# Patient Record
Sex: MALE | Race: WHITE | NOT HISPANIC OR LATINO | Employment: OTHER | URBAN - METROPOLITAN AREA
[De-identification: names, ages, dates, MRNs, and addresses within clinical notes are randomized per-mention and may not be internally consistent; named-entity substitution may affect disease eponyms.]

---

## 2021-03-28 ENCOUNTER — HOSPITAL ENCOUNTER (EMERGENCY)
Facility: HOSPITAL | Age: 71
Discharge: HOME | End: 2021-03-28
Attending: EMERGENCY MEDICINE
Payer: MEDICARE

## 2021-03-28 VITALS
WEIGHT: 170 LBS | TEMPERATURE: 97.4 F | RESPIRATION RATE: 20 BRPM | OXYGEN SATURATION: 96 % | DIASTOLIC BLOOD PRESSURE: 81 MMHG | HEART RATE: 82 BPM | SYSTOLIC BLOOD PRESSURE: 161 MMHG

## 2021-03-28 DIAGNOSIS — R31.29 OTHER MICROSCOPIC HEMATURIA: ICD-10-CM

## 2021-03-28 DIAGNOSIS — I10 HYPERTENSION, UNSPECIFIED TYPE: ICD-10-CM

## 2021-03-28 DIAGNOSIS — R34 DECREASED URINATION: Primary | ICD-10-CM

## 2021-03-28 LAB
ANION GAP SERPL CALCULATED.3IONS-SCNC: 3 MMOL/L (ref 4–13)
BACTERIA UR QL AUTO: ABNORMAL /HPF
BILIRUB UR QL STRIP: NEGATIVE
BUN SERPL-MCNC: 18 MG/DL (ref 5–25)
CALCIUM SERPL-MCNC: 9.2 MG/DL (ref 8.3–10.1)
CHLORIDE SERPL-SCNC: 106 MMOL/L (ref 100–108)
CLARITY UR: ABNORMAL
CO2 SERPL-SCNC: 32 MMOL/L (ref 21–32)
COLOR UR: YELLOW
CREAT SERPL-MCNC: 1.08 MG/DL (ref 0.6–1.3)
GFR SERPL CREATININE-BSD FRML MDRD: 70 ML/MIN/1.73SQ M
GLUCOSE SERPL-MCNC: 112 MG/DL (ref 65–140)
GLUCOSE UR STRIP-MCNC: NEGATIVE MG/DL
HGB UR QL STRIP.AUTO: ABNORMAL
KETONES UR STRIP-MCNC: NEGATIVE MG/DL
LEUKOCYTE ESTERASE UR QL STRIP: NEGATIVE
MUCOUS THREADS UR QL AUTO: ABNORMAL
NITRITE UR QL STRIP: POSITIVE
NON-SQ EPI CELLS URNS QL MICRO: ABNORMAL /HPF
PH UR STRIP.AUTO: 7 [PH]
POTASSIUM SERPL-SCNC: 4.9 MMOL/L (ref 3.5–5.3)
PROT UR STRIP-MCNC: ABNORMAL MG/DL
RBC #/AREA URNS AUTO: ABNORMAL /HPF
SODIUM SERPL-SCNC: 141 MMOL/L (ref 136–145)
SP GR UR STRIP.AUTO: >=1.03 (ref 1–1.03)
UROBILINOGEN UR QL STRIP.AUTO: 0.2 E.U./DL
WBC #/AREA URNS AUTO: ABNORMAL /HPF

## 2021-03-28 PROCEDURE — 81001 URINALYSIS AUTO W/SCOPE: CPT | Performed by: EMERGENCY MEDICINE

## 2021-03-28 PROCEDURE — 99282 EMERGENCY DEPT VISIT SF MDM: CPT | Performed by: EMERGENCY MEDICINE

## 2021-03-28 PROCEDURE — 99283 EMERGENCY DEPT VISIT LOW MDM: CPT

## 2021-03-28 PROCEDURE — 36415 COLL VENOUS BLD VENIPUNCTURE: CPT | Performed by: EMERGENCY MEDICINE

## 2021-03-28 PROCEDURE — 80048 BASIC METABOLIC PNL TOTAL CA: CPT | Performed by: EMERGENCY MEDICINE

## 2021-03-28 PROCEDURE — 87086 URINE CULTURE/COLONY COUNT: CPT | Performed by: EMERGENCY MEDICINE

## 2021-03-28 RX ORDER — TAMSULOSIN HYDROCHLORIDE 0.4 MG/1
0.4 CAPSULE ORAL
Qty: 30 CAPSULE | Refills: 6 | Status: SHIPPED | OUTPATIENT
Start: 2021-03-28 | End: 2021-04-07

## 2021-03-28 RX ORDER — TAMSULOSIN HYDROCHLORIDE 0.4 MG/1
0.4 CAPSULE ORAL ONCE
Status: COMPLETED | OUTPATIENT
Start: 2021-03-28 | End: 2021-03-28

## 2021-03-28 RX ADMIN — TAMSULOSIN HYDROCHLORIDE 0.4 MG: 0.4 CAPSULE ORAL at 08:53

## 2021-03-28 NOTE — ED PROVIDER NOTES
History  Chief Complaint   Patient presents with    Urinary Retention     pt c/o urinary retention starting last night     78 yr male- with hx of decreasing urine stream -- no no meds- c/op since last night- with  Urinary urgency withg dribbling ---   No abd/ flank pain -- no fevers/ no n/v- no suprpubic pressure- pain -- no dysuria- no low back pain- weight loss--       History provided by:  Patient   used: No        None       Past Medical History:   Diagnosis Date    Hypertension        Past Surgical History:   Procedure Laterality Date    HERNIA REPAIR         History reviewed  No pertinent family history  I have reviewed and agree with the history as documented  E-Cigarette/Vaping     E-Cigarette/Vaping Substances     Social History     Tobacco Use    Smoking status: Former Smoker    Tobacco comment: quit Wednesday   Substance Use Topics    Alcohol use: Never     Frequency: Never    Drug use: Never       Review of Systems   Constitutional: Negative  HENT: Negative  Eyes: Negative  Respiratory: Negative  Cardiovascular: Negative  Gastrointestinal: Negative  Endocrine: Negative  Genitourinary: Positive for decreased urine volume  Negative for difficulty urinating, discharge, dysuria, enuresis, flank pain, frequency, genital sores, hematuria, penile pain, penile swelling, scrotal swelling, testicular pain and urgency  Musculoskeletal: Negative  Skin: Negative  Allergic/Immunologic: Negative  Neurological: Negative  Hematological: Negative  Psychiatric/Behavioral: Negative  Physical Exam  Physical Exam  Vitals signs and nursing note reviewed  Constitutional:       General: He is not in acute distress  Appearance: Normal appearance  He is not ill-appearing, toxic-appearing or diaphoretic        Comments: avss-- htnsive- pulse ox  96 % on ra- interpretation is normal- no intervention- well appearing- in nad    HENT:      Head: Normocephalic and atraumatic  Nose: Nose normal       Mouth/Throat:      Mouth: Mucous membranes are moist    Eyes:      General: No scleral icterus  Right eye: No discharge  Left eye: No discharge  Extraocular Movements: Extraocular movements intact  Conjunctiva/sclera: Conjunctivae normal       Pupils: Pupils are equal, round, and reactive to light  Comments: Mm pink   Neck:      Musculoskeletal: Normal range of motion and neck supple  No neck rigidity or muscular tenderness  Vascular: No carotid bruit  Comments: No pmt c/t/l/s spine   Cardiovascular:      Rate and Rhythm: Normal rate and regular rhythm  Pulses: Normal pulses  Heart sounds: Normal heart sounds  No murmur  No friction rub  No gallop  Pulmonary:      Effort: Pulmonary effort is normal  No respiratory distress  Breath sounds: Normal breath sounds  No stridor  No wheezing, rhonchi or rales  Chest:      Chest wall: No tenderness  Abdominal:      General: Bowel sounds are normal  There is no distension  Palpations: Abdomen is soft  There is no mass  Tenderness: There is no abdominal tenderness  There is no right CVA tenderness, left CVA tenderness, guarding or rebound  Hernia: No hernia is present  Comments: Soft nt/nd- no hsm- no cva tenderness- no peritoneal signs-- no pulsatile abd mass/bruit/ tenderness   Genitourinary:     Comments: enlarged- nt- non boggy prostate- no masses  Musculoskeletal: Normal range of motion  General: No swelling, tenderness, deformity or signs of injury  Right lower leg: Edema present  Left lower leg: Edema present  Comments: Trace ble pretibial edema- nt- no asym/ erythema   Lymphadenopathy:      Cervical: No cervical adenopathy  Skin:     General: Skin is warm  Capillary Refill: Capillary refill takes less than 2 seconds  Coloration: Skin is not jaundiced or pale        Findings: No bruising, erythema or lesion  Neurological:      General: No focal deficit present  Mental Status: He is alert and oriented to person, place, and time  Mental status is at baseline  Cranial Nerves: No cranial nerve deficit  Sensory: No sensory deficit  Motor: No weakness  Coordination: Coordination normal       Gait: Gait normal       Comments: Non focal neuro exam    Psychiatric:         Mood and Affect: Mood normal          Behavior: Behavior normal          Vital Signs  ED Triage Vitals   Temperature Pulse Respirations Blood Pressure SpO2   03/28/21 0710 03/28/21 0710 03/28/21 0710 03/28/21 0710 03/28/21 0711   (!) 97 4 °F (36 3 °C) 82 20 161/81 96 %      Temp Source Heart Rate Source Patient Position - Orthostatic VS BP Location FiO2 (%)   03/28/21 0710 03/28/21 0710 -- -- --   Oral Monitor         Pain Score       --                  Vitals:    03/28/21 0710 03/28/21 0715   BP: 161/81 161/81   Pulse: 82          Visual Acuity      ED Medications  Medications - No data to display    Diagnostic Studies  Results Reviewed     Procedure Component Value Units Date/Time    UA (URINE) with reflex to Scope [[id number]] Collected: 03/28/21 0743    Lab Status: In process Specimen: Urine, Clean Catch Updated: 03/28/21 3001    Basic metabolic panel [[id number]] Collected: 03/28/21 0742    Lab Status:  In process Specimen: Blood from Arm, Right Updated: 03/28/21 0746                 No orders to display              Procedures  Procedures         ED Course  ED Course as of Apr 01 1250   Sun Mar 28, 2021   0750 Er md note- bladder scan 33 ml       0849 Er md note- bladder u/s by  er md- non enlarged bladder-  with prostate indenting bladder- no  free fluid- printer out of paper                                              MDM    Disposition  Final diagnoses:   None     ED Disposition     None      Follow-up Information    None         Patient's Medications    No medications on file     No discharge procedures on file     PDMP Review     None          ED Provider  Electronically Signed by           Nisha Freeman MD  04/01/21 4305

## 2021-03-28 NOTE — DISCHARGE INSTRUCTIONS
Decreased urination-- likely do to enlarged prostate / elevated blood p ressure-  160/80/ microscopic hematuria -blood in urine    - flomax- 1 capsule once a day --  can cause low blood pressure  upon standing- causing dizziness/ lightheadedness- so no fast movements- position changes     - please call the internal medicine clinic on Monday to schedule an appointment-- -- within 1 week - you will need your blood pressure rechecked and to see if the medicine is improving your urine stream - they might want to send y ou to a urologist     - please return to  the er for any fevers- temp > 100 4/ any flank pain/ persistent vomiting- these wound be signs of an kidney infection  or any decreasing urination with increasing lower abdominal/bladder area pain/ pressure/ distention- this would be urinary retention

## 2021-03-29 LAB — BACTERIA UR CULT: NORMAL

## 2023-01-01 ENCOUNTER — NURSING HOME VISIT (OUTPATIENT)
Dept: WOUND CARE | Facility: HOSPITAL | Age: 73
End: 2023-01-01

## 2023-01-01 ENCOUNTER — NURSING HOME VISIT (OUTPATIENT)
Dept: GERIATRICS | Facility: OTHER | Age: 73
End: 2023-01-01

## 2023-01-01 ENCOUNTER — TELEPHONE (OUTPATIENT)
Dept: OTHER | Facility: OTHER | Age: 73
End: 2023-01-01

## 2023-01-01 VITALS — WEIGHT: 123.2 LBS | BODY MASS INDEX: 19.89 KG/M2

## 2023-01-01 VITALS
DIASTOLIC BLOOD PRESSURE: 60 MMHG | RESPIRATION RATE: 18 BRPM | HEART RATE: 73 BPM | SYSTOLIC BLOOD PRESSURE: 100 MMHG | TEMPERATURE: 97.9 F | OXYGEN SATURATION: 96 %

## 2023-01-01 DIAGNOSIS — G93.89 BRAIN MASS: ICD-10-CM

## 2023-01-01 DIAGNOSIS — R53.81 PHYSICAL DECONDITIONING: ICD-10-CM

## 2023-01-01 DIAGNOSIS — C34.91 NON-SMALL CELL CARCINOMA OF RIGHT LUNG (HCC): ICD-10-CM

## 2023-01-01 DIAGNOSIS — I10 HYPERTENSION, UNSPECIFIED TYPE: ICD-10-CM

## 2023-01-01 DIAGNOSIS — R53.81 PHYSICAL DECONDITIONING: Primary | ICD-10-CM

## 2023-01-01 DIAGNOSIS — R26.2 AMBULATORY DYSFUNCTION: ICD-10-CM

## 2023-01-01 DIAGNOSIS — C34.91 NON-SMALL CELL CARCINOMA OF RIGHT LUNG (HCC): Primary | ICD-10-CM

## 2023-01-01 DIAGNOSIS — E43 SEVERE PROTEIN-CALORIE MALNUTRITION (HCC): ICD-10-CM

## 2023-01-01 DIAGNOSIS — T14.8XXA ABRASION: Primary | ICD-10-CM

## 2023-01-01 DIAGNOSIS — J96.01 ACUTE RESPIRATORY FAILURE WITH HYPOXIA (HCC): ICD-10-CM

## 2023-01-01 RX ORDER — LORAZEPAM 2 MG/ML
0.25 CONCENTRATE ORAL EVERY 2 HOUR PRN
COMMUNITY

## 2023-01-01 RX ORDER — ACETAMINOPHEN 650 MG/1
650 SUPPOSITORY RECTAL EVERY 4 HOURS PRN
COMMUNITY

## 2023-01-01 RX ORDER — MORPHINE SULFATE 20 MG/5ML
0.25 SOLUTION ORAL EVERY 2 HOUR PRN
COMMUNITY

## 2023-03-13 ENCOUNTER — HOSPITAL ENCOUNTER (EMERGENCY)
Facility: HOSPITAL | Age: 73
End: 2023-03-14
Attending: EMERGENCY MEDICINE

## 2023-03-13 DIAGNOSIS — C79.31 METASTASIS TO BRAIN (HCC): ICD-10-CM

## 2023-03-13 DIAGNOSIS — R27.0 ATAXIA: ICD-10-CM

## 2023-03-13 DIAGNOSIS — R91.8 MASS OF RIGHT LUNG: Primary | ICD-10-CM

## 2023-03-13 DIAGNOSIS — R47.9 SPEECH DISTURBANCE: ICD-10-CM

## 2023-03-13 LAB
BASOPHILS # BLD AUTO: 0.04 THOUSANDS/ÂΜL (ref 0–0.1)
BASOPHILS NFR BLD AUTO: 0 % (ref 0–1)
EOSINOPHIL # BLD AUTO: 0.02 THOUSAND/ÂΜL (ref 0–0.61)
EOSINOPHIL NFR BLD AUTO: 0 % (ref 0–6)
ERYTHROCYTE [DISTWIDTH] IN BLOOD BY AUTOMATED COUNT: 14.3 % (ref 11.6–15.1)
HCT VFR BLD AUTO: 35.4 % (ref 36.5–49.3)
HGB BLD-MCNC: 11.1 G/DL (ref 12–17)
IMM GRANULOCYTES # BLD AUTO: 0.04 THOUSAND/UL (ref 0–0.2)
IMM GRANULOCYTES NFR BLD AUTO: 0 % (ref 0–2)
LYMPHOCYTES # BLD AUTO: 1.05 THOUSANDS/ÂΜL (ref 0.6–4.47)
LYMPHOCYTES NFR BLD AUTO: 11 % (ref 14–44)
MCH RBC QN AUTO: 27.3 PG (ref 26.8–34.3)
MCHC RBC AUTO-ENTMCNC: 31.4 G/DL (ref 31.4–37.4)
MCV RBC AUTO: 87 FL (ref 82–98)
MONOCYTES # BLD AUTO: 0.98 THOUSAND/ÂΜL (ref 0.17–1.22)
MONOCYTES NFR BLD AUTO: 10 % (ref 4–12)
NEUTROPHILS # BLD AUTO: 7.89 THOUSANDS/ÂΜL (ref 1.85–7.62)
NEUTS SEG NFR BLD AUTO: 79 % (ref 43–75)
NRBC BLD AUTO-RTO: 0 /100 WBCS
PLATELET # BLD AUTO: 370 THOUSANDS/UL (ref 149–390)
PMV BLD AUTO: 10 FL (ref 8.9–12.7)
RBC # BLD AUTO: 4.07 MILLION/UL (ref 3.88–5.62)
WBC # BLD AUTO: 10.02 THOUSAND/UL (ref 4.31–10.16)

## 2023-03-14 ENCOUNTER — APPOINTMENT (EMERGENCY)
Dept: CT IMAGING | Facility: HOSPITAL | Age: 73
End: 2023-03-14

## 2023-03-14 ENCOUNTER — APPOINTMENT (EMERGENCY)
Dept: RADIOLOGY | Facility: HOSPITAL | Age: 73
End: 2023-03-14

## 2023-03-14 ENCOUNTER — HOSPITAL ENCOUNTER (INPATIENT)
Facility: HOSPITAL | Age: 73
LOS: 5 days | Discharge: HOME HEALTH | End: 2023-03-19
Attending: EMERGENCY MEDICINE | Admitting: EMERGENCY MEDICINE

## 2023-03-14 ENCOUNTER — APPOINTMENT (EMERGENCY)
Dept: MRI IMAGING | Facility: HOSPITAL | Age: 73
End: 2023-03-14

## 2023-03-14 VITALS
DIASTOLIC BLOOD PRESSURE: 86 MMHG | HEART RATE: 78 BPM | RESPIRATION RATE: 18 BRPM | SYSTOLIC BLOOD PRESSURE: 131 MMHG | OXYGEN SATURATION: 98 % | TEMPERATURE: 98.1 F

## 2023-03-14 DIAGNOSIS — G93.89 BRAIN MASS: Primary | ICD-10-CM

## 2023-03-14 DIAGNOSIS — R91.8 LUNG MASS: ICD-10-CM

## 2023-03-14 DIAGNOSIS — R91.8 RIGHT LOWER LOBE LUNG MASS: ICD-10-CM

## 2023-03-14 PROBLEM — I10 HYPERTENSION: Status: ACTIVE | Noted: 2023-03-14

## 2023-03-14 LAB
2HR DELTA HS TROPONIN: -1 NG/L
ALBUMIN SERPL BCP-MCNC: 3.4 G/DL (ref 3.5–5)
ALP SERPL-CCNC: 53 U/L (ref 34–104)
ALT SERPL W P-5'-P-CCNC: 8 U/L (ref 7–52)
ANION GAP SERPL CALCULATED.3IONS-SCNC: 10 MMOL/L (ref 4–13)
AST SERPL W P-5'-P-CCNC: 9 U/L (ref 13–39)
BACTERIA UR QL AUTO: ABNORMAL /HPF
BILIRUB SERPL-MCNC: 0.44 MG/DL (ref 0.2–1)
BILIRUB UR QL STRIP: NEGATIVE
BUN SERPL-MCNC: 20 MG/DL (ref 5–25)
CALCIUM ALBUM COR SERPL-MCNC: 10.4 MG/DL (ref 8.3–10.1)
CALCIUM SERPL-MCNC: 9.9 MG/DL (ref 8.4–10.2)
CARDIAC TROPONIN I PNL SERPL HS: 7 NG/L
CARDIAC TROPONIN I PNL SERPL HS: 8 NG/L
CHLORIDE SERPL-SCNC: 101 MMOL/L (ref 96–108)
CLARITY UR: CLEAR
CO2 SERPL-SCNC: 25 MMOL/L (ref 21–32)
COLOR UR: ABNORMAL
CREAT SERPL-MCNC: 0.85 MG/DL (ref 0.6–1.3)
GFR SERPL CREATININE-BSD FRML MDRD: 88 ML/MIN/1.73SQ M
GLUCOSE SERPL-MCNC: 108 MG/DL (ref 65–140)
GLUCOSE UR STRIP-MCNC: NEGATIVE MG/DL
HGB UR QL STRIP.AUTO: NEGATIVE
KETONES UR STRIP-MCNC: NEGATIVE MG/DL
LEUKOCYTE ESTERASE UR QL STRIP: ABNORMAL
MAGNESIUM SERPL-MCNC: 2.1 MG/DL (ref 1.9–2.7)
MUCOUS THREADS UR QL AUTO: ABNORMAL
NITRITE UR QL STRIP: NEGATIVE
NON-SQ EPI CELLS URNS QL MICRO: ABNORMAL /HPF
PH UR STRIP.AUTO: 6 [PH]
POTASSIUM SERPL-SCNC: 3.8 MMOL/L (ref 3.5–5.3)
PROT SERPL-MCNC: 6.6 G/DL (ref 6.4–8.4)
PROT UR STRIP-MCNC: ABNORMAL MG/DL
RBC #/AREA URNS AUTO: ABNORMAL /HPF
SODIUM SERPL-SCNC: 136 MMOL/L (ref 135–147)
SP GR UR STRIP.AUTO: 1.04 (ref 1–1.03)
TSH SERPL DL<=0.05 MIU/L-ACNC: 2.13 UIU/ML (ref 0.45–4.5)
UROBILINOGEN UR STRIP-ACNC: <2 MG/DL
WBC #/AREA URNS AUTO: ABNORMAL /HPF

## 2023-03-14 RX ORDER — LABETALOL HYDROCHLORIDE 5 MG/ML
10 INJECTION, SOLUTION INTRAVENOUS EVERY 6 HOURS PRN
Status: DISCONTINUED | OUTPATIENT
Start: 2023-03-14 | End: 2023-03-19 | Stop reason: HOSPADM

## 2023-03-14 RX ORDER — DEXAMETHASONE SODIUM PHOSPHATE 4 MG/ML
10 INJECTION, SOLUTION INTRA-ARTICULAR; INTRALESIONAL; INTRAMUSCULAR; INTRAVENOUS; SOFT TISSUE EVERY 6 HOURS SCHEDULED
Status: DISCONTINUED | OUTPATIENT
Start: 2023-03-14 | End: 2023-03-14 | Stop reason: HOSPADM

## 2023-03-14 RX ORDER — AMOXICILLIN 250 MG
1 CAPSULE ORAL
Status: DISCONTINUED | OUTPATIENT
Start: 2023-03-14 | End: 2023-03-19 | Stop reason: HOSPADM

## 2023-03-14 RX ORDER — DEXAMETHASONE SODIUM PHOSPHATE 4 MG/ML
4 INJECTION, SOLUTION INTRA-ARTICULAR; INTRALESIONAL; INTRAMUSCULAR; INTRAVENOUS; SOFT TISSUE EVERY 6 HOURS SCHEDULED
Status: DISCONTINUED | OUTPATIENT
Start: 2023-03-14 | End: 2023-03-19 | Stop reason: HOSPADM

## 2023-03-14 RX ORDER — DEXAMETHASONE SODIUM PHOSPHATE 4 MG/ML
10 INJECTION, SOLUTION INTRA-ARTICULAR; INTRALESIONAL; INTRAMUSCULAR; INTRAVENOUS; SOFT TISSUE ONCE
Status: COMPLETED | OUTPATIENT
Start: 2023-03-14 | End: 2023-03-14

## 2023-03-14 RX ORDER — LEVETIRACETAM 500 MG/1
500 TABLET ORAL EVERY 12 HOURS SCHEDULED
Status: DISCONTINUED | OUTPATIENT
Start: 2023-03-14 | End: 2023-03-19 | Stop reason: HOSPADM

## 2023-03-14 RX ORDER — POLYETHYLENE GLYCOL 3350 17 G/17G
17 POWDER, FOR SOLUTION ORAL DAILY
Status: DISCONTINUED | OUTPATIENT
Start: 2023-03-14 | End: 2023-03-19 | Stop reason: HOSPADM

## 2023-03-14 RX ORDER — ATORVASTATIN CALCIUM 40 MG/1
40 TABLET, FILM COATED ORAL EVERY EVENING
Status: DISCONTINUED | OUTPATIENT
Start: 2023-03-14 | End: 2023-03-19 | Stop reason: HOSPADM

## 2023-03-14 RX ADMIN — GADOBUTROL 7.5 ML: 604.72 INJECTION INTRAVENOUS at 12:15

## 2023-03-14 RX ADMIN — POLYETHYLENE GLYCOL 3350 17 G: 17 POWDER, FOR SOLUTION ORAL at 15:37

## 2023-03-14 RX ADMIN — LEVETIRACETAM 500 MG: 500 TABLET, FILM COATED ORAL at 22:21

## 2023-03-14 RX ADMIN — DEXAMETHASONE SODIUM PHOSPHATE 10 MG: 4 INJECTION, SOLUTION INTRAMUSCULAR; INTRAVENOUS at 08:56

## 2023-03-14 RX ADMIN — SENNOSIDES AND DOCUSATE SODIUM 1 TABLET: 8.6; 5 TABLET ORAL at 22:21

## 2023-03-14 RX ADMIN — IOHEXOL 100 ML: 350 INJECTION, SOLUTION INTRAVENOUS at 01:28

## 2023-03-14 RX ADMIN — ATORVASTATIN CALCIUM 40 MG: 40 TABLET, FILM COATED ORAL at 17:18

## 2023-03-14 RX ADMIN — DEXAMETHASONE SODIUM PHOSPHATE 4 MG: 4 INJECTION INTRA-ARTICULAR; INTRALESIONAL; INTRAMUSCULAR; INTRAVENOUS; SOFT TISSUE at 17:18

## 2023-03-14 RX ADMIN — DEXAMETHASONE SODIUM PHOSPHATE 10 MG: 4 INJECTION INTRA-ARTICULAR; INTRALESIONAL; INTRAMUSCULAR; INTRAVENOUS; SOFT TISSUE at 02:16

## 2023-03-14 RX ADMIN — LEVETIRACETAM 1500 MG: 100 INJECTION, SOLUTION INTRAVENOUS at 02:31

## 2023-03-14 NOTE — H&P
H&P Exam - [address]  Winsome Perez 81 y o  male MRN: [id number]  Unit/Bed#: ICU 05 Encounter: [id number]      -------------------------------------------------------------------------------------------------------------  Chief Complaint: Ambulatory dysfunction    History of Present Illness   HX and PE limited by: 81year-old male patient with past medical history of BPH, hypertension who presented to [address] [place] [address] ED with ambulatory dysfunction for the last 5 days  Patient reported having fall 5 days ago  Patient denied head strike however he did reported striking his chest with right-sided rib pain  Patient reported feeling of imbalance in the last few days but denied lightheadedness or dizziness  Patient family reported that patient had some slurred speech  Patient is denying any weakness, sensory loss, chest pain, headache, visual changes, diarrhea  Patient also complaining of constipation over the last few days but denied blood in her stool  Patient heart rate in the ED was slightly bradycardia in the 50s  In the ED patient exam showed mild ataxia with both upper and lower extremity ataxia  With both legs are approximately week  Labs was done in the ED including TSH was normal, magnesium was normal, UA was unremarkable, troponin was negative, CMP was unremarkable, CBC with hemoglobin of 11 1  CT head showed multiple target appearing intracranial lesions suspected described with associated stranding edema, primary differential consideration including intracranial metastatic disease versus infection/abscess depending on the clinical setting  MRI brain showed multiple intracranial enhancing lesions are identified total of 5, consistent with endocrine metastasis  Several of these demonstrate focal surrounding positioning with localized mass effect, most prominently the right cerebellar lesion    CT chest/abdomen/pelvis showed right lower lobe mass extending along the fissure to the right middle and upper lobes and right hilum, measuring 10 x 12 cm consistent with lung malignancy  Left lower lobe subpleural 9 mm lung pulmonary nodule, intermediate concerning for malignancy  Few airspace opacities at the base of the right lower lobe and the right middle lobe may represent pneumonia and/or aspiration  Patient was transferred [facility] for further workup and management  On exam patient was not able to walk by himself and he has an ataxia in his 4 limbs  Patient is current smoker and used to smoke a pack a day for 45 years however currently he smokes 2 to 3 cigarettes/day  Patient quit alcohol 15 years ago  Patient decision-maker is either the wife or daughter  Patient is level 2 CODE STATUS  History obtained from the patient   -------------------------------------------------------------------------------------------------------------  Assessment and Plan:    Neuro:   • Diagnosis: Multiple intracranial enhancing lesions of the brain suspecting brain metastasis  • Patient is presenting with imbalance and ambulatory dysfunction for the last 5 days  • Patient had recent fall 5 days ago with no head strike  • On exam patient has ataxic gait  • 03/14 CT head showed multiple target appearing intracranial lesions suspected described with associated stranding edema, primary differential consideration including intracranial metastatic disease versus infection/abscess depending on the clinical setting  • 03/14 MRI brain showed multiple intracranial enhancing lesions are identified total of 5, consistent with endocrine metastasis  Several of these demonstrate focal surrounding positioning with localized mass effect, most prominently the right cerebellar lesion  • CT chest/abdomen/pelvis showed right lower lobe mass extending along the fissure to the right middle and upper lobes and right hilum, measuring 10 x 12 cm consistent with lung malignancy    Left lower lobe subpleural 9 mm lung pulmonary nodule, intermediate concerning for malignancy  Few airspace opacities at the base of the right lower lobe and the right middle lobe may represent pneumonia and/or aspiration  o Plan:   o Consult palliative care  o Consult neurosurgery   o possible consult pulmonary   o Consult PT/OT  o Fall precautions  o Neuro check every 4 hours  o Decadron 4 mg IV every 6 hours       CV:   • Diagnosis: Hypertension  • Patient has a history of hypertension   • Patient is not currently on any medication at home  • patient blood pressure in the ED was normotensive  o Plan: atorvastatin 40 mg daily  o Labetalol 10 mg IV PRN every 6 hours      Pulm:  o Diagnosis: Right lower lobe lung mass suspecting malignancy  o CT chest/abdomen/pelvis showed right lower lobe mass extending along the fissure to the right middle and upper lobes and right hilum, measuring 10 x 12 cm consistent with lung malignancy  Left lower lobe subpleural 9 mm lung pulmonary nodule, intermediate concerning for malignancy  Few airspace opacities at the base of the right lower lobe and the right middle lobe may represent pneumonia and/or aspiration  o Plan: See the plan under brain mets      GI:   • Diagnosis: Constipation  o Plan: We will start patient on bowel regimen      :   • Diagnosis: BPH  o Plan:  We will continue tamsulosin as well medication      F/E/N:   • Plan: Patient has not currently on fluid  • Regular diet      Heme/Onc:   • Diagnosis: No acute issues      Endo:   o Diagnosis: No acute issues      ID:   o Diagnosis: No acute issues      MSK/Skin:   • Diagnosis: Ambulatory dysfunction  • Plan: Fall precautions  • PT/OT evaluation and treatment      Disposition: Admit to Critical Care   Code Status: Level 2 - DNAR: but accepts endotracheal intubation  --------------------------------------------------------------------------------------------------------------  Review of Systems   Constitutional: Negative for activity change, appetite change, chills, diaphoresis, fatigue and fever  HENT: Negative for congestion, drooling, facial swelling, hearing loss, nosebleeds, sinus pain, sneezing, tinnitus and voice change  Eyes: Negative for photophobia, pain, discharge, redness and itching  Respiratory: Negative for apnea, cough, choking, chest tightness, shortness of breath, wheezing and stridor  Cardiovascular: Negative for chest pain, palpitations and leg swelling  Gastrointestinal: Positive for constipation  Negative for abdominal distention, abdominal pain, diarrhea, rectal pain and vomiting  Endocrine: Negative for cold intolerance, heat intolerance, polydipsia, polyphagia and polyuria  Genitourinary: Negative for decreased urine volume, difficulty urinating, dysuria, enuresis, flank pain, frequency, hematuria and urgency  Musculoskeletal: Positive for gait problem  Negative for arthralgias, back pain, joint swelling, myalgias, neck pain and neck stiffness  Skin: Negative for color change, pallor and rash  Neurological: Negative for dizziness, seizures, syncope, facial asymmetry, weakness, light-headedness, numbness and headaches  Psychiatric/Behavioral: Negative for agitation, confusion, hallucinations and self-injury  A 12-point, complete review of systems was reviewed and negative except as stated above     Physical Exam  Constitutional:       General: He is not in acute distress  Appearance: Normal appearance  He is not ill-appearing, toxic-appearing or diaphoretic  HENT:      Head: Normocephalic and atraumatic  Nose: Nose normal  No congestion or rhinorrhea  Mouth/Throat:      Mouth: Mucous membranes are moist       Pharynx: No oropharyngeal exudate or posterior oropharyngeal erythema  Eyes:      General: No scleral icterus  Right eye: No discharge  Left eye: No discharge  Extraocular Movements: Extraocular movements intact        Conjunctiva/sclera: Conjunctivae normal  Pupils: Pupils are equal, round, and reactive to light  Neck:      Vascular: No carotid bruit  Cardiovascular:      Rate and Rhythm: Normal rate and regular rhythm  Pulses: Normal pulses  Heart sounds: Normal heart sounds  No murmur heard  No friction rub  No gallop  Pulmonary:      Effort: Pulmonary effort is normal  No respiratory distress  Breath sounds: Normal breath sounds  No stridor  No wheezing, rhonchi or rales  Chest:      Chest wall: No tenderness  Abdominal:      General: Abdomen is flat  Bowel sounds are normal  There is no distension  Palpations: Abdomen is soft  There is no mass  Tenderness: There is no abdominal tenderness  There is no right CVA tenderness, left CVA tenderness, guarding or rebound  Hernia: No hernia is present  Musculoskeletal:         General: No swelling, tenderness, deformity or signs of injury  Normal range of motion  Cervical back: Normal range of motion and neck supple  No rigidity or tenderness  Right lower leg: No edema  Left lower leg: No edema  Lymphadenopathy:      Cervical: No cervical adenopathy  Skin:     General: Skin is warm and dry  Coloration: Skin is not jaundiced or pale  Findings: No bruising, erythema, lesion or rash  Neurological:      General: No focal deficit present  Mental Status: He is alert and oriented to person, place, and time  Cranial Nerves: No cranial nerve deficit  Sensory: No sensory deficit  Motor: No weakness  Coordination: Coordination normal       Gait: Gait abnormal (Ataxic)        Deep Tendon Reflexes: Reflexes normal    Psychiatric:         Mood and Affect: Mood normal          Behavior: Behavior normal          Judgment: Judgment normal          --------------------------------------------------------------------------------------------------------------  Vitals:   Vitals:    03/14/23 1350 03/14/23 1400   BP: 127/72 119/70   Pulse: 92 90   Resp: 18 20   Temp: 98 5 °F (36 9 °C)    TempSrc: Oral    SpO2: 98% 98%   Weight: 61 2 kg (134 lb 14 7 oz)    Height: 5' 6" (1 676 m)      Temp  Min: 98 1 °F (36 7 °C)  Max: 98 5 °F (36 9 °C)  IBW (Ideal Body Weight): 63 8 kg  Height: 5' 6" (167 6 cm)  Body mass index is 21 78 kg/m²  N/A    Laboratory and Diagnostics:  Results from last 7 days   Lab Units 03/13/23  2328   WBC Thousand/uL 10 02   HEMOGLOBIN g/dL 11 1*   HEMATOCRIT % 35 4*   PLATELETS Thousands/uL 370   NEUTROS PCT % 79*   MONOS PCT % 10     Results from last 7 days   Lab Units 03/13/23  2328   SODIUM mmol/L 136   POTASSIUM mmol/L 3 8   CHLORIDE mmol/L 101   CO2 mmol/L 25   ANION GAP mmol/L 10   BUN mg/dL 20   CREATININE mg/dL 0 85   CALCIUM mg/dL 9 9   GLUCOSE RANDOM mg/dL 108   ALT U/L 8   AST U/L 9*   ALK PHOS U/L 53   ALBUMIN g/dL 3 4*   TOTAL BILIRUBIN mg/dL 0 44     Results from last 7 days   Lab Units 03/13/23  2328   MAGNESIUM mg/dL 2 1                           EKG: Sinus rhythm with premature supraventricular complex imaging: I have personally reviewed pertinent reports  Historical Information   Past Medical History:   Diagnosis Date   • Hypertension      Past Surgical History:   Procedure Laterality Date   • HERNIA REPAIR       Social History   Social History     Substance and Sexual Activity   Alcohol Use Never     Social History     Substance and Sexual Activity   Drug Use Never     Social History     Tobacco Use   Smoking Status Former   Smokeless Tobacco Not on file   Tobacco Comments    quit Wednesday     Exercise History: None  Family History:   No family history on file    I have reviewed this patient's family history and commented on sigificant items within the HPI      Medications:  Current Facility-Administered Medications   Medication Dose Route Frequency   • atorvastatin (LIPITOR) tablet 40 mg  40 mg Oral QPM   • dexamethasone (DECADRON) injection 4 mg  4 mg Intravenous Q6H [address]   • labetalol (NORMODYNE) injection 10 mg 10 mg Intravenous Q6H PRN     Home medications:  None     Allergies:  No Known Allergies  ------------------------------------------------------------------------------------------------------------  Advance Directive and Living Will:      Power of :    POLST:    ------------------------------------------------------------------------------------------------------------  Anticipated Length of Stay is > 2 midnights    Care Time Delivered:   45 minutes      Delilah Kline DO        Portions of the record may have been created with voice recognition software  Occasional wrong word or "sound a like" substitutions may have occurred due to the inherent limitations of voice recognition software    Read the chart carefully and recognize, using context, where substitutions have occurred

## 2023-03-14 NOTE — CONSULTS
[facility] MERVIN Perez 1950, 81 y o  male MRN: [id number]  Unit/Bed#: ICU 05 Encounter: [id number]  Primary Care Provider: No primary care provider on file  Date and time admitted to hospital: 3/14/2023  1:39 PM    Inpatient consult to Neurosurgery  Consult performed by: Blaire Sandhu PA-C  Consult ordered by: Hilary Holman DO      Imaging personally reviewed with attending 3/14/23 at 3:30pm  Patient examined at bedside 3/14/23 at 3:45pm    Brain mass  Assessment & Plan  Patient presents for evaluation of ambulatory dysfunction x5 days, found to have 12 cm right sided lung mass and 5 brain masses with cerebral edema and mild brain compression  · No prior h/o malignancy  · Daily long time smoker  Admits to coughing up blood in January  · Significant weight loss over several months (used to be 185)  · On exam, subtle right eyelid droop and slurred speech  No dysmetria noted BUE/BLE  GCS 15    Imaging:  · MRI brain w/wo, 3/14/23: Multiple intracranial enhancing lesions are identified, total of 5, consistent with intracranial metastasis  Several of these demonstrate focal surrounding vasogenic with localized mass effect, most prominently the right cerebellar lesion    Plan:  · Continue to monitor neurological exam  · Imaging reviewed with attending   Would recommend WBRT vs SRT to brain lesions depending on pathology  · IR consult for biopsy  · Palliative consult given likely stage IV cancer diagnosis  · Will need medical oncology and radiation oncology consults once tissue diagnosis is made  · Continue decadron 4q6 for cerebral edema  · Continue Keppra 500mg BID for seizure ppx  · Medical management per primary team  · DVT ppx: BETSYs, ok for pharm ppx from [facility] standpoint    Neurosurgery will continue to follow for family discussion tomorrow per patient request  No neurosurgical intervention is anticipated given location of masses and small size - more amenable to radiation  Please call with questions or concerns  History of Present Illness     HPI: Shelia Lai is a 81y o  year old male with PMH including HTN, BPH who presents with complaint of 5 days of ambulatory dysfunction  He states he hasn't been able to walk straight for several days  He bent forward to pick something off of the ground and fell forward, hitting his head  He has not been using a cane / walker  He states that prior to this he had no difficulty with ambulation or falls  He states he is a long term smoker and coughed up blood in January; he assumed he had lung cancer but the blood resolved so he did not seek medical evaluation  He also admits to nearly a 50 pound weight loss over the past few months  Imaging in the ED was significant for a large 12cm right sided lung mass and 5 brain masses, each less than 2 cm  Review of Systems   Constitutional: Positive for unexpected weight change  Negative for chills and fever  HENT: Negative for ear pain and sore throat  Eyes: Negative for pain and visual disturbance  Respiratory: Negative for cough and shortness of breath  Cardiovascular: Negative for chest pain and palpitations  Gastrointestinal: Negative for abdominal pain and vomiting  Genitourinary: Negative for dysuria and hematuria  Musculoskeletal: Positive for gait problem  Negative for arthralgias and back pain  Skin: Negative for color change and rash  Neurological: Negative for seizures and syncope  All other systems reviewed and are negative        Historical Information   Past Medical History:   Diagnosis Date   • Hypertension      Past Surgical History:   Procedure Laterality Date   • HERNIA REPAIR       Social History     Substance and Sexual Activity   Alcohol Use Never     Social History     Substance and Sexual Activity   Drug Use Never     Social History     Tobacco Use   Smoking Status Former   Smokeless Tobacco Not on file   Tobacco Comments quit Wednesday     No family history on file  Meds/Allergies   all current active meds have been reviewed, current meds:   Current Facility-Administered Medications   Medication Dose Route Frequency   • atorvastatin (LIPITOR) tablet 40 mg  40 mg Oral QPM   • dexamethasone (DECADRON) injection 4 mg  4 mg Intravenous Q6H [facility]   • labetalol (NORMODYNE) injection 10 mg  10 mg Intravenous Q6H PRN   • polyethylene glycol (MIRALAX) packet 17 g  17 g Oral Daily   • senna-docusate sodium (SENOKOT S) 8 6-50 mg per tablet 1 tablet  1 tablet Oral HS    and PTA meds:   None     No Known Allergies    Objective   I/O       03/12 0701 03/13 0700 03/13 0701 03/14 0700 03/14 0701  03/15 0700           Unmeasured Stool Occurrence   1 x          Physical Exam  Vitals and nursing note reviewed  Constitutional:       Appearance: Normal appearance  He is well-developed  He is ill-appearing  Comments: Bilateral temporal wasting   HENT:      Head: Normocephalic and atraumatic  Eyes:      Extraocular Movements: Extraocular movements intact  Pupils: Pupils are equal, round, and reactive to light  Cardiovascular:      Rate and Rhythm: Normal rate  Pulmonary:      Effort: Pulmonary effort is normal  No respiratory distress  Abdominal:      Palpations: Abdomen is soft  Musculoskeletal:         General: Normal range of motion  Cervical back: Normal range of motion  Skin:     General: Skin is warm and dry  Neurological:      Mental Status: He is alert and oriented to person, place, and time  Cranial Nerves: Cranial nerves 2-12 are intact  Motor: Motor strength is normal       Coordination: Finger-Nose-Finger Test and Heel to [place] rashid Test normal       Deep Tendon Reflexes:      Reflex Scores:       Brachioradialis reflexes are 2+ on the right side and 2+ on the left side  Patellar reflexes are 2+ on the right side and 2+ on the left side    Psychiatric:         Mood and Affect: Mood normal  Speech: Speech normal          Behavior: Behavior normal          Thought Content: Thought content normal          Judgment: Judgment normal        Neurologic Exam     Mental Status   Oriented to person, place, and time  Follows 2 step commands  Attention: normal  Concentration: normal    Speech: speech is normal (Mild slurred speech)  Level of consciousness: alert  Knowledge: good  Able to perform simple calculations  Able to name object  Able to repeat  Normal comprehension  Cranial Nerves   Cranial nerves II through XII intact  CN III, IV, VI   Pupils are equal, round, and reactive to light  Subtle right eyelid droop     Motor Exam   Muscle bulk: normal  Overall muscle tone: normal  Right arm pronator drift: absent  Left arm pronator drift: absent    Strength   Strength 5/5 throughout  Sensory Exam   Light touch normal      Gait, Coordination, and Reflexes     Coordination   Finger to nose coordination: normal  Heel to shin coordination: normal    Tremor   Resting tremor: absent    Reflexes   Right brachioradialis: 2+  Left brachioradialis: 2+  Right patellar: 2+  Left patellar: 2+  Right Fraire: absent  Left Fraire: absent  Right ankle clonus: absent  Left ankle clonus: absent        Vitals:Blood pressure 138/79, pulse 98, temperature 98 5 °F (36 9 °C), temperature source Oral, resp  rate 20, height 5' 6" (1 676 m), weight 61 2 kg (134 lb 14 7 oz), SpO2 96 %  ,Body mass index is 21 78 kg/m²       Lab Results:   Results from last 7 days   Lab Units 03/13/23  2328   WBC Thousand/uL 10 02   HEMOGLOBIN g/dL 11 1*   HEMATOCRIT % 35 4*   PLATELETS Thousands/uL 370   NEUTROS PCT % 79*   MONOS PCT % 10     Results from last 7 days   Lab Units 03/13/23  2328   POTASSIUM mmol/L 3 8   CHLORIDE mmol/L 101   CO2 mmol/L 25   BUN mg/dL 20   CREATININE mg/dL 0 85   CALCIUM mg/dL 9 9   ALK PHOS U/L 53   ALT U/L 8   AST U/L 9*     Results from last 7 days   Lab Units 03/13/23  2328   MAGNESIUM mg/dL 2 1 No results found for: TROPONINT  ABG:No results found for: PHART, LWF0TFW, PO2ART, LZU0VBJ, B0APTAPE, BEART, SOURCE    Imaging Studies: I have personally reviewed pertinent reports  and I have personally reviewed pertinent films in PACS     CTA head and neck with and without contrast    Result Date: 3/14/2023  Narrative: CTA NECK AND BRAIN WITH gadolinium CONTRAST INDICATION: CVA vs tumor, abnormal CT head COMPARISON:   3/14/2023  TECHNIQUE:  Post contrast imaging was performed after administration of iodinated contrast through the neck and brain  Post contrast axial 0 625 mm images timed to opacify the arterial system  3D rendering was performed on an independent workstation  MIP reconstructions performed  Coronal reconstructions were performed of the noncontrast portion of the brain  Radiation dose length product (DLP) for this visit:  3174 mGy-cm   This examination, like all CT scans performed in the [facility], was performed utilizing techniques to minimize radiation dose exposure, including the use of iterative reconstruction and automated exposure control  IV Contrast:  100 mL of iohexol (OMNIPAQUE)  IMAGE QUALITY:   Diagnostic FINDINGS: Previously demonstrated lesions throughout the cerebral hemispheres and in the posterior fossa demonstrate ring enhancement  Stable surrounding vasogenic edema  CERVICAL VASCULATURE AORTIC ARCH AND GREAT VESSELS:  No atherosclerotic plaque in the aortic arch  No stenosis in the subclavian arteries  RIGHT VERTEBRAL ARTERY CERVICAL SEGMENT:  Normal origin  The vessel is normal in caliber throughout the neck  LEFT VERTEBRAL ARTERY CERVICAL SEGMENT: Origin directly from the aortic arch  No stenosis or dissection  RIGHT EXTRACRANIAL CAROTID SEGMENT:  Normal caliber common carotid artery  Normal bifurcation and cervical internal carotid artery  No stenosis or dissection  LEFT EXTRACRANIAL CAROTID SEGMENT:  Normal caliber common carotid artery  Normal bifurcation and cervical internal carotid artery  No stenosis or dissection  NASCET criteria was used to determine the degree of internal carotid artery diameter stenosis  INTRACRANIAL VASCULATURE INTERNAL CAROTID ARTERIES:  Calcified plaque throughout the carotid siphons without hemodynamically significant stenosis  ANTERIOR CIRCULATION:  Symmetric A1 segments and anterior cerebral arteries with normal enhancement  Normal anterior communicating artery  MIDDLE CEREBRAL ARTERY CIRCULATION:  M1 segment and middle cerebral artery branches demonstrate normal enhancement bilaterally  DISTAL VERTEBRAL ARTERIES:  Normal distal vertebral arteries  Posterior inferior cerebellar arteries are patent  BASILAR ARTERY:  Physiologically small basilar artery  Patent superior cerebellar arteries  POSTERIOR CEREBRAL ARTERIES: Fetal type bilateral posterior communicating arteries  No stenosis  VENOUS STRUCTURES:  Normal  NON VASCULAR ANATOMY BONY STRUCTURES:  No acute osseous abnormality  SOFT TISSUES OF THE NECK:  Right level IIb and 3 lymph nodes measuring up to 1 9 cm  THORACIC INLET:  Partially imaged right upper lobe 9 cm mass  Mild panlobular emphysema  Impression: 1  Enhancing lesions in the cerebral hemispheres and posterior fossa was able surrounding vasogenic edema  Recommend MRI of the brain with gadolinium  2   Right level 2 and 3 lymph nodes measuring up to 1 9 cm, likely metastatic  3   No stenosis, dissection or occlusion of the carotid or vertebral arteries or major vessels of the [place]  Workstation performed: [id number]     XR chest 2 views    Result Date: 3/14/2023  Narrative: CHEST INDICATION:   trauma  COMPARISON:  Subsequent chest CT from today  EXAM PERFORMED/VIEWS:  XR CHEST AP & LATERAL  FINDINGS: Cardiomediastinal silhouette normal  12 cm mass in the right lower lobe  No effusion  Bones normal for age  Upper abdomen normal      Impression: 12 cm right lower lobe mass    See subsequent chest CT  Workstation performed: [id number]     XR abdomen 1 view kub    Result Date: 3/14/2023  Narrative: ABDOMEN INDICATION:   constipation  COMPARISON:  CT chest, abdomen, pelvis 3/14/2023  VIEWS:  AP supine FINDINGS: There is a nonobstructive bowel gas pattern  Moderate colonic stool burden in the ascending colon and rectum  No discernible free air on this supine study  Upright or left lateral decubitus imaging is more sensitive to detect subtle free air in the appropriate setting  No pathologic calcifications or soft tissue masses  Visualized lung bases are clear  Degenerative changes of the bilateral sacroiliac joints  Impression: Nonobstructive bowel gas pattern with a moderate colonic stool burden  Workstation performed: [id number]     CT head without contrast    Result Date: 3/14/2023  Narrative: CT BRAIN - WITHOUT CONTRAST INDICATION:   ataxia, speech disturbance  COMPARISON:  None  TECHNIQUE:  CT examination of the brain was performed  In addition to axial images, sagittal and coronal 2D reformatted images were created and submitted for interpretation  Radiation dose length product (DLP) for this visit:  1022 mGy-cm   This examination, like all CT scans performed in the [facility], was performed utilizing techniques to minimize radiation dose exposure, including the use of iterative reconstruction and automated exposure control  IMAGE QUALITY:  Diagnostic   FINDINGS: PARENCHYMA:  Target appearing intracranial lesions suspected in the right cerebellum measuring approximately 1 9 cm in size (axial image 7, series 301), and the right frontal region measuring approximately 1 7 cm in size  (axial image 31, series 301), in  the posterior left frontal region measuring approximately 1 2 cm in size (axial image 32, series 301 with associated surrounding vasogenic edema; primary differential considerations including intracranial metastatic disease versus infection/abscesses depending on the clinical setting  MRI of the brain with contrast recommended for further evaluation to detect smaller lesions  No significant mass effect or midline shift  No territorial infarction is seen  Gray-white differentiation appears grossly  maintained  Mild parenchymal atrophy  VENTRICLES AND EXTRA-AXIAL SPACES:  Ventricles and extra-axial CSF spaces are prominent commensurate with the degree of volume loss  No hydrocephalus  No acute extra-axial hemorrhage  VISUALIZED ORBITS: Orbits appear intact  PARANASAL SINUSES: Mild mucosal thickening and small amount of fluid in the left maxillary sinus  Cystic area in the right maxillary bone measures approximately 2 cm in size CALVARIUM AND EXTRACRANIAL SOFT TISSUES:  Otherwise grossly unremarkable  Impression: Multiple target appearing intracranial lesions suspected as described with associated vasogenic edema; primary differential considerations including intracranial metastatic disease versus infection/abscesses depending on the clinical setting  MRI of the  brain with contrast recommended for further evaluation and to detect smaller lesions  No significant mass effect or midline shift  Other findings as above  Workstation performed: [id number]     CT chest abdomen pelvis w contrast    Result Date: 3/14/2023  Narrative: CT CHEST, ABDOMEN AND PELVIS WITH IV CONTRAST INDICATION:   Abnormal chest radiograph  COMPARISON:  3/14/2023  TECHNIQUE: CT examination of the chest, abdomen and pelvis was performed  Axial, sagittal, and coronal 2D reformatted images were created from the source data and submitted for interpretation  Radiation dose length product (DLP) for this visit:  462 mGy-cm   This examination, like all CT scans performed in the [facility], was performed utilizing techniques to minimize radiation dose exposure, including the use of iterative reconstruction and automated exposure control   IV Contrast:  100 mL of iohexol (OMNIPAQUE) Enteric Contrast: Enteric contrast was administered  FINDINGS: CHEST LUNGS:  Hypoenhancing right lower lobe mass extending along the fissures into the right middle and upper lobes measuring 10 3 x 11 7 x 12 2 cm  Mass extends to the right hilum  Left lower lobe subpleural 9 mm pulmonary nodule, series 306, image 96  Occlusion of right lower lobe segmental bronchi within the mass  Scattered airspace opacities at the base of the right lower lobe may represent postobstructive pneumonia  Few tiny opacities at the base of the right middle lobe may indicate aspiration  Mild centrilobular and paraseptal emphysema  PLEURA:  No effusion  HEART/GREAT VESSELS: Normal heart size  No thoracic aortic aneurysm or dissection  No evidence of proximal pulmonary artery embolus  Right lower lobe segmental pulmonary arteries are encased by tumor  MEDIASTINUM AND UDAY:  Subcarinal 1 7 cm lymph node  Prevascular 1 cm lymph node  CHEST WALL AND LOWER NECK:  Unremarkable  ABDOMEN LIVER/BILIARY TREE:  Focal area of hypoattenuation in the right lobe of the liver adjacent to the falciform ligament is consistent with focal fat  Mild hepatomegaly GALLBLADDER:  No calcified gallstones  No pericholecystic inflammatory change  SPLEEN:  Unremarkable  PANCREAS:  Unremarkable  ADRENAL GLANDS:  Unremarkable  KIDNEYS/URETERS:  Right renal cysts measure up to 3 8 cm  Left renal cysts measure up to 3 3 cm  Symmetric nephrographic phase enhancement of the kidneys  No obstructive uropathy  STOMACH AND BOWEL:  Large amount stool distends the right colon  Stool distends the rectum  Diverticulosis without evidence of diverticulitis or colitis  APPENDIX:  No findings to suggest appendicitis  ABDOMINOPELVIC CAVITY:  No free intraperitoneal air, fluid collection or lymphadenopathy  VESSELS:  No abdominal aortic aneurysm  PELVIS REPRODUCTIVE ORGANS:  Enlarged prostate making a posterior impression on the bladder   URINARY BLADDER: Calculi at the left base of the bladder  ABDOMINAL WALL/INGUINAL REGIONS:  Unremarkable  OSSEOUS STRUCTURES:  No acute fracture or destructive osseous lesion  Impression: 1  Right lower lobe mass extending along the fissure to the right middle and upper lobes and to the right hilum, measuring 10 x 12 12 cm, consistent with lung malignancy  Recommend pulmonary consultation  2   Left lower lobe subpleural 9 mm pulmonary nodule, indeterminate though concerning for malignancy  3   Few airspace opacities at the base of the right lower lobe and right middle lobe may represent pneumonia and/or aspiration  4   No evidence of malignancy in the abdomen or pelvis  5   Constipation  Workstation performed: [id number]     MRI Brain BT w wo Contrast    Result Date: 3/14/2023  Narrative: MRI BRAIN WITH AND WITHOUT CONTRAST INDICATION: New brain masses  Lung carcinoma  Tumor Type: Lung carcinoma  Suspected brain metastasis  Surgical History: None Radiation History: None COMPARISON:  None  TECHNIQUE: Multiplanar, multisequence imaging of the brain and sella was performed before and after gadolinium administration  IV Contrast:  7 5 mL of Gadobutrol injection (SINGLE-DOSE)  IMAGE QUALITY:   Diagnostic  FINDINGS: BRAIN PARENCHYMA: A total of 5 enhancing lesions are identified within the brain parenchyma  1   Series 14 image 103: Left parafalcine frontal lobe  2   Series 14 image 96: Right anterior lateral frontal lobe 3  Superior cerebellum, series 14 image 53  4   Right lateral cerebellum, series 14 image 27 5  5  Left posterior cerebellum, series 14 image 39  Lesions 1 through 4 measures between 1 and 2 cm in size  Lesion 5, Measures less than 1 cm in size  Vasogenic edema is seen surrounding the larger lesions most prominently within the right cerebellum where there is localized mass effect upon the 4th ventricle and partial effacement of the right perimesencephalic cistern   Arterial spin labeling was performed and lesions #1, 2 and 3 demonstrate an increase in cerebral blood flow  OTHER FINDINGS: The remainder of the brain parenchyma is stable  VENTRICLES:  Normal for the patient's age  SELLA AND PITUITARY GLAND:  Normal  ORBITS:  Normal  PARANASAL SINUSES:  Normal  VASCULATURE:  Evaluation of the major intracranial vasculature demonstrates appropriate flow voids  CALVARIUM AND SKULL BASE:  Normal  EXTRACRANIAL SOFT TISSUES:  Normal      Impression: Multiple intracranial enhancing lesions are identified, total of 5, consistent with intracranial metastasis  Several of these demonstrate focal surrounding vasogenic with localized mass effect, most prominently the right cerebellar lesion  Workstation performed: [id number]     EKG, Pathology, and Other Studies: I have personally reviewed pertinent reports  VTE Prophylaxis: Sequential compression device (Venodyne)     Code Status: Level 2 - DNAR: but accepts endotracheal intubation  Advance Directive and Living Will:      Power of :    POLST:      Counseling / Coordination of Care  I spent 30 minutes with the patient

## 2023-03-14 NOTE — ASSESSMENT & PLAN NOTE
Patient has a history of hypertension   • Patient is not currently on any medication at home  • patient blood pressure in the ED was normotensive  ? Plan: atorvastatin 40 mg daily  ?  Labetalol 10 mg IV PRN every 6 hours

## 2023-03-14 NOTE — ED NOTES
Transfer Information:    Ambulance Squad: Nika Lynne Time: 3455   Destination: [facility] ICU5   Accepting Physician: [place] [id number]   Report Number:          [facility] Marcel, RN  03/14/23 1252

## 2023-03-14 NOTE — SPEECH THERAPY NOTE
Speech-Language Pathology Bedside Swallow Evaluation      Patient Name: Naomi Young    BVDWO'Q Date: 3/14/2023     Problem List  Active Problems:    Brain mass      Past Medical History  Past Medical History:   Diagnosis Date   • Hypertension        Past Surgical History  Past Surgical History:   Procedure Laterality Date   • HERNIA REPAIR         Summary   Pt presented with functional appearing oral and pharyngeal stage swallowing skills with materials administered today  The patient is assessed with puree and regular solids with thin liquids  Bite strength is adequate  Mastication is timely and efficient  The patient has no overt s/s aspiration with thin liquids  Patient admitted on stroke pathway  Speech is min dysarthric, but intelligible during unknown conversation  No oral motor weakness observed  Language skills appear adequate  Risk/s for Aspiration: low      Recommended Diet: regular diet and thin liquids   Recommended Form of Meds: whole with liquid   Aspiration precautions and swallowing strategies: upright posture and small bites/sips  Other Recommendations: Continue frequent oral care    Current Medical Status  History provided by:  Patient and relative   used: No    29-year-old male with history of hypertension, BPH brought for evaluation of ambulatory dysfunction for the last 4 to 5 days  He does report having a fall prior to this but denies head strike  Strike his chest and had reported some right-sided rib pain just with movement but no pain with breathing or dyspnea  He states that he has felt wobbly consistently over the last few days  Does not normally use a walker but has needed one recently  Family members report that he seems to be having some slurred speech but his last known normal was at least a many days ago  Patient states he does not recognize if he is talking any different    He denies any focal weakness, sensory loss, difficulty swallowing, chest pain, headaches, visual changes  He reports some constipation over the last few days but denies any abdominal symptoms, bloating, pressure, pain, nausea, vomiting  Appetite is normal   No blood in stool  Urinating at baseline with some frequency and hesitancy secondary to BPH  He states that his tamsulosin ran out and he did not get it refilled  He was noted to be briefly bradycardic in triage with a heart rate in the 30s via pulse ox  While I was in the room he did have a heart rate in the 50s via pulse ox but was reading in the 100s on the telemetry monitor  His palpable pulse was in the 50  His ectopic beats are not generating a pulse  Otherwise on exam he has mild ataxia with both upper and lower extremities his gait is slightly ataxic  He needed assistance and to hold onto something which is atypical   He has no focal sensory changes  Both legs are proximally weak  Current Precautions:  Fall  Aspiration    Allergies:  No known food allergies  Past medical history:  Please see H&P for details    Special Studies:  CT chest 3/14/2023:    1  Right lower lobe mass extending along the fissure to the right middle and upper lobes and to the right hilum, measuring 10 x 12 12 cm, consistent with lung malignancy  Recommend pulmonary consultation  2   Left lower lobe subpleural 9 mm pulmonary nodule, indeterminate though concerning for malignancy  3   Few airspace opacities at the base of the right lower lobe and right middle lobe may represent pneumonia and/or aspiration  4   No evidence of malignancy in the abdomen or pelvis  5   Constipation      Social/Education/Vocational Hx:  Pt lives with family    Swallow Information   Current Risks for Dysphagia & Aspiration: CVA  Current Symptoms/Concerns: none observed   Current Diet: regular diet and thin liquids   Baseline Diet: regular diet and thin liquids    Baseline Assessment   Behavior/Cognition: alert  Speech/Language Status: able to participate in conversation and able to follow commands  Patient Positioning: upright in bed  Pain Status/Interventions/Response to Interventions: No report of or nonverbal indications of pain  Swallow Mechanism Exam  Facial: symmetrical  Labial: WFL  Lingual: WFL  Velum: symmetrical  Mandible: adequate ROM  Dentition: full dentures, upper dentures and lower dentures  Vocal quality:clear/adequate   Volitional Cough: strong/productive   Respiratory Status: on RA       Consistencies Assessed and Performance   Consistencies Administered: thin liquids, puree and hard solids  Materials administered included applesauce and cookie with thin liquids     Oral Stage: WFL  Mastication was adequate with the materials administered today  Bolus formation and transfer were functional with no significant oral residue noted  No overt s/s reduced oral control  Pharyngeal Stage: WFL  Swallow Mechanics:  Swallowing initiation appeared prompt  Laryngeal rise was palpated and judged to be within functional limits  No coughing, throat clearing, change in vocal quality or respiratory status noted today       Esophageal Concerns: none reported    Summary and Recommendations (see above)    Results Reviewed with: patient     Treatment Recommended: evaluation only

## 2023-03-14 NOTE — ASSESSMENT & PLAN NOTE
Multiple intracranial enhancing lesions of the brain suspecting brain metastasis  • Patient is presenting with imbalance and ambulatory dysfunction for the last 5 days  • Patient had recent fall 5 days ago with no head strike  • On exam patient has ataxic gait  • 03/14 CT head showed multiple target appearing intracranial lesions suspected described with associated stranding edema, primary differential consideration including intracranial metastatic disease versus infection/abscess depending on the clinical setting     • 03/14 MRI brain showed multiple intracranial enhancing lesions are identified total of 5, consistent with endocrine metastasis   Several of these demonstrate focal surrounding positioning with localized mass effect, most prominently the right cerebellar lesion     • CT chest/abdomen/pelvis showed right lower lobe mass extending along the fissure to the right middle and upper lobes and right hilum, measuring 10 x 12 cm consistent with lung malignancy   Left lower lobe subpleural 9 mm lung pulmonary nodule, intermediate concerning for malignancy   Few airspace opacities at the base of the right lower lobe and the right middle lobe may represent pneumonia and/or aspiration  ? Plan:   ? Consult palliative care  ? Consult neurosurgery   ? possible consult pulmonary   ? Consult PT/OT  ? Fall precautions  ? Neuro check every 4 hours  ? Decadron 4 mg IV every 6 hours   ?  Keppra 500 mg tab every 12 hours

## 2023-03-14 NOTE — ED NOTES
Pt's legal wife, Edmond Shell daughter, Urmila Yan, would like updates when the patient is transferred    Josey's phone number: [phone]  [id number]  phone number: [phone]     Kyler Wright RN  03/14/23 4042

## 2023-03-14 NOTE — ASSESSMENT & PLAN NOTE
Right lower lobe lung mass suspecting malignancy  ? CT chest/abdomen/pelvis showed right lower lobe mass extending along the fissure to the right middle and upper lobes and right hilum, measuring 10 x 12 cm consistent with lung malignancy  Left lower lobe subpleural 9 mm lung pulmonary nodule, intermediate concerning for malignancy  Few airspace opacities at the base of the right lower lobe and the right middle lobe may represent pneumonia and/or aspiration    ? Plan: See the plan under brain mets

## 2023-03-14 NOTE — ASSESSMENT & PLAN NOTE
Multiple intracranial enhancing lesions of the brain suspecting brain metastasis  • Patient is presenting with imbalance and ambulatory dysfunction for the last 5 days  • Patient had recent fall 5 days ago with no head strike  • On exam patient has ataxic gait  • 03/14 CT head showed multiple target appearing intracranial lesions suspected described with associated stranding edema, primary differential consideration including intracranial metastatic disease versus infection/abscess depending on the clinical setting     • 03/14 MRI brain showed multiple intracranial enhancing lesions are identified total of 5, consistent with endocrine metastasis   Several of these demonstrate focal surrounding positioning with localized mass effect, most prominently the right cerebellar lesion     • CT chest/abdomen/pelvis showed right lower lobe mass extending along the fissure to the right middle and upper lobes and right hilum, measuring 10 x 12 cm consistent with lung malignancy   Left lower lobe subpleural 9 mm lung pulmonary nodule, intermediate concerning for malignancy   Few airspace opacities at the base of the right lower lobe and the right middle lobe may represent pneumonia and/or aspiration  ? Plan:   ? Palliative care consulted  ? Neurosurgery consulted, recommend IR biopsy of lung mass  ? IR consulted -> lung biopsy completed 3/17 -> f/u results  ? Consult PT/OT  ? Oncology consulted -> Plan to follow up with Dr Autumn Coburn outpatient (visit scheduled for 3/30/23)  ? Rad-onc consulted -> Plan to follow up outpatient  ? Fall precautions  ? Neuro check every 4 hours  ? Decadron 4 mg IV every 6 hours   ? Keppra 500 mg tab every 12 hours  ?  Patient is medically stable for discharge to [facility] -> multiple referrals to [facility] and [facility] sent, however facilities will not accept the patient until he is reevaluated by PT/OT and until the oncology and radiation oncology outpatient plans are documented in case the patient will require transportation -> PT, OT, oncology and rad Onc made aware

## 2023-03-14 NOTE — PROGRESS NOTES
Transfer Note - [address]  Winsome Perez 81 y o  male MRN: [id number]  Unit/Bed#: ICU 05 Encounter: [id number]    Code Status: Level 2 - DNAR: but accepts endotracheal intubation  POA:    POLST:      Reason for ICU admission: Ambulatory function with imbalance    Active problems:   Active Problems:    Brain mass    Lung mass    Hypertension  Resolved Problems:    * No resolved hospital problems  *  Brain mass  Assessment & Plan  Multiple intracranial enhancing lesions of the brain suspecting brain metastasis  • Patient is presenting with imbalance and ambulatory dysfunction for the last 5 days  • Patient had recent fall 5 days ago with no head strike  • On exam patient has ataxic gait  • 03/14 CT head showed multiple target appearing intracranial lesions suspected described with associated stranding edema, primary differential consideration including intracranial metastatic disease versus infection/abscess depending on the clinical setting  • 03/14 MRI brain showed multiple intracranial enhancing lesions are identified total of 5, consistent with endocrine metastasis  Several of these demonstrate focal surrounding positioning with localized mass effect, most prominently the right cerebellar lesion  • CT chest/abdomen/pelvis showed right lower lobe mass extending along the fissure to the right middle and upper lobes and right hilum, measuring 10 x 12 cm consistent with lung malignancy  Left lower lobe subpleural 9 mm lung pulmonary nodule, intermediate concerning for malignancy  Few airspace opacities at the base of the right lower lobe and the right middle lobe may represent pneumonia and/or aspiration  ? Plan:   ? Consult palliative care  ? Consult neurosurgery   ? possible consult pulmonary   ? Consult PT/OT  ? Fall precautions  ? Neuro check every 4 hours  ? Decadron 4 mg IV every 6 hours   ?  Keppra 500 mg tab every 12 hours    Hypertension  Assessment & Plan  Patient has a history of hypertension   • Patient is not currently on any medication at home  • patient blood pressure in the ED was normotensive  ? Plan: atorvastatin 40 mg daily  ? Labetalol 10 mg IV PRN every 6 hours    Lung mass  Assessment & Plan  Right lower lobe lung mass suspecting malignancy  ? CT chest/abdomen/pelvis showed right lower lobe mass extending along the fissure to the right middle and upper lobes and right hilum, measuring 10 x 12 cm consistent with lung malignancy  Left lower lobe subpleural 9 mm lung pulmonary nodule, intermediate concerning for malignancy  Few airspace opacities at the base of the right lower lobe and the right middle lobe may represent pneumonia and/or aspiration  ? Plan: See the plan under brain mets      Consultants:   Neurosurgery    History of Present Illness:   81year-old male patient with past medical history of BPH, hypertension who presented to [address] [place] [address] ED with ambulatory dysfunction for the last 5 days  Patient reported having fall 5 days ago  Patient denied head strike however he did reported striking his chest with right-sided rib pain  Patient reported feeling of imbalance in the last few days but denied lightheadedness or dizziness  Patient family reported that patient had some slurred speech  Patient is denying any weakness, sensory loss, chest pain, headache, visual changes, diarrhea  Patient also complaining of constipation over the last few days but denied blood in her stool  Patient heart rate in the ED was slightly bradycardia in the 50s  In the ED patient exam showed mild ataxia with both upper and lower extremity ataxia  With both legs are approximately week  Labs was done in the ED including TSH was normal, magnesium was normal, UA was unremarkable, troponin was negative, CMP was unremarkable, CBC with hemoglobin of 11 1    CT head showed multiple target appearing intracranial lesions suspected described with associated stranding edema, primary differential consideration including intracranial metastatic disease versus infection/abscess depending on the clinical setting  MRI brain showed multiple intracranial enhancing lesions are identified total of 5, consistent with endocrine metastasis  Several of these demonstrate focal surrounding positioning with localized mass effect, most prominently the right cerebellar lesion  CT chest/abdomen/pelvis showed right lower lobe mass extending along the fissure to the right middle and upper lobes and right hilum, measuring 10 x 12 cm consistent with lung malignancy  Left lower lobe subpleural 9 mm lung pulmonary nodule, intermediate concerning for malignancy  Few airspace opacities at the base of the right lower lobe and the right middle lobe may represent pneumonia and/or aspiration  Patient was transferred to [facility] critical care for further workup and management  On exam patient was not able to walk by himself and he has an ataxia in his 4 limbs  Patient is current smoker and used to smoke a pack a day for 45 years however currently he smokes 2 to 3 cigarettes/day  Patient quit alcohol 15 years ago  Patient decision-maker is either the wife or daughter  Patient is level 2 CODE STATUS  Summary of clinical course: Patient received Decadron 10 mg injection and Keppra 500 mg tablet  Recent or scheduled procedures:   None    Outstanding/pending diagnostics:   None    Cultures:   None         Nutrition Plan:   Regular diet    VTE Pharmacologic Prophylaxis: None  VTE Mechanical Prophylaxis: sequential compression device    Discharge Plan:   Patient should be ready for transfer to [place]  Home medications that are not reordered: Seen      Portions of the record may have been created with voice recognition software  Occasional wrong word or "sound a like" substitutions may have occurred due to the inherent limitations of voice recognition software    Read the chart carefully and recognize, using context, where substitutions have occurred

## 2023-03-14 NOTE — ED PROVIDER NOTES
History  Chief Complaint   Patient presents with   • Weakness - Generalized     Pt c/o generalized weakness, unable to have a bowel movement, and difficulty ambulating for the past week  Pt has also tried laxatives but unable to produce any movement  Pt has also had his HR jump from the 30's to 110's in the past week       History provided by:  Patient and relative   used: No    77-year-old male with history of hypertension, BPH brought for evaluation of ambulatory dysfunction for the last 4 to 5 days  He does report having a fall prior to this but denies head strike  Strike his chest and had reported some right-sided rib pain just with movement but no pain with breathing or dyspnea  He states that he has felt wobbly consistently over the last few days  Does not normally use a walker but has needed one recently  Family members report that he seems to be having some slurred speech but his last known normal was at least a many days ago  Patient states he does not recognize if he is talking any different  He denies any focal weakness, sensory loss, difficulty swallowing, chest pain, headaches, visual changes  He reports some constipation over the last few days but denies any abdominal symptoms, bloating, pressure, pain, nausea, vomiting  Appetite is normal   No blood in stool  Urinating at baseline with some frequency and hesitancy secondary to BPH  He states that his tamsulosin ran out and he did not get it refilled  He was noted to be briefly bradycardic in triage with a heart rate in the 30s via pulse ox  While I was in the room he did have a heart rate in the 50s via pulse ox but was reading in the 100s on the telemetry monitor  His palpable pulse was in the 50  His ectopic beats are not generating a pulse  Otherwise on exam he has mild ataxia with both upper and lower extremities his gait is slightly ataxic    He needed assistance and to hold onto something which is atypical   He has no focal sensory changes  Both legs are proximally weak  Plan labs, continue cardiac monitoring, CT head, chest x-ray, KUB  We will plan admission for further work-up and management  Prior to Admission Medications   Prescriptions Last Dose Informant Patient Reported? Taking?   tamsulosin (FLOMAX) 0 4 mg   No No   Sig: Take 1 capsule (0 4 mg total) by mouth daily with dinner for 10 days      Facility-Administered Medications: None       Past Medical History:   Diagnosis Date   • Hypertension        Past Surgical History:   Procedure Laterality Date   • HERNIA REPAIR         History reviewed  No pertinent family history  I have reviewed and agree with the history as documented  E-Cigarette/Vaping     E-Cigarette/Vaping Substances     Social History     Tobacco Use   • Smoking status: Former   • Tobacco comments:     quit Wednesday   Substance Use Topics   • Alcohol use: Never   • Drug use: Never       Review of Systems   Constitutional: Negative for activity change, appetite change, fatigue and fever  Respiratory: Negative for cough, chest tightness and shortness of breath  Cardiovascular: Negative for chest pain  Gastrointestinal: Positive for constipation  Negative for abdominal pain, nausea and vomiting  Musculoskeletal: Positive for gait problem  Negative for back pain, neck pain and neck stiffness  Skin: Negative for color change and rash  Neurological: Positive for speech difficulty and weakness  Negative for dizziness, light-headedness and headaches  All other systems reviewed and are negative  Physical Exam  Physical Exam  Vitals and nursing note reviewed  Constitutional:       Appearance: Normal appearance  HENT:      Head: Normocephalic and atraumatic  Nose: Nose normal       Mouth/Throat:      Comments: Somewhat dry  Eyes:      Extraocular Movements: Extraocular movements intact  Pupils: Pupils are equal, round, and reactive to light     Cardiovascular: Rate and Rhythm: Normal rate  Pulses: Normal pulses  Heart sounds: Normal heart sounds  Comments: Frequent ectopy on monitor  Pulmonary:      Effort: Pulmonary effort is normal       Breath sounds: Normal breath sounds  Chest:      Chest wall: No tenderness  Abdominal:      General: There is no distension  Palpations: Abdomen is soft  Tenderness: There is no abdominal tenderness  Musculoskeletal:         General: No swelling or tenderness  Normal range of motion  Cervical back: Normal range of motion and neck supple  No tenderness  Skin:     General: Skin is warm and dry  Neurological:      Mental Status: He is alert  Cranial Nerves: No cranial nerve deficit  Gait: Gait abnormal       Comments: Some mild ataxia in all 4 extremities  Proximal weakness of both lower extremities about 4/5     Psychiatric:         Mood and Affect: Mood normal          Behavior: Behavior normal          Vital Signs  ED Triage Vitals   Temperature Pulse Respirations Blood Pressure SpO2   03/13/23 2303 03/13/23 2303 03/13/23 2303 03/13/23 2303 03/13/23 2303   98 1 °F (36 7 °C) (!) 35 20 136/95 97 %      Temp Source Heart Rate Source Patient Position - Orthostatic VS BP Location FiO2 (%)   03/13/23 2303 03/13/23 2303 03/13/23 2303 03/13/23 2303 --   Oral Monitor Sitting Right arm       Pain Score       03/14/23 0430       No Pain           Vitals:    03/14/23 0400 03/14/23 0430 03/14/23 0500 03/14/23 0530   BP: 112/77 119/76 123/77 153/79   Pulse: 78 75 76 79   Patient Position - Orthostatic VS:             Visual Acuity      ED Medications  Medications   dexamethasone (DECADRON) injection 10 mg (has no administration in time range)   iohexol (OMNIPAQUE) 350 MG/ML injection (SINGLE-DOSE) 100 mL (100 mL Intravenous Given 3/14/23 0128)   dexamethasone (DECADRON) injection 10 mg (10 mg Intravenous Given 3/14/23 0216)   levETIRAcetam (KEPPRA) 1,500 mg in sodium chloride 0 9 % 100 mL IVPB (0 mg Intravenous Stopped 3/14/23 0400)       Diagnostic Studies  Results Reviewed     Procedure Component Value Units Date/Time    TSH [[id number]]  (Normal) Collected: 03/13/23 2328    Lab Status: Final result Specimen: Blood from Arm, Right Updated: 03/14/23 0627     TSH 3RD GENERATON 2 132 uIU/mL     Narrative:      Patients undergoing fluorescein dye angiography may retain small amounts of fluorescein in the body for 48-72 hours post procedure  Samples containing fluorescein can produce falsely depressed TSH values  If the patient had this procedure,a specimen should be resubmitted post fluorescein clearance        Magnesium [[id number]]  (Normal) Collected: 03/13/23 2328    Lab Status: Final result Specimen: Blood from Arm, Right Updated: 03/14/23 0305     Magnesium 2 1 mg/dL     Urine Microscopic [[id number]]  (Abnormal) Collected: 03/14/23 0143    Lab Status: Final result Specimen: Urine, Clean Catch Updated: 03/14/23 0237     RBC, UA 2-4 /hpf      WBC, UA 30-50 /hpf      Epithelial Cells Occasional /hpf      Bacteria, UA None Seen /hpf      MUCUS THREADS Moderate    UA (URINE) with reflex to Scope [[id number]]  (Abnormal) Collected: 03/14/23 0143    Lab Status: Final result Specimen: Urine, Clean Catch Updated: 03/14/23 0235     Color, UA Light Yellow     Clarity, UA Clear     Specific Gravity, UA 1 036     pH, UA 6 0     Leukocytes, UA Small     Nitrite, UA Negative     Protein, UA Trace mg/dl      Glucose, UA Negative mg/dl      Ketones, UA Negative mg/dl      Urobilinogen, UA <2 0 mg/dl      Bilirubin, UA Negative     Occult Blood, UA Negative    HS Troponin I 2hr [[id number]]  (Normal) Collected: 03/14/23 0143    Lab Status: Final result Specimen: Blood from Arm, Right Updated: 03/14/23 0224     hs TnI 2hr 7 ng/L      Delta 2hr hsTnI -1 ng/L     HS Troponin 0hr (reflex protocol) [[id number]]  (Normal) Collected: 03/13/23 2328    Lab Status: Final result Specimen: Blood from Arm, Right Updated: 03/14/23 0019     hs TnI 0hr 8 ng/L     Comprehensive metabolic panel [[id number]]  (Abnormal) Collected: 03/13/23 2328    Lab Status: Final result Specimen: Blood from Arm, Right Updated: 03/14/23 0014     Sodium 136 mmol/L      Potassium 3 8 mmol/L      Chloride 101 mmol/L      CO2 25 mmol/L      ANION GAP 10 mmol/L      BUN 20 mg/dL      Creatinine 0 85 mg/dL      Glucose 108 mg/dL      Calcium 9 9 mg/dL      Corrected Calcium 10 4 mg/dL      AST 9 U/L      ALT 8 U/L      Alkaline Phosphatase 53 U/L      Total Protein 6 6 g/dL      Albumin 3 4 g/dL      Total Bilirubin 0 44 mg/dL      eGFR 88 ml/min/1 73sq m     Narrative:      National Kidney Disease Foundation guidelines for Chronic Kidney Disease (CKD):   •  Stage 1 with normal or high GFR (GFR > 90 mL/min/1 73 square meters)  •  Stage 2 Mild CKD (GFR = 60-89 mL/min/1 73 square meters)  •  Stage 3A Moderate CKD (GFR = 45-59 mL/min/1 73 square meters)  •  Stage 3B Moderate CKD (GFR = 30-44 mL/min/1 73 square meters)  •  Stage 4 Severe CKD (GFR = 15-29 mL/min/1 73 square meters)  •  Stage 5 End Stage CKD (GFR <15 mL/min/1 73 square meters)  Note: GFR calculation is accurate only with a steady state creatinine    CBC and differential [[id number]]  (Abnormal) Collected: 03/13/23 2328    Lab Status: Final result Specimen: Blood from Arm, Right Updated: 03/13/23 2338     WBC 10 02 Thousand/uL      RBC 4 07 Million/uL      Hemoglobin 11 1 g/dL      Hematocrit 35 4 %      MCV 87 fL      MCH 27 3 pg      MCHC 31 4 g/dL      RDW 14 3 %      MPV 10 0 fL      Platelets 072 Thousands/uL      nRBC 0 /100 WBCs      Neutrophils Relative 79 %      Immat GRANS % 0 %      Lymphocytes Relative 11 %      Monocytes Relative 10 %      Eosinophils Relative 0 %      Basophils Relative 0 %      Neutrophils Absolute 7 89 Thousands/µL      Immature Grans Absolute 0 04 Thousand/uL      Lymphocytes Absolute 1 05 Thousands/µL      Monocytes Absolute 0 98 Thousand/µL      Eosinophils Absolute 0 02 Thousand/µL Basophils Absolute 0 04 Thousands/µL                  CTA head and neck with and without contrast   Final Result by Holly Moya MD (03/14 8226)      1  Enhancing lesions in the cerebral hemispheres and posterior fossa was able surrounding vasogenic edema  Recommend MRI of the brain with gadolinium  2   Right level 2 and 3 lymph nodes measuring up to 1 9 cm, likely metastatic  3   No stenosis, dissection or occlusion of the carotid or vertebral arteries or major vessels of the [place]  Workstation performed: [id number]         CT chest abdomen pelvis w contrast   Final Result by Holly Moya MD (03/14 1599)         1  Right lower lobe mass extending along the fissure to the right middle and upper lobes and to the right hilum, measuring 10 x 12 12 cm, consistent with lung malignancy  Recommend pulmonary consultation  2   Left lower lobe subpleural 9 mm pulmonary nodule, indeterminate though concerning for malignancy  3   Few airspace opacities at the base of the right lower lobe and right middle lobe may represent pneumonia and/or aspiration  4   No evidence of malignancy in the abdomen or pelvis  5   Constipation  Workstation performed: [id number]         XR chest 2 views   ED Interpretation by Nayan Patel MD (03/14 0122)   Right lung opacity, probably mass      XR abdomen 1 view kub   ED Interpretation by Nayan Patel MD (03/14 0124)   Moderate right-sided and rectal stool  Normal gas pattern  CT head without contrast   Final Result by Lesa Scruggs DO (03/14 0131)      Multiple target appearing intracranial lesions suspected as described with associated vasogenic edema; primary differential considerations including intracranial metastatic disease versus infection/abscesses depending on the clinical setting  MRI of the    brain with contrast recommended for further evaluation and to detect smaller lesions    No significant mass effect or midline shift  Other findings as above  Workstation performed: [id number]         MRI Brain BT w wo Contrast    (Results Pending)              Procedures  ECG 12 Lead Documentation Only    Date/Time: 3/13/2023 11:53 PM  Performed by: Nora Villafuerte MD  Authorized by: Nora Villafuerte MD     Indications / Diagnosis:  Weakness  ECG reviewed by me, the ED Provider: yes    Patient location:  ED  Previous ECG:     Previous ECG:  Unavailable  Rate:     ECG rate:  93  Rhythm:     Rhythm: sinus rhythm    Ectopy:     Ectopy: PAC and PVCs    QRS:     QRS axis:  Left  Conduction:     Conduction: abnormal      Abnormal conduction: non-specific intraventricular conduction delay    ST segments:     ST segments:  Normal  T waves:     T waves: normal               ED Course  ED Course as of 03/14/23 0713   Tue Mar 14, 2023   0156 CT head concerning for intracranial metastatic disease, likely lung primary based on chest imaging today  We will start Decadron, Keppra, discuss transfer to neuro ICU at [facility]  8811 Discussed case with on-call critical care physician Dr Katelin Gallo at [facility]  Accepts patient for transfer to Neuro ICU but no beds available at this time  Will transfer as soon as bed is available  Requested 10 mg Decadron every 6 hours and MRI with and without contrast of the brain if the patient remains here long enough to get it  Medical Decision Making  74-year-old male presented for evaluation of difficulty walking, feeling wobbly over the last 4 to 5 days  Family also noticed speech disturbance since seeing him weeks ago  On exam he has some slight dysarthria and mild ataxia in 4 extremities  Otherwise no focal deficit  Patient reported falling, landing on his chest with some rib pain on the right side few days ago  No crepitus or instability  Chest x-ray notable for right lung mass    No prior films available  He has a long history of tobacco use  CT head notable for multifocal lesions suspicious for metastasis, likely from lung mass  Patient was started on IV Decadron and Keppra for seizure prophylaxis  Case discussed with neuro critical care at [facility] and he was accepted in transfer  He is awaiting bed availability for transport  Remaines stable  Amount and/or Complexity of Data Reviewed  Labs: ordered  Radiology: ordered and independent interpretation performed  Risk  Prescription drug management  Disposition  Final diagnoses: Mass of right lung   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[facility])   Ataxia   Speech disturbance     Time reflects when diagnosis was documented in both MDM as applicable and the Disposition within this note     Time User Action Codes Description Comment    3/14/2023  2:05 AM Henrry Christianson Add [R91 8] Mass of right lung     3/14/2023  2:05 AM Junito Graves Add [C79 31] 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([address] )     3/14/2023  2:05 AM Cassius FRAGA Add [R27 0] Ataxia     3/14/2023  2:05 AM Henrry Waggoner [R47 9] Speech disturbance       ED Disposition     ED Disposition   Transfer to Another Facility-In Network    Condition   --    Date/Time   Tue Mar 14, 2023  2:29 AM    Comment   Boris Perez should be transferred out to [facility]             MD Documentation    [address] Most Recent Value   Patient Condition The patient has been stabilized such that within reasonable medical probability, no material deterioration of the patient condition or the condition of the unborn child(luis) is likely to result from the transfer   Reason for Transfer Level of Care needed not available at this facility   Benefits of Transfer Specialized equipment and/or services available at the receiving facility (Include comment)________________________  [facility]]   Risks of Transfer Potential for delay in receiving treatment, Increased discomfort during transfer Accepting Physician Dr Lopez Route Name, Klever Perez MD [facility]   Provider Certification General risk, such as traffic hazards, adverse weather conditions, rough terrain or turbulence, possible failure of equipment (including vehicle or aircraft), or consequences of actions of persons outside the control of the transport personnel      RN Documentation    [address] Name, Klever Rivas   Level of Care Advanced life support      Follow-up Information    None         Patient's Medications   Discharge Prescriptions    No medications on file       No discharge procedures on file      PDMP Review     None          ED Provider  Electronically Signed by           Drew Galvin MD  03/14/23 8994

## 2023-03-14 NOTE — ED CARE HANDOFF
Emergency Department Sign Out Note        Sign out and transfer of care from Dr Zaida Padron  See Separate Emergency Department note  The patient, Zoey Leblanc, was evaluated by the previous provider for difficulty ambulating  Workup Completed:  CT, blood work    ED Course / Workup Pending (followup):  56-year-old male patient presenting to the ED for difficulty ambulating  Patient was found to have metastatic disease, with multiple lesions in his brain  Patient accepted to neuro critical care at [place], but awaiting transport and bed assignment  Patient to receive Decadron 10 mg every 6 hours, MRI, Keppra every 12  Patient was then transferred unremarkably on my shift  Procedures  MDM        Disposition  Final diagnoses: Mass of right lung   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[facility])   Ataxia   Speech disturbance     Time reflects when diagnosis was documented in both MDM as applicable and the Disposition within this note     Time User Action Codes Description Comment    3/14/2023  2:05 AM Subhash Nguyen Add [R91 8] Mass of right lung     3/14/2023  2:05 AM Javi Graves Add [C79 31] 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([facility] [address] )     3/14/2023  2:05 AM Mareclo FRAGA Add [R27 0] Ataxia     3/14/2023  2:05 AM Subhash Nguyen Add [R47 9] Speech disturbance       ED Disposition     ED Disposition   Transfer to Another Facility-In Network    Condition   --    Date/Time   Tue Mar 14, 2023  2:29 AM    Comment   Joshua Perez should be transferred out to [facility]             MD Documentation    Mic Morales Most Recent Value   Patient Condition The patient has been stabilized such that within reasonable medical probability, no material deterioration of the patient condition or the condition of the unborn child(luis) is likely to result from the transfer   Reason for Transfer Level of Care needed not available at this facility   Benefits of Transfer Specialized equipment and/or services available at the receiving facility (Include comment)________________________  [facility]]   Risks of Transfer Potential for delay in receiving treatment, Increased discomfort during transfer   Accepting Physician Dr Jaspreet Proctor Name, Steve Perez MD [facility]   Provider Certification General risk, such as traffic hazards, adverse weather conditions, rough terrain or turbulence, possible failure of equipment (including vehicle or aircraft), or consequences of actions of persons outside the control of the transport personnel      RN Documentation    74 Steve Ferrer   Level of Care Advanced life support      Follow-up Information    None       Patient's Medications   Discharge Prescriptions    No medications on file     No discharge procedures on file         ED Provider  Electronically Signed by     Steffany Bishop MD  03/14/23 4731

## 2023-03-14 NOTE — ASSESSMENT & PLAN NOTE
Patient presents for evaluation of ambulatory dysfunction x5 days, found to have 12 cm right sided lung mass and 5 brain masses with cerebral edema and mild brain compression  · No prior h/o malignancy  · Daily long time smoker  Admits to coughing up blood in January  · Significant weight loss over several months (used to be 185)  · On exam, subtle right eyelid droop and slurred speech  No dysmetria noted BUE/BLE  GCS 15    Imaging:  · MRI brain w/wo, 3/14/23: Multiple intracranial enhancing lesions are identified, total of 5, consistent with intracranial metastasis  Several of these demonstrate focal surrounding vasogenic with localized mass effect, most prominently the right cerebellar lesion    Plan:  · Continue to monitor neurological exam  · Imaging reviewed with attending  Would recommend WBRT vs SRT to brain lesions depending on pathology  · IR consult for biopsy  · Palliative consult given likely stage IV cancer diagnosis  · Will need medical oncology and radiation oncology consults once tissue diagnosis is made  · Continue decadron 4q6 for cerebral edema  · Continue Keppra 500mg BID for seizure ppx  · Medical management per primary team  · [facility]    Neurosurgery will continue to follow for family discussion tomorrow per patient request  No neurosurgical intervention is anticipated given location of masses and small size - more amenable to radiation  Please call with questions or concerns

## 2023-03-14 NOTE — PROGRESS NOTES
Case discussed with Dr Rut Mensah at [facility]  Patient with new onset ambulatory dysfunction and generalized weakness  Found to have large lung mass on imaging and multiple metastatic lesions to brain with vasogenic edema  No immediate bed availability at [facility]  Already given decadron x1 dose and Keppra in ED  Agree with medication plan so far, advised additional decadron dose in 6 hours  Will move patient to neuro-CC at [facility] as soon as bed available  Requested process for MRI of brain w/wo contrast be started if there will be a delay    Advised to call back immediately with any changes in neuro status and will move patient to [facility] as soon as bed is available

## 2023-03-14 NOTE — PLAN OF CARE
Problem: MOBILITY - ADULT  Goal: Maintain or return to baseline ADL function  Description: INTERVENTIONS:  -  Assess patient's ability to carry out ADLs; assess patient's baseline for ADL function and identify physical deficits which impact ability to perform ADLs (bathing, care of mouth/teeth, toileting, grooming, dressing, etc )  - Assess/evaluate cause of self-care deficits   - Assess range of motion  - Assess patient's mobility; develop plan if impaired  - Assess patient's need for assistive devices and provide as appropriate  - Encourage maximum independence but intervene and supervise when necessary  - Involve family in performance of ADLs  - Assess for home care needs following discharge   - Consider OT consult to assist with ADL evaluation and planning for discharge  - Provide patient education as appropriate  Outcome: Progressing  Goal: Maintains/Returns to pre admission functional level  Description: INTERVENTIONS:  - Perform BMAT or MOVE assessment daily    - Set and communicate daily mobility goal to care team and patient/family/caregiver  - Collaborate with rehabilitation services on mobility goals if consulted  - Perform Range of Motion 3 times a day  - Reposition patient every 2 hours    - Dangle patient 3 times a day  - Stand patient 3 times a day  - Ambulate patient 3 times a day  - Out of bed to chair 3 times a day   - Out of bed for meals 3 times a day  - Out of bed for toileting  - Record patient progress and toleration of activity level   Outcome: Progressing     Problem: PAIN - ADULT  Goal: Verbalizes/displays adequate comfort level or baseline comfort level  Description: Interventions:  - Encourage patient to monitor pain and request assistance  - Assess pain using appropriate pain scale  - Administer analgesics based on type and severity of pain and evaluate response  - Implement non-pharmacological measures as appropriate and evaluate response  - Consider cultural and social influences on pain and pain management  - Notify physician/advanced practitioner if interventions unsuccessful or patient reports new pain  Outcome: Progressing     Problem: INFECTION - ADULT  Goal: Absence or prevention of progression during hospitalization  Description: INTERVENTIONS:  - Assess and monitor for signs and symptoms of infection  - Monitor lab/diagnostic results  - Monitor all insertion sites, i e  indwelling lines, tubes, and drains  - Monitor endotracheal if appropriate and nasal secretions for changes in amount and color  - [place] appropriate cooling/warming therapies per order  - Administer medications as ordered  - Instruct and encourage patient and family to use good hand hygiene technique  - Identify and instruct in appropriate isolation precautions for identified infection/condition  Outcome: Progressing  Goal: Absence of fever/infection during neutropenic period  Description: INTERVENTIONS:  - Monitor WBC    Outcome: Progressing     Problem: SAFETY ADULT  Goal: Maintain or return to baseline ADL function  Description: INTERVENTIONS:  -  Assess patient's ability to carry out ADLs; assess patient's baseline for ADL function and identify physical deficits which impact ability to perform ADLs (bathing, care of mouth/teeth, toileting, grooming, dressing, etc )  - Assess/evaluate cause of self-care deficits   - Assess range of motion  - Assess patient's mobility; develop plan if impaired  - Assess patient's need for assistive devices and provide as appropriate  - Encourage maximum independence but intervene and supervise when necessary  - Involve family in performance of ADLs  - Assess for home care needs following discharge   - Consider OT consult to assist with ADL evaluation and planning for discharge  - Provide patient education as appropriate  Outcome: Progressing  Goal: Maintains/Returns to pre admission functional level  Description: INTERVENTIONS:  - Perform BMAT or MOVE assessment daily    - Set and communicate daily mobility goal to care team and patient/family/caregiver     - Collaborate with rehabilitation services on mobility goals if consulted  - Out of bed for toileting  - Record patient progress and toleration of activity level   Outcome: Progressing  Goal: Patient will remain free of falls  Description: INTERVENTIONS:  - Educate patient/family on patient safety including physical limitations  - Instruct patient to call for assistance with activity   - Consult OT/PT to assist with strengthening/mobility   - Keep Call bell within reach  - Keep bed low and locked with side rails adjusted as appropriate  - Keep care items and personal belongings within reach  - Initiate and maintain comfort rounds  - Make Fall Risk Sign visible to staff  - Offer Toileting every 2 Hours, in advance of need  - Initiate/Maintain bed/chair alarm  - Obtain necessary fall risk management equipment: bed/chair alarm  - Apply yellow socks and bracelet for high fall risk patients  - Consider moving patient to room near nurses station  Outcome: Progressing     Problem: DISCHARGE PLANNING  Goal: Discharge to home or other facility with appropriate resources  Description: INTERVENTIONS:  - Identify barriers to discharge w/patient and caregiver  - Arrange for needed discharge resources and transportation as appropriate  - Identify discharge learning needs (meds, wound care, etc )  - Arrange for interpretive services to assist at discharge as needed  - Refer to Case Management Department for coordinating discharge planning if the patient needs post-hospital services based on physician/advanced practitioner order or complex needs related to functional status, cognitive ability, or social support system  Outcome: Progressing     Problem: Knowledge Deficit  Goal: Patient/family/caregiver demonstrates understanding of disease process, treatment plan, medications, and discharge instructions  Description: Complete learning assessment and assess knowledge base    Interventions:  - Provide teaching at level of understanding  - Provide teaching via preferred learning methods  Outcome: Progressing
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
## 2023-03-14 NOTE — EMTALA/ACUTE CARE TRANSFER
[address] [address]  Dept: [phone]      EMTALA TRANSFER CONSENT    NAME Bear LOPEZ 1950                              MRN [id number]    I have been informed of my rights regarding examination, treatment, and transfer   by Dr Jesisca Garcia MD    Benefits: Specialized equipment and/or services available at the receiving facility (Include comment)________________________ (Neuro ICU)    Risks: Potential for delay in receiving treatment, Increased discomfort during transfer      Consent for Transfer:  I acknowledge that my medical condition has been evaluated and explained to me by the emergency department physician or other qualified medical person and/or my attending physician, who has recommended that I be transferred to the service of  Accepting Physician: Dr Lila Flowers at [facility] Name, [address]  The above potential benefits of such transfer, the potential risks associated with such transfer, and the probable risks of not being transferred have been explained to me, and I fully understand them  The doctor has explained that, in my case, the benefits of transfer outweigh the risks  I agree to be transferred  I authorize the performance of emergency medical procedures and treatments upon me in both transit and upon arrival at the receiving facility  Additionally, I authorize the release of any and all medical records to the receiving facility and request they be transported with me, if possible  I understand that the safest mode of transportation during a medical emergency is an ambulance and that the Hospital advocates the use of this mode of transport   Risks of traveling to the receiving facility by car, including absence of medical control, life sustaining equipment, such as oxygen, and medical personnel has been explained to me and I fully understand them  ([place] CORRECT BOX BELOW)  [X]  I consent to the stated transfer and to be transported by ambulance/helicopter  [  ]  I consent to the stated transfer, but refuse transportation by ambulance and accept full responsibility for my transportation by car  I understand the risks of non-ambulance transfers and I exonerate the Hospital and its staff from any deterioration in my condition that results from this refusal     X___________________________________________    DATE  23  TIME________  Signature of patient or legally responsible individual signing on patient behalf           RELATIONSHIP TO PATIENT_________________________          Provider Certification    NAME Bear Perez                                         1950                              MRN [id number]    A medical screening exam was performed on the above named patient  Based on the examination:    Condition Necessitating Transfer The primary encounter diagnosis was Mass of right lung  Diagnoses of 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[facility]), Ataxia, and Speech disturbance were also pertinent to this visit      Patient Condition: The patient has been stabilized such that within reasonable medical probability, no material deterioration of the patient condition or the condition of the unborn child(luis) is likely to result from the transfer    Reason for Transfer: Level of Care needed not available at this facility    Transfer Requirements: [address]   · Space available and qualified personnel available for treatment as acknowledged by    · Agreed to accept transfer and to provide appropriate medical treatment as acknowledged by       Dr Lila Flowers  · Appropriate medical records of the examination and treatment of the patient are provided at the time of transfer   [address] _______  · Transfer will be performed by qualified personnel from    and appropriate transfer equipment as required, including the use of necessary and appropriate life support measures  Provider Certification: I have examined the patient and explained the following risks and benefits of being transferred/refusing transfer to the patient/family:  General risk, such as traffic hazards, adverse weather conditions, rough terrain or turbulence, possible failure of equipment (including vehicle or aircraft), or consequences of actions of persons outside the control of the transport personnel      Based on these reasonable risks and benefits to the patient and/or the unborn child(luis), and based upon the information available at the time of the patient’s examination, I certify that the medical benefits reasonably to be expected from the provision of appropriate medical treatments at another medical facility outweigh the increasing risks, if any, to the individual’s medical condition, and in the case of labor to the unborn child, from effecting the transfer      X____________________________________________ DATE 03/14/23        TIME_______      ORIGINAL - SEND TO MEDICAL RECORDS   COPY - SEND WITH PATIENT DURING TRANSFER

## 2023-03-14 NOTE — PROGRESS NOTES
INTERNAL MEDICINE RESIDENCY TRANSFER ACCEPTANCE NOTE     Name: Linda Mendoza   Age & Sex: 81 y o  male   MRN: [id number]  Unit/Bed#: ICU 05   Encounter: [id number]  Hospital Stay Days: 0    Accepting team: SOD Team B   Code Status: Level 2 - DNAR: but accepts endotracheal intubation  Disposition: Patient requires Med/Surg    ASSESSMENT/PLAN     Principal Problem:    Brain mass  Active Problems:    Lung mass    Hypertension      * Brain mass  Assessment & Plan  Multiple intracranial enhancing lesions of the brain suspecting brain metastasis  • Patient is presenting with imbalance and ambulatory dysfunction for the last 5 days  • Patient had recent fall 5 days ago with no head strike  • On exam patient has ataxic gait  • 03/14 CT head showed multiple target appearing intracranial lesions suspected described with associated stranding edema, primary differential consideration including intracranial metastatic disease versus infection/abscess depending on the clinical setting     • 03/14 MRI brain showed multiple intracranial enhancing lesions are identified total of 5, consistent with endocrine metastasis   Several of these demonstrate focal surrounding positioning with localized mass effect, most prominently the right cerebellar lesion     • CT chest/abdomen/pelvis showed right lower lobe mass extending along the fissure to the right middle and upper lobes and right hilum, measuring 10 x 12 cm consistent with lung malignancy   Left lower lobe subpleural 9 mm lung pulmonary nodule, intermediate concerning for malignancy   Few airspace opacities at the base of the right lower lobe and the right middle lobe may represent pneumonia and/or aspiration  ? Plan:   ? Palliative care consulted  ? Neurosurgery consulted, recommend IR biopsy of lung mass  ? Consult PT/OT  ? Fall precautions  ? Neuro check every 4 hours  ? Decadron 4 mg IV every 6 hours   ?  Keppra 500 mg tab every 12 hours    Hypertension  Assessment & Plan  Patient has a history of hypertension   • Patient is not currently on any medication at home  • patient blood pressure in the ED was normotensive  ? Plan: atorvastatin 40 mg daily  ? Labetalol 10 mg IV PRN every 6 hours    Lung mass  Assessment & Plan  Right lower lobe lung mass suspecting malignancy  ? CT chest/abdomen/pelvis showed right lower lobe mass extending along the fissure to the right middle and upper lobes and right hilum, measuring 10 x 12 cm consistent with lung malignancy   Left lower lobe subpleural 9 mm lung pulmonary nodule, intermediate concerning for malignancy   Few airspace opacities at the base of the right lower lobe and the right middle lobe may represent pneumonia and/or aspiration    ? Plan: See the plan under brain mets      VTE Pharmacologic Prophylaxis: Reason for no pharmacologic prophylaxis low VTE score  VTE Mechanical Prophylaxis: sequential compression device    HOSPITAL COURSE     79-year-old male patient with past medical history of BPH, hypertension who presented to [address] [place] [address] ED with ambulatory dysfunction for the last 5 days   Patient reported having fall 5 days ago   Patient denied head strike however he did reported striking his chest with right-sided rib pain   Patient reported feeling of imbalance in the last few days but denied lightheadedness or dizziness   Patient family reported that patient had some slurred speech   Patient is denying any weakness, sensory loss, chest pain, headache, visual changes, diarrhea   Patient also complaining of constipation over the last few days but denied blood in her stool   Patient heart rate in the ED was slightly bradycardia in the 46s   In the ED patient exam showed mild ataxia with both upper and lower extremity ataxia   With both legs are approximately week   Labs was done in the ED including TSH was normal, magnesium was normal, UA was unremarkable, troponin was negative, CMP was unremarkable, CBC with hemoglobin of 11 1   CT head showed multiple target appearing intracranial lesions suspected described with associated stranding edema, primary differential consideration including intracranial metastatic disease versus infection/abscess depending on the clinical setting   MRI brain showed multiple intracranial enhancing lesions are identified total of 5, consistent with endocrine metastasis   Several of these demonstrate focal surrounding positioning with localized mass effect, most prominently the right cerebellar lesion   CT chest/abdomen/pelvis showed right lower lobe mass extending along the fissure to the right middle and upper lobes and right hilum, measuring 10 x 12 cm consistent with lung malignancy   Left lower lobe subpleural 9 mm lung pulmonary nodule, intermediate concerning for malignancy   Few airspace opacities at the base of the right lower lobe and the right middle lobe may represent pneumonia and/or aspiration  Patient was [facility] critical care for further workup and management   On exam patient was not able to walk by himself and he has an ataxia in his 4 limbs   Patient is current smoker and used to smoke a pack a day for 45 years however currently he smokes 2 to 3 cigarettes/day   Patient quit alcohol 15 years ago   Patient decision-maker is either the wife or daughter  Jaquan Knottalcon is level 2 CODE STATUS  SUBJECTIVE     Patient appears well, no complaints  He states he understands the results of the scan and what the overall plan is  Patient daughter, son in law, and grandaughter present in the room  Patient is eating while talking and denies headaches, n/v, chest pain, SOB, vision changes  He states he had an episode of hemoptysis in January and was losing a lot of weight and had a feeling he had lung cancer before coming to the ED         OBJECTIVE     Vitals:    03/14/23 1400 03/14/23 1500 03/14/23 1600 03/14/23 1700   BP: 119/70 135/79 138/79 150/74   Pulse: 90 96 98 88   Resp: 20 (!) 24 20 22   Temp:       TempSrc:       SpO2: 98% 97% 96% 97%   Weight:       Height:         No intake/output data recorded  Physical Exam  Vitals and nursing note reviewed  Constitutional:       General: He is not in acute distress  Appearance: He is well-developed  HENT:      Head: Normocephalic and atraumatic  Eyes:      Conjunctiva/sclera: Conjunctivae normal    Cardiovascular:      Rate and Rhythm: Normal rate and regular rhythm  Heart sounds: No murmur heard  Pulmonary:      Effort: Pulmonary effort is normal  No respiratory distress  Breath sounds: Normal breath sounds  Abdominal:      Palpations: Abdomen is soft  Tenderness: There is no abdominal tenderness  Musculoskeletal:         General: No swelling  Cervical back: Neck supple  Skin:     General: Skin is warm and dry  Capillary Refill: Capillary refill takes less than 2 seconds  Neurological:      General: No focal deficit present  Mental Status: He is alert and oriented to person, place, and time  Cranial Nerves: No cranial nerve deficit  Sensory: No sensory deficit  Motor: No weakness  Coordination: Coordination normal    Psychiatric:         Mood and Affect: Mood normal        LABORATORY DATA     Labs: I have personally reviewed pertinent reports      Results from last 7 days   Lab Units 03/13/23  2328   WBC Thousand/uL 10 02   HEMOGLOBIN g/dL 11 1*   HEMATOCRIT % 35 4*   PLATELETS Thousands/uL 370   NEUTROS PCT % 79*   MONOS PCT % 10      Results from last 7 days   Lab Units 03/13/23  2328   POTASSIUM mmol/L 3 8   CHLORIDE mmol/L 101   CO2 mmol/L 25   BUN mg/dL 20   CREATININE mg/dL 0 85   CALCIUM mg/dL 9 9   ALK PHOS U/L 53   ALT U/L 8   AST U/L 9*     Results from last 7 days   Lab Units 03/13/23  2328   MAGNESIUM mg/dL 2 1                      Micro:  Lab Results   Component Value Date    URINECX No Growth <1000 cfu/mL 03/28/2021     IMAGING & DIAGNOSTIC TESTING     Imaging: I have personally reviewed pertinent reports  CTA head and neck with and without contrast    Result Date: 3/14/2023  Impression: 1  Enhancing lesions in the cerebral hemispheres and posterior fossa was able surrounding vasogenic edema  Recommend MRI of the brain with gadolinium  2   Right level 2 and 3 lymph nodes measuring up to 1 9 cm, likely metastatic  3   No stenosis, dissection or occlusion of the carotid or vertebral arteries or major vessels of the [place]  Workstation performed: [id number]     XR chest 2 views    Result Date: 3/14/2023  Impression: 12 cm right lower lobe mass  See subsequent chest CT  Workstation performed: [id number]     XR abdomen 1 view kub    Result Date: 3/14/2023  Impression: Nonobstructive bowel gas pattern with a moderate colonic stool burden  Workstation performed: [id number]     CT head without contrast    Result Date: 3/14/2023  Impression: Multiple target appearing intracranial lesions suspected as described with associated vasogenic edema; primary differential considerations including intracranial metastatic disease versus infection/abscesses depending on the clinical setting  MRI of the  brain with contrast recommended for further evaluation and to detect smaller lesions  No significant mass effect or midline shift  Other findings as above  Workstation performed: [id number]     CT chest abdomen pelvis w contrast    Result Date: 3/14/2023  Impression: 1  Right lower lobe mass extending along the fissure to the right middle and upper lobes and to the right hilum, measuring 10 x 12 12 cm, consistent with lung malignancy  Recommend pulmonary consultation  2   Left lower lobe subpleural 9 mm pulmonary nodule, indeterminate though concerning for malignancy  3   Few airspace opacities at the base of the right lower lobe and right middle lobe may represent pneumonia and/or aspiration  4   No evidence of malignancy in the abdomen or pelvis  5   Constipation   Workstation performed: [id number]     MRI Brain BT w wo Contrast    Result Date: 3/14/2023  Impression: Multiple intracranial enhancing lesions are identified, total of 5, consistent with intracranial metastasis  Several of these demonstrate focal surrounding vasogenic with localized mass effect, most prominently the right cerebellar lesion  Workstation performed: [id number]     EKG, Pathology, and Other Studies: I have personally reviewed pertinent reports  ALLERGIES   No Known Allergies  MEDICATIONS     Current Facility-Administered Medications   Medication Dose Route Frequency Provider Last Rate   • atorvastatin  40 mg Oral QPM Kemal Santillan DO     • dexamethasone  4 mg Intravenous Q6H [address] Kemalsamina Santillan, DO     • labetalol  10 mg Intravenous Q6H PRN Margoth Menchaca DO     • levETIRAcetam  500 mg Oral Q12H [address] Kemalsamina Sanitllan, DO     • polyethylene glycol  17 g Oral Daily Gaile Liz, DO     • senna-docusate sodium  1 tablet Oral HS Gaile Liz, DO          labetalol, 10 mg, Q6H PRN        Portions of the record may have been created with voice recognition software  Occasional wrong word or "sound a like" substitutions may have occurred due to the inherent limitations of voice recognition software    Read the chart carefully and recognize, using context, where substitutions have occurred     ==  Kathy Borges MD  [address]  Internal Medicine Residency PGY-1

## 2023-03-15 LAB
ALBUMIN SERPL BCP-MCNC: 2.8 G/DL (ref 3.5–5)
ALP SERPL-CCNC: 53 U/L (ref 46–116)
ALT SERPL W P-5'-P-CCNC: 14 U/L (ref 12–78)
ANION GAP SERPL CALCULATED.3IONS-SCNC: 5 MMOL/L (ref 4–13)
APTT PPP: 31 SECONDS (ref 23–37)
AST SERPL W P-5'-P-CCNC: 13 U/L (ref 5–45)
BASOPHILS # BLD AUTO: 0.01 THOUSANDS/ÂΜL (ref 0–0.1)
BASOPHILS NFR BLD AUTO: 0 % (ref 0–1)
BILIRUB SERPL-MCNC: 0.23 MG/DL (ref 0.2–1)
BUN SERPL-MCNC: 24 MG/DL (ref 5–25)
CALCIUM ALBUM COR SERPL-MCNC: 11.2 MG/DL (ref 8.3–10.1)
CALCIUM SERPL-MCNC: 10.2 MG/DL (ref 8.3–10.1)
CHLORIDE SERPL-SCNC: 104 MMOL/L (ref 96–108)
CHOLEST SERPL-MCNC: 176 MG/DL
CO2 SERPL-SCNC: 27 MMOL/L (ref 21–32)
CREAT SERPL-MCNC: 0.76 MG/DL (ref 0.6–1.3)
EOSINOPHIL # BLD AUTO: 0 THOUSAND/ÂΜL (ref 0–0.61)
EOSINOPHIL NFR BLD AUTO: 0 % (ref 0–6)
ERYTHROCYTE [DISTWIDTH] IN BLOOD BY AUTOMATED COUNT: 14.2 % (ref 11.6–15.1)
GFR SERPL CREATININE-BSD FRML MDRD: 92 ML/MIN/1.73SQ M
GLUCOSE SERPL-MCNC: 127 MG/DL (ref 65–140)
HCT VFR BLD AUTO: 35.1 % (ref 36.5–49.3)
HDLC SERPL-MCNC: 53 MG/DL
HGB BLD-MCNC: 11.2 G/DL (ref 12–17)
IMM GRANULOCYTES # BLD AUTO: 0.06 THOUSAND/UL (ref 0–0.2)
IMM GRANULOCYTES NFR BLD AUTO: 1 % (ref 0–2)
INR PPP: 1 (ref 0.84–1.19)
LDLC SERPL CALC-MCNC: 112 MG/DL (ref 0–100)
LYMPHOCYTES # BLD AUTO: 0.42 THOUSANDS/ÂΜL (ref 0.6–4.47)
LYMPHOCYTES NFR BLD AUTO: 4 % (ref 14–44)
MAGNESIUM SERPL-MCNC: 2.5 MG/DL (ref 1.6–2.6)
MCH RBC QN AUTO: 27.6 PG (ref 26.8–34.3)
MCHC RBC AUTO-ENTMCNC: 31.9 G/DL (ref 31.4–37.4)
MCV RBC AUTO: 87 FL (ref 82–98)
MONOCYTES # BLD AUTO: 0.5 THOUSAND/ÂΜL (ref 0.17–1.22)
MONOCYTES NFR BLD AUTO: 4 % (ref 4–12)
NEUTROPHILS # BLD AUTO: 10.83 THOUSANDS/ÂΜL (ref 1.85–7.62)
NEUTS SEG NFR BLD AUTO: 91 % (ref 43–75)
NRBC BLD AUTO-RTO: 0 /100 WBCS
PHOSPHATE SERPL-MCNC: 3.4 MG/DL (ref 2.3–4.1)
PLATELET # BLD AUTO: 397 THOUSANDS/UL (ref 149–390)
PMV BLD AUTO: 10.3 FL (ref 8.9–12.7)
POTASSIUM SERPL-SCNC: 4.3 MMOL/L (ref 3.5–5.3)
PROT SERPL-MCNC: 6.5 G/DL (ref 6.4–8.4)
PROTHROMBIN TIME: 13.4 SECONDS (ref 11.6–14.5)
RBC # BLD AUTO: 4.06 MILLION/UL (ref 3.88–5.62)
SODIUM SERPL-SCNC: 136 MMOL/L (ref 135–147)
TRIGL SERPL-MCNC: 55 MG/DL
WBC # BLD AUTO: 11.82 THOUSAND/UL (ref 4.31–10.16)

## 2023-03-15 PROCEDURE — 0BBK3ZX EXCISION OF RIGHT LUNG, PERCUTANEOUS APPROACH, DIAGNOSTIC: ICD-10-PCS | Performed by: INTERNAL MEDICINE

## 2023-03-15 RX ORDER — ENOXAPARIN SODIUM 100 MG/ML
40 INJECTION SUBCUTANEOUS
Status: DISCONTINUED | OUTPATIENT
Start: 2023-03-17 | End: 2023-03-15

## 2023-03-15 RX ORDER — ENOXAPARIN SODIUM 100 MG/ML
40 INJECTION SUBCUTANEOUS
Status: DISCONTINUED | OUTPATIENT
Start: 2023-03-16 | End: 2023-03-16

## 2023-03-15 RX ORDER — LACTULOSE 20 G/30ML
20 SOLUTION ORAL ONCE
Status: COMPLETED | OUTPATIENT
Start: 2023-03-15 | End: 2023-03-15

## 2023-03-15 RX ORDER — ENOXAPARIN SODIUM 100 MG/ML
40 INJECTION SUBCUTANEOUS EVERY 12 HOURS SCHEDULED
Status: DISCONTINUED | OUTPATIENT
Start: 2023-03-15 | End: 2023-03-15

## 2023-03-15 RX ORDER — ENOXAPARIN SODIUM 100 MG/ML
40 INJECTION SUBCUTANEOUS
Status: DISCONTINUED | OUTPATIENT
Start: 2023-03-15 | End: 2023-03-15

## 2023-03-15 RX ADMIN — DEXAMETHASONE SODIUM PHOSPHATE 4 MG: 4 INJECTION INTRA-ARTICULAR; INTRALESIONAL; INTRAMUSCULAR; INTRAVENOUS; SOFT TISSUE at 23:12

## 2023-03-15 RX ADMIN — DEXAMETHASONE SODIUM PHOSPHATE 4 MG: 4 INJECTION INTRA-ARTICULAR; INTRALESIONAL; INTRAMUSCULAR; INTRAVENOUS; SOFT TISSUE at 06:54

## 2023-03-15 RX ADMIN — LACTULOSE 20 G: 20 SOLUTION ORAL at 17:36

## 2023-03-15 RX ADMIN — DEXAMETHASONE SODIUM PHOSPHATE 4 MG: 4 INJECTION INTRA-ARTICULAR; INTRALESIONAL; INTRAMUSCULAR; INTRAVENOUS; SOFT TISSUE at 01:45

## 2023-03-15 RX ADMIN — LEVETIRACETAM 500 MG: 500 TABLET, FILM COATED ORAL at 22:00

## 2023-03-15 RX ADMIN — DEXAMETHASONE SODIUM PHOSPHATE 4 MG: 4 INJECTION INTRA-ARTICULAR; INTRALESIONAL; INTRAMUSCULAR; INTRAVENOUS; SOFT TISSUE at 13:10

## 2023-03-15 RX ADMIN — SENNOSIDES AND DOCUSATE SODIUM 1 TABLET: 8.6; 5 TABLET ORAL at 22:00

## 2023-03-15 RX ADMIN — ATORVASTATIN CALCIUM 40 MG: 40 TABLET, FILM COATED ORAL at 17:36

## 2023-03-15 RX ADMIN — POLYETHYLENE GLYCOL 3350 17 G: 17 POWDER, FOR SOLUTION ORAL at 09:52

## 2023-03-15 RX ADMIN — ENOXAPARIN SODIUM 40 MG: 40 INJECTION SUBCUTANEOUS at 09:52

## 2023-03-15 RX ADMIN — LEVETIRACETAM 500 MG: 500 TABLET, FILM COATED ORAL at 09:52

## 2023-03-15 RX ADMIN — DEXAMETHASONE SODIUM PHOSPHATE 4 MG: 4 INJECTION INTRA-ARTICULAR; INTRALESIONAL; INTRAMUSCULAR; INTRAVENOUS; SOFT TISSUE at 17:36

## 2023-03-15 NOTE — PLAN OF CARE
Problem: PHYSICAL THERAPY ADULT  Goal: Performs mobility at highest level of function for planned discharge setting  See evaluation for individualized goals  Description: Treatment/Interventions: Functional transfer training, LE strengthening/ROM, Therapeutic exercise, Endurance training, Patient/family training, Equipment eval/education, Bed mobility, Gait training, Spoke to nursing, OT  Equipment Recommended: Laurie Cordon       See flowsheet documentation for full assessment, interventions and recommendations  Note: Prognosis: Guarded  Problem List: Impaired balance, Decreased mobility, Impaired judgement, Decreased safety awareness  Assessment: Pt presented to B s/p ambulatory dysfunction with imaging revealing multiple intracranial enhancing lesions of the brain suspecting brain metastasis  Per chart review pt had recent fall 6 days ago and has since experienced difficulty amb  PMH includes HTN, hx of hernia repair  Pt being seen for high complexity PT evaluation due to ongoing medical management for primary diagnosis, continuous pulse oximetry monitoring, utilization of new assistive device, and decreased activity tolerance compared to baseline  Pt reports living with girlfriend and her children in a 1 story apartment with 0 NAILA  Pt states he was independent with ADLs, IADLs, and functional mobility without use of AD PTA  Pt unable to drive and states his daughter provides transportation, but mostly walks community distances  Provided pt education on use of RW for amb secondary to balance deficits and reduce risk of falls  Pt performs transfers with Min Ax1, and ambulation with Min Ax1 and use of RW  Throughout functional mobility pt requires frequent VC for maintaining attention to task, and requires CGA throughout amb secondary to impulsivity and decreased safety awareness  Encouraged pt to use RW for amb to reduce risk of falls secondary impaired dynamic standing and ambulatory balance   At conclusion of PT evaluation, pt was seated in chair with all needs within reach and chair alarm engaged  Pt presented at PT evaluation with decreased BLE strength and ROM, impaired static and dynamic balance, decreased endurance, decreased safety awareness, risk of falls, and gait deviations, and will continue to benefit from skilled PT services during hospital stay  Due to the impairments listed above and limited social support, PT d/c recommendation when medically cleared is post acute rehabilitation services  Barriers to Discharge: Decreased caregiver support     PT Discharge Recommendation: Post acute rehabilitation services    See flowsheet documentation for full assessment

## 2023-03-15 NOTE — MALNUTRITION/BMI
This medical record reflects one or more clinical indicators suggestive of malnutrition  Malnutrition Findings:   Adult Malnutrition type: Chronic illness  Adult Degree of Malnutrition: Other severe protein calorie malnutrition  Malnutrition Characteristics: Muscle loss, Fat loss                  360 Statement: related to catabolic illness as evidenced by weight loss, depressed temples, hollow supraclavicular space, dark orbital, visible acromonion process, ribs, low body weight  Intervention: Regular diet, pt declined medical food supplements  BMI Findings:  Adult BMI Classifications: Underweight < 18 5        Body mass index is 22 49 kg/m²  See Nutrition note dated 3/15/2023 for additional details  Completed nutrition assessment is viewable in the nutrition documentation

## 2023-03-15 NOTE — PROGRESS NOTES
INTERNAL MEDICINE RESIDENCY PROGRESS NOTE     Name: Rob Agudelo   Age & Sex: 81 y o  male   MRN: [id number]  Unit/Bed#: [facility] 604-01   Encounter: [id number]  Team: SOD Team B     PATIENT INFORMATION     Name: Ramakrishna Perez   Age & Sex: 81 y o  male   MRN: [id number]  Hospital Stay Days: 1    ASSESSMENT/PLAN     Principal Problem:    Brain mass  Active Problems:    Lung mass    Hypertension      Hypertension  Assessment & Plan  Patient has a history of hypertension   • Patient is not currently on any medication at home  • patient blood pressure in the ED was normotensive  ? Plan: atorvastatin 40 mg daily  ? Labetalol 10 mg IV PRN every 6 hours    Lung mass  Assessment & Plan  Right lower lobe lung mass suspecting malignancy  ? CT chest/abdomen/pelvis showed right lower lobe mass extending along the fissure to the right middle and upper lobes and right hilum, measuring 10 x 12 cm consistent with lung malignancy   Left lower lobe subpleural 9 mm lung pulmonary nodule, intermediate concerning for malignancy   Few airspace opacities at the base of the right lower lobe and the right middle lobe may represent pneumonia and/or aspiration  ? Plan: See the plan under brain mets    * Brain mass  Assessment & Plan  Multiple intracranial enhancing lesions of the brain suspecting brain metastasis  • Patient is presenting with imbalance and ambulatory dysfunction for the last 5 days  • Patient had recent fall 5 days ago with no head strike  • On exam patient has ataxic gait    • 03/14 CT head showed multiple target appearing intracranial lesions suspected described with associated stranding edema, primary differential consideration including intracranial metastatic disease versus infection/abscess depending on the clinical setting     • 03/14 MRI brain showed multiple intracranial enhancing lesions are identified total of 5, consistent with endocrine metastasis   Several of these demonstrate focal surrounding positioning with localized mass effect, most prominently the right cerebellar lesion     • CT chest/abdomen/pelvis showed right lower lobe mass extending along the fissure to the right middle and upper lobes and right hilum, measuring 10 x 12 cm consistent with lung malignancy   Left lower lobe subpleural 9 mm lung pulmonary nodule, intermediate concerning for malignancy   Few airspace opacities at the base of the right lower lobe and the right middle lobe may represent pneumonia and/or aspiration  ? Plan:   ? Palliative care consulted  ? Neurosurgery consulted, recommend IR biopsy of lung mass  ? IR consulted, will proceed with lung biopsy, NPO midnight prior, hold anticoagulation midnight prior  ? Consult PT/OT  ? Plan for oncology and possibly rad-onc consult once biopsy is complete  ? Fall precautions  ? Neuro check every 4 hours  ? Decadron 4 mg IV every 6 hours   ? Keppra 500 mg tab every 12 hours        Disposition: Pending tissue diagnosis of likely metastatic disease and treatment planning  SUBJECTIVE     Patient seen and examined  No acute events overnight  Patient states he is feeling well today  States he continues to feel off-balance while ambulating, often leaning on walls or other objects for support  He continues to report right lateral rib pain he has had since falling 5 days prior to admission, only present when moving or pressing on the area  He feels he has his normal level of energy and maintains a full appetite  Denies dizziness, vertigo, lightheadedness, headache, chest pain, shortness of breath, palpitations, or auditory/visual changes      OBJECTIVE     Vitals:    23 1940 03/15/23 0148 03/15/23 0600 03/15/23 0747   BP: 118/73 118/61  119/64   Pulse: 91 74  71   Resp: 20 20  16   Temp: 98 °F (36 7 °C) 97 8 °F (36 6 °C)  97 7 °F (36 5 °C)   TempSrc:       SpO2: 94% 96%  94%   Weight:   63 2 kg (139 lb 5 3 oz)    Height:          Temperature:   Temp (24hrs), Av °F (36 7 °C), Min:97 7 °F (36 5 °C), Max:98 5 °F (36 9 °C)    Temperature: 97 7 °F (36 5 °C)  Intake & Output:  I/O       03/13 0701  03/14 0700 03/14 0701  03/15 0700 03/15 0701 03/16 0700    P  O    460    Total Intake(mL/kg)   460 (7 3)    Urine (mL/kg/hr)  1450 400 (1 6)    Total Output  1450 400    Net  -1450 +60           Unmeasured Stool Occurrence  1 x         Weights:   IBW (Ideal Body Weight): 63 8 kg    Body mass index is 22 49 kg/m²  Weight (last 2 days)     Date/Time Weight    03/15/23 0600 63 2 (139 33)    03/14/23 1350 61 2 (134 92)        Physical Exam  Constitutional:       General: He is not in acute distress  Appearance: He is not ill-appearing  HENT:      Mouth/Throat:      Mouth: Mucous membranes are moist       Pharynx: Oropharynx is clear  Eyes:      General: No scleral icterus  Extraocular Movements: Extraocular movements intact  Pupils: Pupils are equal, round, and reactive to light  Cardiovascular:      Rate and Rhythm: Normal rate and regular rhythm  Heart sounds: No murmur heard  Pulmonary:      Effort: Pulmonary effort is normal  No respiratory distress  Abdominal:      General: Bowel sounds are normal  There is no distension  Palpations: Abdomen is soft  Tenderness: There is no abdominal tenderness  Musculoskeletal:      Right lower leg: No edema  Left lower leg: No edema  Skin:     General: Skin is warm and dry  Coloration: Skin is not jaundiced  Neurological:      Mental Status: He is alert and oriented to person, place, and time  Cranial Nerves: No cranial nerve deficit  Sensory: No sensory deficit  Motor: No weakness  Coordination: Romberg sign positive  Heel to [facility] Test normal  Rapid alternating movements normal       Gait: Gait abnormal (Ataxic with short steps, holding on to surroundings for balance  )  Comments: Right patellar reflex 3+, all other DTRs 2+       LABORATORY DATA     Labs:  I have personally reviewed pertinent reports  Results from last 7 days   Lab Units 03/15/23  0604 03/13/23  2328   WBC Thousand/uL 11 82* 10 02   HEMOGLOBIN g/dL 11 2* 11 1*   HEMATOCRIT % 35 1* 35 4*   PLATELETS Thousands/uL 397* 370   NEUTROS PCT % 91* 79*   MONOS PCT % 4 10      Results from last 7 days   Lab Units 03/15/23  0604 03/13/23  2328   POTASSIUM mmol/L 4 3 3 8   CHLORIDE mmol/L 104 101   CO2 mmol/L 27 25   BUN mg/dL 24 20   CREATININE mg/dL 0 76 0 85   CALCIUM mg/dL 10 2* 9 9   ALK PHOS U/L 53 53   ALT U/L 14 8   AST U/L 13 9*     Results from last 7 days   Lab Units 03/15/23  0604 03/13/23  2328   MAGNESIUM mg/dL 2 5 2 1     Results from last 7 days   Lab Units 03/15/23  0604   PHOSPHORUS mg/dL 3 4      Results from last 7 days   Lab Units 03/15/23  0604   INR  1 00   PTT seconds 31               IMAGING & DIAGNOSTIC TESTING     Radiology Results: I have personally reviewed pertinent reports  CTA head and neck with and without contrast    Result Date: 3/14/2023  Impression: 1  Enhancing lesions in the cerebral hemispheres and posterior fossa was able surrounding vasogenic edema  Recommend MRI of the brain with gadolinium  2   Right level 2 and 3 lymph nodes measuring up to 1 9 cm, likely metastatic  3   No stenosis, dissection or occlusion of the carotid or vertebral arteries or major vessels of the [place]  Workstation performed: [id number]     XR chest 2 views    Result Date: 3/14/2023  Impression: 12 cm right lower lobe mass  See subsequent chest CT  Workstation performed: [id number]     XR abdomen 1 view kub    Result Date: 3/14/2023  Impression: Nonobstructive bowel gas pattern with a moderate colonic stool burden   Workstation performed: [id number]     CT head without contrast    Result Date: 3/14/2023  Impression: Multiple target appearing intracranial lesions suspected as described with associated vasogenic edema; primary differential considerations including intracranial metastatic disease versus infection/abscesses depending on the clinical setting  MRI of the  brain with contrast recommended for further evaluation and to detect smaller lesions  No significant mass effect or midline shift  Other findings as above  Workstation performed: [id number]     CT chest abdomen pelvis w contrast    Result Date: 3/14/2023  Impression: 1  Right lower lobe mass extending along the fissure to the right middle and upper lobes and to the right hilum, measuring 10 x 12 12 cm, consistent with lung malignancy  Recommend pulmonary consultation  2   Left lower lobe subpleural 9 mm pulmonary nodule, indeterminate though concerning for malignancy  3   Few airspace opacities at the base of the right lower lobe and right middle lobe may represent pneumonia and/or aspiration  4   No evidence of malignancy in the abdomen or pelvis  5   Constipation  Workstation performed: [id number]     MRI Brain BT w wo Contrast    Result Date: 3/14/2023  Impression: Multiple intracranial enhancing lesions are identified, total of 5, consistent with intracranial metastasis  Several of these demonstrate focal surrounding vasogenic with localized mass effect, most prominently the right cerebellar lesion  Workstation performed: [id number]     Other Diagnostic Testing: I have personally reviewed pertinent reports      ACTIVE MEDICATIONS     Current Facility-Administered Medications   Medication Dose Route Frequency   • atorvastatin (LIPITOR) tablet 40 mg  40 mg Oral QPM   • dexamethasone (DECADRON) injection 4 mg  4 mg Intravenous Q6H [address]   • enoxaparin (LOVENOX) subcutaneous injection 40 mg  40 mg Subcutaneous Q24H MOLLY   • labetalol (NORMODYNE) injection 10 mg  10 mg Intravenous Q6H PRN   • levETIRAcetam (KEPPRA) tablet 500 mg  500 mg Oral Q12H [address]   • polyethylene glycol (MIRALAX) packet 17 g  17 g Oral Daily   • senna-docusate sodium (SENOKOT S) 8 6-50 mg per tablet 1 tablet  1 tablet Oral HS       VTE Pharmacologic Prophylaxis: Enoxaparin (Lovenox)  VTE Mechanical Prophylaxis: sequential compression device    Portions of the record may have been created with voice recognition software  Occasional wrong word or "sound a like" substitutions may have occurred due to the inherent limitations of voice recognition software    Read the chart carefully and recognize, using context, where substitutions have occurred   ==  [address] [facility]  Internal Medicine Residency PGY-***

## 2023-03-15 NOTE — PLAN OF CARE
Problem: MOBILITY - ADULT  Goal: Maintain or return to baseline ADL function  Description: INTERVENTIONS:  -  Assess patient's ability to carry out ADLs; assess patient's baseline for ADL function and identify physical deficits which impact ability to perform ADLs (bathing, care of mouth/teeth, toileting, grooming, dressing, etc )  - Assess/evaluate cause of self-care deficits   - Assess range of motion  - Assess patient's mobility; develop plan if impaired  - Assess patient's need for assistive devices and provide as appropriate  - Encourage maximum independence but intervene and supervise when necessary  - Involve family in performance of ADLs  - Assess for home care needs following discharge   - Consider OT consult to assist with ADL evaluation and planning for discharge  - Provide patient education as appropriate  Outcome: Progressing  Goal: Maintains/Returns to pre admission functional level  Description: INTERVENTIONS:  - Perform BMAT or MOVE assessment daily    - Set and communicate daily mobility goal to care team and patient/family/caregiver  - Collaborate with rehabilitation services on mobility goals if consulted  - Perform Range of Motion  times a day  - Reposition patient every  hours    - Dangle patient  times a day  - Stand patient  times a day  - Ambulate patient  times a day  - Out of bed to chair  times a day   - Out of bed for meals  times a day  - Out of bed for toileting  - Record patient progress and toleration of activity level   Outcome: Progressing     Problem: PAIN - ADULT  Goal: Verbalizes/displays adequate comfort level or baseline comfort level  Description: Interventions:  - Encourage patient to monitor pain and request assistance  - Assess pain using appropriate pain scale  - Administer analgesics based on type and severity of pain and evaluate response  - Implement non-pharmacological measures as appropriate and evaluate response  - Consider cultural and social influences on pain and pain management  - Notify physician/advanced practitioner if interventions unsuccessful or patient reports new pain  Outcome: Progressing     Problem: INFECTION - ADULT  Goal: Absence or prevention of progression during hospitalization  Description: INTERVENTIONS:  - Assess and monitor for signs and symptoms of infection  - Monitor lab/diagnostic results  - Monitor all insertion sites, i e  indwelling lines, tubes, and drains  - Monitor endotracheal if appropriate and nasal secretions for changes in amount and color  - [place] appropriate cooling/warming therapies per order  - Administer medications as ordered  - Instruct and encourage patient and family to use good hand hygiene technique  - Identify and instruct in appropriate isolation precautions for identified infection/condition  Outcome: Progressing  Goal: Absence of fever/infection during neutropenic period  Description: INTERVENTIONS:  - Monitor WBC    Outcome: Progressing     Problem: SAFETY ADULT  Goal: Maintain or return to baseline ADL function  Description: INTERVENTIONS:  -  Assess patient's ability to carry out ADLs; assess patient's baseline for ADL function and identify physical deficits which impact ability to perform ADLs (bathing, care of mouth/teeth, toileting, grooming, dressing, etc )  - Assess/evaluate cause of self-care deficits   - Assess range of motion  - Assess patient's mobility; develop plan if impaired  - Assess patient's need for assistive devices and provide as appropriate  - Encourage maximum independence but intervene and supervise when necessary  - Involve family in performance of ADLs  - Assess for home care needs following discharge   - Consider OT consult to assist with ADL evaluation and planning for discharge  - Provide patient education as appropriate  Outcome: Progressing  Goal: Maintains/Returns to pre admission functional level  Description: INTERVENTIONS:  - Perform BMAT or MOVE assessment daily    - Set and communicate daily mobility goal to care team and patient/family/caregiver  - Collaborate with rehabilitation services on mobility goals if consulted  - Perform Range of Motion  times a day  - Reposition patient every  hours    - Dangle patient  times a day  - Stand patient  times a day  - Ambulate patient  times a day  - Out of bed to chair  times a day   - Out of bed for meals  times a day  - Out of bed for toileting  - Record patient progress and toleration of activity level   Outcome: Progressing  Goal: Patient will remain free of falls  Description: INTERVENTIONS:  - Educate patient/family on patient safety including physical limitations  - Instruct patient to call for assistance with activity   - Consult OT/PT to assist with strengthening/mobility   - Keep Call bell within reach  - Keep bed low and locked with side rails adjusted as appropriate  - Keep care items and personal belongings within reach  - Initiate and maintain comfort rounds  - Make Fall Risk Sign visible to staff  - Offer Toileting every  Hours, in advance of need  - Initiate/Maintain alarm  - Obtain necessary fall risk management equipmen  - Apply yellow socks and bracelet for high fall risk patients  - Consider moving patient to room near nurses station  Outcome: Progressing     Problem: DISCHARGE PLANNING  Goal: Discharge to home or other facility with appropriate resources  Description: INTERVENTIONS:  - Identify barriers to discharge w/patient and caregiver  - Arrange for needed discharge resources and transportation as appropriate  - Identify discharge learning needs (meds, wound care, etc )  - Arrange for interpretive services to assist at discharge as needed  - Refer to Case Management Department for coordinating discharge planning if the patient needs post-hospital services based on physician/advanced practitioner order or complex needs related to functional status, cognitive ability, or social support system  Outcome: Progressing Problem: Knowledge Deficit  Goal: Patient/family/caregiver demonstrates understanding of disease process, treatment plan, medications, and discharge instructions  Description: Complete learning assessment and assess knowledge base  Interventions:  - Provide teaching at level of understanding  - Provide teaching via preferred learning methods  Outcome: Progressing     Problem: Nutrition/Hydration-ADULT  Goal: Nutrient/Hydration intake appropriate for improving, restoring or maintaining nutritional needs  Description: Monitor and assess patient's nutrition/hydration status for malnutrition  Collaborate with interdisciplinary team and initiate plan and interventions as ordered  Monitor patient's weight and dietary intake as ordered or per policy  Utilize nutrition screening tool and intervene as necessary  Determine patient's food preferences and provide high-protein, high-caloric foods as appropriate  INTERVENTIONS:  - Monitor oral intake, urinary output, labs, and treatment plans  - Assess nutrition and hydration status and recommend course of action  - Evaluate amount of meals eaten  - Assist patient with eating if necessary   - Allow adequate time for meals  - Recommend/ encourage appropriate diets, oral nutritional supplements, and vitamin/mineral supplements  - Order, calculate, and assess calorie counts as needed  - Recommend, monitor, and adjust tube feedings and TPN/PPN based on assessed needs  - Assess need for intravenous fluids  - Provide specific nutrition/hydration education as appropriate  - Include patient/family/caregiver in decisions related to nutrition  Outcome: Progressing     Problem: Nutrition/Hydration-ADULT  Goal: Nutrient/Hydration intake appropriate for improving, restoring or maintaining nutritional needs  Description: Monitor and assess patient's nutrition/hydration status for malnutrition  Collaborate with interdisciplinary team and initiate plan and interventions as ordered  Monitor patient's weight and dietary intake as ordered or per policy  Utilize nutrition screening tool and intervene as necessary  Determine patient's food preferences and provide high-protein, high-caloric foods as appropriate       INTERVENTIONS:  - Monitor oral intake, urinary output, labs, and treatment plans  - Assess nutrition and hydration status and recommend course of action  - Evaluate amount of meals eaten  - Assist patient with eating if necessary   - Allow adequate time for meals  - Recommend/ encourage appropriate diets, oral nutritional supplements, and vitamin/mineral supplements  - Order, calculate, and assess calorie counts as needed  - Recommend, monitor, and adjust tube feedings and TPN/PPN based on assessed needs  - Assess need for intravenous fluids  - Provide specific nutrition/hydration education as appropriate  - Include patient/family/caregiver in decisions related to nutrition  Outcome: Progressing

## 2023-03-15 NOTE — TELEMEDICINE
e-Consult (IPC)  - Interventional Radiology  Ck Perez 81 y o  male MRN: [id number]  Unit/Bed#: [facility] 336-13 Encounter: [id number]          Interventional Radiology has been consulted to evaluate Lisa Robertson    We were consulted by [facility] concerning this patient with lung mass with apparent brain metastases  Patient was admitted to the hospital due to neurologic complications of these brain metastases  Patient wished for further diagnostic work up  Not currently a candidate for neurosurgery intervention  Inpatient Consult to IR  Consult performed by: Delroy Wetzel PA-C  Consult ordered by: Cooper Rivas DO        03/15/23    Assessment/Recommendation:       1  Right lung mass with brain metastases  -Case reviewed with attending physician Dr Korina Desir for right lung biopsy IR schedule permitting  -N p o  midnight prior to procedure  - Hold anticoagulation midnight prior to procedure  - Goal INR <1 5, plt >50  -Please contact IR with any questions or concerns    11-20 minutes, >50% of the total time devoted to medical consultative verbal/EMR discussion between providers  Written report will be generated in the EMR  Thank you for allowing Interventional Radiology to participate in the care of Lisa Robertson  Please don't hesitate to call or TigerText us with any questions       Delroy Wetzel PA-C

## 2023-03-15 NOTE — ASSESSMENT & PLAN NOTE
Patient presents for evaluation of ambulatory dysfunction x5 days, found to have 12 cm right sided lung mass and 5 brain masses with cerebral edema and mild brain compression  · No prior h/o malignancy  · Daily long time smoker  Admits to coughing up blood in January  · Significant weight loss over several months (used to be 185)  · On exam, subtle right eyelid droop and slurred speech  No dysmetria noted BUE/BLE  GCS 15    Imaging:  · MRI brain w/wo, 3/14/23: Multiple intracranial enhancing lesions are identified, total of 5, consistent with intracranial metastasis  Several of these demonstrate focal surrounding vasogenic with localized mass effect, most prominently the right cerebellar lesion    Plan:  · Continue to monitor neurological exam  · Imaging reviewed with attending  Would recommend WBRT vs SRT to brain lesions depending on pathology  · IR consult for biopsy, planned for tomorrow  · Palliative consult given likely stage IV cancer diagnosis  · Will need medical oncology and radiation oncology consults once tissue diagnosis is made  · Continue decadron 4q6 for cerebral edema, adjust per oncology services  · Ok to discontinue keppra as patient did not present with seizure activity  · Medical management per primary team  · DVT ppx: SCDs, ok for pharm ppx from [facility] standpoint    Neurosurgery will sign off at this time  No neurosurgical intervention is anticipated given location of masses and small size - more amenable to radiation  Please call with questions or concerns

## 2023-03-15 NOTE — CASE MANAGEMENT
Case Management Assessment & Discharge Planning Note    Patient name Irving Dykes  Location [facility] 604/[facility] 055-10 MRN [id number]  : 1950 Date 3/15/2023       Current Admission Date: 3/14/2023  Current Admission Diagnosis:Brain mass   Patient Active Problem List    Diagnosis Date Noted   • Brain mass 2023   • Lung mass 2023   • Hypertension 2023      LOS (days): 1  Geometric Mean LOS (GMLOS) (days): 3 80  Days to GMLOS:2 9     OBJECTIVE:    Risk of Unplanned Readmission Score: 9 89         Current admission status: Inpatient       Preferred Pharmacy:   [id number], [address] [facility]  [address] [facility],   [address]  [address] [place] [address]  Phone: [phone] Fax: [phone]    Primary Care Provider: No primary care provider on file  Primary Insurance: MEDICARE  Secondary Insurance: [address],[facility]    ASSESSMENT:  Active Health Care Proxies     Ana Archbold - [facility] - Daughter   Primary Phone: [phone] (Mobile)               Patient Information  Admitted from[de-identified] Home  Mental Status: Alert  During Assessment patient was accompanied by: Not accompanied during assessment  Assessment information provided by[de-identified] Patient  Primary Caregiver: Self  Support Systems: Self, Spouse/significant other, Daughter  [place] of Residence: [address] [place] [address] do you live in?: [address] entry access options   Select all that apply : No steps to enter home  Type of Current Residence: Apartment  Floor Level: 1  Upon entering residence, is there a bedroom on the main floor (no further steps)?: Yes  Upon entering residence, is there a bathroom on the main floor (no further steps)?: Yes  In the last 12 months, was there a time when you were not able to pay the mortgage or rent on time?: No  In the last 12 months, how many places have you lived?: 1  In the last 12 months, was there a time when you did not have a steady place to sleep or slept in a shelter (including now)?: No  Homeless/housing insecurity resource given?: N/A  Living Arrangements: Lives w/ Friend  Is patient a ?: No    Activities of Daily Living Prior to Admission  Functional Status: Independent  Completes ADLs independently?: Yes  Ambulates independently?: Yes  Does patient use assisted devices?: No  Does patient currently own DME?: No  Does patient have a history of Outpatient Therapy (PT/OT)?: No  Does the patient have a history of Short-Term Rehab?: No  Does patient have a history of HHC?: No  Does patient currently have [place] [id number]?: No    Patient Information Continued  Income Source: Pension/[employer]  Does patient have prescription coverage?: Yes  Within the past 12 months, you worried that your food would run out before you got the money to buy more : Never true  Within the past 12 months, the food you bought just didn't last and you didn't have money to get more : Never true  Food insecurity resource given?: N/A  Does patient receive dialysis treatments?: No  Does patient have a history of substance abuse?: No  Does patient have a history of Mental Health Diagnosis?: No    Means of Transportation  Means of Transport to Appts[de-identified] Family transport  In the past 12 months, has lack of transportation kept you from medical appointments or from getting medications?: No  In the past 12 months, has lack of transportation kept you from meetings, work, or from getting things needed for daily living?: No  Was application for public transport provided?: N/A        DISCHARGE DETAILS:    Discharge planning discussed with[de-identified] Patient  Freedom of Choice: Yes  Comments - Freedom of Choice: Discussed FOC  CM contacted family/caregiver?: Yes  Were Treatment Team discharge recommendations reviewed with patient/caregiver?: Yes  Did patient/caregiver verbalize understanding of patient care needs?: Yes  Were patient/caregiver advised of the risks associated with not following Treatment Team discharge recommendations?: Yes    Contacts  Patient Contacts: Benitez Tayla  Relationship to Patient[de-identified] Family  Reason/Outcome: Continuity of Care, Emergency Contact, Discharge Planning    Other Referral/Resources/Interventions Provided:  Interventions: Short Term Rehab  Referral Comments: Patient is interested in referral to [facility], entered in [address]     CM reviewed d/c planning process including the following: identifying help at home, patient preference for d/c planning needs, Discharge Lounge, [facility] Meds to Bed program, availability of treatment team to discuss questions or concerns patient and/or family may have regarding understanding medications and recognizing signs and symptoms once discharged  CM also encouraged patient to follow up with all recommended appointments after discharge  Patient advised of importance for patient and family to participate in managing patient’s medical well being  Information obtained from patient, lives with girlfriend, gf son and dil  in a 1st floor apartment, no NAILA   IADLS, Does not drive, gets rides from daughter  Is  from his wife, not legally ,  Daughter is emergency contact  Has not had covid vaccines, nor is he interested in receiving

## 2023-03-15 NOTE — PHYSICAL THERAPY NOTE
PHYSICAL THERAPY EVALUATION          Patient Name: Shasta Mensah  JNJKU'C Date: 3/15/2023       03/15/23 0918   PT Last Visit   PT Visit Date 03/15/23   Note Type   Note type Evaluation   Pain Assessment   Pain Assessment Tool 0-10   Pain Score No Pain   Patient's Stated Pain Goal No pain   Restrictions/Precautions   Weight Bearing Precautions Per Order No   Other Precautions Impulsive;Cognitive; Chair Alarm; Bed Alarm; Fall Risk   Home Living   Type of [address] One level;Performs ADLs on one level  (0 NAILA)   Bathroom Shower/Tub Walk-in shower   Bathroom Toilet Raised   Bathroom Equipment Grab bars in shower;Built-in shower seat;Grab bars around toilet   [address]   (None per pt report)   Additional Comments Pt reports living with significant other and her children in a one story house with 0 NAILA  Prior Function   Level of [place] Independent with ADLs; Independent with functional mobility; Independent with IADLS   Lives With Significant other;Family   Receives Help From Family   IADLs Family/Friend/Other provides transportation; Family/Friend/Other provides meals; Family/Friend/Other provides medication management   Falls in the last 6 months 1 to 4  (1)   Vocational Unemployed   Comments PTA pt denies the use of AD for amb   General   Family/Caregiver Present No   Cognition   Overall Cognitive Status Impaired   Arousal/Participation Alert   Orientation Level Oriented X4   Memory Within functional limits   Following Commands Follows one step commands with increased time or repetition   Comments Pt pleasant and cooperative, requires cueing for redirection to task  Observed with increased impulsivity and requires hands-on assist for functional mobility secondary to safety     Subjective   Subjective "I don't know why they won't let me get up from this bed"   RLE Assessment   RLE Assessment WFL   Strength RLE   RLE Overall Strength 4-/5  (functionally assessed)   LLE Assessment   LLE Assessment WFL   Strength LLE   LLE Overall Strength 4-/5  (functionally assessed)   Bed Mobility   Additional Comments Unable to assess; pt greeted seated edge of bed at start of eval   Transfers   Sit to Stand 4  Minimal assistance   Additional items Increased time required; Impulsive;Verbal cues; Assist x 1   Stand to Sit 4  Minimal assistance   Additional items Assist x 1; Armrests; Increased time required; Impulsive;Verbal cues   Additional Comments c RW, VC for impulsivity during transfers   Ambulation/Elevation   Gait pattern Narrow LIDIA; Forward Flexion;Decreased foot clearance; Inconsistent sayra; Short stride; Excessively slow; Step through pattern   Gait Assistance 4  Minimal assist   Additional items Assist x 1;Verbal cues; Tactile cues  (CGA for impulsivity)   Assistive Device Rolling walker   Distance 90'x2   Stair Management Assistance Not tested   Ambulation/Elevation Additional Comments c RW, CGA secondary to impulsivity  Stops walking while talking   Balance   Static Sitting Fair   Dynamic Sitting Fair -   Static Standing Fair -   Dynamic Standing Poor +   Ambulatory Poor +   Endurance Deficit   Endurance Deficit No   Activity Tolerance   Activity Tolerance Patient tolerated treatment well   Medical Staff Made Aware Qian Pastrana OT present for co-evaluation secondary to pt presentation  Ninoska Patch, PT present for student observation   Nurse Made Aware clearance per RN   Assessment   Prognosis Guarded   Problem List Impaired balance;Decreased mobility; Impaired judgement;Decreased safety awareness   Assessment Pt presented to [facility] s/p ambulatory dysfunction with imaging revealing multiple intracranial enhancing lesions of the brain suspecting brain metastasis  Per chart review pt had recent fall 6 days ago and has since experienced difficulty amb  PMH includes HTN, hx of hernia repair   Pt being seen for high complexity PT evaluation due to ongoing medical management for primary diagnosis, continuous pulse oximetry monitoring, utilization of new assistive device, and decreased activity tolerance compared to baseline  Pt reports living with girlfriend and her children in a 1 story apartment with 0 NAILA  Pt states he was independent with ADLs, IADLs, and functional mobility without use of AD PTA  Pt unable to drive and states his daughter provides transportation, but mostly walks community distances  Provided pt education on use of RW for amb secondary to balance deficits and reduce risk of falls  Pt performs transfers with Min Ax1, and ambulation with Min Ax1 and use of RW  Throughout functional mobility pt requires frequent VC for maintaining attention to task, and requires CGA throughout amb secondary to impulsivity and decreased safety awareness  Encouraged pt to use RW for amb to reduce risk of falls secondary impaired dynamic standing and ambulatory balance  At conclusion of PT evaluation, pt was seated in chair with all needs within reach and chair alarm engaged  Pt presented at PT evaluation with decreased BLE strength and ROM, impaired static and dynamic balance, decreased endurance, decreased safety awareness, risk of falls, and gait deviations, and will continue to benefit from skilled PT services during hospital stay  Due to the impairments listed above and limited social support, PT d/c recommendation when medically cleared is post acute rehabilitation services  Barriers to Discharge Decreased caregiver support   Goals   Patient Goals To walk   STG Expiration Date 03/25/23   Short Term Goal #1 In 10 days pt will complete: 1) Bed mobility skills with Mod I to increase safety and independence as well as decrease caregiver burden  2) Functional transfers with Mod I to promote increased independence, safety, and QOL   3) Ambulate 250' using least restrictive AD with Mod I without LOB and stable vitals so that pt can negotiate previous living environment safely and promote independence with functional mobility and return to PLOF  4) Improve balance grades by 1/2 grade to increase safety with all mobility and decrease fall risk  5) Improve BLE strength by 1/2 grade to help increase overall functional mobility and decrease fall risk  PT Treatment Day 0   Plan   Treatment/Interventions Functional transfer training;LE strengthening/ROM; Therapeutic exercise; Endurance training;Patient/family training;Equipment eval/education; Bed mobility;Gait training;Spoke to nursing;OT   PT Frequency 3-5x/wk   Recommendation   PT Discharge Recommendation Post acute rehabilitation services   Equipment Recommended [address] [facility] Recommended Wheeled walker   Change/add to [place]? No   AM-PAC Basic Mobility Inpatient   Turning in Flat Bed Without Bedrails 3   Lying on Back to Sitting on Edge of Flat Bed Without Bedrails 3   Moving Bed to Chair 3   Standing Up From Chair Using Arms 3   Walk in Room 2   Climb 3-5 Stairs With Railing 2   Basic Mobility Inpatient Raw Score 16   Basic Mobility Standardized Score 38 32   Highest Level Of Mobility   -HLM Goal 5: Stand one or more mins   -HLM Achieved 7: Walk 25 feet or more   Modified [place] Scale   Modified [place] Scale 4   Barthel Index   Feeding 5   Bathing 0   Grooming Score 0   Dressing Score 5   Bladder Score 10   Bowels Score 10   Toilet Use Score 5   Transfers (Bed/Chair) Score 10   Mobility (Level Surface) Score 10   Stairs Score 0   Barthel Index Score 55   End of Consult   Patient Position at End of Consult Bedside chair;Bed/Chair alarm activated; All needs within reach     [facility], [facility]  03/15/23 unknown

## 2023-03-15 NOTE — OCCUPATIONAL THERAPY NOTE
Occupational Therapy Evaluation     Patient Name: Yuki Singh  FFMWW'F Date: 3/15/2023  Problem List  Principal Problem:    Brain mass  Active Problems:    Lung mass    Hypertension    Past Medical History  Past Medical History:   Diagnosis Date    Hypertension      Past Surgical History  Past Surgical History:   Procedure Laterality Date    HERNIA REPAIR             03/15/23 0920   OT Last Visit   OT Visit Date 03/15/23   Note Type   Note type Evaluation   Pain Assessment   Pain Assessment Tool 0-10   Pain Score No Pain   Patient's Stated Pain Goal No pain   Restrictions/Precautions   Weight Bearing Precautions Per Order No   Other Precautions Cognitive; Chair Alarm; Bed Alarm; Impulsive;Multiple lines; Fall Risk   Home Living   Type of [address] [place] [address] One level;Performs ADLs on one level   Bathroom Shower/Tub Walk-in shower   Bathroom Toilet Raised   Bathroom Equipment Grab bars in shower;Built-in shower seat;Grab bars around toilet   Bathroom Accessibility Accessible   Prior Function   Level of [place] Independent with ADLs; Independent with functional mobility; Independent with IADLS   Lives With Significant other;Family   Receives Help From Family   IADLs Family/Friend/Other provides transportation; Family/Friend/Other provides meals; Family/Friend/Other provides medication management   Falls in the last 6 months 1 to 4  (1)   Vocational Unemployed   Lifestyle   Autonomy pta, pt was I W ADL/IADL, no AD mobility   -    Reciprocal Relationships supportive significant other and her children   Service to Others unemployed   Intrinsic Gratification spending time w significant other   Subjective   Subjective "I'll get up"   ADL   Where Assessed Edge of bed   Eating Assistance 6  Modified independent   Grooming Assistance 6  Modified Independent   UB Bathing Assistance 4  Minimal Assistance   LB [address] 3  Moderate Assistance   [address] Assistance 3  Moderate Assistance   Toileting Assistance  3  Moderate Assistance   Functional Assistance 3  Moderate Assistance   Bed Mobility   Additional Comments found sititng EOB, left in chair w all needs in reach and alarm on   Transfers   Sit to Stand 4  Minimal assistance   Additional items Assist x 1; Increased time required;Verbal cues   Stand to Sit 4  Minimal assistance   Additional items Assist x 1;Verbal cues; Increased time required   Additional Comments c rw, impulsive upon stance and requires vc for redirection/inc overall safety awareness  Functional Mobility   Functional Mobility 4  Minimal assistance   Additional Comments ax1, impulsive  requires consistent vc t/o session for inc overall safety awareness  Additional items Rolling walker   Balance   Static Sitting Fair   Dynamic Sitting Fair -   Static Standing Poor +   Dynamic Standing Poor +   Ambulatory Poor +   Activity Tolerance   Activity Tolerance Patient tolerated treatment well   Medical Staff Made Aware DPT Bebeto Hzd 2' pts med complexity, comorbidities and regression from baseline  Nurse Made Aware ok per RN   RUE Assessment   RUE Assessment WFL   LUE Assessment   LUE Assessment WFL   Hand Function   Gross Motor Coordination Functional   Fine Motor Coordination Functional   Psychosocial   Psychosocial (WDL) WDL   Cognition   Overall Cognitive Status Impaired   Arousal/Participation Responsive; Alert   Attention Attends with cues to redirect   Orientation Level Oriented X4   Memory Decreased recall of precautions   Following Commands Follows one step commands with increased time or repetition   Comments impulsive and requires inc time to follow one step commands, consistent vc t/o session for inc overall safety awareness t/o all task completion  Assessment   Limitation Decreased ADL status; Decreased UE strength;Decreased Safe judgement during ADL;Decreased cognition;Decreased endurance;Decreased self-care trans;Decreased high-level ADLs Prognosis Good   Assessment Pt is a 81 y o  male admitted 3/14/23 w inc ambulatory dysfunction, dizziness, imbalance and slurred speech  Pt was found to have multiple brain masses, R sided lung mass  Pt w active OT eval and treat orders at this time  PMH includes  has a past medical history of Hypertension  Pt lives w significant in a [facility] with 0 NAILA, reports tub shower and standard toilet  Pta, pt was independent w/ ADL/IADL and functional mobility, was not driving and was not using any DME at baseline  Currently, pt is Min Ax1 for UB ADL, Mod Ax1 for LB ADL, and completed transfers/FM w Min Ax1  Pt is limited at this time 2* decreased endurance/activtiy tolerance, decreased cognition, decreased ADL/High-level ADL status, decreased self-care trans, decreased safety awareness, limited home support and is a fall risk  This impacts pt's ability to complete UB and LB dressing and bathing, toileting, transfers, functional mobility, community mobility, home and health maintenance, and safe engagement in typical daily routine  The patient's raw score on the AM-PAC Daily Activity inpatient short form is 16, standardized score is 35 96, less than 39 4  Patients at this level are likely to benefit from discharge to post-acute rehabilitation services  Please refer to the recommendation of the Occupational Therapist for safe discharge planning  From OT standpoint, pt should D/C to STR when medically stable  Pt will benefit from continued acute OT services 2-3 x/wk for 10-14 days to meet goals  Goals   Patient Goals get up   LTG Time Frame 10-14   Long Term Goal #1 see below   Plan   Treatment Interventions ADL retraining;Functional transfer training; Endurance training;UE strengthening/ROM; Cognitive reorientation;Patient/family training;Equipment evaluation/education; Compensatory technique education; Energy conservation; Activityengagement   Goal Expiration Date 03/29/23   OT Frequency 2-3x/wk   Recommendation   OT Discharge Recommendation Post acute rehabilitation services   AM-PAC Daily Activity Inpatient   Lower Body Dressing 2   Bathing 2   Toileting 2   Upper Body Dressing 3   Grooming 3   Eating 4   Daily Activity Raw Score 16   Daily Activity Standardized Score (Calc for Raw Score >=11) 35 96   AM-PAC Applied Cognition Inpatient   Following a Speech/Presentation 4   Understanding Ordinary Conversation 4   Taking Medications 3   Remembering Where Things Are Placed or Put Away 3   Remembering List of 4-5 Errands 2   Taking Care of Complicated Tasks 2   Applied Cognition Raw Score 18   Applied Cognition Standardized Score 38 07   Modified [place] Scale   Modified [place] Scale 4   End of Consult   Education Provided Yes   Patient Position at End of Consult Bedside chair; All needs within reach;Bed/Chair alarm activated   Nurse Communication Nurse aware of consult     Pt will complete functional mobility with Mod I using appropriate DME as needed  Pt will complete UB dressing and bathing with Mod I using appropriate DME as needed  Pt will complete LB dressing and bathing with Mod I using appropriate DME as needed  Pt will complete transfers with Mod I using appropriate DME as needed  Pt will complete toileting with Mod I using appropriate DME as needed  Pt will utilize energy conservation techniques throughout functional activity/ADL s/p skilled education  Pt will demonstrate increased safety awareness during functional tasks/ADL's s/p skilled education  Pt will increase activity tolerance to 30 minutes in order to complete ADL's/ functional tasks, using appropriate DME as needed  Pt will engage in ongoing cog assessment w/ G participation to assist with safe d/c planning  Pt will inc UE strength +1 MMT in order to increase overall ability to engage in typical daily routine           ADRIAN Zacarias, OTR/L

## 2023-03-15 NOTE — PLAN OF CARE
Problem: OCCUPATIONAL THERAPY ADULT  Goal: Performs self-care activities at highest level of function for planned discharge setting  See evaluation for individualized goals  Description: Treatment Interventions: ADL retraining, Functional transfer training, Endurance training, UE strengthening/ROM, Cognitive reorientation, Patient/family training, Equipment evaluation/education, Compensatory technique education, Energy conservation, Activityengagement          See flowsheet documentation for full assessment, interventions and recommendations  Note: Limitation: Decreased ADL status, Decreased UE strength, Decreased Safe judgement during ADL, Decreased cognition, Decreased endurance, Decreased self-care trans, Decreased high-level ADLs  Prognosis: Good  Assessment: Pt is a 81 y o  male admitted 3/14/23 w inc ambulatory dysfunction, dizziness, imbalance and slurred speech  Pt was found to have multiple brain masses, R sided lung mass  Pt w active OT eval and treat orders at this time  PMH includes  has a past medical history of Hypertension  Pt lives w significant in a [facility] with 0 NAILA, reports tub shower and standard toilet  Pta, pt was independent w/ ADL/IADL and functional mobility, was not driving and was not using any DME at baseline  Currently, pt is Min Ax1 for UB ADL, Mod Ax1 for LB ADL, and completed transfers/FM w Min Ax1  Pt is limited at this time 2* decreased endurance/activtiy tolerance, decreased cognition, decreased ADL/High-level ADL status, decreased self-care trans, decreased safety awareness, limited home support and is a fall risk  This impacts pt's ability to complete UB and LB dressing and bathing, toileting, transfers, functional mobility, community mobility, home and health maintenance, and safe engagement in typical daily routine  The patient's raw score on the AM-PAC Daily Activity inpatient short form is 16, standardized score is 35 96, less than 39 4   Patients at this level are likely to benefit from discharge to post-acute rehabilitation services  Please refer to the recommendation of the Occupational Therapist for safe discharge planning  From OT standpoint, pt should D/C to STR when medically stable  Pt will benefit from continued acute OT services 2-3 x/wk for 10-14 days to meet goals       OT Discharge Recommendation: Post acute rehabilitation services

## 2023-03-15 NOTE — PROGRESS NOTES
[address] [facility]  Progress Note - Stevo Worthy 1950, 81 y o  male MRN: [id number]  Unit/Bed#: [facility] 604-01 Encounter: [id number]  Primary Care Provider: No primary care provider on file  Date and time admitted to hospital: 3/14/2023  1:39 PM    * Brain mass  Assessment & Plan  Patient presents for evaluation of ambulatory dysfunction x5 days, found to have 12 cm right sided lung mass and 5 brain masses with cerebral edema and mild brain compression  · No prior h/o malignancy  · Daily long time smoker  Admits to coughing up blood in January  · Significant weight loss over several months (used to be 185)  · On exam, subtle right eyelid droop and slurred speech  No dysmetria noted BUE/BLE  GCS 15    Imaging:  · MRI brain w/wo, 3/14/23: Multiple intracranial enhancing lesions are identified, total of 5, consistent with intracranial metastasis  Several of these demonstrate focal surrounding vasogenic with localized mass effect, most prominently the right cerebellar lesion    Plan:  · Continue to monitor neurological exam  · Imaging reviewed with attending  Would recommend WBRT vs SRT to brain lesions depending on pathology  · IR consult for biopsy, planned for tomorrow  · Palliative consult given likely stage IV cancer diagnosis  · Will need medical oncology and radiation oncology consults once tissue diagnosis is made  · Continue decadron 4q6 for cerebral edema, adjust per oncology services  · Ok to discontinue keppra as patient did not present with seizure activity  · Medical management per primary team  · DVT ppx: SCDs, ok for pharm ppx from [facility] standpoint    Neurosurgery will sign off at this time  No neurosurgical intervention is anticipated given location of masses and small size - more amenable to radiation  Please call with questions or concerns  Subjective/Objective   Chief Complaint: "I'm frustrated"    Subjective: Patient with NAEO   He remains clinically stable  We discussed that surgery is not the best option in his case and he is getting a tissue sample of his lung in IR tomorrow  He is happy that surgery is not recommend but frustrated because he was told he was to have brain surgery  I discussed that after much discussion this is not recommended to to multifocal disease, location of tumors, and tumor size  He asked that I call his daughter with the update  Objective: Patient sitting in chair in NAD  VSS  Intake/Output                 03/15/23 0701 - 03/16/23 0700     [id number] Total              Intake    P O   460  -- 460    Total Intake 460 -- 460       Output    Urine  400  -- 400    Urine 400 -- 400    Total Output 400 -- 400       Net I/O     60 -- 60          Invasive Devices     Peripheral Intravenous Line  Duration           Peripheral IV 03/13/23 Right Antecubital 1 day                Vitals: Blood pressure 119/65, pulse 85, temperature 97 7 °F (36 5 °C), resp  rate 16, height 5' 6" (1 676 m), weight 63 2 kg (139 lb 5 3 oz), SpO2 98 %  ,Body mass index is 22 49 kg/m²  General appearance: alert, appears stated age, cooperative and no distress  Head: Normocephalic, without obvious abnormality, atraumatic  Eyes: EOMI, PERRL, conjugate gaze  Slight right eyelid droop  Lungs: non labored breathing  Heart: regular heart rate  Neurologic:   Mental status: Alert, oriented, thought content appropriate, slurred speech improved today  Cranial nerves: grossly intact (Cranial nerves II-XII)  Sensory: normal to LT  Motor: moving all extremities without focal weakness   Coordination: finger to nose normal bilaterally, no drift bilaterally    Lab Results: I have personally reviewed pertinent results        Results from last 7 days   Lab Units 03/15/23  0604 03/13/23  2328   WBC Thousand/uL 11 82* 10 02   HEMOGLOBIN g/dL 11 2* 11 1*   HEMATOCRIT % 35 1* 35 4*   PLATELETS Thousands/uL 397* 370   NEUTROS PCT % 91* 79*   MONOS PCT % 4 10 Results from last 7 days   Lab Units 03/15/23  0604 03/13/23  2328   POTASSIUM mmol/L 4 3 3 8   CHLORIDE mmol/L 104 101   CO2 mmol/L 27 25   BUN mg/dL 24 20   CREATININE mg/dL 0 76 0 85   CALCIUM mg/dL 10 2* 9 9   ALK PHOS U/L 53 53   ALT U/L 14 8   AST U/L 13 9*     Results from last 7 days   Lab Units 03/15/23  0604 03/13/23  2328   MAGNESIUM mg/dL 2 5 2 1     Results from last 7 days   Lab Units 03/15/23  0604   PHOSPHORUS mg/dL 3 4     Results from last 7 days   Lab Units 03/15/23  0604   INR  1 00   PTT seconds 31     No results found for: TROPONINT  ABG:No results found for: PHART, RUX2JFU, PO2ART, SPL5PJF, H7AMPTWE, BEART, SOURCE    Imaging Studies: I have personally reviewed pertinent reports  and I have personally reviewed pertinent films in PACS     CTA head and neck with and without contrast    Result Date: 3/14/2023  Narrative: CTA NECK AND BRAIN WITH gadolinium CONTRAST INDICATION: CVA vs tumor, abnormal CT head COMPARISON:   3/14/2023  TECHNIQUE:  Post contrast imaging was performed after administration of iodinated contrast through the neck and brain  Post contrast axial 0 625 mm images timed to opacify the arterial system  3D rendering was performed on an independent workstation  MIP reconstructions performed  Coronal reconstructions were performed of the noncontrast portion of the brain  Radiation dose length product (DLP) for this visit:  0690 mGy-cm   This examination, like all CT scans performed in the [place], was performed utilizing techniques to minimize radiation dose exposure, including the use of iterative reconstruction and automated exposure control  IV Contrast:  100 mL of iohexol (OMNIPAQUE)  IMAGE QUALITY:   Diagnostic FINDINGS: Previously demonstrated lesions throughout the cerebral hemispheres and in the posterior fossa demonstrate ring enhancement  Stable surrounding vasogenic edema   CERVICAL VASCULATURE AORTIC ARCH AND GREAT VESSELS:  No atherosclerotic plaque in the aortic arch  No stenosis in the subclavian arteries  RIGHT VERTEBRAL ARTERY CERVICAL SEGMENT:  Normal origin  The vessel is normal in caliber throughout the neck  LEFT VERTEBRAL ARTERY CERVICAL SEGMENT: Origin directly from the aortic arch  No stenosis or dissection  RIGHT EXTRACRANIAL CAROTID SEGMENT:  Normal caliber common carotid artery  Normal bifurcation and cervical internal carotid artery  No stenosis or dissection  LEFT EXTRACRANIAL CAROTID SEGMENT:  Normal caliber common carotid artery  Normal bifurcation and cervical internal carotid artery  No stenosis or dissection  NASCET criteria was used to determine the degree of internal carotid artery diameter stenosis  INTRACRANIAL VASCULATURE INTERNAL CAROTID ARTERIES:  Calcified plaque throughout the carotid siphons without hemodynamically significant stenosis  ANTERIOR CIRCULATION:  Symmetric A1 segments and anterior cerebral arteries with normal enhancement  Normal anterior communicating artery  MIDDLE CEREBRAL ARTERY CIRCULATION:  M1 segment and middle cerebral artery branches demonstrate normal enhancement bilaterally  DISTAL VERTEBRAL ARTERIES:  Normal distal vertebral arteries  Posterior inferior cerebellar arteries are patent  BASILAR ARTERY:  Physiologically small basilar artery  Patent superior cerebellar arteries  POSTERIOR CEREBRAL ARTERIES: Fetal type bilateral posterior communicating arteries  No stenosis  VENOUS STRUCTURES:  Normal  NON VASCULAR ANATOMY BONY STRUCTURES:  No acute osseous abnormality  SOFT TISSUES OF THE NECK:  Right level IIb and 3 lymph nodes measuring up to 1 9 cm  THORACIC INLET:  Partially imaged right upper lobe 9 cm mass  Mild panlobular emphysema  Impression: 1  Enhancing lesions in the cerebral hemispheres and posterior fossa was able surrounding vasogenic edema  Recommend MRI of the brain with gadolinium   2   Right level 2 and 3 lymph nodes measuring up to 1 9 cm, likely metastatic  3   No stenosis, dissection or occlusion of the carotid or vertebral arteries or major vessels of the [place]  Workstation performed: [id number]     XR chest 2 views    Result Date: 3/14/2023  Narrative: CHEST INDICATION:   trauma  COMPARISON:  Subsequent chest CT from today  EXAM PERFORMED/VIEWS:  XR CHEST AP & LATERAL  FINDINGS: Cardiomediastinal silhouette normal  12 cm mass in the right lower lobe  No effusion  Bones normal for age  Upper abdomen normal      Impression: 12 cm right lower lobe mass  See subsequent chest CT  Workstation performed: [id number]     XR abdomen 1 view kub    Result Date: 3/14/2023  Narrative: ABDOMEN INDICATION:   constipation  COMPARISON:  CT chest, abdomen, pelvis 3/14/2023  VIEWS:  AP supine FINDINGS: There is a nonobstructive bowel gas pattern  Moderate colonic stool burden in the ascending colon and rectum  No discernible free air on this supine study  Upright or left lateral decubitus imaging is more sensitive to detect subtle free air in the appropriate setting  No pathologic calcifications or soft tissue masses  Visualized lung bases are clear  Degenerative changes of the bilateral sacroiliac joints  Impression: Nonobstructive bowel gas pattern with a moderate colonic stool burden  Workstation performed: [id number]     CT head without contrast    Result Date: 3/14/2023  Narrative: CT BRAIN - WITHOUT CONTRAST INDICATION:   ataxia, speech disturbance  COMPARISON:  None  TECHNIQUE:  CT examination of the brain was performed  In addition to axial images, sagittal and coronal 2D reformatted images were created and submitted for interpretation  Radiation dose length product (DLP) for this visit:  1022 mGy-cm   This examination, like all CT scans performed in the [facility], was performed utilizing techniques to minimize radiation dose exposure, including the use of iterative reconstruction and automated exposure control    IMAGE QUALITY:  Diagnostic  FINDINGS: PARENCHYMA:  Target appearing intracranial lesions suspected in the right cerebellum measuring approximately 1 9 cm in size (axial image 7, series 301), and the right frontal region measuring approximately 1 7 cm in size  (axial image 31, series 301), in  the posterior left frontal region measuring approximately 1 2 cm in size (axial image 32, series 301 with associated surrounding vasogenic edema; primary differential considerations including intracranial metastatic disease versus infection/abscesses depending on the clinical setting  MRI of the brain with contrast recommended for further evaluation to detect smaller lesions  No significant mass effect or midline shift  No territorial infarction is seen  Gray-white differentiation appears grossly  maintained  Mild parenchymal atrophy  VENTRICLES AND EXTRA-AXIAL SPACES:  Ventricles and extra-axial CSF spaces are prominent commensurate with the degree of volume loss  No hydrocephalus  No acute extra-axial hemorrhage  VISUALIZED ORBITS: Orbits appear intact  PARANASAL SINUSES: Mild mucosal thickening and small amount of fluid in the left maxillary sinus  Cystic area in the right maxillary bone measures approximately 2 cm in size CALVARIUM AND EXTRACRANIAL SOFT TISSUES:  Otherwise grossly unremarkable  Impression: Multiple target appearing intracranial lesions suspected as described with associated vasogenic edema; primary differential considerations including intracranial metastatic disease versus infection/abscesses depending on the clinical setting  MRI of the  brain with contrast recommended for further evaluation and to detect smaller lesions  No significant mass effect or midline shift  Other findings as above  Workstation performed: [id number]     CT chest abdomen pelvis w contrast    Result Date: 3/14/2023  Narrative: CT CHEST, ABDOMEN AND PELVIS WITH IV CONTRAST INDICATION:   Abnormal chest radiograph   COMPARISON: 3/14/2023  TECHNIQUE: CT examination of the chest, abdomen and pelvis was performed  Axial, sagittal, and coronal 2D reformatted images were created from the source data and submitted for interpretation  Radiation dose length product (DLP) for this visit:  462 mGy-cm   This examination, like all CT scans performed in the [facility], was performed utilizing techniques to minimize radiation dose exposure, including the use of iterative reconstruction and automated exposure control  IV Contrast:  100 mL of iohexol (OMNIPAQUE) Enteric Contrast: Enteric contrast was administered  FINDINGS: CHEST LUNGS:  Hypoenhancing right lower lobe mass extending along the fissures into the right middle and upper lobes measuring 10 3 x 11 7 x 12 2 cm  Mass extends to the right hilum  Left lower lobe subpleural 9 mm pulmonary nodule, series 306, image 96  Occlusion of right lower lobe segmental bronchi within the mass  Scattered airspace opacities at the base of the right lower lobe may represent postobstructive pneumonia  Few tiny opacities at the base of the right middle lobe may indicate aspiration  Mild centrilobular and paraseptal emphysema  PLEURA:  No effusion  HEART/GREAT VESSELS: Normal heart size  No thoracic aortic aneurysm or dissection  No evidence of proximal pulmonary artery embolus  Right lower lobe segmental pulmonary arteries are encased by tumor  MEDIASTINUM AND UDAY:  Subcarinal 1 7 cm lymph node  Prevascular 1 cm lymph node  CHEST WALL AND LOWER NECK:  Unremarkable  ABDOMEN LIVER/BILIARY TREE:  Focal area of hypoattenuation in the right lobe of the liver adjacent to the falciform ligament is consistent with focal fat  Mild hepatomegaly GALLBLADDER:  No calcified gallstones  No pericholecystic inflammatory change  SPLEEN:  Unremarkable  PANCREAS:  Unremarkable  ADRENAL GLANDS:  Unremarkable  KIDNEYS/URETERS:  Right renal cysts measure up to 3 8 cm  Left renal cysts measure up to 3 3 cm  Symmetric nephrographic phase enhancement of the kidneys  No obstructive uropathy  STOMACH AND BOWEL:  Large amount stool distends the right colon  Stool distends the rectum  Diverticulosis without evidence of diverticulitis or colitis  APPENDIX:  No findings to suggest appendicitis  ABDOMINOPELVIC CAVITY:  No free intraperitoneal air, fluid collection or lymphadenopathy  VESSELS:  No abdominal aortic aneurysm  PELVIS REPRODUCTIVE ORGANS:  Enlarged prostate making a posterior impression on the bladder  URINARY BLADDER:  Calculi at the left base of the bladder  ABDOMINAL WALL/INGUINAL REGIONS:  Unremarkable  OSSEOUS STRUCTURES:  No acute fracture or destructive osseous lesion  Impression: 1  Right lower lobe mass extending along the fissure to the right middle and upper lobes and to the right hilum, measuring 10 x 12 12 cm, consistent with lung malignancy  Recommend pulmonary consultation  2   Left lower lobe subpleural 9 mm pulmonary nodule, indeterminate though concerning for malignancy  3   Few airspace opacities at the base of the right lower lobe and right middle lobe may represent pneumonia and/or aspiration  4   No evidence of malignancy in the abdomen or pelvis  5   Constipation  Workstation performed: [id number]     MRI Brain BT w wo Contrast    Result Date: 3/14/2023  Narrative: MRI BRAIN WITH AND WITHOUT CONTRAST INDICATION: New brain masses  Lung carcinoma  Tumor Type: Lung carcinoma  Suspected brain metastasis  Surgical History: None Radiation History: None COMPARISON:  None  TECHNIQUE: Multiplanar, multisequence imaging of the brain and sella was performed before and after gadolinium administration  IV Contrast:  7 5 mL of Gadobutrol injection (SINGLE-DOSE)  IMAGE QUALITY:   Diagnostic  FINDINGS: BRAIN PARENCHYMA: A total of 5 enhancing lesions are identified within the brain parenchyma  1   Series 14 image 103: Left parafalcine frontal lobe   2   Series 14 image 96: Right anterior lateral frontal lobe 3  Superior cerebellum, series 14 image 53  4   Right lateral cerebellum, series 14 image 27 5  5  Left posterior cerebellum, series 14 image 39  Lesions 1 through 4 measures between 1 and 2 cm in size  Lesion 5, Measures less than 1 cm in size  Vasogenic edema is seen surrounding the larger lesions most prominently within the right cerebellum where there is localized mass effect upon the 4th ventricle and partial effacement of the right perimesencephalic cistern  Arterial spin labeling was performed and lesions #1, 2 and 3 demonstrate an increase in cerebral blood flow  OTHER FINDINGS: The remainder of the brain parenchyma is stable  VENTRICLES:  Normal for the patient's age  SELLA AND PITUITARY GLAND:  Normal  ORBITS:  Normal  PARANASAL SINUSES:  Normal  VASCULATURE:  Evaluation of the major intracranial vasculature demonstrates appropriate flow voids  CALVARIUM AND SKULL BASE:  Normal  EXTRACRANIAL SOFT TISSUES:  Normal      Impression: Multiple intracranial enhancing lesions are identified, total of 5, consistent with intracranial metastasis  Several of these demonstrate focal surrounding vasogenic with localized mass effect, most prominently the right cerebellar lesion  Workstation performed: [id number]     EKG, Pathology, and Other Studies: I have personally reviewed pertinent reports        VTE Pharmacologic Prophylaxis: Enoxaparin (Lovenox)    VTE Mechanical Prophylaxis: sequential compression device

## 2023-03-15 NOTE — PROGRESS NOTES
INTERNAL MEDICINE RESIDENCY PROGRESS NOTE     Name: Aaron Cox   Age & Sex: 81 y o  male   MRN: [id number]  Unit/Bed#: [facility] 604-01   Encounter: [id number]  Team: SOD Team B     PATIENT INFORMATION     Name: Nara Perez   Age & Sex: 81 y o  male   MRN: [id number]  Hospital Stay Days: 1    ASSESSMENT/PLAN     Principal Problem:    Brain mass  Active Problems:    Lung mass    Hypertension      Hypertension  Assessment & Plan  Patient has a history of hypertension   • Patient is not currently on any medication at home  • patient blood pressure in the ED was normotensive  ? Plan: atorvastatin 40 mg daily  ? Labetalol 10 mg IV PRN every 6 hours    Lung mass  Assessment & Plan  Right lower lobe lung mass suspecting malignancy  ? CT chest/abdomen/pelvis showed right lower lobe mass extending along the fissure to the right middle and upper lobes and right hilum, measuring 10 x 12 cm consistent with lung malignancy   Left lower lobe subpleural 9 mm lung pulmonary nodule, intermediate concerning for malignancy   Few airspace opacities at the base of the right lower lobe and the right middle lobe may represent pneumonia and/or aspiration  ? Plan: See the plan under brain mets    * Brain mass  Assessment & Plan  Multiple intracranial enhancing lesions of the brain suspecting brain metastasis  • Patient is presenting with imbalance and ambulatory dysfunction for the last 5 days  • Patient had recent fall 5 days ago with no head strike  • On exam patient has ataxic gait    • 03/14 CT head showed multiple target appearing intracranial lesions suspected described with associated stranding edema, primary differential consideration including intracranial metastatic disease versus infection/abscess depending on the clinical setting     • 03/14 MRI brain showed multiple intracranial enhancing lesions are identified total of 5, consistent with endocrine metastasis   Several of these demonstrate focal surrounding positioning with localized mass effect, most prominently the right cerebellar lesion     • CT chest/abdomen/pelvis showed right lower lobe mass extending along the fissure to the right middle and upper lobes and right hilum, measuring 10 x 12 cm consistent with lung malignancy   Left lower lobe subpleural 9 mm lung pulmonary nodule, intermediate concerning for malignancy   Few airspace opacities at the base of the right lower lobe and the right middle lobe may represent pneumonia and/or aspiration  ? Plan:   ? Palliative care consulted  ? Neurosurgery consulted, recommend IR biopsy of lung mass  ? IR consulted, will proceed with lung biopsy 3/16, NPO midnight prior, hold anticoagulation midnight prior  ? Consult PT/OT  ? Plan for oncology and possibly rad-onc consult once biopsy is complete  ? Fall precautions  ? Neuro check every 4 hours  ? Decadron 4 mg IV every 6 hours   ? Keppra 500 mg tab every 12 hours      Disposition: Inpatient     SUBJECTIVE     Patient seen and examined  No acute events overnight  Today overall the patient was feeling well and had no acute complaints  OBJECTIVE     Vitals:    03/15/23 0148 03/15/23 0600 03/15/23 0747 03/15/23 1113   BP: 118/61  119/64 119/65   Pulse: 74  71 85   Resp: 20  16 16   Temp: 97 8 °F (36 6 °C)  97 7 °F (36 5 °C)    TempSrc:       SpO2: 96%  94% 98%   Weight:  63 2 kg (139 lb 5 3 oz)     Height:          Temperature:   Temp (24hrs), Av °F (36 7 °C), Min:97 7 °F (36 5 °C), Max:98 5 °F (36 9 °C)    Temperature: 97 7 °F (36 5 °C)  Intake & Output:  I/O        0701   0700  0701  03/15 0700 03/15 07 0700    Urine (mL/kg/hr)  1450     Total Output  1450     Net  -1450            Unmeasured Stool Occurrence  1 x         Weights:   IBW (Ideal Body Weight): 63 8 kg    Body mass index is 22 49 kg/m²    Weight (last 2 days)     Date/Time Weight    03/15/23 0600 63 2 (139 33)    23 1350 61 2 (134 92)        Physical Exam  Constitutional: Appearance: He is well-developed  HENT:      Head: Normocephalic and atraumatic  Nose: Nose normal    Eyes:      General:         Right eye: No discharge  Left eye: No discharge  Conjunctiva/sclera: Conjunctivae normal       Pupils: Pupils are equal, round, and reactive to light  Neck:      Thyroid: No thyromegaly  Cardiovascular:      Rate and Rhythm: Normal rate and regular rhythm  Heart sounds: Normal heart sounds  No murmur heard  No friction rub  No gallop  Pulmonary:      Effort: Pulmonary effort is normal  No respiratory distress  Breath sounds: Normal breath sounds  No stridor  No wheezing or rales  Chest:      Chest wall: No tenderness  Abdominal:      General: Bowel sounds are normal  There is no distension  Palpations: Abdomen is soft  There is no mass  Tenderness: There is no abdominal tenderness  There is no guarding or rebound  Musculoskeletal:         General: No tenderness or deformity  Right lower leg: No edema  Left lower leg: No edema  Lymphadenopathy:      Cervical: No cervical adenopathy  Skin:     General: Skin is warm and dry  Neurological:      Mental Status: He is alert and oriented to person, place, and time  Psychiatric:         Mood and Affect: Mood normal          Behavior: Behavior normal          Thought Content: Thought content normal          Judgment: Judgment normal        LABORATORY DATA     Labs: I have personally reviewed pertinent reports    Results from last 7 days   Lab Units 03/15/23  0604 03/13/23  2328   WBC Thousand/uL 11 82* 10 02   HEMOGLOBIN g/dL 11 2* 11 1*   HEMATOCRIT % 35 1* 35 4*   PLATELETS Thousands/uL 397* 370   NEUTROS PCT % 91* 79*   MONOS PCT % 4 10      Results from last 7 days   Lab Units 03/15/23  0604 03/13/23  2328   POTASSIUM mmol/L 4 3 3 8   CHLORIDE mmol/L 104 101   CO2 mmol/L 27 25   BUN mg/dL 24 20   CREATININE mg/dL 0 76 0 85   CALCIUM mg/dL 10 2* 9 9   ALK PHOS U/L 53 53 ALT U/L 14 8   AST U/L 13 9*     Results from last 7 days   Lab Units 03/15/23  0604 03/13/23  2328   MAGNESIUM mg/dL 2 5 2 1     Results from last 7 days   Lab Units 03/15/23  0604   PHOSPHORUS mg/dL 3 4      Results from last 7 days   Lab Units 03/15/23  0604   INR  1 00   PTT seconds 31               IMAGING & DIAGNOSTIC TESTING     Radiology Results: I have personally reviewed pertinent reports  CTA head and neck with and without contrast    Result Date: 3/14/2023  Impression: 1  Enhancing lesions in the cerebral hemispheres and posterior fossa was able surrounding vasogenic edema  Recommend MRI of the brain with gadolinium  2   Right level 2 and 3 lymph nodes measuring up to 1 9 cm, likely metastatic  3   No stenosis, dissection or occlusion of the carotid or vertebral arteries or major vessels of the [place]  Workstation performed: [id number]     XR chest 2 views    Result Date: 3/14/2023  Impression: 12 cm right lower lobe mass  See subsequent chest CT  Workstation performed: [id number]     XR abdomen 1 view kub    Result Date: 3/14/2023  Impression: Nonobstructive bowel gas pattern with a moderate colonic stool burden  Workstation performed: [id number]     CT head without contrast    Result Date: 3/14/2023  Impression: Multiple target appearing intracranial lesions suspected as described with associated vasogenic edema; primary differential considerations including intracranial metastatic disease versus infection/abscesses depending on the clinical setting  MRI of the  brain with contrast recommended for further evaluation and to detect smaller lesions  No significant mass effect or midline shift  Other findings as above  Workstation performed: [id number]     CT chest abdomen pelvis w contrast    Result Date: 3/14/2023  Impression: 1    Right lower lobe mass extending along the fissure to the right middle and upper lobes and to the right hilum, measuring 10 x 12 12 cm, consistent with lung malignancy  Recommend pulmonary consultation  2   Left lower lobe subpleural 9 mm pulmonary nodule, indeterminate though concerning for malignancy  3   Few airspace opacities at the base of the right lower lobe and right middle lobe may represent pneumonia and/or aspiration  4   No evidence of malignancy in the abdomen or pelvis  5   Constipation  Workstation performed: [id number]     MRI Brain BT w wo Contrast    Result Date: 3/14/2023  Impression: Multiple intracranial enhancing lesions are identified, total of 5, consistent with intracranial metastasis  Several of these demonstrate focal surrounding vasogenic with localized mass effect, most prominently the right cerebellar lesion  Workstation performed: [id number]     Other Diagnostic Testing: I have personally reviewed pertinent reports  ACTIVE MEDICATIONS     Current Facility-Administered Medications   Medication Dose Route Frequency   • atorvastatin (LIPITOR) tablet 40 mg  40 mg Oral QPM   • dexamethasone (DECADRON) injection 4 mg  4 mg Intravenous Q6H [address]   • [START ON 3/17/2023] enoxaparin (LOVENOX) subcutaneous injection 40 mg  40 mg Subcutaneous Q24H MOLLY   • labetalol (NORMODYNE) injection 10 mg  10 mg Intravenous Q6H PRN   • levETIRAcetam (KEPPRA) tablet 500 mg  500 mg Oral Q12H [address]   • polyethylene glycol (MIRALAX) packet 17 g  17 g Oral Daily   • senna-docusate sodium (SENOKOT S) 8 6-50 mg per tablet 1 tablet  1 tablet Oral HS       VTE Pharmacologic Prophylaxis: Enoxaparin (Lovenox)  VTE Mechanical Prophylaxis: sequential compression device    Portions of the record may have been created with voice recognition software  Occasional wrong word or "sound a like" substitutions may have occurred due to the inherent limitations of voice recognition software    Read the chart carefully and recognize, using context, where substitutions have occurred   ==  Kadeem Du MD  [address]  Internal Medicine Residency PGY-3

## 2023-03-16 ENCOUNTER — PATIENT OUTREACH (OUTPATIENT)
Dept: HEMATOLOGY ONCOLOGY | Facility: CLINIC | Age: 73
End: 2023-03-16

## 2023-03-16 ENCOUNTER — TELEPHONE (OUTPATIENT)
Dept: HEMATOLOGY ONCOLOGY | Facility: CLINIC | Age: 73
End: 2023-03-16

## 2023-03-16 PROBLEM — E43 SEVERE PROTEIN-CALORIE MALNUTRITION (HCC): Status: ACTIVE | Noted: 2023-03-16

## 2023-03-16 LAB
ANION GAP SERPL CALCULATED.3IONS-SCNC: 1 MMOL/L (ref 4–13)
BUN SERPL-MCNC: 28 MG/DL (ref 5–25)
CALCIUM SERPL-MCNC: 10 MG/DL (ref 8.3–10.1)
CHLORIDE SERPL-SCNC: 104 MMOL/L (ref 96–108)
CO2 SERPL-SCNC: 30 MMOL/L (ref 21–32)
CREAT SERPL-MCNC: 0.76 MG/DL (ref 0.6–1.3)
ERYTHROCYTE [DISTWIDTH] IN BLOOD BY AUTOMATED COUNT: 14.5 % (ref 11.6–15.1)
GFR SERPL CREATININE-BSD FRML MDRD: 92 ML/MIN/1.73SQ M
GLUCOSE SERPL-MCNC: 119 MG/DL (ref 65–140)
HCT VFR BLD AUTO: 34.2 % (ref 36.5–49.3)
HGB BLD-MCNC: 10.5 G/DL (ref 12–17)
MCH RBC QN AUTO: 27.4 PG (ref 26.8–34.3)
MCHC RBC AUTO-ENTMCNC: 30.7 G/DL (ref 31.4–37.4)
MCV RBC AUTO: 89 FL (ref 82–98)
PLATELET # BLD AUTO: 381 THOUSANDS/UL (ref 149–390)
PMV BLD AUTO: 10.6 FL (ref 8.9–12.7)
POTASSIUM SERPL-SCNC: 4.2 MMOL/L (ref 3.5–5.3)
RBC # BLD AUTO: 3.83 MILLION/UL (ref 3.88–5.62)
SODIUM SERPL-SCNC: 135 MMOL/L (ref 135–147)
WBC # BLD AUTO: 13.82 THOUSAND/UL (ref 4.31–10.16)

## 2023-03-16 RX ORDER — ENOXAPARIN SODIUM 100 MG/ML
40 INJECTION SUBCUTANEOUS
Status: DISCONTINUED | OUTPATIENT
Start: 2023-03-18 | End: 2023-03-19 | Stop reason: HOSPADM

## 2023-03-16 RX ADMIN — DEXAMETHASONE SODIUM PHOSPHATE 4 MG: 4 INJECTION INTRA-ARTICULAR; INTRALESIONAL; INTRAMUSCULAR; INTRAVENOUS; SOFT TISSUE at 12:29

## 2023-03-16 RX ADMIN — DEXAMETHASONE SODIUM PHOSPHATE 4 MG: 4 INJECTION INTRA-ARTICULAR; INTRALESIONAL; INTRAMUSCULAR; INTRAVENOUS; SOFT TISSUE at 23:06

## 2023-03-16 RX ADMIN — ATORVASTATIN CALCIUM 40 MG: 40 TABLET, FILM COATED ORAL at 18:00

## 2023-03-16 RX ADMIN — LEVETIRACETAM 500 MG: 500 TABLET, FILM COATED ORAL at 08:40

## 2023-03-16 RX ADMIN — DEXAMETHASONE SODIUM PHOSPHATE 4 MG: 4 INJECTION INTRA-ARTICULAR; INTRALESIONAL; INTRAMUSCULAR; INTRAVENOUS; SOFT TISSUE at 05:59

## 2023-03-16 RX ADMIN — LEVETIRACETAM 500 MG: 500 TABLET, FILM COATED ORAL at 22:27

## 2023-03-16 RX ADMIN — DEXAMETHASONE SODIUM PHOSPHATE 4 MG: 4 INJECTION INTRA-ARTICULAR; INTRALESIONAL; INTRAMUSCULAR; INTRAVENOUS; SOFT TISSUE at 18:00

## 2023-03-16 RX ADMIN — SENNOSIDES AND DOCUSATE SODIUM 1 TABLET: 8.6; 5 TABLET ORAL at 22:27

## 2023-03-16 RX ADMIN — ENOXAPARIN SODIUM 40 MG: 40 INJECTION SUBCUTANEOUS at 08:40

## 2023-03-16 NOTE — TELEPHONE ENCOUNTER
Soft Intake Form   Patient Details   Suman Rutledge     1950     Reason For Appointment   Who is Calling? Sonal Rivas   If not patient, Name? NA   DID YOU CONFIRM INSURANCE WITH PATIENT? E verified, Routed to finance   Who is the Referring Doctor? Dr Maegan Ordoñez   What is the diagnosis? RLL and LLL mass concerning for malignancy (primary) with intracranial metastasis and local as well as distal lymphadenopathy 2  Multiple intracranial brain mass suspicion of metastatic disease 3  Rt cerebellar mass with local mass effect  4  Hypercalcemia, undetermined etiology, can be secondary to underlying malignancy versus dehydration   Has this diagnosis been confirmed by a biopsy/surgery? If yes, what is the date it was done? Lung bx to be taken inpatient on 3/17   Biopsy done at [facility]? If not, where was it done? When completed, Yes   Was imaging done, and was it done at [address] [place] [address]? If not, where was it done? Yes-[facility]   Have you been seen by another Oncologist?  If so, who and where (name of facility, city and state) No   For 2nd Opinions Only: Are you currently undergoing treatment, or are you scheduled to start treatment? If yes, name of facility, city and state  NA   For "History Of" only: Have you completed treatment? NA   Have you had Genetic Testing done in the past?  If so, advise to bring test results to their visit No   Record Gathering Information   Did you advise to have records faxed to [phone]? NA   Did you advise to have disks sent to the proper address with imaging? ("History of" Patients)  5 years of imaging for breast patients-Mammos, US etc NA   Scheduling Information   Did you send new patient paperwork? Email or mail? NA   What is the best call back number? (If the [facility] is calling, please use their number) NA   Miscellaneous Information      The patient has been scheduled for a HFU appointment with Dr Maegan Ordoñez on 3/30 in the [place] office at 0900

## 2023-03-16 NOTE — ARC ADMISSION
Referral received for patient consideration of [facility]C placement for rehab  At this time, no determination has been made however, [facility] will continue to follow  CM has been updated

## 2023-03-16 NOTE — PLAN OF CARE
Problem: MOBILITY - ADULT  Goal: Maintain or return to baseline ADL function  Description: INTERVENTIONS:  -  Assess patient's ability to carry out ADLs; assess patient's baseline for ADL function and identify physical deficits which impact ability to perform ADLs (bathing, care of mouth/teeth, toileting, grooming, dressing, etc )  - Assess/evaluate cause of self-care deficits   - Assess range of motion  - Assess patient's mobility; develop plan if impaired  - Assess patient's need for assistive devices and provide as appropriate  - Encourage maximum independence but intervene and supervise when necessary  - Involve family in performance of ADLs  - Assess for home care needs following discharge   - Consider OT consult to assist with ADL evaluation and planning for discharge  - Provide patient education as appropriate  Outcome: Progressing  Goal: Maintains/Returns to pre admission functional level  Description: INTERVENTIONS:  - Perform BMAT or MOVE assessment daily    - Set and communicate daily mobility goal to care team and patient/family/caregiver  - Collaborate with rehabilitation services on mobility goals if consulted  - Perform Range of Motion  times a day  - Reposition patient every  hours    - Dangle patient  times a day  - Stand patient  times a day  - Ambulate patient  times a day  - Out of bed to chair  times a day   - Out of bed for meals  times a day  - Out of bed for toileting  - Record patient progress and toleration of activity level   Outcome: Progressing     Problem: PAIN - ADULT  Goal: Verbalizes/displays adequate comfort level or baseline comfort level  Description: Interventions:  - Encourage patient to monitor pain and request assistance  - Assess pain using appropriate pain scale  - Administer analgesics based on type and severity of pain and evaluate response  - Implement non-pharmacological measures as appropriate and evaluate response  - Consider cultural and social influences on pain and pain management  - Notify physician/advanced practitioner if interventions unsuccessful or patient reports new pain  Outcome: Progressing     Problem: INFECTION - ADULT  Goal: Absence or prevention of progression during hospitalization  Description: INTERVENTIONS:  - Assess and monitor for signs and symptoms of infection  - Monitor lab/diagnostic results  - Monitor all insertion sites, i e  indwelling lines, tubes, and drains  - Monitor endotracheal if appropriate and nasal secretions for changes in amount and color  - [place] appropriate cooling/warming therapies per order  - Administer medications as ordered  - Instruct and encourage patient and family to use good hand hygiene technique  - Identify and instruct in appropriate isolation precautions for identified infection/condition  Outcome: Progressing  Goal: Absence of fever/infection during neutropenic period  Description: INTERVENTIONS:  - Monitor WBC    Outcome: Progressing     Problem: SAFETY ADULT  Goal: Maintain or return to baseline ADL function  Description: INTERVENTIONS:  -  Assess patient's ability to carry out ADLs; assess patient's baseline for ADL function and identify physical deficits which impact ability to perform ADLs (bathing, care of mouth/teeth, toileting, grooming, dressing, etc )  - Assess/evaluate cause of self-care deficits   - Assess range of motion  - Assess patient's mobility; develop plan if impaired  - Assess patient's need for assistive devices and provide as appropriate  - Encourage maximum independence but intervene and supervise when necessary  - Involve family in performance of ADLs  - Assess for home care needs following discharge   - Consider OT consult to assist with ADL evaluation and planning for discharge  - Provide patient education as appropriate  Outcome: Progressing  Goal: Maintains/Returns to pre admission functional level  Description: INTERVENTIONS:  - Perform BMAT or MOVE assessment daily    - Set and communicate daily mobility goal to care team and patient/family/caregiver  - Collaborate with rehabilitation services on mobility goals if consulted  - Perform Range of Motion  times a day  - Reposition patient every  hours    - Dangle patient  times a day  - Stand patient  times a day  - Ambulate patient  times a day  - Out of bed to chair  times a day   - Out of bed for meals  times a day  - Out of bed for toileting  - Record patient progress and toleration of activity level   Outcome: Progressing  Goal: Patient will remain free of falls  Description: INTERVENTIONS:  - Educate patient/family on patient safety including physical limitations  - Instruct patient to call for assistance with activity   - Consult OT/PT to assist with strengthening/mobility   - Keep Call bell within reach  - Keep bed low and locked with side rails adjusted as appropriate  - Keep care items and personal belongings within reach  - Initiate and maintain comfort rounds  - Make Fall Risk Sign visible to staff  - Offer Toileting every  Hours, in advance of need  - Initiate/Maintain alarm  - Obtain necessary fall risk management equipment  - Apply yellow socks and bracelet for high fall risk patients  - Consider moving patient to room near nurses station  Outcome: Progressing     Problem: DISCHARGE PLANNING  Goal: Discharge to home or other facility with appropriate resources  Description: INTERVENTIONS:  - Identify barriers to discharge w/patient and caregiver  - Arrange for needed discharge resources and transportation as appropriate  - Identify discharge learning needs (meds, wound care, etc )  - Arrange for interpretive services to assist at discharge as needed  - Refer to Case Management Department for coordinating discharge planning if the patient needs post-hospital services based on physician/advanced practitioner order or complex needs related to functional status, cognitive ability, or social support system  Outcome: Progressing Problem: Knowledge Deficit  Goal: Patient/family/caregiver demonstrates understanding of disease process, treatment plan, medications, and discharge instructions  Description: Complete learning assessment and assess knowledge base    Interventions:  - Provide teaching at level of understanding  - Provide teaching via preferred learning methods  Outcome: Progressing

## 2023-03-16 NOTE — ASSESSMENT & PLAN NOTE
Malnutrition Findings:   Adult Malnutrition type: Chronic illness  Adult Degree of Malnutrition: Other severe protein calorie malnutrition  Malnutrition Characteristics: Muscle loss, Fat loss         Plan:  · Encourage oral intake  · Monitor BMI            360 Statement: related to catabolic illness as evidenced by weight loss, depressed temples, hollow supraclavicular space, dark orbital, visible acromonion process, ribs, low body weight  Intervention: Regular diet, pt declined medical food supplements  BMI Findings:  Adult BMI Classifications: Underweight < 18 5        Body mass index is 22 17 kg/m²

## 2023-03-16 NOTE — PROGRESS NOTES
INTERNAL MEDICINE RESIDENCY PROGRESS NOTE     Name: Stevo Worthy   Age & Sex: 81 y o  male   MRN: [id number]  [facility]   Encounter: [id number]  Team: SOD Team B     PATIENT INFORMATION     Name: Kathya Perez   Age & Sex: 81 y o  male   MRN: [id number]  Hospital Stay Days: 2    ASSESSMENT/PLAN     Principal Problem:    Brain mass  Active Problems:    Lung mass    Hypertension    Severe protein-calorie malnutrition ([address] )      Severe protein-calorie malnutrition ([address] )  Assessment & Plan  Malnutrition Findings:   Adult Malnutrition type: Chronic illness  Adult Degree of Malnutrition: Other severe protein calorie malnutrition  Malnutrition Characteristics: Muscle loss, Fat loss         Plan:  · Encourage oral intake  · Monitor BMI            360 Statement: related to catabolic illness as evidenced by weight loss, depressed temples, hollow supraclavicular space, dark orbital, visible acromonion process, ribs, low body weight  Intervention: Regular diet, pt declined medical food supplements  BMI Findings:  Adult BMI Classifications: Underweight < 18 5        Body mass index is 22 49 kg/m²  Hypertension  Assessment & Plan  Patient has a history of hypertension   • Patient is not currently on any medication at home  • patient blood pressure in the ED was normotensive  ? Plan: atorvastatin 40 mg daily  ? Labetalol 10 mg IV PRN every 6 hours    Lung mass  Assessment & Plan  Right lower lobe lung mass suspecting malignancy  ? CT chest/abdomen/pelvis showed right lower lobe mass extending along the fissure to the right middle and upper lobes and right hilum, measuring 10 x 12 cm consistent with lung malignancy   Left lower lobe subpleural 9 mm lung pulmonary nodule, intermediate concerning for malignancy   Few airspace opacities at the base of the right lower lobe and the right middle lobe may represent pneumonia and/or aspiration    ? Plan: See the plan under brain mets    * Brain mass  Assessment & Plan  Multiple intracranial enhancing lesions of the brain suspecting brain metastasis  • Patient is presenting with imbalance and ambulatory dysfunction for the last 5 days  • Patient had recent fall 5 days ago with no head strike  • On exam patient has ataxic gait  • 03/14 CT head showed multiple target appearing intracranial lesions suspected described with associated stranding edema, primary differential consideration including intracranial metastatic disease versus infection/abscess depending on the clinical setting     • 03/14 MRI brain showed multiple intracranial enhancing lesions are identified total of 5, consistent with endocrine metastasis   Several of these demonstrate focal surrounding positioning with localized mass effect, most prominently the right cerebellar lesion     • CT chest/abdomen/pelvis showed right lower lobe mass extending along the fissure to the right middle and upper lobes and right hilum, measuring 10 x 12 cm consistent with lung malignancy   Left lower lobe subpleural 9 mm lung pulmonary nodule, intermediate concerning for malignancy   Few airspace opacities at the base of the right lower lobe and the right middle lobe may represent pneumonia and/or aspiration  ? Plan:   ? Palliative care consulted  ? Neurosurgery consulted, recommend IR biopsy of lung mass  ? IR consulted, will proceed with lung biopsy 3/17, NPO midnight prior, hold anticoagulation midnight prior  ? Consult PT/OT  ? Plan for oncology and possibly rad-onc consult once biopsy is complete  ? Fall precautions  ? Neuro check every 4 hours  ? Decadron 4 mg IV every 6 hours   ? Keppra 500 mg tab every 12 hours      Disposition: Inpatient     SUBJECTIVE     Patient seen and examined  No acute events overnight  Today overall the patient was feeling well and had no acute complaints       OBJECTIVE     Vitals:    03/16/23 0344 03/16/23 0615 03/16/23 0808 03/16/23 1011   BP: 103/56 133/84 130/84 125/78 Pulse: 65 65 79 86   Resp:   16   Temp: (!) 97 3 °F (36 3 °C) 97 6 °F (36 4 °C)  97 7 °F (36 5 °C)   TempSrc:       SpO2: 94% 96% 94% 91%   Weight:       Height:          Temperature:   Temp (24hrs), Av 7 °F (36 5 °C), Min:97 3 °F (36 3 °C), Max:98 2 °F (36 8 °C)    Temperature: 97 7 °F (36 5 °C) (Showed up on masimo, but dissapeared)  Intake & Output:  I/O        0701  03/15 0700 03/15 0701   0700  0701   0700    P  O   700     Total Intake(mL/kg)  700 (11 1)     Urine (mL/kg/hr) 1450 2150 (1 4) 200 (2 3)    Total Output 1450 2150 200    Net -1450 -1450 -200           Unmeasured Stool Occurrence 1 x          Weights:   IBW (Ideal Body Weight): 63 8 kg    Body mass index is 22 49 kg/m²  Weight (last 2 days)     Date/Time Weight    03/15/23 0600 63 2 (139 33)    23 1350 61 2 (134 92)        Physical Exam  Constitutional:       Appearance: He is well-developed  HENT:      Head: Normocephalic and atraumatic  Nose: Nose normal    Eyes:      General:         Right eye: No discharge  Left eye: No discharge  Conjunctiva/sclera: Conjunctivae normal       Pupils: Pupils are equal, round, and reactive to light  Neck:      Thyroid: No thyromegaly  Cardiovascular:      Rate and Rhythm: Normal rate and regular rhythm  Heart sounds: Normal heart sounds  No murmur heard  No friction rub  No gallop  Pulmonary:      Effort: Pulmonary effort is normal  No respiratory distress  Breath sounds: Normal breath sounds  No stridor  No wheezing or rales  Chest:      Chest wall: No tenderness  Abdominal:      General: Bowel sounds are normal  There is no distension  Palpations: Abdomen is soft  There is no mass  Tenderness: There is no abdominal tenderness  There is no guarding or rebound  Musculoskeletal:         General: No tenderness or deformity  Right lower leg: No edema  Left lower leg: No edema     Lymphadenopathy:      Cervical: No cervical adenopathy  Skin:     General: Skin is warm and dry  Neurological:      Mental Status: He is alert and oriented to person, place, and time  Psychiatric:         Mood and Affect: Mood normal          Behavior: Behavior normal          Thought Content: Thought content normal          Judgment: Judgment normal        LABORATORY DATA     Labs: I have personally reviewed pertinent reports  Results from last 7 days   Lab Units 03/16/23  0537 03/15/23  0604 03/13/23  2328   WBC Thousand/uL 13 82* 11 82* 10 02   HEMOGLOBIN g/dL 10 5* 11 2* 11 1*   HEMATOCRIT % 34 2* 35 1* 35 4*   PLATELETS Thousands/uL 381 397* 370   NEUTROS PCT %  --  91* 79*   MONOS PCT %  --  4 10      Results from last 7 days   Lab Units 03/16/23  0537 03/15/23  0604 03/13/23  2328   POTASSIUM mmol/L 4 2 4 3 3 8   CHLORIDE mmol/L 104 104 101   CO2 mmol/L 30 27 25   BUN mg/dL 28* 24 20   CREATININE mg/dL 0 76 0 76 0 85   CALCIUM mg/dL 10 0 10 2* 9 9   ALK PHOS U/L  --  53 53   ALT U/L  --  14 8   AST U/L  --  13 9*     Results from last 7 days   Lab Units 03/15/23  0604 03/13/23  2328   MAGNESIUM mg/dL 2 5 2 1     Results from last 7 days   Lab Units 03/15/23  0604   PHOSPHORUS mg/dL 3 4      Results from last 7 days   Lab Units 03/15/23  0604   INR  1 00   PTT seconds 31               IMAGING & DIAGNOSTIC TESTING     Radiology Results: I have personally reviewed pertinent reports  CTA head and neck with and without contrast    Result Date: 3/14/2023  Impression: 1  Enhancing lesions in the cerebral hemispheres and posterior fossa was able surrounding vasogenic edema  Recommend MRI of the brain with gadolinium  2   Right level 2 and 3 lymph nodes measuring up to 1 9 cm, likely metastatic  3   No stenosis, dissection or occlusion of the carotid or vertebral arteries or major vessels of the [place]  Workstation performed: [id number]     XR chest 2 views    Result Date: 3/14/2023  Impression: 12 cm right lower lobe mass    See subsequent chest CT  Workstation performed: [id number]     XR abdomen 1 view kub    Result Date: 3/14/2023  Impression: Nonobstructive bowel gas pattern with a moderate colonic stool burden  Workstation performed: [id number]     CT head without contrast    Result Date: 3/14/2023  Impression: Multiple target appearing intracranial lesions suspected as described with associated vasogenic edema; primary differential considerations including intracranial metastatic disease versus infection/abscesses depending on the clinical setting  MRI of the  brain with contrast recommended for further evaluation and to detect smaller lesions  No significant mass effect or midline shift  Other findings as above  Workstation performed: [id number]     CT chest abdomen pelvis w contrast    Result Date: 3/14/2023  Impression: 1  Right lower lobe mass extending along the fissure to the right middle and upper lobes and to the right hilum, measuring 10 x 12 12 cm, consistent with lung malignancy  Recommend pulmonary consultation  2   Left lower lobe subpleural 9 mm pulmonary nodule, indeterminate though concerning for malignancy  3   Few airspace opacities at the base of the right lower lobe and right middle lobe may represent pneumonia and/or aspiration  4   No evidence of malignancy in the abdomen or pelvis  5   Constipation  Workstation performed: [id number]     MRI Brain BT w wo Contrast    Result Date: 3/14/2023  Impression: Multiple intracranial enhancing lesions are identified, total of 5, consistent with intracranial metastasis  Several of these demonstrate focal surrounding vasogenic with localized mass effect, most prominently the right cerebellar lesion  Workstation performed: [id number]     Other Diagnostic Testing: I have personally reviewed pertinent reports      ACTIVE MEDICATIONS     Current Facility-Administered Medications   Medication Dose Route Frequency   • atorvastatin (LIPITOR) tablet 40 mg  40 mg Oral QPM   • dexamethasone (DECADRON) injection 4 mg  4 mg Intravenous Q6H [facility]   • enoxaparin (LOVENOX) subcutaneous injection 40 mg  40 mg Subcutaneous Q24H [facility]   • labetalol (NORMODYNE) injection 10 mg  10 mg Intravenous Q6H PRN   • levETIRAcetam (KEPPRA) tablet 500 mg  500 mg Oral Q12H [facility]   • polyethylene glycol (MIRALAX) packet 17 g  17 g Oral Daily   • senna-docusate sodium (SENOKOT S) 8 6-50 mg per tablet 1 tablet  1 tablet Oral HS       VTE Pharmacologic Prophylaxis: Enoxaparin (Lovenox)  VTE Mechanical Prophylaxis: sequential compression device    Portions of the record may have been created with voice recognition software  Occasional wrong word or "sound a like" substitutions may have occurred due to the inherent limitations of voice recognition software    Read the chart carefully and recognize, using context, where substitutions have occurred   ==  Tamy Barboza MD  [address]  Internal Medicine Residency PGY-3

## 2023-03-16 NOTE — QUICK NOTE
3/16/2023 4:26 PM -  Mana Perez's chart and case were reviewed by Yelena Mckinney PA-C  Mode of review included electronic chart check  Per review, symptoms remain controlled on current regimen and no changes are made at this time  Please continue the regimen in place, and review our last note for details  IR biopsy planned for tomorrow, 3/17  Palliative care available to revisit goals of care once pathology results are received  Please page if symptom management needs arise in the interim  For dispo plan, please review Case Management notes  Patient is appropriate for outpatient follow-up upon discharge  We will make arrangements through our office  For urgent issues or any questions/concerns, please notify on-call provider via [facility]  You may also call our answering service 24/7 at   Yelena Mckinney PA-C  Palliative and Supportive Care  Clinic/Answering Service: [phone]  You can find me on [employer]!

## 2023-03-16 NOTE — RESTORATIVE TECHNICIAN NOTE
Restorative Technician Note      Patient Name: Michelle Yap     Restorative Tech Visit Date: 03/16/23  Note Type: Mobility  Patient Position Upon Consult: Seated edge of bed  Activity Performed: Ambulated  Assistive Device: Roller walker  Patient Position at End of Consult: Seated edge of bed;  All needs within reach    Rg Conklin Restorative Technician

## 2023-03-16 NOTE — CONSULTS
Medical Oncology/Hematology Consult Note  Timbo Thomas, male, 81 y  o , 1950,  [facility] 604-01, [id number]     Assessment and Plan  1  RLL and LLL mass concerning for malignancy (primary) with intracranial metastasis and local as well as distal lymphadenopathy  - Presented for gait abnormality and ambulatory dysfunction  - Imaging with MRI brain showing multiple intracranial metastasis  - CT head and neck showing cervical lymphadenopathy likely metastatic disease  - CT chest abdomen pelvis showing large 10 cm x 12 cm right-sided mass extending to multiple lobes with suspected local atelectasis  - Also noted incidental left lower lobe nodule suspicion of malignancy  - Multiple local plus metastatic lymphadenopathy noted on imaging    2  Multiple intracranial brain mass suspicion of metastatic disease  3  Rt cerebellar mass with local mass effect  4  Hypercalcemia, undetermined etiology, can be secondary to underlying malignancy versus dehydration    Plan:-  - Agree with IR consult for lung biopsy  - Will need heme-onc follow-up outpatient once pathology results have been finalized  - Will set up an appointment appointment after discharge  - Neurosurgery, palliative following  - Smoking cessation was strongly encouraged    Thank you for the consultation  We will continue to follow  Outpatient follow up plan: Outpatient follow-up with heme-onc after biopsy results    Communication with patient/family:  I did update the patient    Communication with team:  Did communicate primary team       I did review this patient with Dr Deysi Nieves and he is in agreement with this plan  Reason for consultation: Rt lung mass with brain metastasis and lymphadenopathy    History of present illness:  Timbo Thomas is a 81 y o  male with past medical history of hypertension presented to [address] [place] [address] with ambulatory dysfunction for 5 days prior to presentation with reported fall    Stroke alert was initiated without any ischemia identified however was noticed to have incidental lung mass with multiple intracranial metastasis as well as local plus metastatic lymphadenopathy  Patient reported feeling of gait abnormality few days prior to presentation but denied any other symptoms  Patient's family reported initially as per chart review some slurred speech  Denies any weakness, sensory loss, chest pain, headache, visual changes, diarrhea  Was admitted to ICU due to intracranial metastasis and some of them showing vasogenic edema  Neurosurgery has been following who recommended no further interventions needed inpatient and recommended radiation consult  Patient's symptoms have improved  CT chest abdomen pelvis was ordered for further evaluation of primary source for malignancy  Was found to have a large 12 cm x 10 cm lung mass on right side with mediastinal/hilar lymphadenopathy without any metastasis to abdominal pelvis  Does have history of chronic smoking for many years in the past   Also noted LLL pulmonary nodule of around 9 mm concerning for malignancy  Does have severe protein calorie malnutrition with cachexia  Likely from underlying malignancy  Palliative care also has been following for goals of care discussion  Currently patient is level 2  Neurosurgery recommended radiation consult  Heme-onc services were consulted for further evaluation regarding lung mass  Patient complaining of poor appetite, unintentional weight loss, fatigue since long time  Denies any chest pain, shortness of breath, numbness, tingling, nausea, vomiting, diarrhea, abdominal pain  Vitals are stable overnight  Review of Systems:   Review of Systems   Constitutional: Positive for unexpected weight change  Negative for chills and fever  HENT: Negative for ear pain and sore throat  Eyes: Negative for pain and visual disturbance  Respiratory: Negative for cough and shortness of breath      Cardiovascular: Negative for chest pain and palpitations  Gastrointestinal: Negative for abdominal pain and vomiting  Genitourinary: Negative for dysuria and hematuria  Musculoskeletal: Positive for gait problem  Negative for arthralgias and back pain  Skin: Negative for color change and rash  Neurological: Negative for seizures and syncope  All other systems reviewed and are negative  Oncology History:   Cancer Staging   No matching staging information was found for the patient  Oncology History    No history exists  Past Medical History:   Past Medical History:   Diagnosis Date   • Hypertension        Past Surgical History:   Procedure Laterality Date   • HERNIA REPAIR         No family history on file  Social History     Socioeconomic History   • Marital status: Single     Spouse name: Not on file   • Number of children: Not on file   • Years of education: Not on file   • Highest education level: Not on file   Occupational History   • Not on file   Tobacco Use   • Smoking status: Former   • Smokeless tobacco: Not on file   • Tobacco comments:     quit Wednesday   Substance and Sexual Activity   • Alcohol use: Never   • Drug use: Never   • Sexual activity: Not on file   Other Topics Concern   • Not on file   Social History Narrative   • Not on file     Social Determinants of Health     Financial Resource Strain: Not on file   Food Insecurity: No Food Insecurity   • Worried About Running Out of Food in the Last Year: Never true   • Ran Out of Food in the Last Year: Never true   Transportation Needs: No Transportation Needs   • Lack of Transportation (Medical): No   • Lack of Transportation (Non-Medical):  No   Physical Activity: Not on file   Stress: Not on file   Social Connections: Not on file   Intimate Partner Violence: Not on file   Housing Stability: Low Risk    • Unable to Pay for Housing in the Last Year: No   • Number of Places Lived in the Last Year: 1   • Unstable Housing in the Last Year: No Current Facility-Administered Medications:   •  atorvastatin (LIPITOR) tablet 40 mg, 40 mg, Oral, QPM, Latoya Monge MD, 40 mg at 03/15/23 1736  •  dexamethasone (DECADRON) injection 4 mg, 4 mg, Intravenous, Q6H [address], Latoya Monge MD, 4 mg at 03/16/23 0559  •  [START ON 3/18/2023] enoxaparin (LOVENOX) subcutaneous injection 40 mg, 40 mg, Subcutaneous, Q24H [address], Roberta Valdez MD  •  labetalol (NORMODYNE) injection 10 mg, 10 mg, Intravenous, Q6H PRN, Latoya Monge MD  •  levETIRAcetam (KEPPRA) tablet 500 mg, 500 mg, Oral, Q12H [address], Latoya Monge MD, 500 mg at 03/16/23 0840  •  polyethylene glycol (MIRALAX) packet 17 g, 17 g, Oral, Daily, Latoya Monge MD, 17 g at 03/15/23 4352  •  senna-docusate sodium (SENOKOT S) 8 6-50 mg per tablet 1 tablet, 1 tablet, Oral, HS, Latoya Monge MD, 1 tablet at 03/15/23 2200    No medications prior to admission  No Known Allergies      Physical Exam:     /78   Pulse 86   Temp 97 7 °F (36 5 °C) Comment: Showed up on masimo, but dissapeared  Resp 16   Ht 5' 6" (1 676 m)   Wt 63 2 kg (139 lb 5 3 oz)   SpO2 91%   BMI 22 49 kg/m²     Physical Exam  Vitals and nursing note reviewed  Constitutional:       Appearance: Normal appearance  He is well-developed  He is ill-appearing  Comments: Bilateral temporal wasting  Cachexia   HENT:      Head: Normocephalic and atraumatic  Eyes:      Extraocular Movements: Extraocular movements intact  Pupils: Pupils are equal, round, and reactive to light  Cardiovascular:      Rate and Rhythm: Normal rate  Pulmonary:      Effort: Pulmonary effort is normal  No respiratory distress  Abdominal:      Palpations: Abdomen is soft  Musculoskeletal:         General: Normal range of motion  Cervical back: Normal range of motion  Skin:     General: Skin is warm and dry        Comments: Clubbing noted   Neurological:      General: No focal deficit present  Mental Status: He is alert and oriented to person, place, and time  Mental status is at baseline  Cranial Nerves: Cranial nerves 2-12 are intact  Coordination: Finger-Nose-Finger Test normal       Deep Tendon Reflexes:      Reflex Scores:       Brachioradialis reflexes are 2+ on the right side and 2+ on the left side  Patellar reflexes are 2+ on the right side and 2+ on the left side  Psychiatric:         Mood and Affect: Mood normal          Speech: Speech normal          Behavior: Behavior normal          Thought Content:  Thought content normal          Judgment: Judgment normal            Recent Results (from the past 48 hour(s))   Lipid Panel with Direct LDL reflex    Collection Time: 03/15/23  6:04 AM   Result Value Ref Range    Cholesterol 176 See Comment mg/dL    Triglycerides 55 See Comment mg/dL    HDL, Direct 53 >=40 mg/dL    LDL Calculated 112 (H) 0 - 100 mg/dL   Comprehensive metabolic panel    Collection Time: 03/15/23  6:04 AM   Result Value Ref Range    Sodium 136 135 - 147 mmol/L    Potassium 4 3 3 5 - 5 3 mmol/L    Chloride 104 96 - 108 mmol/L    CO2 27 21 - 32 mmol/L    ANION GAP 5 4 - 13 mmol/L    BUN 24 5 - 25 mg/dL    Creatinine 0 76 0 60 - 1 30 mg/dL    Glucose 127 65 - 140 mg/dL    Calcium 10 2 (H) 8 3 - 10 1 mg/dL    Corrected Calcium 11 2 (H) 8 3 - 10 1 mg/dL    AST 13 5 - 45 U/L    ALT 14 12 - 78 U/L    Alkaline Phosphatase 53 46 - 116 U/L    Total Protein 6 5 6 4 - 8 4 g/dL    Albumin 2 8 (L) 3 5 - 5 0 g/dL    Total Bilirubin 0 23 0 20 - 1 00 mg/dL    eGFR 92 ml/min/1 73sq m   Magnesium    Collection Time: 03/15/23  6:04 AM   Result Value Ref Range    Magnesium 2 5 1 6 - 2 6 mg/dL   Phosphorus    Collection Time: 03/15/23  6:04 AM   Result Value Ref Range    Phosphorus 3 4 2 3 - 4 1 mg/dL   CBC and differential    Collection Time: 03/15/23  6:04 AM   Result Value Ref Range    WBC 11 82 (H) 4 31 - 10 16 Thousand/uL    RBC 4 06 3 88 - 5 62 Million/uL Hemoglobin 11 2 (L) 12 0 - 17 0 g/dL    Hematocrit 35 1 (L) 36 5 - 49 3 %    MCV 87 82 - 98 fL    MCH 27 6 26 8 - 34 3 pg    MCHC 31 9 31 4 - 37 4 g/dL    RDW 14 2 11 6 - 15 1 %    MPV 10 3 8 9 - 12 7 fL    Platelets 055 (H) 786 - 390 Thousands/uL    nRBC 0 /100 WBCs    Neutrophils Relative 91 (H) 43 - 75 %    Immat GRANS % 1 0 - 2 %    Lymphocytes Relative 4 (L) 14 - 44 %    Monocytes Relative 4 4 - 12 %    Eosinophils Relative 0 0 - 6 %    Basophils Relative 0 0 - 1 %    Neutrophils Absolute 10 83 (H) 1 85 - 7 62 Thousands/µL    Immature Grans Absolute 0 06 0 00 - 0 20 Thousand/uL    Lymphocytes Absolute 0 42 (L) 0 60 - 4 47 Thousands/µL    Monocytes Absolute 0 50 0 17 - 1 22 Thousand/µL    Eosinophils Absolute 0 00 0 00 - 0 61 Thousand/µL    Basophils Absolute 0 01 0 00 - 0 10 Thousands/µL   APTT    Collection Time: 03/15/23  6:04 AM   Result Value Ref Range    PTT 31 23 - 37 seconds   Protime-INR    Collection Time: 03/15/23  6:04 AM   Result Value Ref Range    Protime 13 4 11 6 - 14 5 seconds    INR 1 00 0 84 - 1 19   CBC and Platelet    Collection Time: 03/16/23  5:37 AM   Result Value Ref Range    WBC 13 82 (H) 4 31 - 10 16 Thousand/uL    RBC 3 83 (L) 3 88 - 5 62 Million/uL    Hemoglobin 10 5 (L) 12 0 - 17 0 g/dL    Hematocrit 34 2 (L) 36 5 - 49 3 %    MCV 89 82 - 98 fL    MCH 27 4 26 8 - 34 3 pg    MCHC 30 7 (L) 31 4 - 37 4 g/dL    RDW 14 5 11 6 - 15 1 %    Platelets 862 170 - 052 Thousands/uL    MPV 10 6 8 9 - 12 7 fL   Basic metabolic panel    Collection Time: 03/16/23  5:37 AM   Result Value Ref Range    Sodium 135 135 - 147 mmol/L    Potassium 4 2 3 5 - 5 3 mmol/L    Chloride 104 96 - 108 mmol/L    CO2 30 21 - 32 mmol/L    ANION GAP 1 (L) 4 - 13 mmol/L    BUN 28 (H) 5 - 25 mg/dL    Creatinine 0 76 0 60 - 1 30 mg/dL    Glucose 119 65 - 140 mg/dL    Calcium 10 0 8 3 - 10 1 mg/dL    eGFR 92 ml/min/1 73sq m       CTA head and neck with and without contrast    Result Date: 3/14/2023  Narrative: CTA NECK AND BRAIN WITH gadolinium CONTRAST INDICATION: CVA vs tumor, abnormal CT head COMPARISON:   3/14/2023  TECHNIQUE:  Post contrast imaging was performed after administration of iodinated contrast through the neck and brain  Post contrast axial 0 625 mm images timed to opacify the arterial system  3D rendering was performed on an independent workstation  MIP reconstructions performed  Coronal reconstructions were performed of the noncontrast portion of the brain  Radiation dose length product (DLP) for this visit:  4952 mGy-cm   This examination, like all CT scans performed in the [facility], was performed utilizing techniques to minimize radiation dose exposure, including the use of iterative reconstruction and automated exposure control  IV Contrast:  100 mL of iohexol (OMNIPAQUE)  IMAGE QUALITY:   Diagnostic FINDINGS: Previously demonstrated lesions throughout the cerebral hemispheres and in the posterior fossa demonstrate ring enhancement  Stable surrounding vasogenic edema  CERVICAL VASCULATURE AORTIC ARCH AND GREAT VESSELS:  No atherosclerotic plaque in the aortic arch  No stenosis in the subclavian arteries  RIGHT VERTEBRAL ARTERY CERVICAL SEGMENT:  Normal origin  The vessel is normal in caliber throughout the neck  LEFT VERTEBRAL ARTERY CERVICAL SEGMENT: Origin directly from the aortic arch  No stenosis or dissection  RIGHT EXTRACRANIAL CAROTID SEGMENT:  Normal caliber common carotid artery  Normal bifurcation and cervical internal carotid artery  No stenosis or dissection  LEFT EXTRACRANIAL CAROTID SEGMENT:  Normal caliber common carotid artery  Normal bifurcation and cervical internal carotid artery  No stenosis or dissection  NASCET criteria was used to determine the degree of internal carotid artery diameter stenosis  INTRACRANIAL VASCULATURE INTERNAL CAROTID ARTERIES:  Calcified plaque throughout the carotid siphons without hemodynamically significant stenosis   ANTERIOR CIRCULATION:  Symmetric A1 segments and anterior cerebral arteries with normal enhancement  Normal anterior communicating artery  MIDDLE CEREBRAL ARTERY CIRCULATION:  M1 segment and middle cerebral artery branches demonstrate normal enhancement bilaterally  DISTAL VERTEBRAL ARTERIES:  Normal distal vertebral arteries  Posterior inferior cerebellar arteries are patent  BASILAR ARTERY:  Physiologically small basilar artery  Patent superior cerebellar arteries  POSTERIOR CEREBRAL ARTERIES: Fetal type bilateral posterior communicating arteries  No stenosis  VENOUS STRUCTURES:  Normal  NON VASCULAR ANATOMY BONY STRUCTURES:  No acute osseous abnormality  SOFT TISSUES OF THE NECK:  Rt level IIb and 3 lymph nodes measuring up to 1 9 cm  THORACIC INLET:  Partially imaged right upper lobe 9 cm mass  Mild panlobular emphysema  Impression: 1  Enhancing lesions in the cerebral hemispheres and posterior fossa was able surrounding vasogenic edema  Recommend MRI of the brain with gadolinium  2   Rt level 2 and 3 lymph nodes measuring up to 1 9 cm, likely metastatic  3   No stenosis, dissection or occlusion of the carotid or vertebral arteries or major vessels of the [place]  Workstation performed: [id number]     XR chest 2 views    Result Date: 3/14/2023  Narrative: CHEST INDICATION:   trauma  COMPARISON:  Subsequent chest CT from today  EXAM PERFORMED/VIEWS:  XR CHEST AP & LATERAL  FINDINGS: Cardiomediastinal silhouette normal  12 cm mass in the right lower lobe  No effusion  Bones normal for age  Upper abdomen normal      Impression: 12 cm right lower lobe mass  See subsequent chest CT  Workstation performed: [id number]     XR abdomen 1 view kub    Result Date: 3/14/2023  Narrative: ABDOMEN INDICATION:   constipation  COMPARISON:  CT chest, abdomen, pelvis 3/14/2023  VIEWS:  AP supine FINDINGS: There is a nonobstructive bowel gas pattern  Moderate colonic stool burden in the ascending colon and rectum   No discernible free air on this supine study  Upright or left lateral decubitus imaging is more sensitive to detect subtle free air in the appropriate setting  No pathologic calcifications or soft tissue masses  Visualized lung bases are clear  Degenerative changes of the bilateral sacroiliac joints  Impression: Nonobstructive bowel gas pattern with a moderate colonic stool burden  Workstation performed: [id number]     CT head without contrast    Result Date: 3/14/2023  Narrative: CT BRAIN - WITHOUT CONTRAST INDICATION:   ataxia, speech disturbance  COMPARISON:  None  TECHNIQUE:  CT examination of the brain was performed  In addition to axial images, sagittal and coronal 2D reformatted images were created and submitted for interpretation  Radiation dose length product (DLP) for this visit:  1022 mGy-cm   This examination, like all CT scans performed in the [facility], was performed utilizing techniques to minimize radiation dose exposure, including the use of iterative reconstruction and automated exposure control  IMAGE QUALITY:  Diagnostic  FINDINGS: PARENCHYMA:  Target appearing intracranial lesions suspected in the right cerebellum measuring approximately 1 9 cm in size (axial image 7, series 301), and the right frontal region measuring approximately 1 7 cm in size  (axial image 31, series 301), in  the posterior left frontal region measuring approximately 1 2 cm in size (axial image 32, series 301 with associated surrounding vasogenic edema; primary differential considerations including intracranial metastatic disease versus infection/abscesses depending on the clinical setting  MRI of the brain with contrast recommended for further evaluation to detect smaller lesions  No significant mass effect or midline shift  No territorial infarction is seen  Gray-white differentiation appears grossly  maintained  Mild parenchymal atrophy   VENTRICLES AND EXTRA-AXIAL SPACES:  Ventricles and extra-axial CSF spaces are prominent commensurate with the degree of volume loss  No hydrocephalus  No acute extra-axial hemorrhage  VISUALIZED ORBITS: Orbits appear intact  PARANASAL SINUSES: Mild mucosal thickening and small amount of fluid in the left maxillary sinus  Cystic area in the right maxillary bone measures approximately 2 cm in size CALVARIUM AND EXTRACRANIAL SOFT TISSUES:  Otherwise grossly unremarkable  Impression: Multiple target appearing intracranial lesions suspected as described with associated vasogenic edema; primary differential considerations including intracranial metastatic disease versus infection/abscesses depending on the clinical setting  MRI of the  brain with contrast recommended for further evaluation and to detect smaller lesions  No significant mass effect or midline shift  Other findings as above  Workstation performed: [id number]     CT chest abdomen pelvis w contrast    Result Date: 3/14/2023  Narrative: CT CHEST, ABDOMEN AND PELVIS WITH IV CONTRAST INDICATION:   Abnormal chest radiograph  COMPARISON:  3/14/2023  TECHNIQUE: CT examination of the chest, abdomen and pelvis was performed  Axial, sagittal, and coronal 2D reformatted images were created from the source data and submitted for interpretation  Radiation dose length product (DLP) for this visit:  462 mGy-cm   This examination, like all CT scans performed in the [facility], was performed utilizing techniques to minimize radiation dose exposure, including the use of iterative reconstruction and automated exposure control  IV Contrast:  100 mL of iohexol (OMNIPAQUE) Enteric Contrast: Enteric contrast was administered  FINDINGS: CHEST LUNGS:  Hypoenhancing right lower lobe mass extending along the fissures into the right middle and upper lobes measuring 10 3 x 11 7 x 12 2 cm  Mass extends to the right hilum  Left lower lobe subpleural 9 mm pulmonary nodule, series 306, image 96    Occlusion of right lower lobe segmental bronchi within the mass  Scattered airspace opacities at the base of the right lower lobe may represent postobstructive pneumonia  Few tiny opacities at the base of the right middle lobe may indicate aspiration  Mild centrilobular and paraseptal emphysema  PLEURA:  No effusion  HEART/GREAT VESSELS: Normal heart size  No thoracic aortic aneurysm or dissection  No evidence of proximal pulmonary artery embolus  Rt lower lobe segmental pulmonary arteries are encased by tumor  MEDIASTINUM AND UDAY:  Subcarinal 1 7 cm lymph node  Prevascular 1 cm lymph node  CHEST WALL AND LOWER NECK:  Unremarkable  ABDOMEN LIVER/BILIARY TREE:  Focal area of hypoattenuation in the right lobe of the liver adjacent to the falciform ligament is consistent with focal fat  Mild hepatomegaly GALLBLADDER:  No calcified gallstones  No pericholecystic inflammatory change  SPLEEN:  Unremarkable  PANCREAS:  Unremarkable  ADRENAL GLANDS:  Unremarkable  KIDNEYS/URETERS:  Rt renal cysts measure up to 3 8 cm  Left renal cysts measure up to 3 3 cm  Symmetric nephrographic phase enhancement of the kidneys  No obstructive uropathy  STOMACH AND BOWEL:  Large amount stool distends the right colon  Stool distends the rectum  Diverticulosis without evidence of diverticulitis or colitis  APPENDIX:  No findings to suggest appendicitis  ABDOMINOPELVIC CAVITY:  No free intraperitoneal air, fluid collection or lymphadenopathy  VESSELS:  No abdominal aortic aneurysm  PELVIS REPRODUCTIVE ORGANS:  Enlarged prostate making a posterior impression on the bladder  URINARY BLADDER:  Calculi at the left base of the bladder  ABDOMINAL WALL/INGUINAL REGIONS:  Unremarkable  OSSEOUS STRUCTURES:  No acute fracture or destructive osseous lesion  Impression: 1  Rt lower lobe mass extending along the fissure to the right middle and upper lobes and to the right hilum, measuring 10 x 12 12 cm, consistent with lung malignancy    Recommend pulmonary consultation  2   Left lower lobe subpleural 9 mm pulmonary nodule, indeterminate though concerning for malignancy  3   Few airspace opacities at the base of the right lower lobe and right middle lobe may represent pneumonia and/or aspiration  4   No evidence of malignancy in the abdomen or pelvis  5   Constipation  Workstation performed: [id number]     MRI Brain BT w wo Contrast    Result Date: 3/14/2023  Narrative: MRI BRAIN WITH AND WITHOUT CONTRAST INDICATION: New brain masses  Lung carcinoma  Tumor Type: Lung carcinoma  Suspected brain metastasis  Surgical History: None Radiation History: None COMPARISON:  None  TECHNIQUE: Multiplanar, multisequence imaging of the brain and sella was performed before and after gadolinium administration  IV Contrast:  7 5 mL of Gadobutrol injection (SINGLE-DOSE)  IMAGE QUALITY:   Diagnostic  FINDINGS: BRAIN PARENCHYMA: A total of 5 enhancing lesions are identified within the brain parenchyma  1   Series 14 image 103: Left parafalcine frontal lobe  2   Series 14 image 96: Rt anterior lateral frontal lobe 3  Superior cerebellum, series 14 image 53  4   Rt lateral cerebellum, series 14 image 27 5  5  Left posterior cerebellum, series 14 image 39  Lesions 1 through 4 measures between 1 and 2 cm in size  Lesion 5, Measures less than 1 cm in size  Vasogenic edema is seen surrounding the larger lesions most prominently within the right cerebellum where there is localized mass effect upon the 4th ventricle and partial effacement of the right perimesencephalic cistern  Arterial spin labeling was performed and lesions #1, 2 and 3 demonstrate an increase in cerebral blood flow  OTHER FINDINGS: The remainder of the brain parenchyma is stable  VENTRICLES:  Normal for the patient's age  SELLA AND PITUITARY GLAND:  Normal  ORBITS:  Normal  PARANASAL SINUSES:  Normal  VASCULATURE:  Evaluation of the major intracranial vasculature demonstrates appropriate flow voids   CALVARIUM AND SKULL BASE:  Normal  EXTRACRANIAL SOFT TISSUES:  Normal      Impression: Multiple intracranial enhancing lesions are identified, total of 5, consistent with intracranial metastasis  Several of these demonstrate focal surrounding vasogenic with localized mass effect, most prominently the right cerebellar lesion  Workstation performed: [id number]       Labs and pertinent reports reviewed  This note has been generated by voice recognition software system  Therefore, there may be spelling, grammar, and or syntax errors  Please contact if questions arise

## 2023-03-16 NOTE — PROGRESS NOTES
Intake received  Chart reviewed  Patient is currently inpatient  Patient presented to the ED for evaluation of ambulatory dysfunction x5 days, found to have 12 cm right sided lung mass and 5 brain masses with cerebral edema and mild brain compression  He is tentatively scheduled for IR biopsy on 3/17/23  He is scheduled for hospital follow-up with Dr Paige Scott on 3/30/23  Will outreach upon discharge

## 2023-03-17 ENCOUNTER — APPOINTMENT (INPATIENT)
Dept: RADIOLOGY | Facility: HOSPITAL | Age: 73
End: 2023-03-17

## 2023-03-17 ENCOUNTER — APPOINTMENT (OUTPATIENT)
Dept: RADIOLOGY | Facility: HOSPITAL | Age: 73
End: 2023-03-17
Attending: RADIOLOGY

## 2023-03-17 ENCOUNTER — APPOINTMENT (OUTPATIENT)
Dept: RADIOLOGY | Facility: HOSPITAL | Age: 73
End: 2023-03-17

## 2023-03-17 LAB
ANION GAP SERPL CALCULATED.3IONS-SCNC: 2 MMOL/L (ref 4–13)
BUN SERPL-MCNC: 29 MG/DL (ref 5–25)
CALCIUM SERPL-MCNC: 9.5 MG/DL (ref 8.3–10.1)
CHLORIDE SERPL-SCNC: 103 MMOL/L (ref 96–108)
CO2 SERPL-SCNC: 29 MMOL/L (ref 21–32)
CREAT SERPL-MCNC: 0.79 MG/DL (ref 0.6–1.3)
GFR SERPL CREATININE-BSD FRML MDRD: 90 ML/MIN/1.73SQ M
GLUCOSE SERPL-MCNC: 103 MG/DL (ref 65–140)
POTASSIUM SERPL-SCNC: 4.7 MMOL/L (ref 3.5–5.3)
SODIUM SERPL-SCNC: 134 MMOL/L (ref 135–147)

## 2023-03-17 RX ORDER — MIDAZOLAM HYDROCHLORIDE 2 MG/2ML
INJECTION, SOLUTION INTRAMUSCULAR; INTRAVENOUS AS NEEDED
Status: COMPLETED | OUTPATIENT
Start: 2023-03-17 | End: 2023-03-17

## 2023-03-17 RX ORDER — LIDOCAINE HYDROCHLORIDE 10 MG/ML
INJECTION, SOLUTION EPIDURAL; INFILTRATION; INTRACAUDAL; PERINEURAL AS NEEDED
Status: COMPLETED | OUTPATIENT
Start: 2023-03-17 | End: 2023-03-17

## 2023-03-17 RX ORDER — FENTANYL CITRATE 50 UG/ML
INJECTION, SOLUTION INTRAMUSCULAR; INTRAVENOUS AS NEEDED
Status: COMPLETED | OUTPATIENT
Start: 2023-03-17 | End: 2023-03-17

## 2023-03-17 RX ADMIN — LIDOCAINE HYDROCHLORIDE 5 ML: 10 INJECTION, SOLUTION EPIDURAL; INFILTRATION; INTRACAUDAL; PERINEURAL at 13:03

## 2023-03-17 RX ADMIN — DEXAMETHASONE SODIUM PHOSPHATE 4 MG: 4 INJECTION INTRA-ARTICULAR; INTRALESIONAL; INTRAMUSCULAR; INTRAVENOUS; SOFT TISSUE at 20:46

## 2023-03-17 RX ADMIN — MIDAZOLAM 0.5 MG: 1 INJECTION INTRAMUSCULAR; INTRAVENOUS at 12:41

## 2023-03-17 RX ADMIN — DEXAMETHASONE SODIUM PHOSPHATE 4 MG: 4 INJECTION INTRA-ARTICULAR; INTRALESIONAL; INTRAMUSCULAR; INTRAVENOUS; SOFT TISSUE at 14:05

## 2023-03-17 RX ADMIN — SENNOSIDES AND DOCUSATE SODIUM 1 TABLET: 8.6; 5 TABLET ORAL at 20:46

## 2023-03-17 RX ADMIN — LEVETIRACETAM 500 MG: 500 TABLET, FILM COATED ORAL at 08:05

## 2023-03-17 RX ADMIN — DEXAMETHASONE SODIUM PHOSPHATE 4 MG: 4 INJECTION INTRA-ARTICULAR; INTRALESIONAL; INTRAMUSCULAR; INTRAVENOUS; SOFT TISSUE at 06:17

## 2023-03-17 RX ADMIN — LEVETIRACETAM 500 MG: 500 TABLET, FILM COATED ORAL at 20:46

## 2023-03-17 RX ADMIN — FENTANYL CITRATE 25 MCG: 50 INJECTION, SOLUTION INTRAMUSCULAR; INTRAVENOUS at 12:41

## 2023-03-17 NOTE — PROGRESS NOTES
INTERNAL MEDICINE RESIDENCY PROGRESS NOTE     Name: Cyril Garcia   Age & Sex: 81 y o  male   MRN: [id number]  [facility]   Encounter: [id number]  Team: SOD Team B     PATIENT INFORMATION     Name: Sherryle Rutherford Reuther   Age & Sex: 81 y o  male   MRN: [id number]  Hospital Stay Days: 3    ASSESSMENT/PLAN     Principal Problem:    Brain mass  Active Problems:    Lung mass    Hypertension    Severe protein-calorie malnutrition ([address] )      Severe protein-calorie malnutrition ([address] )  Assessment & Plan  Malnutrition Findings:   Adult Malnutrition type: Chronic illness  Adult Degree of Malnutrition: Other severe protein calorie malnutrition  Malnutrition Characteristics: Muscle loss, Fat loss         Plan:  · Encourage oral intake  · Monitor BMI            360 Statement: related to catabolic illness as evidenced by weight loss, depressed temples, hollow supraclavicular space, dark orbital, visible acromonion process, ribs, low body weight  Intervention: Regular diet, pt declined medical food supplements  BMI Findings:  Adult BMI Classifications: Underweight < 18 5        Body mass index is 22 49 kg/m²  Hypertension  Assessment & Plan  Patient has a history of hypertension   • Patient is not currently on any medication at home  • patient blood pressure in the ED was normotensive  ? Plan: atorvastatin 40 mg daily  ? Labetalol 10 mg IV PRN every 6 hours    Lung mass  Assessment & Plan  Right lower lobe lung mass suspecting malignancy  ? CT chest/abdomen/pelvis showed right lower lobe mass extending along the fissure to the right middle and upper lobes and right hilum, measuring 10 x 12 cm consistent with lung malignancy   Left lower lobe subpleural 9 mm lung pulmonary nodule, intermediate concerning for malignancy   Few airspace opacities at the base of the right lower lobe and the right middle lobe may represent pneumonia and/or aspiration    ? Plan: See the plan under brain mets    * Brain mass  Assessment & Plan  Multiple intracranial enhancing lesions of the brain suspecting brain metastasis  • Patient is presenting with imbalance and ambulatory dysfunction for the last 5 days  • Patient had recent fall 5 days ago with no head strike  • On exam patient has ataxic gait  • 03/14 CT head showed multiple target appearing intracranial lesions suspected described with associated stranding edema, primary differential consideration including intracranial metastatic disease versus infection/abscess depending on the clinical setting     • 03/14 MRI brain showed multiple intracranial enhancing lesions are identified total of 5, consistent with endocrine metastasis   Several of these demonstrate focal surrounding positioning with localized mass effect, most prominently the right cerebellar lesion     • CT chest/abdomen/pelvis showed right lower lobe mass extending along the fissure to the right middle and upper lobes and right hilum, measuring 10 x 12 cm consistent with lung malignancy   Left lower lobe subpleural 9 mm lung pulmonary nodule, intermediate concerning for malignancy   Few airspace opacities at the base of the right lower lobe and the right middle lobe may represent pneumonia and/or aspiration  ? Plan:   ? Palliative care consulted  ? Neurosurgery consulted, recommend IR biopsy of lung mass  ? IR consulted, will proceed with lung biopsy today 3/17  ? Consult PT/OT  ? Oncology consulted -> Plan to follow up with Dr Galindo Vargas outpatient (visit scheduled for 3/30/23)  ? Rad-onc consulted -> appreciate recomendations  ? Fall precautions  ? Neuro check every 4 hours  ? Decadron 4 mg IV every 6 hours   ? Keppra 500 mg tab every 12 hours  ? Tentative plan for DC tomorrow 3/18 with outpatient onc and red/onc follow up       Disposition: Inpatient     SUBJECTIVE     Patient seen and examined  No acute events overnight  Today overall the patient was feeling well and had no acute complaints      OBJECTIVE Vitals:    23 0249 23 0739 23 0800 23 1011   BP: 116/67 137/80  107/63   BP Location:       Pulse: 57 76  59   Resp:    Temp:       TempSrc:       SpO2: 95% 96% 96% 98%   Weight:       Height:          Temperature:   Temp (24hrs), Av 1 °F (36 7 °C), Min:97 7 °F (36 5 °C), Max:98 4 °F (36 9 °C)    Temperature: 97 7 °F (36 5 °C)  Intake & Output:  I/O       03/15 0701   0700  0701   0700  0701   0700    P  O  700 1050     Total Intake(mL/kg) 700 (11 1) 1050 (16 6)     Urine (mL/kg/hr) 2150 (1 4) 500 (0 3) 100 (1 1)    Total Output 2150 500 100    Net -1450 +550 -100           Unmeasured Urine Occurrence  2 x         Weights:   IBW (Ideal Body Weight): 63 8 kg    Body mass index is 22 49 kg/m²  Weight (last 2 days)     Date/Time Weight    03/15/23 0600 63 2 (139 33)        Physical Exam  Constitutional:       Appearance: He is well-developed  HENT:      Head: Normocephalic and atraumatic  Nose: Nose normal    Eyes:      General:         Right eye: No discharge  Left eye: No discharge  Conjunctiva/sclera: Conjunctivae normal       Pupils: Pupils are equal, round, and reactive to light  Neck:      Thyroid: No thyromegaly  Cardiovascular:      Rate and Rhythm: Normal rate and regular rhythm  Heart sounds: Normal heart sounds  No murmur heard  No friction rub  No gallop  Pulmonary:      Effort: Pulmonary effort is normal  No respiratory distress  Breath sounds: Normal breath sounds  No stridor  No wheezing or rales  Chest:      Chest wall: No tenderness  Abdominal:      General: Bowel sounds are normal  There is no distension  Palpations: Abdomen is soft  There is no mass  Tenderness: There is no abdominal tenderness  There is no guarding or rebound  Musculoskeletal:         General: No tenderness or deformity  Right lower leg: No edema  Left lower leg: No edema     Lymphadenopathy: Cervical: No cervical adenopathy  Skin:     General: Skin is warm and dry  Neurological:      Mental Status: He is alert and oriented to person, place, and time  Psychiatric:         Mood and Affect: Mood normal          Behavior: Behavior normal          Thought Content: Thought content normal          Judgment: Judgment normal        LABORATORY DATA     Labs: I have personally reviewed pertinent reports  Results from last 7 days   Lab Units 03/16/23  0537 03/15/23  0604 03/13/23  2328   WBC Thousand/uL 13 82* 11 82* 10 02   HEMOGLOBIN g/dL 10 5* 11 2* 11 1*   HEMATOCRIT % 34 2* 35 1* 35 4*   PLATELETS Thousands/uL 381 397* 370   NEUTROS PCT %  --  91* 79*   MONOS PCT %  --  4 10      Results from last 7 days   Lab Units 03/17/23  0816 03/16/23  0537 03/15/23  0604 03/13/23  2328   POTASSIUM mmol/L 4 7 4 2 4 3 3 8   CHLORIDE mmol/L 103 104 104 101   CO2 mmol/L 29 30 27 25   BUN mg/dL 29* 28* 24 20   CREATININE mg/dL 0 79 0 76 0 76 0 85   CALCIUM mg/dL 9 5 10 0 10 2* 9 9   ALK PHOS U/L  --   --  53 53   ALT U/L  --   --  14 8   AST U/L  --   --  13 9*     Results from last 7 days   Lab Units 03/15/23  0604 03/13/23  2328   MAGNESIUM mg/dL 2 5 2 1     Results from last 7 days   Lab Units 03/15/23  0604   PHOSPHORUS mg/dL 3 4      Results from last 7 days   Lab Units 03/15/23  0604   INR  1 00   PTT seconds 31               IMAGING & DIAGNOSTIC TESTING     Radiology Results: I have personally reviewed pertinent reports  CTA head and neck with and without contrast    Result Date: 3/14/2023  Impression: 1  Enhancing lesions in the cerebral hemispheres and posterior fossa was able surrounding vasogenic edema  Recommend MRI of the brain with gadolinium  2   Right level 2 and 3 lymph nodes measuring up to 1 9 cm, likely metastatic  3   No stenosis, dissection or occlusion of the carotid or vertebral arteries or major vessels of the [place]   Workstation performed: [id number]     XR chest 2 views    Result Date: 3/14/2023  Impression: 12 cm right lower lobe mass  See subsequent chest CT  Workstation performed: [id number]     XR abdomen 1 view kub    Result Date: 3/14/2023  Impression: Nonobstructive bowel gas pattern with a moderate colonic stool burden  Workstation performed: [id number]     CT head without contrast    Result Date: 3/14/2023  Impression: Multiple target appearing intracranial lesions suspected as described with associated vasogenic edema; primary differential considerations including intracranial metastatic disease versus infection/abscesses depending on the clinical setting  MRI of the  brain with contrast recommended for further evaluation and to detect smaller lesions  No significant mass effect or midline shift  Other findings as above  Workstation performed: [id number]     CT chest abdomen pelvis w contrast    Result Date: 3/14/2023  Impression: 1  Right lower lobe mass extending along the fissure to the right middle and upper lobes and to the right hilum, measuring 10 x 12 12 cm, consistent with lung malignancy  Recommend pulmonary consultation  2   Left lower lobe subpleural 9 mm pulmonary nodule, indeterminate though concerning for malignancy  3   Few airspace opacities at the base of the right lower lobe and right middle lobe may represent pneumonia and/or aspiration  4   No evidence of malignancy in the abdomen or pelvis  5   Constipation  Workstation performed: [id number]     MRI Brain BT w wo Contrast    Result Date: 3/14/2023  Impression: Multiple intracranial enhancing lesions are identified, total of 5, consistent with intracranial metastasis  Several of these demonstrate focal surrounding vasogenic with localized mass effect, most prominently the right cerebellar lesion  Workstation performed: [id number]     Other Diagnostic Testing: I have personally reviewed pertinent reports      ACTIVE MEDICATIONS     Current Facility-Administered Medications   Medication Dose Route Frequency • atorvastatin (LIPITOR) tablet 40 mg  40 mg Oral QPM   • dexamethasone (DECADRON) injection 4 mg  4 mg Intravenous Q6H [address]   • [START ON 3/18/2023] enoxaparin (LOVENOX) subcutaneous injection 40 mg  40 mg Subcutaneous Q24H MOLLY   • labetalol (NORMODYNE) injection 10 mg  10 mg Intravenous Q6H PRN   • levETIRAcetam (KEPPRA) tablet 500 mg  500 mg Oral Q12H [address]   • polyethylene glycol (MIRALAX) packet 17 g  17 g Oral Daily   • senna-docusate sodium (SENOKOT S) 8 6-50 mg per tablet 1 tablet  1 tablet Oral HS       VTE Pharmacologic Prophylaxis: Enoxaparin (Lovenox)  VTE Mechanical Prophylaxis: sequential compression device    Portions of the record may have been created with voice recognition software  Occasional wrong word or "sound a like" substitutions may have occurred due to the inherent limitations of voice recognition software    Read the chart carefully and recognize, using context, where substitutions have occurred   ==  Gabriela Alva MD  [facility]  Internal Medicine Residency PGY-3

## 2023-03-17 NOTE — SEDATION DOCUMENTATION
Right lung biopsy performed by Dr Mirlande Crowell   Patient tolerated well and vss  Specimens sent with pathology  Biosentry in biopsy track with band-aid in place   IR Procedure Bedrest Start Time is 1330

## 2023-03-17 NOTE — BRIEF OP NOTE (RAD/CATH)
INTERVENTIONAL RADIOLOGY PROCEDURE NOTE    Date: 3/17/2023    Procedure: IR BIOPSY LUNG     Preoperative diagnosis:   1  Brain mass    2  Right lower lobe lung mass    3  Lung mass         Postoperative diagnosis: Same  Surgeon: Evan Cao MD     Assistant: None  No qualified resident was available  Blood loss: Minimal    Specimens: None    Findings: Right lung mass biopsy  Complications: Small pneumothorax  Will observe for now  Stat chest radiograph and 2-hour follow-up      Anesthesia: conscious sedation

## 2023-03-17 NOTE — PLAN OF CARE
Problem: MOBILITY - ADULT  Goal: Maintain or return to baseline ADL function  Description: INTERVENTIONS:  -  Assess patient's ability to carry out ADLs; assess patient's baseline for ADL function and identify physical deficits which impact ability to perform ADLs (bathing, care of mouth/teeth, toileting, grooming, dressing, etc )  - Assess/evaluate cause of self-care deficits   - Assess range of motion  - Assess patient's mobility; develop plan if impaired  - Assess patient's need for assistive devices and provide as appropriate  - Encourage maximum independence but intervene and supervise when necessary  - Involve family in performance of ADLs  - Assess for home care needs following discharge   - Consider OT consult to assist with ADL evaluation and planning for discharge  - Provide patient education as appropriate  Outcome: Progressing  Goal: Maintains/Returns to pre admission functional level  Description: INTERVENTIONS:  - Perform BMAT or MOVE assessment daily    - Set and communicate daily mobility goal to care team and patient/family/caregiver  - Collaborate with rehabilitation services on mobility goals if consulted  - Perform Range of Motion  times a day  - Reposition patient every  hours    - Dangle patient  times a day  - Stand patient  times a day  - Ambulate patient  times a day  - Out of bed to chair  times a day   - Out of bed for meals  times a day  - Out of bed for toileting  - Record patient progress and toleration of activity level   Outcome: Progressing     Problem: PAIN - ADULT  Goal: Verbalizes/displays adequate comfort level or baseline comfort level  Description: Interventions:  - Encourage patient to monitor pain and request assistance  - Assess pain using appropriate pain scale  - Administer analgesics based on type and severity of pain and evaluate response  - Implement non-pharmacological measures as appropriate and evaluate response  - Consider cultural and social influences on pain and pain management  - Notify physician/advanced practitioner if interventions unsuccessful or patient reports new pain  Outcome: Progressing     Problem: INFECTION - ADULT  Goal: Absence or prevention of progression during hospitalization  Description: INTERVENTIONS:  - Assess and monitor for signs and symptoms of infection  - Monitor lab/diagnostic results  - Monitor all insertion sites, i e  indwelling lines, tubes, and drains  - Monitor endotracheal if appropriate and nasal secretions for changes in amount and color  - [place] appropriate cooling/warming therapies per order  - Administer medications as ordered  - Instruct and encourage patient and family to use good hand hygiene technique  - Identify and instruct in appropriate isolation precautions for identified infection/condition  Outcome: Progressing  Goal: Absence of fever/infection during neutropenic period  Description: INTERVENTIONS:  - Monitor WBC    Outcome: Progressing     Problem: SAFETY ADULT  Goal: Maintain or return to baseline ADL function  Description: INTERVENTIONS:  -  Assess patient's ability to carry out ADLs; assess patient's baseline for ADL function and identify physical deficits which impact ability to perform ADLs (bathing, care of mouth/teeth, toileting, grooming, dressing, etc )  - Assess/evaluate cause of self-care deficits   - Assess range of motion  - Assess patient's mobility; develop plan if impaired  - Assess patient's need for assistive devices and provide as appropriate  - Encourage maximum independence but intervene and supervise when necessary  - Involve family in performance of ADLs  - Assess for home care needs following discharge   - Consider OT consult to assist with ADL evaluation and planning for discharge  - Provide patient education as appropriate  Outcome: Progressing  Goal: Maintains/Returns to pre admission functional level  Description: INTERVENTIONS:  - Perform BMAT or MOVE assessment daily    - Set and communicate daily mobility goal to care team and patient/family/caregiver  - Collaborate with rehabilitation services on mobility goals if consulted  - Perform Range of Motion  times a day  - Reposition patient every  hours    - Dangle patient  times a day  - Stand patient  times a day  - Ambulate patient  times a day  - Out of bed to chair  times a day   - Out of bed for meals  times a day  - Out of bed for toileting  - Record patient progress and toleration of activity level   Outcome: Progressing  Goal: Patient will remain free of falls  Description: INTERVENTIONS:  - Educate patient/family on patient safety including physical limitations  - Instruct patient to call for assistance with activity   - Consult OT/PT to assist with strengthening/mobility   - Keep Call bell within reach  - Keep bed low and locked with side rails adjusted as appropriate  - Keep care items and personal belongings within reach  - Initiate and maintain comfort rounds  - Make Fall Risk Sign visible to staff  - Offer Toileting every  Hours, in advance of need  - Initiate/Maintain alarm  - Obtain necessary fall risk management equipment:  - Apply yellow socks and bracelet for high fall risk patients  - Consider moving patient to room near nurses station  Outcome: Progressing     Problem: DISCHARGE PLANNING  Goal: Discharge to home or other facility with appropriate resources  Description: INTERVENTIONS:  - Identify barriers to discharge w/patient and caregiver  - Arrange for needed discharge resources and transportation as appropriate  - Identify discharge learning needs (meds, wound care, etc )  - Arrange for interpretive services to assist at discharge as needed  - Refer to Case Management Department for coordinating discharge planning if the patient needs post-hospital services based on physician/advanced practitioner order or complex needs related to functional status, cognitive ability, or social support system  Outcome: Progressing Problem: Knowledge Deficit  Goal: Patient/family/caregiver demonstrates understanding of disease process, treatment plan, medications, and discharge instructions  Description: Complete learning assessment and assess knowledge base  Interventions:  - Provide teaching at level of understanding  - Provide teaching via preferred learning methods  Outcome: Progressing     Problem: Nutrition/Hydration-ADULT  Goal: Nutrient/Hydration intake appropriate for improving, restoring or maintaining nutritional needs  Description: Monitor and assess patient's nutrition/hydration status for malnutrition  Collaborate with interdisciplinary team and initiate plan and interventions as ordered  Monitor patient's weight and dietary intake as ordered or per policy  Utilize nutrition screening tool and intervene as necessary  Determine patient's food preferences and provide high-protein, high-caloric foods as appropriate       INTERVENTIONS:  - Monitor oral intake, urinary output, labs, and treatment plans  - Assess nutrition and hydration status and recommend course of action  - Evaluate amount of meals eaten  - Assist patient with eating if necessary   - Allow adequate time for meals  - Recommend/ encourage appropriate diets, oral nutritional supplements, and vitamin/mineral supplements  - Order, calculate, and assess calorie counts as needed  - Recommend, monitor, and adjust tube feedings and TPN/PPN based on assessed needs  - Assess need for intravenous fluids  - Provide specific nutrition/hydration education as appropriate  - Include patient/family/caregiver in decisions related to nutrition  Outcome: Progressing

## 2023-03-17 NOTE — DISCHARGE INSTRUCTIONS
Needle Biopsy of the Lung    WHAT YOU NEED TO KNOW:  A needle biopsy of the lung is a procedure to remove cells or tissue from your lung  You may have a fine needle aspiration biopsy (FNAB), or a core needle biopsy (CNB)  A FNAB is used to remove cells through a thin needle  CNB uses a thicker needle to remove lung tissue  The samples are collected and tested for inflammation, infection, or cancer  DISCHARGE INSTRUCTIONS:   Resume your normal diet  Small sips of flat soda will help with nausea  Limit your activity for 24 hours  Wound Care:      - Remove band aid in 24 hours  Contact Interventional Radiology at [phone] [place] PATIENTS: Contact Interventional Radiology at [phone]) ([address]: Contact Interventional Radiology at [phone]) if any of the following occur:    - You have a fever greater than 101*    - You cough up large amounts of bright red blood     - You have chest pain with breathing    - You have shortness of breath    -You have persistent nausea and vomiting    - You have pus, redness or swelling around your biopsy site    - You have questions or concerns about your condition or care            Moderate Sedation   WHAT YOU NEED TO KNOW:   Moderate sedation, or conscious sedation, is medicine used during procedures to help you feel relaxed and calm  You will be awake and able to follow directions without anxiety or pain  You will remember little to none of the procedure  You may feel tired, weak, or unsteady on your feet after you get sedation  You may also have trouble concentrating or short-term memory loss  These symptoms should go away in 24 hours or less  DISCHARGE INSTRUCTIONS:   Call 911 or have someone else call for any of the following: You have sudden trouble breathing  You cannot be woken  Seek care immediately if:   You have a severe headache or dizziness       Your heart is beating faster than usual   Contact your healthcare provider if: You have a fever  You have nausea or are vomiting for more than 8 hours after the procedure  Your skin is itchy, swollen, or you have a rash  You have questions or concerns about your condition or care  Self-care:   Have someone stay with you for 24 hours  This person can drive you to errands and help you do things around the house  This person can also watch for problems  Rest and do quiet activities for 24 hours  Do not exercise, ride a bike, or play sports  Stand up slowly to prevent dizziness and falls  Take short walks around the house with another person  Slowly return to your usual activities the next day  Do not drive or use dangerous machines or tools for 24 hours  You may injure yourself or others  Examples include a lawnmower, saw, or drill  Do not return to work for 24 hours if you use dangerous machines or tools for work  Do not make important decisions for 24 hours  For example, do not sign important papers or invest money  Drink liquids as directed  Liquids help flush the sedation medicine out of your body  Ask how much liquid to drink each day and which liquids are best for you  Eat small, frequent meals to prevent nausea and vomiting  Start with clear liquids such as juice or broth  If you do not vomit after clear liquids, you can eat your usual foods  Do not drink alcohol or take medicines that make you drowsy  This includes medicines that help you sleep and anxiety medicines  Ask your healthcare provider if it is safe for you to take pain medicine  Follow up with your healthcare provider as directed: Write down your questions so you remember to ask them during your visits  © 2017 [address] [employer] [employer] Information is for End User's use only and may not be sold, redistributed or otherwise used for commercial purposes   All illustrations and images included in CareNotes® are the copyrighted property of [employer]  or [employer]  The above information is an  only  It is not intended as medical advice for individual conditions or treatments  Talk to your doctor, nurse or pharmacist before following any medical regimen to see if it is safe and effective for you

## 2023-03-17 NOTE — PLAN OF CARE
Problem: MOBILITY - ADULT  Goal: Maintain or return to baseline ADL function  Description: INTERVENTIONS:  -  Assess patient's ability to carry out ADLs; assess patient's baseline for ADL function and identify physical deficits which impact ability to perform ADLs (bathing, care of mouth/teeth, toileting, grooming, dressing, etc )  - Assess/evaluate cause of self-care deficits   - Assess range of motion  - Assess patient's mobility; develop plan if impaired  - Assess patient's need for assistive devices and provide as appropriate  - Encourage maximum independence but intervene and supervise when necessary  - Involve family in performance of ADLs  - Assess for home care needs following discharge   - Consider OT consult to assist with ADL evaluation and planning for discharge  - Provide patient education as appropriate  Outcome: Progressing  Goal: Maintains/Returns to pre admission functional level  Description: INTERVENTIONS:  - Perform BMAT or MOVE assessment daily    - Set and communicate daily mobility goal to care team and patient/family/caregiver     - Collaborate with rehabilitation services on mobility goals if consulted  - Out of bed for toileting  - Record patient progress and toleration of activity level   Outcome: Progressing     Problem: PAIN - ADULT  Goal: Verbalizes/displays adequate comfort level or baseline comfort level  Description: Interventions:  - Encourage patient to monitor pain and request assistance  - Assess pain using appropriate pain scale  - Administer analgesics based on type and severity of pain and evaluate response  - Implement non-pharmacological measures as appropriate and evaluate response  - Consider cultural and social influences on pain and pain management  - Notify physician/advanced practitioner if interventions unsuccessful or patient reports new pain  Outcome: Progressing     Problem: INFECTION - ADULT  Goal: Absence or prevention of progression during hospitalization  Description: INTERVENTIONS:  - Assess and monitor for signs and symptoms of infection  - Monitor lab/diagnostic results  - Monitor all insertion sites, i e  indwelling lines, tubes, and drains  - Monitor endotracheal if appropriate and nasal secretions for changes in amount and color  - [place] appropriate cooling/warming therapies per order  - Administer medications as ordered  - Instruct and encourage patient and family to use good hand hygiene technique  - Identify and instruct in appropriate isolation precautions for identified infection/condition  Outcome: Progressing  Goal: Absence of fever/infection during neutropenic period  Description: INTERVENTIONS:  - Monitor WBC    Outcome: Progressing     Problem: SAFETY ADULT  Goal: Maintain or return to baseline ADL function  Description: INTERVENTIONS:  -  Assess patient's ability to carry out ADLs; assess patient's baseline for ADL function and identify physical deficits which impact ability to perform ADLs (bathing, care of mouth/teeth, toileting, grooming, dressing, etc )  - Assess/evaluate cause of self-care deficits   - Assess range of motion  - Assess patient's mobility; develop plan if impaired  - Assess patient's need for assistive devices and provide as appropriate  - Encourage maximum independence but intervene and supervise when necessary  - Involve family in performance of ADLs  - Assess for home care needs following discharge   - Consider OT consult to assist with ADL evaluation and planning for discharge  - Provide patient education as appropriate  Outcome: Progressing  Goal: Maintains/Returns to pre admission functional level  Description: INTERVENTIONS:  - Perform BMAT or MOVE assessment daily    - Set and communicate daily mobility goal to care team and patient/family/caregiver     - Collaborate with rehabilitation services on mobility goals if consulted  - Out of bed for toileting  - Record patient progress and toleration of activity level   Outcome: Progressing  Goal: Patient will remain free of falls  Description: INTERVENTIONS:  - Educate patient/family on patient safety including physical limitations  - Instruct patient to call for assistance with activity   - Consult OT/PT to assist with strengthening/mobility   - Keep Call bell within reach  - Keep bed low and locked with side rails adjusted as appropriate  - Keep care items and personal belongings within reach  - Initiate and maintain comfort rounds  - Make Fall Risk Sign visible to staff  - Apply yellow socks and bracelet for high fall risk patients  - Consider moving patient to room near nurses station  Outcome: Progressing     Problem: DISCHARGE PLANNING  Goal: Discharge to home or other facility with appropriate resources  Description: INTERVENTIONS:  - Identify barriers to discharge w/patient and caregiver  - Arrange for needed discharge resources and transportation as appropriate  - Identify discharge learning needs (meds, wound care, etc )  - Arrange for interpretive services to assist at discharge as needed  - Refer to Case Management Department for coordinating discharge planning if the patient needs post-hospital services based on physician/advanced practitioner order or complex needs related to functional status, cognitive ability, or social support system  Outcome: Progressing     Problem: Knowledge Deficit  Goal: Patient/family/caregiver demonstrates understanding of disease process, treatment plan, medications, and discharge instructions  Description: Complete learning assessment and assess knowledge base  Interventions:  - Provide teaching at level of understanding  - Provide teaching via preferred learning methods  Outcome: Progressing     Problem: Nutrition/Hydration-ADULT  Goal: Nutrient/Hydration intake appropriate for improving, restoring or maintaining nutritional needs  Description: Monitor and assess patient's nutrition/hydration status for malnutrition  Collaborate with interdisciplinary team and initiate plan and interventions as ordered  Monitor patient's weight and dietary intake as ordered or per policy  Utilize nutrition screening tool and intervene as necessary  Determine patient's food preferences and provide high-protein, high-caloric foods as appropriate       INTERVENTIONS:  - Monitor oral intake, urinary output, labs, and treatment plans  - Assess nutrition and hydration status and recommend course of action  - Evaluate amount of meals eaten  - Assist patient with eating if necessary   - Allow adequate time for meals  - Recommend/ encourage appropriate diets, oral nutritional supplements, and vitamin/mineral supplements  - Order, calculate, and assess calorie counts as needed  - Recommend, monitor, and adjust tube feedings and TPN/PPN based on assessed needs  - Assess need for intravenous fluids  - Provide specific nutrition/hydration education as appropriate  - Include patient/family/caregiver in decisions related to nutrition  Outcome: Progressing

## 2023-03-17 NOTE — CONSULTS
Consultation - Radiation Oncology   Daxa Perez 81 y o  male MRN: [id number]  Unit/Bed#: [facility] 741-62 Encounter: [id number]        History of Present Illness   Physician Requesting Consult: Karissa Gonzalez MD  Reason for Consult / Principal Problem: Brain metastases  Hx and PE limited by: None    Assessment and Plan:  Mr Madiha Byrd is a 81year old man, former smoker, who presented with gait imbalance and was found to have five intracranial enhancing lesions from a presumed Stage IV lung cancer  Biopsy of a large right sided mass has been performed for diagnosis and pathology is pending  He is seen today in consideration of palliative RT to the brain  We reviewed the patient's recent clinical history including his recent MRI Brain  Given the size, number, and distribution of his brain metastases on MRI, the patient would likely be a good candidate for SRT pending pathologic diagnosis  WBRT is an alternative option, though seeing as he has a limited number of metastases and does not have an urgent need to start RT immediately it is not my preferred approach at present  We reviewed the risks and benefits associated with SRS/SRT in detail  Side effects could include fatigue, headache, anorexia, focal alopecia, worsening edema, radionecrosis and a small risk for seizure  The patient was agreeable to this tentative recommendation pending return of his biopsy results  I will plan to bring him for CT simulation early next week (Monday if still admitted) with plan for SRT to begin on 3/29  I additionally discussed these recommendations with the patient's daughter Deni Niño  Should his biopsy show SCLC, further consideration of WBRT would be required  Summary:  - Tentatively recommend SRT to his brain metastases pending biopsy results  - Plan for CT simulation early next week with SRT to begin the week following   If patient is to be discharged to rehab, please ensure they know he will require transport back and forth for planned radiation treatments  HPI:  Mr Wong Emerson is a 81year old man with a longtime smoking history who was in his usual state of health until January when he developed hemoptysis  When he discovered this he quit smoking and the hemoptysis eventually resolved  He was thereafter well until around 5 days prior to admission when he suffered a fall in the context of associated ambulatory dysfunction and imbalance  He ultimately presented to the [facility] ED where CT Head demonstrated several intracranial lesions suspicious for metastases  CT C/A/P demonstrated a large right sided lung mass measuring up to 12cm in size extending along the fissure of the RML and RUL consistent with a lung malignancy  Additionally noted was a 9mm LLL pulmonary nodule  No other evidence of metastatic disease was noted  MRI Brain (3/14/23) demonstrated a total of 5 enhancing lesions in the brain parenchyma including four (left parafalcine frontal, right anterior lateral frontal, superior cerebellum, right lateral cerebellum) measuring between 1-2cm in size and one (left posterior cerebellum) measuring < 1cm in size  Currently the patient is doing fair overall  He has persistent imbalance with walking, but can get up and move about without too much difficulty provided he has something to hold onto  He has no headaches, no nasuea, no vomiting  He has not had any recurrent hemoptysis  He has had no seizures  He underwent IR guided biopsy of the lung mass today; pathology is pending  Historical Information   Previous Oncology History:   Oncology History    No history exists  Past Medical History:   Diagnosis Date   • Hypertension      Past Surgical History:   Procedure Laterality Date   • HERNIA REPAIR       No family history on file    Social History   Social History     Substance and Sexual Activity   Alcohol Use Never     Social History     Substance and Sexual Activity Drug Use Never     Social History     Tobacco Use   Smoking Status Former   Smokeless Tobacco Not on file   Tobacco Comments    quit Wednesday       Meds/Allergies   all current active meds have been reviewed    No Known Allergies    Objective     Intake/Output Summary (Last 24 hours) at 3/17/2023 1622  Last data filed at 3/17/2023 1011  Gross per 24 hour   Intake 480 ml   Output 700 ml   Net -220 ml     Invasive Devices     Peripheral Intravenous Line  Duration           Peripheral IV 03/17/23 Left;Ventral (anterior) Forearm <1 day              Physical Exam   Well appearing  NAD  No increased work of breathing  AOx3  Strength 5/5 in bilateral upper and lower extremities  Sensation intact throughout  Good coordination on finger to nose exam  Some difficulty with ambulation noted  No increased work of breathing  Lab Results: I have personally reviewed pertinent reports  Imaging Studies: I have personally reviewed pertinent films in PACS  EKG, Pathology, and Other Studies: I have personally reviewed pertinent reports  Code Status: Level 2 - DNAR: but accepts endotracheal intubation  Advance Directive and Living Will:      Power of :    POLST:      Counseling / Coordination of Care  Total floor / unit time spent today 45 minutes  Greater than 50% of total time was spent with the patient and / or family counseling and / or coordination of care  A description of the counseling / coordination of care: As above

## 2023-03-17 NOTE — DISCHARGE INSTR - AVS FIRST PAGE
Please continue your current medication regimen  Please follow up with oncology, radiation oncology and palliative care

## 2023-03-17 NOTE — OCCUPATIONAL THERAPY NOTE
Occupational Therapy Treatment Note:      03/17/23 1300   OT Last Visit   OT Visit Date 03/17/23   Note Type   Cancel Reasons Other  (pt for bx this date   will follow as appropriate)     April A Juan Pablo

## 2023-03-18 ENCOUNTER — APPOINTMENT (OUTPATIENT)
Dept: RADIOLOGY | Facility: HOSPITAL | Age: 73
End: 2023-03-18

## 2023-03-18 ENCOUNTER — HOME HEALTH ADMISSION (OUTPATIENT)
Dept: HOME HEALTH SERVICES | Facility: HOME HEALTHCARE | Age: 73
End: 2023-03-18

## 2023-03-18 LAB
ALBUMIN SERPL BCP-MCNC: 2.7 G/DL (ref 3.5–5)
ALP SERPL-CCNC: 49 U/L (ref 46–116)
ALT SERPL W P-5'-P-CCNC: 16 U/L (ref 12–78)
ANION GAP SERPL CALCULATED.3IONS-SCNC: 5 MMOL/L (ref 4–13)
AST SERPL W P-5'-P-CCNC: 7 U/L (ref 5–45)
BASOPHILS # BLD MANUAL: 0 THOUSAND/UL (ref 0–0.1)
BASOPHILS NFR MAR MANUAL: 0 % (ref 0–1)
BILIRUB SERPL-MCNC: 0.3 MG/DL (ref 0.2–1)
BUN SERPL-MCNC: 29 MG/DL (ref 5–25)
CALCIUM ALBUM COR SERPL-MCNC: 10.2 MG/DL (ref 8.3–10.1)
CALCIUM SERPL-MCNC: 9.2 MG/DL (ref 8.3–10.1)
CHLORIDE SERPL-SCNC: 103 MMOL/L (ref 96–108)
CO2 SERPL-SCNC: 27 MMOL/L (ref 21–32)
CREAT SERPL-MCNC: 0.75 MG/DL (ref 0.6–1.3)
DME PARACHUTE DELIVERY DATE REQUESTED: NORMAL
DME PARACHUTE ITEM DESCRIPTION: NORMAL
DME PARACHUTE ORDER STATUS: NORMAL
DME PARACHUTE SUPPLIER NAME: NORMAL
DME PARACHUTE SUPPLIER PHONE: NORMAL
EOSINOPHIL # BLD MANUAL: 0 THOUSAND/UL (ref 0–0.4)
EOSINOPHIL NFR BLD MANUAL: 0 % (ref 0–6)
ERYTHROCYTE [DISTWIDTH] IN BLOOD BY AUTOMATED COUNT: 14.7 % (ref 11.6–15.1)
GFR SERPL CREATININE-BSD FRML MDRD: 92 ML/MIN/1.73SQ M
GLUCOSE SERPL-MCNC: 110 MG/DL (ref 65–140)
HCT VFR BLD AUTO: 32.9 % (ref 36.5–49.3)
HGB BLD-MCNC: 10.3 G/DL (ref 12–17)
LYMPHOCYTES # BLD AUTO: 0.22 THOUSAND/UL (ref 0.6–4.47)
LYMPHOCYTES # BLD AUTO: 2 % (ref 14–44)
MCH RBC QN AUTO: 27.3 PG (ref 26.8–34.3)
MCHC RBC AUTO-ENTMCNC: 31.3 G/DL (ref 31.4–37.4)
MCV RBC AUTO: 87 FL (ref 82–98)
MONOCYTES # BLD AUTO: 0.11 THOUSAND/UL (ref 0–1.22)
MONOCYTES NFR BLD: 1 % (ref 4–12)
NEUTROPHILS # BLD MANUAL: 10.71 THOUSAND/UL (ref 1.85–7.62)
NEUTS SEG NFR BLD AUTO: 97 % (ref 43–75)
OVALOCYTES BLD QL SMEAR: PRESENT
PLATELET # BLD AUTO: 371 THOUSANDS/UL (ref 149–390)
PLATELET BLD QL SMEAR: ADEQUATE
PMV BLD AUTO: 10.6 FL (ref 8.9–12.7)
POIKILOCYTOSIS BLD QL SMEAR: PRESENT
POLYCHROMASIA BLD QL SMEAR: PRESENT
POTASSIUM SERPL-SCNC: 4.5 MMOL/L (ref 3.5–5.3)
PROT SERPL-MCNC: 5.7 G/DL (ref 6.4–8.4)
RBC # BLD AUTO: 3.77 MILLION/UL (ref 3.88–5.62)
RBC MORPH BLD: PRESENT
SODIUM SERPL-SCNC: 135 MMOL/L (ref 135–147)
WBC # BLD AUTO: 11.04 THOUSAND/UL (ref 4.31–10.16)

## 2023-03-18 RX ADMIN — DEXAMETHASONE SODIUM PHOSPHATE 4 MG: 4 INJECTION INTRA-ARTICULAR; INTRALESIONAL; INTRAMUSCULAR; INTRAVENOUS; SOFT TISSUE at 03:09

## 2023-03-18 RX ADMIN — DEXAMETHASONE SODIUM PHOSPHATE 4 MG: 4 INJECTION INTRA-ARTICULAR; INTRALESIONAL; INTRAMUSCULAR; INTRAVENOUS; SOFT TISSUE at 09:38

## 2023-03-18 RX ADMIN — ATORVASTATIN CALCIUM 40 MG: 40 TABLET, FILM COATED ORAL at 16:06

## 2023-03-18 RX ADMIN — DEXAMETHASONE SODIUM PHOSPHATE 4 MG: 4 INJECTION INTRA-ARTICULAR; INTRALESIONAL; INTRAMUSCULAR; INTRAVENOUS; SOFT TISSUE at 21:45

## 2023-03-18 RX ADMIN — LEVETIRACETAM 500 MG: 500 TABLET, FILM COATED ORAL at 21:45

## 2023-03-18 RX ADMIN — ENOXAPARIN SODIUM 40 MG: 40 INJECTION SUBCUTANEOUS at 09:37

## 2023-03-18 RX ADMIN — DEXAMETHASONE SODIUM PHOSPHATE 4 MG: 4 INJECTION INTRA-ARTICULAR; INTRALESIONAL; INTRAMUSCULAR; INTRAVENOUS; SOFT TISSUE at 14:47

## 2023-03-18 RX ADMIN — LEVETIRACETAM 500 MG: 500 TABLET, FILM COATED ORAL at 09:37

## 2023-03-18 NOTE — PHYSICAL THERAPY NOTE
Physical Therapy Treatment Note    Patient's Name: Sukhwinder Vick  : 23 1303   PT Last Visit   PT Visit Date 23   Note Type   Note Type Treatment   Pain Assessment   Pain Assessment Tool 0-10   Pain Score No Pain   Restrictions/Precautions   Weight Bearing Precautions Per Order No   Other Precautions Cognitive; Chair Alarm; Bed Alarm; Fall Risk;Impulsive;Agitated   General   Chart Reviewed Yes   Response to Previous Treatment Patient unable to report, no changes reported from family or staff   Family/Caregiver Present No   Cognition   Overall Cognitive Status Impaired   Arousal/Participation Alert; Cooperative   Memory Decreased recall of precautions   Following Commands Follows one step commands with increased time or repetition   Comments (+) impulsivity, highly distractible, requires frequent vc for safety, becomes easily agitated w/ vc   Subjective   Subjective "I want to walk "   Bed Mobility   Additional Comments Pt greeted seated on toilet   Transfers   Sit to Stand 5  Supervision   Additional items Verbal cues; Impulsive   Stand to Sit 5  Supervision   Additional items Verbal cues; Impulsive   Toilet transfer 5  Supervision   Additional items Standard toilet;Verbal cues; Impulsive   Additional Comments RW - requires cues to turn completely w/ RW prior to sitting down   Ambulation/Elevation   Gait pattern Foward flexed;Decreased heel strike;Decreased foot clearance   Gait Assistance   (cga)   Additional items Assist x 1;Verbal cues; Tactile cues   Assistive Device Rolling walker   Distance 100'x2   Balance   Static Sitting Fair +   Dynamic Sitting Fair   Static Standing Fair -   Dynamic Standing Poor +   Ambulatory Poor +  (RW)   Endurance Deficit   Endurance Deficit Yes   Activity Tolerance   Activity Tolerance Treatment limited secondary to agitation   Medical Staff Made Aware OT Raúl Beatty yes - cleared for therapy   Assessment   Problem List Impaired balance;Decreased mobility; Impaired judgement;Decreased safety awareness;Decreased cognition;Decreased endurance   Assessment Pt seen for PT treatment session w/ focus on t/f training + gait training w/ RW  Pt demonstrated progress w/ decreased assistance required for functional t/f this session  Pt remains mostly limited by impulsivity + decreased safety awareness  As pt has good social support at home (pt reports his s/o can be present to assist him as needed), changing recommendation to HHPT  CM updated  Barriers to Discharge None   Goals   Patient Goals "to walk"   PT Treatment Day 1   Plan   Treatment/Interventions Functional transfer training;LE strengthening/ROM; Therapeutic exercise; Endurance training;Patient/family training;Equipment eval/education; Bed mobility;Gait training; Compensatory technique education;Spoke to nursing;Spoke to case management;OT  (balance training)   Progress Progressing toward goals   PT Frequency 3-5x/wk   Recommendation   PT Discharge Recommendation Home with home health rehabilitation   Kaleb centeno   Change/add to Workbooks? No   AM-PAC Basic Mobility Inpatient   Turning in Flat Bed Without Bedrails 3   Lying on Back to Sitting on Edge of Flat Bed Without Bedrails 3   Moving Bed to Chair 3   Standing Up From Chair Using Arms 3   Walk in Room 3   Climb 3-5 Stairs With Railing 2   Basic Mobility Inpatient Raw Score 17   Basic Mobility Standardized Score 39 67   Highest Level Of Mobility   JH-HLM Goal 5: Stand one or more mins   JH-HLM Achieved 7: Walk 25 feet or more   Education   Education Provided Mobility training;Assistive device   Patient Demonstrates acceptance/verbal understanding;Reinforcement needed   End of Consult   Patient Position at End of Consult Bedside chair;Bed/Chair alarm activated; All needs within reach     Hayley Hernandez, PT, DPT

## 2023-03-18 NOTE — PLAN OF CARE
Problem: OCCUPATIONAL THERAPY ADULT  Goal: Performs self-care activities at highest level of function for planned discharge setting  See evaluation for individualized goals  Description: Treatment Interventions: ADL retraining, Functional transfer training, Endurance training, UE strengthening/ROM, Cognitive reorientation, Patient/family training, Equipment evaluation/education, Compensatory technique education, Energy conservation, Activityengagement          See flowsheet documentation for full assessment, interventions and recommendations  Outcome: Progressing  Note: Limitation: Decreased ADL status, Decreased UE strength, Decreased Safe judgement during ADL, Decreased cognition, Decreased endurance, Decreased self-care trans, Decreased high-level ADLs  Prognosis: Good  Assessment: Patient participated in Skilled OT session this date with interventions consisting of ADL re training with the use of correct body mechnaics, Energy Conservation techniques, safety awareness and fall prevention techniques, increase dynamic sit/ stand balance during functional activity  and increase OOB/ sitting tolerance   Patient agreeable to OT treatment session, upon arrival patient was found sitting on toilet  Pt required Min A for UB and LB dressing, S for toileting, SBA for transfers, CGA mobility w/ RW  Pt highly impulsive and distractable, requiring frequent VC for safety and redirection to task  Patient requiring frequent re direction, verbal cues for safety, verbal cues for correct technique, verbal cues for pacing thru activity steps, cognitive assistance to anticipate next step, one step directives and frequent rest periods  Patient continues to be functioning below baseline level, occupational performance remains limited secondary to factors listed above and increased risk for falls and injury  From OT standpoint, recommendation at time of d/c would be Home with family support and Home OT     The patient's raw score on the AM-PAC Daily Activity Inpatient Short Form is 19  A raw score of greater than or equal to 19 suggests the patient may benefit from discharge to home  Please refer to the recommendation of the Occupational Therapist for safe discharge planning  Patient to benefit from continued Occupational Therapy treatment while in the hospital to address deficits as defined above and maximize level of functional independence with ADLs and functional mobility       OT Discharge Recommendation: (S) Home with home health rehabilitation (home w/ home OT if he has support at home (likely will need 24 hour supervision))

## 2023-03-18 NOTE — PROGRESS NOTES
Jorge scheduled to go home tomorrow and will call him first thing Monday morning to inform him of his first appointment for radiation treatment planning

## 2023-03-18 NOTE — OCCUPATIONAL THERAPY NOTE
Occupational Therapy Treatment Note      Lisa Certain    3/18/2023    Principal Problem:    Brain mass  Active Problems:    Lung mass    Hypertension    Severe protein-calorie malnutrition ([facility] [address] )      Past Medical History:   Diagnosis Date    Hypertension        Past Surgical History:   Procedure Laterality Date    HERNIA REPAIR          03/18/23 1304   Restrictions/Precautions   Weight Bearing Precautions Per Order No   Other Precautions Impulsive;Cognitive; Chair Alarm; Bed Alarm;Multiple lines; Fall Risk   Lifestyle   Autonomy pta, pt was I W ADL/IADL, no AD mobility  -    Reciprocal Relationships supportive significant other and her children   Service to Others unemployed   Intrinsic Gratification spending time w significant other   ADL   Where Assessed Other (Comment)  (sitting on toilet; sitting EOB)   [address]; Increased time to complete;Pull around back;Verbal cueing;Supervision/safety   LB Dressing Assistance 4  Minimal Assistance   LB Dressing Deficit Setup; Increased time to complete; Don/doff R shoe;Don/doff L shoe   Toileting Assistance  5  Supervision/Setup   Toileting Deficit Setup;Verbal cueing;Supervison/safety; Increased time to complete   Transfers   Sit to Stand 5  Supervision  (SBA)   Additional items Assist x 1; Increased time required;Verbal cues;Armrests; Impulsive   Stand to Sit 5  Supervision  (SBA)   Additional items Assist x 1; Increased time required;Verbal cues;Armrests; Impulsive   Toilet transfer 5  Supervision  (SBA)   Additional items Assist x 1; Increased time required;Verbal cues; Impulsive   Additional Comments Transfers w/ RW   Highly impulsive - requires frequent VC for safety   Functional Mobility   Functional Mobility 4  Minimal assistance  (CGA)   Additional Comments Pt demonstrated household mobility distance w/ RW w/ CGA - highly impulsive and at times disregards RW, requiring very frequent VC for safety, sequencing, and redirection to task   Additional items Rolling walker   Cognition   Overall Cognitive Status Impaired   Arousal/Participation Alert; Cooperative   Attention Attends with cues to redirect   Orientation Level Oriented X4   Memory Decreased recall of precautions   Following Commands Follows one step commands with increased time or repetition   Comments Cooperative to participate in therapy  Pt is highly impulsive and distractable, requiring frequent VC for safety and redirection to task  Requires increased time to follow one step commands  Tends to get agitated easily and requires deescalation and short, simple 1-step commands  Activity Tolerance   Activity Tolerance Patient limited by fatigue;Treatment limited secondary to agitation;Treatment limited secondary to medical complications (Comment)   Medical Staff Made Aware PT Jeanettedevyn Evansky; Pt seen as a co-tx due to the patient's co-morbidities, clinically unstable presentation, and present impairments which are a regression from the patient's baseline  Assessment   Assessment Patient participated in Skilled OT session this date with interventions consisting of ADL re training with the use of correct body mechnaics, Energy Conservation techniques, safety awareness and fall prevention techniques, increase dynamic sit/ stand balance during functional activity  and increase OOB/ sitting tolerance   Patient agreeable to OT treatment session, upon arrival patient was found sitting on toilet  Pt required Min A for UB and LB dressing, S for toileting, SBA for transfers, CGA mobility w/ RW  Pt highly impulsive and distractable, requiring frequent VC for safety and redirection to task  Patient requiring frequent re direction, verbal cues for safety, verbal cues for correct technique, verbal cues for pacing thru activity steps, cognitive assistance to anticipate next step, one step directives and frequent rest periods   Patient continues to be functioning below baseline level, occupational performance remains limited secondary to factors listed above and increased risk for falls and injury  From OT standpoint, recommendation at time of d/c would be Home with family support and Home OT  The patient's raw score on the AM-PAC Daily Activity Inpatient Short Form is 19  A raw score of greater than or equal to 19 suggests the patient may benefit from discharge to home  Please refer to the recommendation of the Occupational Therapist for safe discharge planning  Patient to benefit from continued Occupational Therapy treatment while in the hospital to address deficits as defined above and maximize level of functional independence with ADLs and functional mobility  Plan   Treatment Interventions ADL retraining;Functional transfer training; Endurance training;Cognitive reorientation;Patient/family training;Equipment evaluation/education; Fine motor coordination activities; Compensatory technique education; Activityengagement; Energy conservation   Goal Expiration Date 03/29/23   OT Treatment Day 1   OT Frequency 2-3x/wk   Recommendation   OT Discharge Recommendation (S)  Home with home health rehabilitation  (home w/ home OT if he has support at home (likely will need 24 hour supervision))   AM-PAC Daily Activity Inpatient   Lower Body Dressing 3   Bathing 3   Toileting 3   Upper Body Dressing 3   Grooming 3   Eating 4   Daily Activity Raw Score 19   Daily Activity Standardized Score (Calc for Raw Score >=11) 40 22   AM-PAC Applied Cognition Inpatient   Following a Speech/Presentation 3   Understanding Ordinary Conversation 4   Taking Medications 3   Remembering Where Things Are Placed or Put Away 3   Remembering List of 4-5 Errands 2   Taking Care of Complicated Tasks 2   Applied Cognition Raw Score 17   Applied Cognition Standardized Score 36 52           Felipe Shultz MS, OTR/L

## 2023-03-18 NOTE — PROGRESS NOTES
INTERNAL MEDICINE RESIDENCY PROGRESS NOTE     Name: Cyril Garcia   Age & Sex: 81 y o  male   MRN: [id number]  [facility]   Encounter: [id number]  Team: SOD Team B     PATIENT INFORMATION     Name: Sherryle Rutherford Reuther   Age & Sex: 81 y o  male   MRN: [id number]  Hospital Stay Days: 4    ASSESSMENT/PLAN     Principal Problem:    Brain mass  Active Problems:    Lung mass    Hypertension    Severe protein-calorie malnutrition ([address] )      Severe protein-calorie malnutrition ([address] )  Assessment & Plan  Malnutrition Findings:   Adult Malnutrition type: Chronic illness  Adult Degree of Malnutrition: Other severe protein calorie malnutrition  Malnutrition Characteristics: Muscle loss, Fat loss         Plan:  · Encourage oral intake  · Monitor BMI            360 Statement: related to catabolic illness as evidenced by weight loss, depressed temples, hollow supraclavicular space, dark orbital, visible acromonion process, ribs, low body weight  Intervention: Regular diet, pt declined medical food supplements  BMI Findings:  Adult BMI Classifications: Underweight < 18 5        Body mass index is 22 17 kg/m²  Hypertension  Assessment & Plan  Patient has a history of hypertension   • Patient is not currently on any medication at home  • patient blood pressure in the ED was normotensive  ? Plan: atorvastatin 40 mg daily  ? Labetalol 10 mg IV PRN every 6 hours    Lung mass  Assessment & Plan  Right lower lobe lung mass suspecting malignancy  ? CT chest/abdomen/pelvis showed right lower lobe mass extending along the fissure to the right middle and upper lobes and right hilum, measuring 10 x 12 cm consistent with lung malignancy   Left lower lobe subpleural 9 mm lung pulmonary nodule, intermediate concerning for malignancy   Few airspace opacities at the base of the right lower lobe and the right middle lobe may represent pneumonia and/or aspiration    ? Plan: See the plan under brain mets    * Brain mass  Assessment & Plan  Multiple intracranial enhancing lesions of the brain suspecting brain metastasis  • Patient is presenting with imbalance and ambulatory dysfunction for the last 5 days  • Patient had recent fall 5 days ago with no head strike  • On exam patient has ataxic gait  • 03/14 CT head showed multiple target appearing intracranial lesions suspected described with associated stranding edema, primary differential consideration including intracranial metastatic disease versus infection/abscess depending on the clinical setting     • 03/14 MRI brain showed multiple intracranial enhancing lesions are identified total of 5, consistent with endocrine metastasis   Several of these demonstrate focal surrounding positioning with localized mass effect, most prominently the right cerebellar lesion     • CT chest/abdomen/pelvis showed right lower lobe mass extending along the fissure to the right middle and upper lobes and right hilum, measuring 10 x 12 cm consistent with lung malignancy   Left lower lobe subpleural 9 mm lung pulmonary nodule, intermediate concerning for malignancy   Few airspace opacities at the base of the right lower lobe and the right middle lobe may represent pneumonia and/or aspiration  ? Plan:   ? Palliative care consulted  ? Neurosurgery consulted, recommend IR biopsy of lung mass  ? IR consulted -> lung biopsy completed 3/17 -> f/u results  ? Consult PT/OT  ? Oncology consulted -> Plan to follow up with Dr Rossana Clemons outpatient (visit scheduled for 3/30/23)  ? Rad-onc consulted -> Plan to follow up outpatient  ? Fall precautions  ? Neuro check every 4 hours  ? Decadron 4 mg IV every 6 hours   ? Keppra 500 mg tab every 12 hours  ?  Patient is medically stable for discharge to STR -> multiple referrals to STR and [facility] sent, however facilities will not accept the patient until he is reevaluated by PT/OT and until the oncology and radiation oncology outpatient plans are documented in case the patient will require transportation -> PT, OT, oncology and rad Onc made aware       Disposition: Pending placement at [address] [place] [address]     Patient seen and examined  No acute events overnight  Today overall the patient was feeling well and had no acute complaints  OBJECTIVE     Vitals:    23 0813 23 0844 23 0900 23 1046   BP: 112/68      BP Location:       Pulse: 89      Resp:   18    Temp:  97 6 °F (36 4 °C)     TempSrc:  Oral     SpO2: 92%   92%   Weight:       Height:          Temperature:   Temp (24hrs), Av 7 °F (36 5 °C), Min:97 4 °F (36 3 °C), Max:98 °F (36 7 °C)    Temperature: 97 6 °F (36 4 °C)  Intake & Output:  I/O        0701   0700  0701   0700  0701   0700    P  O  1050 0     Total Intake(mL/kg) 1050 (16 6) 0 (0)     Urine (mL/kg/hr) 500 (0 3) 775 (0 5)     Total Output 500 775     Net +550 -775            Unmeasured Urine Occurrence 2 x 1 x 1 x        Weights:   IBW (Ideal Body Weight): 63 8 kg    Body mass index is 22 17 kg/m²  Weight (last 2 days)     Date/Time Weight    23 0600 62 3 (137 35)    23 0300 59 4 (130 95)        Physical Exam  Constitutional:       Appearance: He is well-developed  HENT:      Head: Normocephalic and atraumatic  Nose: Nose normal    Eyes:      General:         Right eye: No discharge  Left eye: No discharge  Conjunctiva/sclera: Conjunctivae normal       Pupils: Pupils are equal, round, and reactive to light  Neck:      Thyroid: No thyromegaly  Cardiovascular:      Rate and Rhythm: Normal rate and regular rhythm  Heart sounds: Normal heart sounds  No murmur heard  No friction rub  No gallop  Pulmonary:      Effort: Pulmonary effort is normal  No respiratory distress  Breath sounds: Normal breath sounds  No stridor  No wheezing or rales  Chest:      Chest wall: No tenderness  Abdominal:      General: Bowel sounds are normal  There is no distension  Palpations: Abdomen is soft  There is no mass  Tenderness: There is no abdominal tenderness  There is no guarding or rebound  Musculoskeletal:         General: No tenderness or deformity  Right lower leg: No edema  Left lower leg: No edema  Lymphadenopathy:      Cervical: No cervical adenopathy  Skin:     General: Skin is warm and dry  Neurological:      Mental Status: He is alert and oriented to person, place, and time  Psychiatric:         Mood and Affect: Mood normal          Behavior: Behavior normal          Thought Content: Thought content normal          Judgment: Judgment normal        LABORATORY DATA     Labs: I have personally reviewed pertinent reports  Results from last 7 days   Lab Units 03/18/23 0413 03/16/23  0537 03/15/23  0604 03/13/23  2328   WBC Thousand/uL 11 04* 13 82* 11 82* 10 02   HEMOGLOBIN g/dL 10 3* 10 5* 11 2* 11 1*   HEMATOCRIT % 32 9* 34 2* 35 1* 35 4*   PLATELETS Thousands/uL 371 381 397* 370   NEUTROS PCT %  --   --  91* 79*   MONOS PCT %  --   --  4 10   MONO PCT % 1*  --   --   --       Results from last 7 days   Lab Units 03/18/23  0413 03/17/23  0816 03/16/23  0537 03/15/23  0604 03/13/23  2328   POTASSIUM mmol/L 4 5 4 7 4 2 4 3 3 8   CHLORIDE mmol/L 103 103 104 104 101   CO2 mmol/L 27 29 30 27 25   BUN mg/dL 29* 29* 28* 24 20   CREATININE mg/dL 0 75 0 79 0 76 0 76 0 85   CALCIUM mg/dL 9 2 9 5 10 0 10 2* 9 9   ALK PHOS U/L 49  --   --  53 53   ALT U/L 16  --   --  14 8   AST U/L 7  --   --  13 9*     Results from last 7 days   Lab Units 03/15/23  0604 03/13/23  2328   MAGNESIUM mg/dL 2 5 2 1     Results from last 7 days   Lab Units 03/15/23  0604   PHOSPHORUS mg/dL 3 4      Results from last 7 days   Lab Units 03/15/23  0604   INR  1 00   PTT seconds 31               IMAGING & DIAGNOSTIC TESTING     Radiology Results: I have personally reviewed pertinent reports  CTA head and neck with and without contrast    Result Date: 3/14/2023  Impression: 1  Enhancing lesions in the cerebral hemispheres and posterior fossa was able surrounding vasogenic edema  Recommend MRI of the brain with gadolinium  2   Right level 2 and 3 lymph nodes measuring up to 1 9 cm, likely metastatic  3   No stenosis, dissection or occlusion of the carotid or vertebral arteries or major vessels of the [place]  Workstation performed: [id number]     XR chest 2 views    Result Date: 3/14/2023  Impression: 12 cm right lower lobe mass  See subsequent chest CT  Workstation performed: [id number]     XR abdomen 1 view kub    Result Date: 3/14/2023  Impression: Nonobstructive bowel gas pattern with a moderate colonic stool burden  Workstation performed: [id number]     CT head without contrast    Result Date: 3/14/2023  Impression: Multiple target appearing intracranial lesions suspected as described with associated vasogenic edema; primary differential considerations including intracranial metastatic disease versus infection/abscesses depending on the clinical setting  MRI of the  brain with contrast recommended for further evaluation and to detect smaller lesions  No significant mass effect or midline shift  Other findings as above  Workstation performed: [id number]     XR chest 1 view    Result Date: 3/18/2023  Impression: Large right lung base mass suspicious for malignancy, consistent with CT Workstation performed: [id number]     CT chest abdomen pelvis w contrast    Result Date: 3/14/2023  Impression: 1  Right lower lobe mass extending along the fissure to the right middle and upper lobes and to the right hilum, measuring 10 x 12 12 cm, consistent with lung malignancy  Recommend pulmonary consultation  2   Left lower lobe subpleural 9 mm pulmonary nodule, indeterminate though concerning for malignancy  3   Few airspace opacities at the base of the right lower lobe and right middle lobe may represent pneumonia and/or aspiration   4   No evidence of malignancy in the abdomen or pelvis  5   Constipation  Workstation performed: [id number]     MRI Brain BT w wo Contrast    Result Date: 3/14/2023  Impression: Multiple intracranial enhancing lesions are identified, total of 5, consistent with intracranial metastasis  Several of these demonstrate focal surrounding vasogenic with localized mass effect, most prominently the right cerebellar lesion  Workstation performed: [id number]     Other Diagnostic Testing: I have personally reviewed pertinent reports  ACTIVE MEDICATIONS     Current Facility-Administered Medications   Medication Dose Route Frequency   • atorvastatin (LIPITOR) tablet 40 mg  40 mg Oral QPM   • dexamethasone (DECADRON) injection 4 mg  4 mg Intravenous Q6H [facility]   • enoxaparin (LOVENOX) subcutaneous injection 40 mg  40 mg Subcutaneous Q24H [facility]   • labetalol (NORMODYNE) injection 10 mg  10 mg Intravenous Q6H PRN   • levETIRAcetam (KEPPRA) tablet 500 mg  500 mg Oral Q12H [facility]   • polyethylene glycol (MIRALAX) packet 17 g  17 g Oral Daily   • senna-docusate sodium (SENOKOT S) 8 6-50 mg per tablet 1 tablet  1 tablet Oral HS       VTE Pharmacologic Prophylaxis: Enoxaparin (Lovenox)  VTE Mechanical Prophylaxis: sequential compression device    Portions of the record may have been created with voice recognition software  Occasional wrong word or "sound a like" substitutions may have occurred due to the inherent limitations of voice recognition software    Read the chart carefully and recognize, using context, where substitutions have occurred   ==  Christian Spain MD  [address]  Internal Medicine Residency PGY-3

## 2023-03-18 NOTE — PLAN OF CARE
Problem: MOBILITY - ADULT  Goal: Maintain or return to baseline ADL function  Description: INTERVENTIONS:  -  Assess patient's ability to carry out ADLs; assess patient's baseline for ADL function and identify physical deficits which impact ability to perform ADLs (bathing, care of mouth/teeth, toileting, grooming, dressing, etc )  - Assess/evaluate cause of self-care deficits   - Assess range of motion  - Assess patient's mobility; develop plan if impaired  - Assess patient's need for assistive devices and provide as appropriate  - Encourage maximum independence but intervene and supervise when necessary  - Involve family in performance of ADLs  - Assess for home care needs following discharge   - Consider OT consult to assist with ADL evaluation and planning for discharge  - Provide patient education as appropriate  Outcome: Progressing  Goal: Maintains/Returns to pre admission functional level  Description: INTERVENTIONS:  - Perform BMAT or MOVE assessment daily    - Set and communicate daily mobility goal to care team and patient/family/caregiver  - Collaborate with rehabilitation services on mobility goals if consulted  - Perform Range of Motion *3** times a day  - Reposition patient every *2** hours    - Dangle patient *3** times a day  - Stand patient *3** times a day  - Ambulate patient **3* times a day  - Out of bed to chair *3** times a day   - Out of bed for meals **3* times a day  - Out of bed for toileting  - Record patient progress and toleration of activity level   Outcome: Progressing     Problem: PAIN - ADULT  Goal: Verbalizes/displays adequate comfort level or baseline comfort level  Description: Interventions:  - Encourage patient to monitor pain and request assistance  - Assess pain using appropriate pain scale  - Administer analgesics based on type and severity of pain and evaluate response  - Implement non-pharmacological measures as appropriate and evaluate response  - Consider cultural and social influences on pain and pain management  - Notify physician/advanced practitioner if interventions unsuccessful or patient reports new pain  Outcome: Progressing     Problem: INFECTION - ADULT  Goal: Absence or prevention of progression during hospitalization  Description: INTERVENTIONS:  - Assess and monitor for signs and symptoms of infection  - Monitor lab/diagnostic results  - Monitor all insertion sites, i e  indwelling lines, tubes, and drains  - Monitor endotracheal if appropriate and nasal secretions for changes in amount and color  - [place] appropriate cooling/warming therapies per order  - Administer medications as ordered  - Instruct and encourage patient and family to use good hand hygiene technique  - Identify and instruct in appropriate isolation precautions for identified infection/condition  Outcome: Progressing  Goal: Absence of fever/infection during neutropenic period  Description: INTERVENTIONS:  - Monitor WBC    Outcome: Progressing     Problem: SAFETY ADULT  Goal: Maintain or return to baseline ADL function  Description: INTERVENTIONS:  -  Assess patient's ability to carry out ADLs; assess patient's baseline for ADL function and identify physical deficits which impact ability to perform ADLs (bathing, care of mouth/teeth, toileting, grooming, dressing, etc )  - Assess/evaluate cause of self-care deficits   - Assess range of motion  - Assess patient's mobility; develop plan if impaired  - Assess patient's need for assistive devices and provide as appropriate  - Encourage maximum independence but intervene and supervise when necessary  - Involve family in performance of ADLs  - Assess for home care needs following discharge   - Consider OT consult to assist with ADL evaluation and planning for discharge  - Provide patient education as appropriate  Outcome: Progressing  Goal: Maintains/Returns to pre admission functional level  Description: INTERVENTIONS:  - Perform BMAT or MOVE assessment daily    - Set and communicate daily mobility goal to care team and patient/family/caregiver  - Collaborate with rehabilitation services on mobility goals if consulted  - Perform Range of Motion *3** times a day  - Reposition patient every *2** hours    - Dangle patient *3** times a day  - Stand patient *3** times a day  - Ambulate patient *3** times a day  - Out of bed to chair **3* times a day   - Out of bed for meals **3* times a day  - Out of bed for toileting  - Record patient progress and toleration of activity level   Outcome: Progressing  Goal: Patient will remain free of falls  Description: INTERVENTIONS:  - Educate patient/family on patient safety including physical limitations  - Instruct patient to call for assistance with activity   - Consult OT/PT to assist with strengthening/mobility   - Keep Call bell within reach  - Keep bed low and locked with side rails adjusted as appropriate  - Keep care items and personal belongings within reach  - Initiate and maintain comfort rounds  - Make Fall Risk Sign visible to staff  - Offer Toileting every *2** Hours, in advance of need  - Initiate/Maintain **bed/chair*alarm  - Obtain necessary fall risk management equipment:   - Apply yellow socks and bracelet for high fall risk patients  - Consider moving patient to room near nurses station  Outcome: Progressing     Problem: DISCHARGE PLANNING  Goal: Discharge to home or other facility with appropriate resources  Description: INTERVENTIONS:  - Identify barriers to discharge w/patient and caregiver  - Arrange for needed discharge resources and transportation as appropriate  - Identify discharge learning needs (meds, wound care, etc )  - Arrange for interpretive services to assist at discharge as needed  - Refer to Case Management Department for coordinating discharge planning if the patient needs post-hospital services based on physician/advanced practitioner order or complex needs related to functional status, cognitive ability, or social support system  Outcome: Progressing     Problem: Knowledge Deficit  Goal: Patient/family/caregiver demonstrates understanding of disease process, treatment plan, medications, and discharge instructions  Description: Complete learning assessment and assess knowledge base  Interventions:  - Provide teaching at level of understanding  - Provide teaching via preferred learning methods  Outcome: Progressing     Problem: Nutrition/Hydration-ADULT  Goal: Nutrient/Hydration intake appropriate for improving, restoring or maintaining nutritional needs  Description: Monitor and assess patient's nutrition/hydration status for malnutrition  Collaborate with interdisciplinary team and initiate plan and interventions as ordered  Monitor patient's weight and dietary intake as ordered or per policy  Utilize nutrition screening tool and intervene as necessary  Determine patient's food preferences and provide high-protein, high-caloric foods as appropriate       INTERVENTIONS:  - Monitor oral intake, urinary output, labs, and treatment plans  - Assess nutrition and hydration status and recommend course of action  - Evaluate amount of meals eaten  - Assist patient with eating if necessary   - Allow adequate time for meals  - Recommend/ encourage appropriate diets, oral nutritional supplements, and vitamin/mineral supplements  - Order, calculate, and assess calorie counts as needed  - Recommend, monitor, and adjust tube feedings and TPN/PPN based on assessed needs  - Assess need for intravenous fluids  - Provide specific nutrition/hydration education as appropriate  - Include patient/family/caregiver in decisions related to nutrition  Outcome: Progressing

## 2023-03-18 NOTE — PLAN OF CARE
Problem: PHYSICAL THERAPY ADULT  Goal: Performs mobility at highest level of function for planned discharge setting  See evaluation for individualized goals  Description: Treatment/Interventions: Functional transfer training, LE strengthening/ROM, Therapeutic exercise, Endurance training, Patient/family training, Equipment eval/education, Bed mobility, Gait training, Spoke to nursing, OT  Equipment Recommended: [place]       See flowsheet documentation for full assessment, interventions and recommendations  Outcome: Progressing  Note: Prognosis: Guarded  Problem List: Impaired balance, Decreased mobility, Impaired judgement, Decreased safety awareness, Decreased cognition, Decreased endurance  Assessment: Pt seen for PT treatment session w/ focus on t/f training + gait training w/ RW  Pt demonstrated progress w/ decreased assistance required for functional t/f this session  Pt remains mostly limited by impulsivity + decreased safety awareness  As pt has good social support at home (pt reports his s/o can be present to assist him as needed), changing recommendation to HHPT  CM updated  Barriers to Discharge: None     PT Discharge Recommendation: Home with home health rehabilitation    See flowsheet documentation for full assessment

## 2023-03-18 NOTE — CASE MANAGEMENT
Case Management Discharge Planning Note    Patient name Irving Dykes  Location [facility] 345-18 MRN [id number]  : 1950 Date 3/18/2023       Current Admission Date: 3/14/2023  Current Admission Diagnosis:Brain mass   Patient Active Problem List    Diagnosis Date Noted   • Severe protein-calorie malnutrition ([address] ) 2023   • Brain mass 2023   • Lung mass 2023   • Hypertension 2023      LOS (days): 4  Geometric Mean LOS (GMLOS) (days): 3 80  Days to GMLOS:-0 1     OBJECTIVE:  Risk of Unplanned Readmission Score: 11 26         Current admission status: Inpatient   [facility]erred Pharmacy:   [id number] [address] [facility], [address] [place] [address] [address]  [address] [facility] [address] [facility] [address]  Phone: [phone] Fax: [phone]    Primary Care Provider: No primary care provider on file  Primary Insurance: MEDICARE  Secondary Insurance: [address] [place] [address] E [place]    DISCHARGE DETAILS:     Additional Comments: CM made aware by provider that pt is cleared for d/c today  Pt's recs are for STR  CM checked referrals and pt does not have any accepting facilities  CM sent message requesting an update on if they are able to accept pt  Provider made aware

## 2023-03-18 NOTE — CASE MANAGEMENT
Case Management Discharge Planning Note    Patient name Naomi Young  Location [facility] 604/[facility] 708-07 MRN [id number]  : 1950 Date 3/18/2023       Current Admission Date: 3/14/2023  Current Admission Diagnosis:Brain mass   Patient Active Problem List    Diagnosis Date Noted   • Severe protein-calorie malnutrition ([address] ) 2023   • Brain mass 2023   • Lung mass 2023   • Hypertension 2023      LOS (days): 4  Geometric Mean LOS (GMLOS) (days): 3 80  Days to GMLOS:-0 2     OBJECTIVE:  Risk of Unplanned Readmission Score: 11 29         Current admission status: Inpatient   [facility]erred Pharmacy:   [id number] [address] [facility], [facility],  [address]  [address] [place] [address]  Phone: [phone] Fax: [phone]    Primary Care Provider: No primary care provider on file  Primary Insurance: MEDICARE  Secondary Insurance: [address] [address]    DISCHARGE DETAILS:    Discharge planning discussed with[de-identified] Pt's dtr  Freedom of Choice: Yes  Comments - Freedom of Choice: pt's dtr in agreement for pt to return home w/ VNA   CM sent blanket referral as requested via Beny MONTANEZ contacted family/caregiver?: Yes  Were Treatment Team discharge recommendations reviewed with patient/caregiver?: Yes  Did patient/caregiver verbalize understanding of patient care needs?: Yes  Were patient/caregiver advised of the risks associated with not following Treatment Team discharge recommendations?: Yes    Contacts  Patient Contacts: Chanda Saint  Relationship to Patient[de-identified] Family  Contact Method: Phone  Reason/Outcome: Continuity of Care, Discharge Planning, Referral    Requested  [address]         Is the patient interested in [address] at discharge?: Yes  [facility] [facility] requested[de-identified] Nursing, Physical Therapy, Occupational [address] Name[de-identified] Other  [facility] [address] Provider[de-identified] PCP  Home Health Services Needed[de-identified] Evaluate Functional Status and Safety, Gait/ADL Training, Post-Op Care and Assessment, Strengthening/Theraputic Exercises to Improve Function  Homebound Criteria Met[de-identified] Requires the Assistance of Another Person for Safe Ambulation or to Leave the Home  Supporting Clincal Findings[de-identified] Limited Endurance    DME Referral Provided  Referral made for DME?: Yes  DME referral completed for the following items[de-identified] [facility]  DME Supplier Name[de-identified] [employer]    Other Referral/Resources/Interventions Provided:  Interventions: [facility]         Treatment Team Recommendation: Home with [address] [facility]  Discharge Destination Plan[de-identified] Home with [place] at Discharge : Family           ETA of Transport (Date): 03/19/23  ETA of Transport (Time): 1100        Additional Comments: CM made aware by PT and OT that upon their re-eval of the pt he will now be rec to d/c home w/ VNA and 24 hr supervision  CM called pt's dtr and updated her and she was in agreement w/ d/c plan  Per pt's dtr pt lives w/ SO who is not working and their is family in the home so someone will be with the pt  She confirmed that pt will need RW as he does not have one  CM sent blanket referral for VNA and ordered RW for pt  CM made aware that she will be available to transport pt home tomorrow by 1100  CM updated provider of d/c plan

## 2023-03-18 NOTE — [facility] ADMISSION
[facility] admissions team received referral on patient’s case for possible [facility] placement  [facility] admissions team will continue to review and follow patient’s progress and correspond with PT/OT therapies / [facility] physician and case management until a determination of acceptance to [facility] is made    Thank you, Brigitte Alexander

## 2023-03-18 NOTE — QUICK NOTE
Patient will follow up with  Oncology (Dr Haroldo Tamez) at [address] [place] [address] on 3/30/23

## 2023-03-19 VITALS
SYSTOLIC BLOOD PRESSURE: 118 MMHG | TEMPERATURE: 98.8 F | HEIGHT: 66 IN | DIASTOLIC BLOOD PRESSURE: 69 MMHG | WEIGHT: 137.35 LBS | RESPIRATION RATE: 17 BRPM | HEART RATE: 73 BPM | OXYGEN SATURATION: 93 % | BODY MASS INDEX: 22.07 KG/M2

## 2023-03-19 LAB
ANION GAP SERPL CALCULATED.3IONS-SCNC: 5 MMOL/L (ref 4–13)
ATRIAL RATE: 93 BPM
BUN SERPL-MCNC: 28 MG/DL (ref 5–25)
CALCIUM SERPL-MCNC: 9.2 MG/DL (ref 8.3–10.1)
CHLORIDE SERPL-SCNC: 104 MMOL/L (ref 96–108)
CO2 SERPL-SCNC: 28 MMOL/L (ref 21–32)
CREAT SERPL-MCNC: 0.83 MG/DL (ref 0.6–1.3)
ERYTHROCYTE [DISTWIDTH] IN BLOOD BY AUTOMATED COUNT: 14.8 % (ref 11.6–15.1)
GFR SERPL CREATININE-BSD FRML MDRD: 89 ML/MIN/1.73SQ M
GLUCOSE SERPL-MCNC: 102 MG/DL (ref 65–140)
HCT VFR BLD AUTO: 33.5 % (ref 36.5–49.3)
HGB BLD-MCNC: 10.5 G/DL (ref 12–17)
MCH RBC QN AUTO: 27.3 PG (ref 26.8–34.3)
MCHC RBC AUTO-ENTMCNC: 31.3 G/DL (ref 31.4–37.4)
MCV RBC AUTO: 87 FL (ref 82–98)
P AXIS: 80 DEGREES
PLATELET # BLD AUTO: 370 THOUSANDS/UL (ref 149–390)
PMV BLD AUTO: 10.8 FL (ref 8.9–12.7)
POTASSIUM SERPL-SCNC: 4.4 MMOL/L (ref 3.5–5.3)
PR INTERVAL: 144 MS
QRS AXIS: -83 DEGREES
QRSD INTERVAL: 100 MS
QT INTERVAL: 360 MS
QTC INTERVAL: 447 MS
RBC # BLD AUTO: 3.84 MILLION/UL (ref 3.88–5.62)
SODIUM SERPL-SCNC: 137 MMOL/L (ref 135–147)
T WAVE AXIS: 81 DEGREES
VENTRICULAR RATE: 93 BPM
WBC # BLD AUTO: 9.79 THOUSAND/UL (ref 4.31–10.16)

## 2023-03-19 RX ORDER — LEVETIRACETAM 500 MG/1
500 TABLET ORAL EVERY 12 HOURS SCHEDULED
Qty: 60 TABLET | Refills: 0 | Status: ON HOLD | OUTPATIENT
Start: 2023-03-19

## 2023-03-19 RX ORDER — ATORVASTATIN CALCIUM 40 MG/1
40 TABLET, FILM COATED ORAL EVERY EVENING
Qty: 30 TABLET | Refills: 0 | Status: ON HOLD | OUTPATIENT
Start: 2023-03-19

## 2023-03-19 RX ORDER — DEXAMETHASONE 4 MG/1
4 TABLET ORAL EVERY 6 HOURS
Qty: 120 TABLET | Refills: 0 | Status: ON HOLD | OUTPATIENT
Start: 2023-03-19 | End: 2023-04-18

## 2023-03-19 RX ORDER — OMEPRAZOLE 40 MG/1
40 CAPSULE, DELAYED RELEASE ORAL DAILY
Qty: 30 CAPSULE | Refills: 0 | Status: ON HOLD | OUTPATIENT
Start: 2023-03-19

## 2023-03-19 RX ADMIN — POLYETHYLENE GLYCOL 3350 17 G: 17 POWDER, FOR SOLUTION ORAL at 08:05

## 2023-03-19 RX ADMIN — DEXAMETHASONE SODIUM PHOSPHATE 4 MG: 4 INJECTION INTRA-ARTICULAR; INTRALESIONAL; INTRAMUSCULAR; INTRAVENOUS; SOFT TISSUE at 02:53

## 2023-03-19 RX ADMIN — DEXAMETHASONE SODIUM PHOSPHATE 4 MG: 4 INJECTION INTRA-ARTICULAR; INTRALESIONAL; INTRAMUSCULAR; INTRAVENOUS; SOFT TISSUE at 08:01

## 2023-03-19 RX ADMIN — ENOXAPARIN SODIUM 40 MG: 40 INJECTION SUBCUTANEOUS at 08:00

## 2023-03-19 RX ADMIN — LEVETIRACETAM 500 MG: 500 TABLET, FILM COATED ORAL at 08:01

## 2023-03-19 NOTE — CASE MANAGEMENT
Case Management Discharge Planning Note    Patient name Irving Dykes  Location [facility] 604/[facility] 594-83 MRN [id number]  : 1950 Date 3/19/2023       Current Admission Date: 3/14/2023  Current Admission Diagnosis:Brain mass   Patient Active Problem List    Diagnosis Date Noted   • Severe protein-calorie malnutrition ([address] ) 2023   • Brain mass 2023   • Lung mass 2023   • Hypertension 2023      LOS (days): 5  Geometric Mean LOS (GMLOS) (days): 3 80  Days to GMLOS:-1 1     OBJECTIVE:  Risk of Unplanned Readmission Score: 11 43         Current admission status: Inpatient   [facility]erred Pharmacy:   [id number] [address] [place] [address],   [address]  [address] [place] [address]  Phone: [phone] Fax: [phone]    Primary Care Provider: No primary care provider on file  Primary Insurance: MEDICARE  Secondary Insurance: [address]    DISCHARGE DETAILS: Rodgers Cranker delivered to patient by this CM from closet stock  IMM reviewed with patient, patient agrees with discharge determination

## 2023-03-19 NOTE — PLAN OF CARE
Problem: MOBILITY - ADULT  Goal: Maintain or return to baseline ADL function  Description: INTERVENTIONS:  -  Assess patient's ability to carry out ADLs; assess patient's baseline for ADL function and identify physical deficits which impact ability to perform ADLs (bathing, care of mouth/teeth, toileting, grooming, dressing, etc )  - Assess/evaluate cause of self-care deficits   - Assess range of motion  - Assess patient's mobility; develop plan if impaired  - Assess patient's need for assistive devices and provide as appropriate  - Encourage maximum independence but intervene and supervise when necessary  - Involve family in performance of ADLs  - Assess for home care needs following discharge   - Consider OT consult to assist with ADL evaluation and planning for discharge  - Provide patient education as appropriate  3/19/2023 1314 by Usha Ceron RN  Outcome: Adequate for Discharge  3/19/2023 0740 by Usha Ceron RN  Outcome: Progressing  Goal: Maintains/Returns to pre admission functional level  Description: INTERVENTIONS:  - Perform BMAT or MOVE assessment daily    - Set and communicate daily mobility goal to care team and patient/family/caregiver  - Collaborate with rehabilitation services on mobility goals if consulted  - Perform Range of Motion *3** times a day  - Reposition patient every *2** hours    - Dangle patient **3* times a day  - Stand patient **3* times a day  - Ambulate patient *3** times a day  - Out of bed to chair *3** times a day   - Out of bed for meals **3* times a day  - Out of bed for toileting  - Record patient progress and toleration of activity level   3/19/2023 1314 by Usha Ceron RN  Outcome: Adequate for Discharge  3/19/2023 0740 by Usha Ceron RN  Outcome: Progressing     Problem: PAIN - ADULT  Goal: Verbalizes/displays adequate comfort level or baseline comfort level  Description: Interventions:  - Encourage patient to monitor pain and request assistance  - Assess pain using appropriate pain scale  - Administer analgesics based on type and severity of pain and evaluate response  - Implement non-pharmacological measures as appropriate and evaluate response  - Consider cultural and social influences on pain and pain management  - Notify physician/advanced practitioner if interventions unsuccessful or patient reports new pain  3/19/2023 1314 by Shahla Montana RN  Outcome: Adequate for Discharge  3/19/2023 0740 by Shahla Montana RN  Outcome: Progressing     Problem: INFECTION - ADULT  Goal: Absence or prevention of progression during hospitalization  Description: INTERVENTIONS:  - Assess and monitor for signs and symptoms of infection  - Monitor lab/diagnostic results  - Monitor all insertion sites, i e  indwelling lines, tubes, and drains  - Monitor endotracheal if appropriate and nasal secretions for changes in amount and color  - [place] appropriate cooling/warming therapies per order  - Administer medications as ordered  - Instruct and encourage patient and family to use good hand hygiene technique  - Identify and instruct in appropriate isolation precautions for identified infection/condition  3/19/2023 1314 by Shahla Montana RN  Outcome: Adequate for Discharge  3/19/2023 0740 by Shahla Montana RN  Outcome: Progressing  Goal: Absence of fever/infection during neutropenic period  Description: INTERVENTIONS:  - Monitor WBC    3/19/2023 1314 by Shahla Montana RN  Outcome: Adequate for Discharge  3/19/2023 0740 by Shahla Montana RN  Outcome: Progressing     Problem: SAFETY ADULT  Goal: Maintain or return to baseline ADL function  Description: INTERVENTIONS:  -  Assess patient's ability to carry out ADLs; assess patient's baseline for ADL function and identify physical deficits which impact ability to perform ADLs (bathing, care of mouth/teeth, toileting, grooming, dressing, etc )  - Assess/evaluate cause of self-care deficits   - Assess range of motion  - Assess patient's mobility; develop plan if impaired  - Assess patient's need for assistive devices and provide as appropriate  - Encourage maximum independence but intervene and supervise when necessary  - Involve family in performance of ADLs  - Assess for home care needs following discharge   - Consider OT consult to assist with ADL evaluation and planning for discharge  - Provide patient education as appropriate  3/19/2023 1314 by Luis Hughes RN  Outcome: Adequate for Discharge  3/19/2023 0740 by Luis Hughes RN  Outcome: Progressing  Goal: Maintains/Returns to pre admission functional level  Description: INTERVENTIONS:  - Perform BMAT or MOVE assessment daily    - Set and communicate daily mobility goal to care team and patient/family/caregiver  - Collaborate with rehabilitation services on mobility goals if consulted  - Perform Range of Motion **3* times a day  - Reposition patient every **2* hours    - Dangle patient *3** times a day  - Stand patient *3** times a day  - Ambulate patient *3** times a day  - Out of bed to chair **3* times a day   - Out of bed for meals *3** times a day  - Out of bed for toileting  - Record patient progress and toleration of activity level   3/19/2023 1314 by Luis Hughes RN  Outcome: Adequate for Discharge  3/19/2023 0740 by Luis Hughes RN  Outcome: Progressing  Goal: Patient will remain free of falls  Description: INTERVENTIONS:  - Educate patient/family on patient safety including physical limitations  - Instruct patient to call for assistance with activity   - Consult OT/PT to assist with strengthening/mobility   - Keep Call bell within reach  - Keep bed low and locked with side rails adjusted as appropriate  - Keep care items and personal belongings within reach  - Initiate and maintain comfort rounds  - Make Fall Risk Sign visible to staff  - Offer Toileting every *2** Hours, in advance of need  - Initiate/Maintain **BED/CHAIR*alarm  - Obtain necessary fall risk management equipment:  - Apply yellow socks and bracelet for high fall risk patients  - Consider moving patient to room near nurses station  3/19/2023 1314 by Angelique Byrd RN  Outcome: Adequate for Discharge  3/19/2023 0740 by Angelique Byrd RN  Outcome: Progressing     Problem: DISCHARGE PLANNING  Goal: Discharge to home or other facility with appropriate resources  Description: INTERVENTIONS:  - Identify barriers to discharge w/patient and caregiver  - Arrange for needed discharge resources and transportation as appropriate  - Identify discharge learning needs (meds, wound care, etc )  - Arrange for interpretive services to assist at discharge as needed  - Refer to Case Management Department for coordinating discharge planning if the patient needs post-hospital services based on physician/advanced practitioner order or complex needs related to functional status, cognitive ability, or social support system  3/19/2023 1314 by Angelique Byrd RN  Outcome: Adequate for Discharge  3/19/2023 0740 by Angelique Byrd RN  Outcome: Progressing     Problem: Knowledge Deficit  Goal: Patient/family/caregiver demonstrates understanding of disease process, treatment plan, medications, and discharge instructions  Description: Complete learning assessment and assess knowledge base  Interventions:  - Provide teaching at level of understanding  - Provide teaching via preferred learning methods  3/19/2023 1314 by Angelique Byrd RN  Outcome: Adequate for Discharge  3/19/2023 0740 by Angelique Byrd RN  Outcome: Progressing     Problem: Nutrition/Hydration-ADULT  Goal: Nutrient/Hydration intake appropriate for improving, restoring or maintaining nutritional needs  Description: Monitor and assess patient's nutrition/hydration status for malnutrition  Collaborate with interdisciplinary team and initiate plan and interventions as ordered  Monitor patient's weight and dietary intake as ordered or per policy   Utilize nutrition screening tool and intervene as necessary  Determine patient's food preferences and provide high-protein, high-caloric foods as appropriate       INTERVENTIONS:  - Monitor oral intake, urinary output, labs, and treatment plans  - Assess nutrition and hydration status and recommend course of action  - Evaluate amount of meals eaten  - Assist patient with eating if necessary   - Allow adequate time for meals  - Recommend/ encourage appropriate diets, oral nutritional supplements, and vitamin/mineral supplements  - Order, calculate, and assess calorie counts as needed  - Recommend, monitor, and adjust tube feedings and TPN/PPN based on assessed needs  - Assess need for intravenous fluids  - Provide specific nutrition/hydration education as appropriate  - Include patient/family/caregiver in decisions related to nutrition  3/19/2023 1314 by Gary Cerrato RN  Outcome: Adequate for Discharge  3/19/2023 0740 by Gary Cerrato RN  Outcome: Progressing     Problem: Prexisting or High Potential for Compromised Skin Integrity  Goal: Skin integrity is maintained or improved  Description: INTERVENTIONS:  - Identify patients at risk for skin breakdown  - Assess and monitor skin integrity  - Assess and monitor nutrition and hydration status  - Monitor labs   - Assess for incontinence   - Turn and reposition patient  - Assist with mobility/ambulation  - Relieve pressure over bony prominences  - Avoid friction and shearing  - Provide appropriate hygiene as needed including keeping skin clean and dry  - Evaluate need for skin moisturizer/barrier cream  - Collaborate with interdisciplinary team   - Patient/family teaching  - Consider wound care consult   3/19/2023 1314 by Gary Cerrato RN  Outcome: Adequate for Discharge  3/19/2023 0740 by Gary Cerrato RN  Outcome: Progressing

## 2023-03-19 NOTE — DISCHARGE SUMMARY
INTERNAL MEDICINE RESIDENCY DISCHARGE SUMMARY     Alysia Perez   81 y o  male  MRN: [id number]  Room/Bed: [facility] 604/[facility] [address] [place] [address]   Encounter: [id number]    Principal Problem:    Brain mass  Active Problems:    Lung mass    Hypertension    Severe protein-calorie malnutrition ([address] )      Severe protein-calorie malnutrition ([facility] [address] )  Assessment & Plan  Malnutrition Findings:   Adult Malnutrition type: Chronic illness  Adult Degree of Malnutrition: Other severe protein calorie malnutrition  Malnutrition Characteristics: Muscle loss, Fat loss         Plan:  · Encourage oral intake  · Monitor BMI            360 Statement: related to catabolic illness as evidenced by weight loss, depressed temples, hollow supraclavicular space, dark orbital, visible acromonion process, ribs, low body weight  Intervention: Regular diet, pt declined medical food supplements  BMI Findings:  Adult BMI Classifications: Underweight < 18 5        Body mass index is 22 17 kg/m²  Hypertension  Assessment & Plan  Patient has a history of hypertension   • Patient is not currently on any medication at home  • patient blood pressure in the ED was normotensive  ? Plan: atorvastatin 40 mg daily  ? Labetalol 10 mg IV PRN every 6 hours    Lung mass  Assessment & Plan  Right lower lobe lung mass suspecting malignancy  ? CT chest/abdomen/pelvis showed right lower lobe mass extending along the fissure to the right middle and upper lobes and right hilum, measuring 10 x 12 cm consistent with lung malignancy   Left lower lobe subpleural 9 mm lung pulmonary nodule, intermediate concerning for malignancy   Few airspace opacities at the base of the right lower lobe and the right middle lobe may represent pneumonia and/or aspiration    ? Plan: See the plan under brain mets    * Brain mass  Assessment & Plan  Multiple intracranial enhancing lesions of the brain suspecting brain metastasis  • Patient is presenting with imbalance and ambulatory dysfunction for the last 5 days  • Patient had recent fall 5 days ago with no head strike  • On exam patient has ataxic gait  • 03/14 CT head showed multiple target appearing intracranial lesions suspected described with associated stranding edema, primary differential consideration including intracranial metastatic disease versus infection/abscess depending on the clinical setting     • 03/14 MRI brain showed multiple intracranial enhancing lesions are identified total of 5, consistent with endocrine metastasis   Several of these demonstrate focal surrounding positioning with localized mass effect, most prominently the right cerebellar lesion     • CT chest/abdomen/pelvis showed right lower lobe mass extending along the fissure to the right middle and upper lobes and right hilum, measuring 10 x 12 cm consistent with lung malignancy   Left lower lobe subpleural 9 mm lung pulmonary nodule, intermediate concerning for malignancy   Few airspace opacities at the base of the right lower lobe and the right middle lobe may represent pneumonia and/or aspiration  ? Plan:   ? Palliative care consulted  ? Neurosurgery consulted, recommend IR biopsy of lung mass  ? IR consulted -> lung biopsy completed 3/17 -> f/u results  ? Consult PT/OT  ? Oncology consulted -> Plan to follow up with Dr Lulu Alcantara outpatient (visit scheduled for 3/30/23)  ? Rad-onc consulted -> Plan to follow up outpatient  ? Fall precautions  ? Neuro check every 4 hours  ? Decadron 4 mg IV every 6 hours   ? Keppra 500 mg tab every 12 hours  ?  Patient is medically stable for discharge to [facility] -> multiple referrals to [facility] and [facility] sent, however facilities will not accept the patient until he is reevaluated by PT/OT and until the oncology and radiation oncology outpatient plans are documented in case the patient will require transportation -> PT, OT, oncology and rad Onc made aware         DAY OF DISCHARGE     Subjective:  Patient seen and evaluated at bedside  Denies any acute complaints  Anxious to be discharged home and continue with treatment for his cancer    Objective:    Vitals:    03/18/23 1046 03/18/23 1538 03/18/23 2247 03/19/23 0630   BP:  116/72 139/79 118/69   BP Location:       Pulse:  85 79 73   Resp:  16 16 17   Temp:  98 1 °F (36 7 °C) 98 9 °F (37 2 °C) 98 8 °F (37 1 °C)   TempSrc:       SpO2: 92% 94% 94% 93%   Weight:       Height:         Physical Exam  Vitals reviewed  Constitutional:       General: He is awake  He is not in acute distress  Appearance: Normal appearance  He is well-developed  He is not ill-appearing, toxic-appearing or diaphoretic  HENT:      Head: Normocephalic and atraumatic  Right Ear: External ear normal       Left Ear: External ear normal       Nose: Nose normal       Mouth/Throat:      Mouth: Mucous membranes are moist       Pharynx: Oropharynx is clear  Eyes:      General:         Right eye: No discharge  Left eye: No discharge  Conjunctiva/sclera: Conjunctivae normal       Pupils: Pupils are equal, round, and reactive to light  Cardiovascular:      Rate and Rhythm: Normal rate and regular rhythm  Heart sounds: Normal heart sounds  Pulmonary:      Effort: Pulmonary effort is normal  No respiratory distress  Breath sounds: Wheezing present  Abdominal:      General: Bowel sounds are normal  There is no distension  Palpations: Abdomen is soft  Tenderness: There is no abdominal tenderness  Musculoskeletal:         General: No swelling or tenderness  Normal range of motion  Cervical back: Normal range of motion  Right lower leg: No edema  Left lower leg: No edema  Skin:     General: Skin is warm and dry  Coloration: Skin is not jaundiced  Findings: No bruising  Neurological:      General: No focal deficit present  Mental Status: He is alert and oriented to person, place, and time  Motor: No weakness  Psychiatric:         Mood and Affect: Mood normal          Behavior: Behavior normal  Behavior is cooperative  Thought Content:  Thought content normal          [address] [place] [address] [address]     HPI as per Dr Clinton Caro: "79-year-old male patient with past medical history of BPH, hypertension who presented to [address] [place] [address] ED with ambulatory dysfunction for the last 5 days   Patient reported having fall 5 days ago   Patient denied head strike however he did reported striking his chest with right-sided rib pain   Patient reported feeling of imbalance in the last few days but denied lightheadedness or dizziness   Patient family reported that patient had some slurred speech   Patient is denying any weakness, sensory loss, chest pain, headache, visual changes, diarrhea   Patient also complaining of constipation over the last few days but denied blood in her stool   Patient heart rate in the ED was slightly bradycardia in the 46s   In the ED patient exam showed mild ataxia with both upper and lower extremity ataxia   With both legs are approximately week   Labs was done in the ED including TSH was normal, magnesium was normal, UA was unremarkable, troponin was negative, CMP was unremarkable, CBC with hemoglobin of 11 1   CT head showed multiple target appearing intracranial lesions suspected described with associated stranding edema, primary differential consideration including intracranial metastatic disease versus infection/abscess depending on the clinical setting   MRI brain showed multiple intracranial enhancing lesions are identified total of 5, consistent with endocrine metastasis   Several of these demonstrate focal surrounding positioning with localized mass effect, most prominently the right cerebellar lesion   CT chest/abdomen/pelvis showed right lower lobe mass extending along the fissure to the right middle and upper lobes and right hilum, measuring 10 x 12 cm consistent with lung malignancy   Left lower lobe subpleural 9 mm lung pulmonary nodule, intermediate concerning for malignancy   Few airspace opacities at the base of the right lower lobe and the right middle lobe may represent pneumonia and/or aspiration  Patient [facility] critical care for further workup and management   On exam patient was not able to walk by himself and he has an ataxia in his 4 limbs   Patient is current smoker and used to smoke a pack a day for 45 years however currently he smokes 2 to 3 cigarettes/day   Patient quit alcohol 15 years ago   Patient decision-maker is either the wife or daughter  Antwon Teresa is level 2 CODE STATUS "     While in the ICU the patient remained stable and was later transferred to the stepdown unit  He was continued on Decadron and Keppra  The patient verbalized that he was interested in pursuing treatment of his suspected metastatic lung cancer  Palliative care and neurosurgery were consulted  Neurosurgery recommended IR biopsy of lung mass which was performed on 3/17/2023  Procedure was complicated by small pneumothorax, which remained stable and patient was asymptomatic  IR contacted on day of discharge, and cleared patient for discharge home  Oncology and radiation oncology were also consulted  Oncology recommended follow-up with Dr Jyotsna Paulino amd follow-up visit was scheduled for 3/30/2023  Radiation oncology will call patient first thin 3/20 to inform him of his first appointment for radiation treatment planning  Patient was deemed medically stable for discharge home with outpatient oncology and radiation oncology follow-up      DISCHARGE INFORMATION     PCP at Discharge: patient will establish    Admitting Provider: Cristiano Jones DO  Admission Date: 3/14/2023    Discharge Provider: Sherrie Carrillo MD  Discharge Date: 3/19/2023    Discharge Disposition: home with home health  Discharge Condition: good  Discharge with Lines: no Discharge Diet: regular diet  Activity Restrictions: none  Test Results Pending at Discharge: lung biopsy    Discharge Diagnoses:  Principal Problem:    Brain mass  Active Problems:    Lung mass    Hypertension    Severe protein-calorie malnutrition ([address] )  Resolved Problems:    * No resolved hospital problems  *      Consulting Providers:  Palliative, Neurosurgery, Oncology, Radiation Oncology    Diagnostic & Therapeutic Procedures Performed:  CTA head and neck with and without contrast    Result Date: 3/14/2023  Impression: 1  Enhancing lesions in the cerebral hemispheres and posterior fossa was able surrounding vasogenic edema  Recommend MRI of the brain with gadolinium  2   Right level 2 and 3 lymph nodes measuring up to 1 9 cm, likely metastatic  3   No stenosis, dissection or occlusion of the carotid or vertebral arteries or major vessels of the [place]  Workstation performed: [id number]     CXR 1 view PA portable in 2 hours    Result Date: 3/18/2023  Impression: Stable small right pneumothorax since the chest CT from yesterday after right lung mass biopsy  It was not visible on chest radiograph from yesterday  The study was marked in [facility] for immediate notification  Workstation performed: [id number]     XR chest 2 views    Result Date: 3/14/2023  Impression: 12 cm right lower lobe mass  See subsequent chest CT  Workstation performed: [id number]     XR abdomen 1 view kub    Result Date: 3/14/2023  Impression: Nonobstructive bowel gas pattern with a moderate colonic stool burden  Workstation performed: [id number]     CT head without contrast    Result Date: 3/14/2023  Impression: Multiple target appearing intracranial lesions suspected as described with associated vasogenic edema; primary differential considerations including intracranial metastatic disease versus infection/abscesses depending on the clinical setting    MRI of the  brain with contrast recommended for further evaluation and to detect smaller lesions  No significant mass effect or midline shift  Other findings as above  Workstation performed: [id number]     XR chest 1 view    Result Date: 3/18/2023  Impression: Large right lung base mass suspicious for malignancy, consistent with CT Workstation performed: [id number]     CT chest abdomen pelvis w contrast    Result Date: 3/14/2023  Impression: 1  Right lower lobe mass extending along the fissure to the right middle and upper lobes and to the right hilum, measuring 10 x 12 12 cm, consistent with lung malignancy  Recommend pulmonary consultation  2   Left lower lobe subpleural 9 mm pulmonary nodule, indeterminate though concerning for malignancy  3   Few airspace opacities at the base of the right lower lobe and right middle lobe may represent pneumonia and/or aspiration  4   No evidence of malignancy in the abdomen or pelvis  5   Constipation  Workstation performed: [id number]     MRI Brain BT w wo Contrast    Result Date: 3/14/2023  Impression: Multiple intracranial enhancing lesions are identified, total of 5, consistent with intracranial metastasis  Several of these demonstrate focal surrounding vasogenic with localized mass effect, most prominently the right cerebellar lesion   Workstation performed: [id number]       Code Status: Level 2 - DNAR: but accepts endotracheal intubation  Advance Directive & Living Will: <no information>  Power of :    POLST:      Medications:  Current Discharge Medication List      START taking these medications    Details   atorvastatin (LIPITOR) 40 mg tablet Take 1 tablet (40 mg total) by mouth every evening  Qty: 30 tablet, Refills: 0    Associated Diagnoses: Brain mass; Right lower lobe lung mass; Lung mass      dexamethasone (DECADRON) 4 mg tablet Take 1 tablet (4 mg total) by mouth every 6 (six) hours  Qty: 120 tablet, Refills: 0    Associated Diagnoses: Brain mass; Right lower lobe lung mass; Lung mass      levETIRAcetam (KEPPRA) 500 mg tablet Take 1 tablet (500 mg total) by mouth every 12 (twelve) hours  Qty: 60 tablet, Refills: 0    Associated Diagnoses: Brain mass; Right lower lobe lung mass; Lung mass      omeprazole (PriLOSEC) 40 MG capsule Take 1 capsule (40 mg total) by mouth daily  Qty: 30 capsule, Refills: 0    Associated Diagnoses: Brain mass; Right lower lobe lung mass; Lung mass               Allergies:  No Known Allergies    FOLLOW-UP     PCP Outpatient Follow-up:  Establish with PCP within 1-2 weeks of DC    Consulting Providers Follow-up:  Oncology and Radiation ONcology     Active Issues Requiring Follow-up:   Lung and brain mass, hypertension    Discharge Statement:   I spent 1 hour minutes discharging the patient  This time was spent on the day of discharge  I had direct contact with the patient on the day of discharge  Additional documentation is required if more than 30 minutes were spent on discharge  Portions of the record may have been created with voice recognition software  Occasional wrong word or "sound a like" substitutions may have occurred due to the inherent limitations of voice recognition software    Read the chart carefully and recognize, using context, where substitutions have occurred     ==  Nicole Meadows, [facility]  Internal Medicine Resident PGY-3

## 2023-03-19 NOTE — PLAN OF CARE
Problem: MOBILITY - ADULT  Goal: Maintain or return to baseline ADL function  Description: INTERVENTIONS:  -  Assess patient's ability to carry out ADLs; assess patient's baseline for ADL function and identify physical deficits which impact ability to perform ADLs (bathing, care of mouth/teeth, toileting, grooming, dressing, etc )  - Assess/evaluate cause of self-care deficits   - Assess range of motion  - Assess patient's mobility; develop plan if impaired  - Assess patient's need for assistive devices and provide as appropriate  - Encourage maximum independence but intervene and supervise when necessary  - Involve family in performance of ADLs  - Assess for home care needs following discharge   - Consider OT consult to assist with ADL evaluation and planning for discharge  - Provide patient education as appropriate  Outcome: Progressing  Goal: Maintains/Returns to pre admission functional level  Description: INTERVENTIONS:  - Perform BMAT or MOVE assessment daily    - Set and communicate daily mobility goal to care team and patient/family/caregiver  - Collaborate with rehabilitation services on mobility goals if consulted  - Perform Range of Motion *3** times a day  - Reposition patient every *2** hours    - Dangle patient *3** times a day  - Stand patient *3* times a day  - Ambulate patient **3* times a day  - Out of bed to chair **3* times a day   - Out of bed for meals *3** times a day  - Out of bed for toileting  - Record patient progress and toleration of activity level   Outcome: Progressing     Problem: PAIN - ADULT  Goal: Verbalizes/displays adequate comfort level or baseline comfort level  Description: Interventions:  - Encourage patient to monitor pain and request assistance  - Assess pain using appropriate pain scale  - Administer analgesics based on type and severity of pain and evaluate response  - Implement non-pharmacological measures as appropriate and evaluate response  - Consider cultural and social influences on pain and pain management  - Notify physician/advanced practitioner if interventions unsuccessful or patient reports new pain  Outcome: Progressing     Problem: INFECTION - ADULT  Goal: Absence or prevention of progression during hospitalization  Description: INTERVENTIONS:  - Assess and monitor for signs and symptoms of infection  - Monitor lab/diagnostic results  - Monitor all insertion sites, i e  indwelling lines, tubes, and drains  - Monitor endotracheal if appropriate and nasal secretions for changes in amount and color  - [place] appropriate cooling/warming therapies per order  - Administer medications as ordered  - Instruct and encourage patient and family to use good hand hygiene technique  - Identify and instruct in appropriate isolation precautions for identified infection/condition  Outcome: Progressing  Goal: Absence of fever/infection during neutropenic period  Description: INTERVENTIONS:  - Monitor WBC    Outcome: Progressing     Problem: SAFETY ADULT  Goal: Maintain or return to baseline ADL function  Description: INTERVENTIONS:  -  Assess patient's ability to carry out ADLs; assess patient's baseline for ADL function and identify physical deficits which impact ability to perform ADLs (bathing, care of mouth/teeth, toileting, grooming, dressing, etc )  - Assess/evaluate cause of self-care deficits   - Assess range of motion  - Assess patient's mobility; develop plan if impaired  - Assess patient's need for assistive devices and provide as appropriate  - Encourage maximum independence but intervene and supervise when necessary  - Involve family in performance of ADLs  - Assess for home care needs following discharge   - Consider OT consult to assist with ADL evaluation and planning for discharge  - Provide patient education as appropriate  Outcome: Progressing  Goal: Maintains/Returns to pre admission functional level  Description: INTERVENTIONS:  - Perform BMAT or MOVE assessment daily    - Set and communicate daily mobility goal to care team and patient/family/caregiver  - Collaborate with rehabilitation services on mobility goals if consulted  - Perform Range of Motion **3* times a day  - Reposition patient every **2* hours    - Dangle patient *3** times a day  - Stand patient **3* times a day  - Ambulate patient *3** times a day  - Out of bed to chair *3** times a day   - Out of bed for meals *3** times a day  - Out of bed for toileting  - Record patient progress and toleration of activity level   Outcome: Progressing  Goal: Patient will remain free of falls  Description: INTERVENTIONS:  - Educate patient/family on patient safety including physical limitations  - Instruct patient to call for assistance with activity   - Consult OT/PT to assist with strengthening/mobility   - Keep Call bell within reach  - Keep bed low and locked with side rails adjusted as appropriate  - Keep care items and personal belongings within reach  - Initiate and maintain comfort rounds  - Make Fall Risk Sign visible to staff  - Offer Toileting every *2** Hours, in advance of need  - Initiate/Maintain *bed/chair**alarm  - Obtain necessary fall risk management equipment:   - Apply yellow socks and bracelet for high fall risk patients  - Consider moving patient to room near nurses station  Outcome: Progressing     Problem: DISCHARGE PLANNING  Goal: Discharge to home or other facility with appropriate resources  Description: INTERVENTIONS:  - Identify barriers to discharge w/patient and caregiver  - Arrange for needed discharge resources and transportation as appropriate  - Identify discharge learning needs (meds, wound care, etc )  - Arrange for interpretive services to assist at discharge as needed  - Refer to Case Management Department for coordinating discharge planning if the patient needs post-hospital services based on physician/advanced practitioner order or complex needs related to functional status, cognitive ability, or social support system  Outcome: Progressing     Problem: Knowledge Deficit  Goal: Patient/family/caregiver demonstrates understanding of disease process, treatment plan, medications, and discharge instructions  Description: Complete learning assessment and assess knowledge base  Interventions:  - Provide teaching at level of understanding  - Provide teaching via preferred learning methods  Outcome: Progressing     Problem: Nutrition/Hydration-ADULT  Goal: Nutrient/Hydration intake appropriate for improving, restoring or maintaining nutritional needs  Description: Monitor and assess patient's nutrition/hydration status for malnutrition  Collaborate with interdisciplinary team and initiate plan and interventions as ordered  Monitor patient's weight and dietary intake as ordered or per policy  Utilize nutrition screening tool and intervene as necessary  Determine patient's food preferences and provide high-protein, high-caloric foods as appropriate       INTERVENTIONS:  - Monitor oral intake, urinary output, labs, and treatment plans  - Assess nutrition and hydration status and recommend course of action  - Evaluate amount of meals eaten  - Assist patient with eating if necessary   - Allow adequate time for meals  - Recommend/ encourage appropriate diets, oral nutritional supplements, and vitamin/mineral supplements  - Order, calculate, and assess calorie counts as needed  - Recommend, monitor, and adjust tube feedings and TPN/PPN based on assessed needs  - Assess need for intravenous fluids  - Provide specific nutrition/hydration education as appropriate  - Include patient/family/caregiver in decisions related to nutrition  Outcome: Progressing     Problem: Prexisting or High Potential for Compromised Skin Integrity  Goal: Skin integrity is maintained or improved  Description: INTERVENTIONS:  - Identify patients at risk for skin breakdown  - Assess and monitor skin integrity  - Assess and monitor nutrition and hydration status  - Monitor labs   - Assess for incontinence   - Turn and reposition patient  - Assist with mobility/ambulation  - Relieve pressure over bony prominences  - Avoid friction and shearing  - Provide appropriate hygiene as needed including keeping skin clean and dry  - Evaluate need for skin moisturizer/barrier cream  - Collaborate with interdisciplinary team   - Patient/family teaching  - Consider wound care consult   Outcome: Progressing

## 2023-03-20 ENCOUNTER — TELEPHONE (OUTPATIENT)
Dept: HEMATOLOGY ONCOLOGY | Facility: CLINIC | Age: 73
End: 2023-03-20

## 2023-03-20 ENCOUNTER — HOME CARE VISIT (OUTPATIENT)
Dept: HOME HEALTH SERVICES | Facility: HOME HEALTHCARE | Age: 73
End: 2023-03-20

## 2023-03-20 LAB
DME PARACHUTE DELIVERY DATE ACTUAL: NORMAL
DME PARACHUTE DELIVERY DATE REQUESTED: NORMAL
DME PARACHUTE ITEM DESCRIPTION: NORMAL
DME PARACHUTE ORDER STATUS: NORMAL
DME PARACHUTE SUPPLIER NAME: NORMAL
DME PARACHUTE SUPPLIER PHONE: NORMAL

## 2023-03-20 NOTE — TELEPHONE ENCOUNTER
I phoned the patient and introduced myself and explained that I was calling from [facility] to review his upcoming appointment details  Mana Nance indicated that he was aware of the appointment on 3/30 at 0900 and that it was at the [place] office  We reviewed the location of the office and the patient expressed understanding and indicated that his daughter would be bringing him to the appointment

## 2023-03-22 ENCOUNTER — APPOINTMENT (OUTPATIENT)
Dept: RADIATION ONCOLOGY | Facility: HOSPITAL | Age: 73
End: 2023-03-22
Attending: STUDENT IN AN ORGANIZED HEALTH CARE EDUCATION/TRAINING PROGRAM

## 2023-03-22 DIAGNOSIS — R91.8 RIGHT LOWER LOBE LUNG MASS: ICD-10-CM

## 2023-03-22 DIAGNOSIS — G93.89 BRAIN MASS: ICD-10-CM

## 2023-03-22 DIAGNOSIS — R91.8 LUNG MASS: ICD-10-CM

## 2023-03-23 ENCOUNTER — TELEPHONE (OUTPATIENT)
Dept: RADIATION ONCOLOGY | Facility: HOSPITAL | Age: 73
End: 2023-03-23

## 2023-03-23 NOTE — TELEPHONE ENCOUNTER
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 since MRI approximately 1 week prior  Given the rate of growth of these metastases and the current size, we discussed that I would prefer a HS-WBRT approach to a SRS/SRT approach for treatment as his overall intracranial tumor burden is quite high and there would be risk for marginal failure after stereotactic treatment  We reviewed the risks and benefits of this approach in detail  Specifically we reviewed side effects of HS-WBRT to include fatigue, somnolence, alopecia, hearing loss/muffled hearing, nausea, and neurocognitive decline  The patient and his daughter were given the opportunity to ask multiple questions, all of which were answered to their satisfaction  The patient would like to proceed with HS-WBRT as recommended  I will plan to treat him to a dose of 30 Gy in 10 fractions  He will return early next week for start of therapy        Jai Umaña MD  Dept of Radiation Oncology

## 2023-03-27 PROBLEM — C34.91 NON-SMALL CELL CARCINOMA OF RIGHT LUNG (HCC): Status: ACTIVE | Noted: 2023-03-27

## 2023-03-28 ENCOUNTER — APPOINTMENT (OUTPATIENT)
Dept: RADIATION ONCOLOGY | Facility: HOSPITAL | Age: 73
End: 2023-03-28
Attending: STUDENT IN AN ORGANIZED HEALTH CARE EDUCATION/TRAINING PROGRAM

## 2023-03-28 DIAGNOSIS — C79.31 BRAIN METASTASES: Primary | ICD-10-CM

## 2023-03-28 RX ORDER — MEMANTINE HYDROCHLORIDE 5 MG/1
10 TABLET ORAL 2 TIMES DAILY
Qty: 120 TABLET | Refills: 5 | Status: ON HOLD | OUTPATIENT
Start: 2023-03-28

## 2023-03-28 NOTE — PROGRESS NOTES
Prescription sent for memantine for neurocognitive protection with hippocampal sparing WBRT  Reviewed taper schedule with the patient and his daughter Lei Gallo  Patient knows to call with any questions or concerns       Vitaliy Lerma MD  Dept of Radiation Oncology

## 2023-03-29 ENCOUNTER — APPOINTMENT (OUTPATIENT)
Dept: RADIATION ONCOLOGY | Facility: HOSPITAL | Age: 73
End: 2023-03-29
Attending: STUDENT IN AN ORGANIZED HEALTH CARE EDUCATION/TRAINING PROGRAM

## 2023-03-29 NOTE — CASE COMMUNICATION
3 20 23  Spoke with the pt regarding home health services  Upon discussing pts insurance, [facility] and homebound requirements, the pt stated that he had taken a walk for 3/10 of a mile with his girlfriend without assistance  Pt is not homebound  Will cancel home health referral   Pt is aware and agreeable

## 2023-03-30 ENCOUNTER — APPOINTMENT (OUTPATIENT)
Dept: RADIATION ONCOLOGY | Facility: HOSPITAL | Age: 73
End: 2023-03-30
Attending: STUDENT IN AN ORGANIZED HEALTH CARE EDUCATION/TRAINING PROGRAM

## 2023-03-30 ENCOUNTER — TELEPHONE (OUTPATIENT)
Dept: HEMATOLOGY ONCOLOGY | Facility: CLINIC | Age: 73
End: 2023-03-30

## 2023-03-30 ENCOUNTER — TELEPHONE (OUTPATIENT)
Dept: PALLIATIVE MEDICINE | Facility: CLINIC | Age: 73
End: 2023-03-30

## 2023-03-30 ENCOUNTER — OFFICE VISIT (OUTPATIENT)
Dept: HEMATOLOGY ONCOLOGY | Facility: CLINIC | Age: 73
End: 2023-03-30

## 2023-03-30 VITALS
HEIGHT: 66 IN | OXYGEN SATURATION: 92 % | DIASTOLIC BLOOD PRESSURE: 76 MMHG | SYSTOLIC BLOOD PRESSURE: 98 MMHG | WEIGHT: 137 LBS | BODY MASS INDEX: 22.02 KG/M2 | TEMPERATURE: 99.5 F | HEART RATE: 106 BPM

## 2023-03-30 DIAGNOSIS — T45.1X5A CHEMOTHERAPY INDUCED NEUTROPENIA (HCC): ICD-10-CM

## 2023-03-30 DIAGNOSIS — C79.31 METASTASIS TO BRAIN (HCC): ICD-10-CM

## 2023-03-30 DIAGNOSIS — T45.1X5A CHEMOTHERAPY-INDUCED NAUSEA: Primary | ICD-10-CM

## 2023-03-30 DIAGNOSIS — R11.0 CHEMOTHERAPY-INDUCED NAUSEA: Primary | ICD-10-CM

## 2023-03-30 DIAGNOSIS — G93.89 BRAIN MASS: ICD-10-CM

## 2023-03-30 DIAGNOSIS — D70.1 CHEMOTHERAPY INDUCED NEUTROPENIA (HCC): ICD-10-CM

## 2023-03-30 DIAGNOSIS — C34.91 NON-SMALL CELL CARCINOMA OF RIGHT LUNG (HCC): Primary | ICD-10-CM

## 2023-03-30 DIAGNOSIS — R91.8 RIGHT LOWER LOBE LUNG MASS: ICD-10-CM

## 2023-03-30 DIAGNOSIS — R91.8 LUNG MASS: ICD-10-CM

## 2023-03-30 RX ORDER — ONDANSETRON 4 MG/1
4 TABLET, ORALLY DISINTEGRATING ORAL EVERY 6 HOURS PRN
Qty: 20 TABLET | Refills: 1 | Status: ON HOLD | OUTPATIENT
Start: 2023-03-30

## 2023-03-30 RX ORDER — ONDANSETRON 4 MG/1
4 TABLET, ORALLY DISINTEGRATING ORAL EVERY 6 HOURS PRN
Qty: 20 TABLET | Refills: 1 | Status: CANCELLED | OUTPATIENT
Start: 2023-03-30

## 2023-03-30 NOTE — TELEPHONE ENCOUNTER
Ayesha Robledo was added to plan so appt duration may need to be extended     While we try to accommodate patient requests, our priority is to schedule treatment according to Doctor's orders and site availability      1  Does the Provider use the intake sheet or checkout note? 2  What would be a preferred day of the week that would work best for your infusion appointment? Wed  3  Do you prefer mornings or afternoons for your appointments? Anytime  4  Are there any days or dates that do not work for your schedule, including any upcoming vacations? No  5  We are going to try our best to schedule you at the infusion center closest to your home  In the event that we are unable to what would be your next preferred infusion site or sites?      1    [facility]  2  [facility]     6  Do you have transportation to take you to all of your appointments? Yes (if it's Wed )  7   Would you like the infusion center to draw labs from your port? (disregard if patient doesn't have a port or need labs for infusion appointment)       [address]

## 2023-03-30 NOTE — TELEPHONE ENCOUNTER
C[place] Mercado was added to plan so appt duration may need to be extended     While we try to accommodate patient requests, our priority is to schedule treatment according to Doctor's orders and site availability  1  Does the Provider use the intake sheet or checkout note? 2  What would be a preferred day of the week that would work best for your infusion appointment? Wed  3  Do you prefer mornings or afternoons for your appointments? Anytime  4  Are there any days or dates that do not work for your schedule, including any upcoming vacations? No  5  We are going to try our best to schedule you at the infusion center closest to your home  In the event that we are unable to what would be your next preferred infusion site or sites? 1  [facility]  2  [facility]    6  Do you have transportation to take you to all of your appointments? Yes (if it's Wed )  7   Would you like the infusion center to draw labs from your port? (disregard if patient doesn't have a port or need labs for infusion appointment)

## 2023-03-30 NOTE — TELEPHONE ENCOUNTER
[facility] does not have availability for d1c1, tried calling patient for third site preference, no answer & no voicemail set up

## 2023-03-30 NOTE — TELEPHONE ENCOUNTER
Left message on [facility] phone stating call was to go over upcoming schedule for [facility]  Gave my Teams # to call back

## 2023-03-30 NOTE — PROGRESS NOTES
Hematology Outpatient Follow - Up Note  Lady Terry Perez 81 y o  male MRN: @ Encounter: [id number]        Date:  3/30/2023        Assessment/ Plan:    74-year-old  male heavy smoker who came to the hospital with disequilibrium, was found to have multiple brain metastasis, imaging studies showed large mass in the right lower lobe of the lung with hilar, distal pelvic metastasis    Biopsy showed poorly differentiated non-small cell lung cancer    He is undergoing whole brain radiation therapy    He came today with his daughter Shayna Franco who is a registered nurse    We had a long discussion, the patient has poor performance status ECOG 2-3, shortness of breath, cough    We talked about palliative care however the patient and his daughter decided for therapeutic trial of therapy and repeat imaging studies in 2-month    Patient to be treated with Taxol 175 mg per metered square, carboplatin AUC 5, pembrolizumab 200 mg flat dose every 3 weeks followed by pegfilgrastim to prevent chemotherapy-induced neutropenia    Side effects such as infusional reaction, allergic reaction, neuropathy, nausea, vomiting, pancytopenia, elevated liver enzymes, renal insufficiency, hepatitis, pneumonitis, dermatitis, hypothyroidism, adrenal insufficiency etc     He signed the consent    Prognosis guarded    We will send for nuclear tests        Labs and imaging studies are reviewed by ordering provider once results are available  If there are findings that need immediate attention, you will be contacted when results available  Discussing results and the implication on your healthcare is best discussed in person at your follow-up visit  HPI:    81 y o  male with past medical history of hypertension presented to [address] [place] [address] with ambulatory dysfunction for 5 days prior to presentation with reported fall    Stroke alert was initiated without any ischemia identified however was noticed to have incidental lung mass with multiple intracranial metastasis as well as local plus metastatic lymphadenopathy  Patient reported feeling of gait abnormality few days prior to presentation but denied any other symptoms  Patient's family reported initially as per chart review some slurred speech  Denies any weakness, sensory loss, chest pain, headache, visual changes, diarrhea  Was admitted to ICU due to intracranial metastasis and some of them showing vasogenic edema  CT chest abdomen pelvis was ordered for further evaluation of primary source for malignancy  Was found to have a large 12 cm x 10 cm lung mass on right side with mediastinal/hilar lymphadenopathy without any metastasis to abdominal pelvis  Does have history of chronic smoking for many years in the past    Biopsy showed poorly differentiated non-small cell lung cancer    Initiated on whole brain radiation therapy cycle #3 on 3/30/2023    He came today with his daughter Ly Rubio who works as a nurse  Interval History:        Previous Treatment:         Test Results:    Imaging: CTA head and neck with and without contrast    Result Date: 3/14/2023  Narrative: CTA NECK AND BRAIN WITH gadolinium CONTRAST INDICATION: CVA vs tumor, abnormal CT head COMPARISON:   3/14/2023  TECHNIQUE:  Post contrast imaging was performed after administration of iodinated contrast through the neck and brain  Post contrast axial 0 625 mm images timed to opacify the arterial system  3D rendering was performed on an independent workstation  MIP reconstructions performed  Coronal reconstructions were performed of the noncontrast portion of the brain  Radiation dose length product (DLP) for this visit:  8077 mGy-cm   This examination, like all CT scans performed in the [facility], was performed utilizing techniques to minimize radiation dose exposure, including the use of iterative reconstruction and automated exposure control     IV Contrast:  100 mL of iohexol (OMNIPAQUE)  IMAGE QUALITY: Diagnostic FINDINGS: Previously demonstrated lesions throughout the cerebral hemispheres and in the posterior fossa demonstrate ring enhancement  Stable surrounding vasogenic edema  CERVICAL VASCULATURE AORTIC ARCH AND GREAT VESSELS:  No atherosclerotic plaque in the aortic arch  No stenosis in the subclavian arteries  RIGHT VERTEBRAL ARTERY CERVICAL SEGMENT:  Normal origin  The vessel is normal in caliber throughout the neck  LEFT VERTEBRAL ARTERY CERVICAL SEGMENT: Origin directly from the aortic arch  No stenosis or dissection  RIGHT EXTRACRANIAL CAROTID SEGMENT:  Normal caliber common carotid artery  Normal bifurcation and cervical internal carotid artery  No stenosis or dissection  LEFT EXTRACRANIAL CAROTID SEGMENT:  Normal caliber common carotid artery  Normal bifurcation and cervical internal carotid artery  No stenosis or dissection  NASCET criteria was used to determine the degree of internal carotid artery diameter stenosis  INTRACRANIAL VASCULATURE INTERNAL CAROTID ARTERIES:  Calcified plaque throughout the carotid siphons without hemodynamically significant stenosis  ANTERIOR CIRCULATION:  Symmetric A1 segments and anterior cerebral arteries with normal enhancement  Normal anterior communicating artery  MIDDLE CEREBRAL ARTERY CIRCULATION:  M1 segment and middle cerebral artery branches demonstrate normal enhancement bilaterally  DISTAL VERTEBRAL ARTERIES:  Normal distal vertebral arteries  Posterior inferior cerebellar arteries are patent  BASILAR ARTERY:  Physiologically small basilar artery  Patent superior cerebellar arteries  POSTERIOR CEREBRAL ARTERIES: Fetal type bilateral posterior communicating arteries  No stenosis  VENOUS STRUCTURES:  Normal  NON VASCULAR ANATOMY BONY STRUCTURES:  No acute osseous abnormality  SOFT TISSUES OF THE NECK:  Right level IIb and 3 lymph nodes measuring up to 1 9 cm  THORACIC INLET:  Partially imaged right upper lobe 9 cm mass  Mild panlobular emphysema  Impression: 1  Enhancing lesions in the cerebral hemispheres and posterior fossa was able surrounding vasogenic edema  Recommend MRI of the brain with gadolinium  2   Right level 2 and 3 lymph nodes measuring up to 1 9 cm, likely metastatic  3   No stenosis, dissection or occlusion of the carotid or vertebral arteries or major vessels of the [place]  Workstation performed: [id number]     CXR 1 view PA portable in 2 hours    Result Date: 3/18/2023  Narrative: CHEST INDICATION:   s/p right lung bx  Pneumothorax on CT after lung biopsy  COMPARISON:  CXR 3/17/2023, chest CT for biopsy from 3/17/2023  EXAM PERFORMED/VIEWS:  XR CHEST PORTABLE  FINDINGS: Cardiomediastinal silhouette normal  Stable small right pneumothorax since the chest CT although not visible on the chest radiograph from yesterday  Large right lower lobe mass  Left lung clear  Upper abdomen normal  Bones normal for age  Impression: Stable small right pneumothorax since the chest CT from yesterday after right lung mass biopsy  It was not visible on chest radiograph from yesterday  The study was marked in [facility] for immediate notification  Workstation performed: [id number]     XR chest pa & lateral    Result Date: 3/20/2023  Narrative: CHEST INDICATION:   stable pneumothorax noted after lung bx  COMPARISON:  3/17/2023 EXAM PERFORMED/VIEWS:  XR CHEST PA & LATERAL  The frontal view was performed utilizing dual energy radiographic technique  FINDINGS: Cardiomediastinal silhouette appears unremarkable  There is a stable small right apical lateral pneumothorax  Stable large right lung mass  The lungs are hyperinflated  Osseous structures appear within normal limits for patient age  Impression: Stable small right apical lateral pneumothorax postbiopsy of right lower lobe lung mass  Workstation performed: [id number]     XR chest 2 views    Result Date: 3/14/2023  Narrative: CHEST INDICATION:   trauma   COMPARISON:  Subsequent chest CT from today  EXAM PERFORMED/VIEWS:  XR CHEST AP & LATERAL  FINDINGS: Cardiomediastinal silhouette normal  12 cm mass in the right lower lobe  No effusion  Bones normal for age  Upper abdomen normal      Impression: 12 cm right lower lobe mass  See subsequent chest CT  Workstation performed: [id number]     XR abdomen 1 view kub    Result Date: 3/14/2023  Narrative: ABDOMEN INDICATION:   constipation  COMPARISON:  CT chest, abdomen, pelvis 3/14/2023  VIEWS:  AP supine FINDINGS: There is a nonobstructive bowel gas pattern  Moderate colonic stool burden in the ascending colon and rectum  No discernible free air on this supine study  Upright or left lateral decubitus imaging is more sensitive to detect subtle free air in the appropriate setting  No pathologic calcifications or soft tissue masses  Visualized lung bases are clear  Degenerative changes of the bilateral sacroiliac joints  Impression: Nonobstructive bowel gas pattern with a moderate colonic stool burden  Workstation performed: [id number]     CT head without contrast    Result Date: 3/14/2023  Narrative: CT BRAIN - WITHOUT CONTRAST INDICATION:   ataxia, speech disturbance  COMPARISON:  None  TECHNIQUE:  CT examination of the brain was performed  In addition to axial images, sagittal and coronal 2D reformatted images were created and submitted for interpretation  Radiation dose length product (DLP) for this visit:  1022 mGy-cm   This examination, like all CT scans performed in the [facility], was performed utilizing techniques to minimize radiation dose exposure, including the use of iterative reconstruction and automated exposure control  IMAGE QUALITY:  Diagnostic   FINDINGS: PARENCHYMA:  Target appearing intracranial lesions suspected in the right cerebellum measuring approximately 1 9 cm in size (axial image 7, series 301), and the right frontal region measuring approximately 1 7 cm in size  (axial image 31, series 301), in  the posterior left frontal region measuring approximately 1 2 cm in size (axial image 32, series 301 with associated surrounding vasogenic edema; primary differential considerations including intracranial metastatic disease versus infection/abscesses depending on the clinical setting  MRI of the brain with contrast recommended for further evaluation to detect smaller lesions  No significant mass effect or midline shift  No territorial infarction is seen  Gray-white differentiation appears grossly  maintained  Mild parenchymal atrophy  VENTRICLES AND EXTRA-AXIAL SPACES:  Ventricles and extra-axial CSF spaces are prominent commensurate with the degree of volume loss  No hydrocephalus  No acute extra-axial hemorrhage  VISUALIZED ORBITS: Orbits appear intact  PARANASAL SINUSES: Mild mucosal thickening and small amount of fluid in the left maxillary sinus  Cystic area in the right maxillary bone measures approximately 2 cm in size CALVARIUM AND EXTRACRANIAL SOFT TISSUES:  Otherwise grossly unremarkable  Impression: Multiple target appearing intracranial lesions suspected as described with associated vasogenic edema; primary differential considerations including intracranial metastatic disease versus infection/abscesses depending on the clinical setting  MRI of the  brain with contrast recommended for further evaluation and to detect smaller lesions  No significant mass effect or midline shift  Other findings as above  Workstation performed: [id number]     XR chest 1 view    Result Date: 3/18/2023  Narrative: CHEST INDICATION:   right ptx  baseline for comparison  will get another in 2 hours  COMPARISON:  3/14/2021 CT EXAM PERFORMED/VIEWS:  XR CHEST 1 VIEW Images: 2 FINDINGS: Large right lung base mass suspicious for malignancy, consistent with CT Cardiomediastinal silhouette appears unremarkable  No pneumothorax or pleural effusion  Osseous structures appear within normal limits for patient age  Impression: Large right lung base mass suspicious for malignancy, consistent with CT Workstation performed: [id number]     CT chest abdomen pelvis w contrast    Result Date: 3/14/2023  Narrative: CT CHEST, ABDOMEN AND PELVIS WITH IV CONTRAST INDICATION:   Abnormal chest radiograph  COMPARISON:  3/14/2023  TECHNIQUE: CT examination of the chest, abdomen and pelvis was performed  Axial, sagittal, and coronal 2D reformatted images were created from the source data and submitted for interpretation  Radiation dose length product (DLP) for this visit:  462 mGy-cm   This examination, like all CT scans performed in the [facility], was performed utilizing techniques to minimize radiation dose exposure, including the use of iterative reconstruction and automated exposure control  IV Contrast:  100 mL of iohexol (OMNIPAQUE) Enteric Contrast: Enteric contrast was administered  FINDINGS: CHEST LUNGS:  Hypoenhancing right lower lobe mass extending along the fissures into the right middle and upper lobes measuring 10 3 x 11 7 x 12 2 cm  Mass extends to the right hilum  Left lower lobe subpleural 9 mm pulmonary nodule, series 306, image 96  Occlusion of right lower lobe segmental bronchi within the mass  Scattered airspace opacities at the base of the right lower lobe may represent postobstructive pneumonia  Few tiny opacities at the base of the right middle lobe may indicate aspiration  Mild centrilobular and paraseptal emphysema  PLEURA:  No effusion  HEART/GREAT VESSELS: Normal heart size  No thoracic aortic aneurysm or dissection  No evidence of proximal pulmonary artery embolus  Right lower lobe segmental pulmonary arteries are encased by tumor  MEDIASTINUM AND UDAY:  Subcarinal 1 7 cm lymph node  Prevascular 1 cm lymph node  CHEST WALL AND LOWER NECK:  Unremarkable   ABDOMEN LIVER/BILIARY TREE:  Focal area of hypoattenuation in the right lobe of the liver adjacent to the falciform ligament is consistent with focal fat  Mild hepatomegaly GALLBLADDER:  No calcified gallstones  No pericholecystic inflammatory change  SPLEEN:  Unremarkable  PANCREAS:  Unremarkable  ADRENAL GLANDS:  Unremarkable  KIDNEYS/URETERS:  Right renal cysts measure up to 3 8 cm  Left renal cysts measure up to 3 3 cm  Symmetric nephrographic phase enhancement of the kidneys  No obstructive uropathy  STOMACH AND BOWEL:  Large amount stool distends the right colon  Stool distends the rectum  Diverticulosis without evidence of diverticulitis or colitis  APPENDIX:  No findings to suggest appendicitis  ABDOMINOPELVIC CAVITY:  No free intraperitoneal air, fluid collection or lymphadenopathy  VESSELS:  No abdominal aortic aneurysm  PELVIS REPRODUCTIVE ORGANS:  Enlarged prostate making a posterior impression on the bladder  URINARY BLADDER:  Calculi at the left base of the bladder  ABDOMINAL WALL/INGUINAL REGIONS:  Unremarkable  OSSEOUS STRUCTURES:  No acute fracture or destructive osseous lesion  Impression: 1  Right lower lobe mass extending along the fissure to the right middle and upper lobes and to the right hilum, measuring 10 x 12 12 cm, consistent with lung malignancy  Recommend pulmonary consultation  2   Left lower lobe subpleural 9 mm pulmonary nodule, indeterminate though concerning for malignancy  3   Few airspace opacities at the base of the right lower lobe and right middle lobe may represent pneumonia and/or aspiration  4   No evidence of malignancy in the abdomen or pelvis  5   Constipation  Workstation performed: [id number]     IR biopsy lung    Result Date: 3/20/2023  Narrative: IR CT GUIDED LUNG BIOPSY PROCEDURE SUMMARY: 1   CT-guided core needle lung biopsy 2    Deployment of Bio[employer]entry tract sealant device INDICATION: lung mass with brain mets COMPLICATIONS: No immediate complications RADIATION DOSE: 855 09 mGy-cm ANESTHESIA/SEDATION Level of anesthesia: Moderate sedation Anesthesia administered by: Continuous monitoring by independent trained observer Duration of anesthesia/sedation: 30 minutes  Moderate conscious sedation was utilized under my direct supervision administered by trained independent provider  I was present at the initial dose of sedation medication  TECHNIQUE: This examination, like all CT scans performed in the [facility], was performed utilizing techniques to minimize radiation dose exposure, including the use of iterative reconstruction and automated exposure control  The patient was brought to the CT scanner and placed prone on the table  After axial images were obtained through the region of interest an area of the skin was then marked, prepped, and draped in usual sterile fashion  All elements of maximal sterile barrier technique were followed (cap, mask, sterile gown, sterile gloves, sterile sheet, hand hygiene, and 2% chlorhexidine for cutaneous antisepsis)  Lidocaine was administered to the skin and a small skin incision was made  A 17 gauge cannula was advanced up to the lesion  Through the cannula, biopsy specimens were obtained with an 18-gauge biopsy needle  BioSentry tract sealant device was deployed and needle and cannula were removed intact  The specimen was found to be adequate for evaluation  Completion scan was performed  The patient tolerated the procedure well and suffered no complications  FINDINGS: 1   CT chest redemonstrated a large right lung mass  2   Intraprocedural CT demonstrated needle tip in the mass  3   Completion CT demonstrated small pneumothorax  Impression: Technically successful core needle biopsy of a right lung mass  Workstation performed: [id number]     MRI Brain BT w wo Contrast    Result Date: 3/14/2023  Narrative: MRI BRAIN WITH AND WITHOUT CONTRAST INDICATION: New brain masses  Lung carcinoma  Tumor Type: Lung carcinoma  Suspected brain metastasis  Surgical History: None Radiation History: None COMPARISON:  None   TECHNIQUE: Multiplanar, multisequence imaging of the brain and sella was performed before and after gadolinium administration  IV Contrast:  7 5 mL of Gadobutrol injection (SINGLE-DOSE)  IMAGE QUALITY:   Diagnostic  FINDINGS: BRAIN PARENCHYMA: A total of 5 enhancing lesions are identified within the brain parenchyma  1   Series 14 image 103: Left parafalcine frontal lobe  2   Series 14 image 96: Right anterior lateral frontal lobe 3  Superior cerebellum, series 14 image 53  4   Right lateral cerebellum, series 14 image 27 5  5  Left posterior cerebellum, series 14 image 39  Lesions 1 through 4 measures between 1 and 2 cm in size  Lesion 5, Measures less than 1 cm in size  Vasogenic edema is seen surrounding the larger lesions most prominently within the right cerebellum where there is localized mass effect upon the 4th ventricle and partial effacement of the right perimesencephalic cistern  Arterial spin labeling was performed and lesions #1, 2 and 3 demonstrate an increase in cerebral blood flow  OTHER FINDINGS: The remainder of the brain parenchyma is stable  VENTRICLES:  Normal for the patient's age  SELLA AND PITUITARY GLAND:  Normal  ORBITS:  Normal  PARANASAL SINUSES:  Normal  VASCULATURE:  Evaluation of the major intracranial vasculature demonstrates appropriate flow voids  CALVARIUM AND SKULL BASE:  Normal  EXTRACRANIAL SOFT TISSUES:  Normal      Impression: Multiple intracranial enhancing lesions are identified, total of 5, consistent with intracranial metastasis  Several of these demonstrate focal surrounding vasogenic with localized mass effect, most prominently the right cerebellar lesion   Workstation performed: [id number]       Labs:   Lab Results   Component Value Date    WBC 9 79 03/19/2023    HGB 10 5 (L) 03/19/2023    HCT 33 5 (L) 03/19/2023    MCV 87 03/19/2023     03/19/2023     Lab Results   Component Value Date    K 4 4 03/19/2023     03/19/2023    CO2 28 03/19/2023    BUN 28 (H) 03/19/2023    CREATININE 0 83 03/19/2023    CALCIUM 9 2 03/19/2023    CORRECTEDCA 10 2 (H) 03/18/2023    AST 7 03/18/2023    ALT 16 03/18/2023    ALKPHOS 49 03/18/2023    EGFR 89 03/19/2023       No results found for: IRON, TIBC, FERRITIN    No results found for: [id number]      ROS: Review of Systems   Constitutional: Positive for appetite change and unexpected weight change  Negative for chills, diaphoresis, fatigue and fever  HENT:   Negative for hearing loss, lump/mass, mouth sores, nosebleeds, sore throat, trouble swallowing and voice change  Eyes: Negative  Negative for eye problems and icterus  Respiratory: Negative  Negative for chest tightness, cough, hemoptysis and shortness of breath  Cardiovascular: Negative for chest pain and leg swelling  Gastrointestinal: Negative for abdominal distention, abdominal pain, blood in stool, constipation, diarrhea and nausea  Endocrine: Negative  Genitourinary: Negative for dysuria, frequency, hematuria and pelvic pain  Musculoskeletal: Positive for gait problem  Negative for arthralgias, back pain, flank pain, myalgias and neck stiffness  Skin: Negative for itching and rash  Neurological: Positive for dizziness, gait problem and light-headedness  Negative for headaches, numbness and speech difficulty  Hematological: Negative for adenopathy  Does not bruise/bleed easily  Psychiatric/Behavioral: Negative for confusion, decreased concentration, depression and sleep disturbance  The patient is not nervous/anxious  Current Medications: Reviewed  Allergies: Reviewed  PMH/FH/SH:  Reviewed      Physical Exam:    Body surface area is 1 7 meters squared      Wt Readings from Last 3 Encounters:   03/30/23 62 1 kg (137 lb)   03/18/23 62 3 kg (137 lb 5 6 oz)   03/14/23 77 1 kg (169 lb 15 6 oz)        Temp Readings from Last 3 Encounters:   03/30/23 99 5 °F (37 5 °C) (Temporal)   03/19/23 98 8 °F (37 1 °C)   03/13/23 98 1 °F (36 7 °C) (Oral) BP Readings from Last 3 Encounters:   23 98/76   23 118/69   23 131/86         Pulse Readings from Last 3 Encounters:   23 (!) 106   23 73   23 78        Physical Exam  Vitals reviewed  Constitutional:       General: He is not in acute distress  Appearance: He is well-developed  He is ill-appearing  He is not diaphoretic  HENT:      Head: Normocephalic and atraumatic  Eyes:      Conjunctiva/sclera: Conjunctivae normal    Neck:      Trachea: No tracheal deviation  Cardiovascular:      Rate and Rhythm: Normal rate and regular rhythm  Heart sounds: No murmur heard  No friction rub  No gallop  Pulmonary:      Effort: Pulmonary effort is normal  No respiratory distress  Breath sounds: Normal breath sounds  No wheezing or rales  Chest:      Chest wall: No tenderness  Abdominal:      General: There is no distension  Palpations: Abdomen is soft  Tenderness: There is no abdominal tenderness  Musculoskeletal:      Cervical back: Normal range of motion and neck supple  Right lower leg: No edema  Left lower leg: No edema  Lymphadenopathy:      Cervical: No cervical adenopathy  Skin:     General: Skin is warm and dry  Coloration: Skin is not pale  Findings: No erythema  Comments: Clubbing   Neurological:      Mental Status: He is alert and oriented to person, place, and time  Psychiatric:         Behavior: Behavior normal          Thought Content: Thought content normal          Judgment: Judgment normal          ECO  Goals and Barriers:  Current Goal: Minimize effects of disease  Barriers: None  Patient's Capacity to Self Care:  Patient is able to self care      Code Status: [unfilled]

## 2023-03-30 NOTE — PROGRESS NOTES
Chemo teach completed with pt and daughter  Discussed home PT due to recent falls  Pt declining at this time       ONCOLOGY CHECKLIST     ONCOLOGY TREATMENT     Task Priority Due Responsible Completed Completed By Step Outcome    Perform IV assessment, review PORT procedure               Discussed role of Hopeline    3/30/2023 Donny Reddy RN          Orders placed for pre-treatment labs    3/30/2023 Donny Reddy RN          Review schedule / calendar    3/30/2023 Donny Reddy RN          Reviewed common side-effects    3/30/2023 Donny Reddy RN          Reviewed if transportation assistance is needed    3/30/2023 Donny Reddy RN          Reviewed medical oncology nurse contact information    3/30/2023 Donny Reddy RN          Reviewed regimen specific education sheets    3/30/2023 Donny Reddy RN          Reviewed use of supportive medication    3/30/2023 Donny Reddy RN          Reviewed what to expect the first day of treatment    3/30/2023 Donny Reddy RN          Reviewed when blood work needs to be completed (initial and subsequent draws)    3/30/2023 Donny Reddy RN          Reviewed when to call (temp greater than 100 4, uncontrolled vomiting, diarrhea, etc )    3/30/2023 Donny Reddy RN          Scripts for support medication sent to pharmacy    3/30/2023 Donny Reddy RN

## 2023-03-30 NOTE — TELEPHONE ENCOUNTER
Daughter Joseph returned my phone call, we reviewed appointments through 5/30   Schedule sent to [facility]

## 2023-03-30 NOTE — TELEPHONE ENCOUNTER
I did leave Ananth Velazco daughter a message to call us to schedule a HFU apt  Dr Anastasiya Luna also submitted a ref in workque today  I ask Ananth Velazco to call us

## 2023-03-31 ENCOUNTER — PATIENT OUTREACH (OUTPATIENT)
Dept: HEMATOLOGY ONCOLOGY | Facility: CLINIC | Age: 73
End: 2023-03-31

## 2023-03-31 ENCOUNTER — APPOINTMENT (OUTPATIENT)
Dept: RADIATION ONCOLOGY | Facility: HOSPITAL | Age: 73
End: 2023-03-31
Attending: STUDENT IN AN ORGANIZED HEALTH CARE EDUCATION/TRAINING PROGRAM

## 2023-03-31 PROBLEM — Z51.12 ENCOUNTER FOR ANTINEOPLASTIC IMMUNOTHERAPY: Status: ACTIVE | Noted: 2023-01-01

## 2023-03-31 NOTE — PROGRESS NOTES
Initial Outreach Attempt x1: Attempted to reach patient today to introduce myself, assess for any barriers to care and offer any supportive services needed  Left VM in detail to give me a callback at my direct number [phone]  Otherwise, I will re-attempt to outreach at a later time/ date

## 2023-04-01 PROBLEM — J96.01 ACUTE RESPIRATORY FAILURE WITH HYPOXIA (HCC): Status: ACTIVE | Noted: 2023-04-01

## 2023-04-01 PROBLEM — R65.10 SIRS (SYSTEMIC INFLAMMATORY RESPONSE SYNDROME) (HCC): Status: ACTIVE | Noted: 2023-04-01

## 2023-04-01 PROBLEM — J93.9 PNEUMOTHORAX: Status: ACTIVE | Noted: 2023-01-01

## 2023-04-01 PROBLEM — E83.52 HYPERCALCEMIA: Status: ACTIVE | Noted: 2023-01-01

## 2023-04-02 PROBLEM — R13.10 DYSPHAGIA: Status: ACTIVE | Noted: 2023-01-01

## 2023-04-02 PROBLEM — E87.20 LACTIC ACIDOSIS: Status: ACTIVE | Noted: 2023-04-02

## 2023-04-03 ENCOUNTER — APPOINTMENT (OUTPATIENT)
Dept: RADIATION ONCOLOGY | Facility: HOSPITAL | Age: 73
End: 2023-04-03
Attending: STUDENT IN AN ORGANIZED HEALTH CARE EDUCATION/TRAINING PROGRAM

## 2023-04-03 ENCOUNTER — APPOINTMENT (OUTPATIENT)
Dept: RADIATION ONCOLOGY | Facility: HOSPITAL | Age: 73
End: 2023-04-03

## 2023-04-04 ENCOUNTER — APPOINTMENT (OUTPATIENT)
Dept: RADIATION ONCOLOGY | Facility: HOSPITAL | Age: 73
End: 2023-04-04
Attending: STUDENT IN AN ORGANIZED HEALTH CARE EDUCATION/TRAINING PROGRAM

## 2023-04-04 PROBLEM — E83.52 HYPERCALCEMIA: Status: RESOLVED | Noted: 2023-04-01 | Resolved: 2023-04-04

## 2023-04-05 ENCOUNTER — APPOINTMENT (OUTPATIENT)
Dept: RADIATION ONCOLOGY | Facility: HOSPITAL | Age: 73
End: 2023-04-05
Attending: STUDENT IN AN ORGANIZED HEALTH CARE EDUCATION/TRAINING PROGRAM

## 2023-04-06 ENCOUNTER — APPOINTMENT (OUTPATIENT)
Dept: RADIATION ONCOLOGY | Facility: HOSPITAL | Age: 73
End: 2023-04-06
Attending: STUDENT IN AN ORGANIZED HEALTH CARE EDUCATION/TRAINING PROGRAM

## 2023-04-07 ENCOUNTER — APPOINTMENT (OUTPATIENT)
Dept: RADIATION ONCOLOGY | Facility: HOSPITAL | Age: 73
End: 2023-04-07

## 2023-04-07 ENCOUNTER — APPOINTMENT (OUTPATIENT)
Dept: RADIATION ONCOLOGY | Facility: HOSPITAL | Age: 73
End: 2023-04-07
Attending: STUDENT IN AN ORGANIZED HEALTH CARE EDUCATION/TRAINING PROGRAM

## 2023-04-07 PROBLEM — R65.10 SIRS (SYSTEMIC INFLAMMATORY RESPONSE SYNDROME) (HCC): Status: RESOLVED | Noted: 2023-04-01 | Resolved: 2023-04-07

## 2023-04-07 PROBLEM — R13.10 DYSPHAGIA: Status: RESOLVED | Noted: 2023-04-02 | Resolved: 2023-04-07

## 2023-04-07 PROBLEM — R26.2 AMBULATORY DYSFUNCTION: Status: ACTIVE | Noted: 2023-01-01

## 2023-04-09 PROBLEM — J96.01 ACUTE RESPIRATORY FAILURE WITH HYPOXIA (HCC): Status: RESOLVED | Noted: 2023-04-01 | Resolved: 2023-04-09

## 2023-04-10 ENCOUNTER — APPOINTMENT (OUTPATIENT)
Dept: RADIATION ONCOLOGY | Facility: HOSPITAL | Age: 73
End: 2023-04-10
Attending: STUDENT IN AN ORGANIZED HEALTH CARE EDUCATION/TRAINING PROGRAM

## 2023-04-11 ENCOUNTER — APPOINTMENT (OUTPATIENT)
Dept: RADIATION ONCOLOGY | Facility: HOSPITAL | Age: 73
End: 2023-04-11
Attending: STUDENT IN AN ORGANIZED HEALTH CARE EDUCATION/TRAINING PROGRAM

## 2023-04-12 ENCOUNTER — APPOINTMENT (OUTPATIENT)
Dept: RADIATION ONCOLOGY | Facility: HOSPITAL | Age: 73
End: 2023-04-12
Attending: STUDENT IN AN ORGANIZED HEALTH CARE EDUCATION/TRAINING PROGRAM

## 2023-04-13 PROBLEM — J93.9 PNEUMOTHORAX: Status: RESOLVED | Noted: 2023-04-01 | Resolved: 2023-04-13

## 2023-04-14 ENCOUNTER — HOSPITAL ENCOUNTER (INPATIENT)
Facility: HOSPITAL | Age: 73
LOS: 11 days | Discharge: NURSING, REHAB OR LONG-TERM CARE | End: 2023-04-25
Attending: STUDENT IN AN ORGANIZED HEALTH CARE EDUCATION/TRAINING PROGRAM | Admitting: STUDENT IN AN ORGANIZED HEALTH CARE EDUCATION/TRAINING PROGRAM

## 2023-04-14 DIAGNOSIS — E44.0 MODERATE PROTEIN-CALORIE MALNUTRITION (HCC): ICD-10-CM

## 2023-04-14 DIAGNOSIS — C79.31 METASTASIS TO BRAIN (HCC): ICD-10-CM

## 2023-04-14 DIAGNOSIS — I10 HYPERTENSION, UNSPECIFIED TYPE: Primary | ICD-10-CM

## 2023-04-14 DIAGNOSIS — L60.2 HYPERTROPHIC TOENAIL: ICD-10-CM

## 2023-04-14 DIAGNOSIS — R52 PAIN: ICD-10-CM

## 2023-04-14 DIAGNOSIS — C34.91 NON-SMALL CELL CARCINOMA OF RIGHT LUNG (HCC): ICD-10-CM

## 2023-04-14 DIAGNOSIS — J96.01 ACUTE RESPIRATORY FAILURE WITH HYPOXIA (HCC): ICD-10-CM

## 2023-04-14 PROCEDURE — 0HBRXZZ EXCISION OF TOE NAIL, EXTERNAL APPROACH: ICD-10-PCS | Performed by: PODIATRIST

## 2023-04-14 RX ORDER — MEMANTINE HYDROCHLORIDE 10 MG/1
10 TABLET ORAL 2 TIMES DAILY
Status: DISCONTINUED | OUTPATIENT
Start: 2023-04-14 | End: 2023-04-25 | Stop reason: HOSPADM

## 2023-04-14 RX ORDER — ENOXAPARIN SODIUM 100 MG/ML
40 INJECTION SUBCUTANEOUS DAILY
Status: DISCONTINUED | OUTPATIENT
Start: 2023-04-15 | End: 2023-04-25 | Stop reason: HOSPADM

## 2023-04-14 RX ORDER — DEXAMETHASONE 4 MG/1
4 TABLET ORAL DAILY
Status: DISCONTINUED | OUTPATIENT
Start: 2023-04-28 | End: 2023-04-25 | Stop reason: HOSPADM

## 2023-04-14 RX ORDER — DEXAMETHASONE 4 MG/1
4 TABLET ORAL EVERY 12 HOURS SCHEDULED
Status: DISCONTINUED | OUTPATIENT
Start: 2023-04-21 | End: 2023-04-25 | Stop reason: HOSPADM

## 2023-04-14 RX ORDER — LEVETIRACETAM 500 MG/1
500 TABLET ORAL EVERY 12 HOURS SCHEDULED
Status: DISCONTINUED | OUTPATIENT
Start: 2023-04-14 | End: 2023-04-25 | Stop reason: HOSPADM

## 2023-04-14 RX ORDER — ATORVASTATIN CALCIUM 40 MG/1
40 TABLET, FILM COATED ORAL EVERY EVENING
Status: DISCONTINUED | OUTPATIENT
Start: 2023-04-14 | End: 2023-04-25 | Stop reason: HOSPADM

## 2023-04-14 RX ORDER — PANTOPRAZOLE SODIUM 40 MG/1
40 TABLET, DELAYED RELEASE ORAL
Status: DISCONTINUED | OUTPATIENT
Start: 2023-04-15 | End: 2023-04-25 | Stop reason: HOSPADM

## 2023-04-14 RX ORDER — DEXAMETHASONE 4 MG/1
4 TABLET ORAL EVERY 8 HOURS SCHEDULED
Status: DISPENSED | OUTPATIENT
Start: 2023-04-14 | End: 2023-04-21

## 2023-04-14 RX ORDER — ACETAMINOPHEN 325 MG/1
650 TABLET ORAL EVERY 6 HOURS PRN
Status: DISCONTINUED | OUTPATIENT
Start: 2023-04-14 | End: 2023-04-25 | Stop reason: HOSPADM

## 2023-04-14 RX ADMIN — MEMANTINE 10 MG: 10 TABLET ORAL at 17:37

## 2023-04-14 RX ADMIN — LEVETIRACETAM 500 MG: 500 TABLET, FILM COATED ORAL at 21:25

## 2023-04-14 RX ADMIN — ATORVASTATIN CALCIUM 40 MG: 40 TABLET, FILM COATED ORAL at 17:37

## 2023-04-14 RX ADMIN — DEXAMETHASONE 4 MG: 4 TABLET ORAL at 21:25

## 2023-04-14 RX ADMIN — DEXAMETHASONE 4 MG: 4 TABLET ORAL at 17:37

## 2023-04-14 NOTE — H&P
PHYSICAL MEDICINE AND REHABILITATION H&P/ADMISSION NOTE  Flaco Perez 81 y o  male MRN: [id number]  Unit/Bed#: -01 Encounter: [id number]     Rehab Diagnosis: Impairment of mobility, safety, Activities of Daily Living (ADLs), and cognitive/communication skills due to Developmental Disability:  15  Developmental Disability  Etiologic: Pneumothorax with Acute respiratory failure with hypoxia ; SIRS  Date of Onset: 4/1/23   Date of surgery: n/a    History of Present Illness:   Lita Walker is a 81 y o  male with a medical history of hypertension, BPH, a recent hospitalization on 03/14-03/19 found to have 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 who presented to [address] [place] [address] on 04/01/23 with a one day history of shortness of breath s/p lung mass biopsy and following this he had a small apical pneumothorax  This was believed to resolve on its own, patient was stable and discharged home  Patient presented with 1 day history of shortness of breath with oxygen saturation in the  70% on room air  He was placed on Bipap then transitioned to 2L nasal cannula  Chest x-ray showed a large right sided pneumothorax with complete collapse of the right lung  A right chest tube was placed on 4/1 and on 04/02 patient was found to have subcutaneous emphysema and chest tube was removed  Trauma was consulted and placed new chest tube  Chest tube was removed on 04/07  Patient also presented with leukocytosis, elevated procalcitonin and lactic acid  The acute respiratory failure was secondary to right-sided pneumothorax with possible LLL pneumonia  Patient completed Cefepime with prn tessalon pearls for cough  Patient was able to continue with whole brain radiation treatments, completed on 04/12  Chemotherapy treatments not started, per Dr Alf Pugh chemotherapy can be held for no more than 2 weeks so patient can have rehabilitation prior to starting the treatments   PT/OT were consulted and recommend acute inpatient rehabilitation  The patient was evaluated by the Rehabilitation team and deemed an appropriate candidate for comprehensive inpatient rehabilitation and admitted to the [facility] on 4/14/2023  1:30 PM    Plan:     Rehabilitation  • Functional deficits: impaired mobility, self care  • Begin PT/OT/SLP  Rehabilitation goals are to achieve a supervision- modified independent level with mobility and self care  Prognosis is fair  ELOS is 10-14  Estimated discharge is home  DVT prophylaxis  • Lovenox    Pain  • Not an issue    Bladder plan  • incontinent    Bowel plan  • incontinent  Code Status  • Level 2, DNAR    Incidental Findings  • none    * Acute respiratory failure with hypoxia (HCC)  Assessment & Plan  - small pneumothorax s/p lung mass biopsy 03/17  - 04/01- shortness of breath  - 04/01 - imaging showed a complete right pneumothorax  - 04/01- right chest tube placed,   - 04/02- chest tube dislodges; developed subcutaneous emphysema extending to neck,   - trauma consulted; removed initial chest tube, trauma placed new chest tube to suction  - monitor oxygenation    - Comprehensive therapies 3 hr a day, 5-6 days a week      Non-small cell carcinoma of right lung (HCC)  Assessment & Plan  - stage IV NSCLC w/ brain metastases  - confirmed with lung biopsy 03/17  - per Dr Enciso June; okay to hold chemotherapy, no more than 2 weeks    Brain mass  Assessment & Plan  - Brain metastases  - received whole brain radiation 10 treatments, completed 04/12  - continue Keppra 500 mg q12h, decadron 4mg q6h, memantine 10 mg q12h  - follows with Oncology, Dr Prince Fitzgerald    Severe protein-calorie malnutrition [facility])  Assessment & Plan  - BMI 20 99  - nutrition consult  - nutritional supplements  - monitor I&O    Subjective/Interval Events:       Review of Systems   Constitutional: Negative for appetite change, chills and fever  HENT: Positive for hearing loss  "Negative for congestion and sore throat  Eyes: Positive for visual disturbance  Negative for photophobia  Respiratory: Negative for cough and wheezing  Cardiovascular: Negative for chest pain and palpitations  Gastrointestinal: Negative for constipation and diarrhea  Endocrine: Negative for cold intolerance and heat intolerance  Genitourinary: Negative for difficulty urinating and dysuria  Musculoskeletal: Positive for gait problem  Skin: Negative for pallor and wound  Neurological: Negative for dizziness and headaches  Psychiatric/Behavioral: Positive for agitation and behavioral problems  All other systems reviewed and are negative  Function:  Prior level of function and living situation:  PRIOR LEVEL OF FUNCTION:  He lives in [facility] apartment  [facility] is  and lives with their spouse  Self Care: Independent, Indoor Mobility: Independent, Stairs (in/outdoor): Independent and Cognition: Independent     FALLS IN THE LAST 6 MONTHS: 1-4     HOME ENVIRONMENT:  The living area: can live on one level  There are No steps to enter the home  The patient will have 24 hour supervision available upon discharge  PREVIOUS DME:  Equipment in home (previous DME): Rolling Walker      Current level of function:  Physical Therapy: mobility- not assessed, transfers- supervision with verbal cues for hand placement, increased time; ambulation- mod A RW  Occupational Therapy: ADL: bathing- refused UB bathing, participated in LB upper leg only became frustrated and asked OT to complete, dressing: UB- refused , LB- refused  Speech Therapy: regular diet/thin liquids, cognition impaired    Physical Exam:  BP 96/52 (BP Location: Right arm)   Pulse 72   Temp 97 6 °F (36 4 °C) (Oral)   Resp 17   Ht 5' 6\" (1 676 m)   Wt 59 kg (130 lb 1 1 oz)   SpO2 92%   BMI 20 99 kg/m²      No intake or output data in the 24 hours ending 04/14/23 1604    Body mass index is 20 99 kg/m²        Physical " Exam  Constitutional:       Appearance: Normal appearance  HENT:      Head: Normocephalic and atraumatic  Mouth/Throat:      Mouth: Mucous membranes are moist    Cardiovascular:      Rate and Rhythm: Normal rate  Pulses: Normal pulses  Pulmonary:      Effort: Pulmonary effort is normal    Abdominal:      General: Bowel sounds are normal       Palpations: Abdomen is soft  Musculoskeletal:         General: Normal range of motion  Cervical back: Normal range of motion  Skin:     General: Skin is warm and dry  Capillary Refill: Capillary refill takes less than 2 seconds  Findings: Bruising present  Neurological:      Mental Status: He is alert and oriented to person, place, and time  Cranial Nerves: Cranial nerve deficit present  Motor: Weakness present  Coordination: Coordination abnormal       Comments: Overall strength 5/5, ataxia   Psychiatric:         Mood and Affect: Mood normal          Judgment: Judgment normal         Labs, medications, and imaging personally reviewed      Laboratory:    Lab Results   Component Value Date    SODIUM 135 04/13/2023    K 4 5 04/13/2023     04/13/2023    CO2 28 04/13/2023    BUN 30 (H) 04/13/2023    CREATININE 0 49 (L) 04/13/2023    GLUC 135 04/13/2023    CALCIUM 8 0 (L) 04/13/2023     Lab Results   Component Value Date    WBC 10 92 (H) 04/13/2023    HGB 11 5 (L) 04/13/2023    HCT 37 0 04/13/2023    MCV 92 04/13/2023     04/13/2023     Lab Results   Component Value Date    INR 1 00 03/15/2023    PROTIME 13 4 03/15/2023         Current Facility-Administered Medications:   •  acetaminophen (TYLENOL) tablet 650 mg, 650 mg, Oral, Q6H PRN, MARY BETH Momin  •  atorvastatin (LIPITOR) tablet 40 mg, 40 mg, Oral, QPM, MARY BETH Momin  •  dexamethasone (DECADRON) tablet 4 mg, 4 mg, Oral, Q8H MOLLY **FOLLOWED BY** [START ON 4/21/2023] dexamethasone (DECADRON) tablet 4 mg, 4 mg, Oral, Q12H [address] **FOLLOWED BY** [START ON 4/28/2023] dexamethasone (DECADRON) tablet 4 mg, 4 mg, Oral, Daily, MARY BETH Momin  •  [START ON 4/15/2023] enoxaparin (LOVENOX) subcutaneous injection 40 mg, 40 mg, Subcutaneous, Daily, MARY BETH Momin  •  levETIRAcetam (KEPPRA) tablet 500 mg, 500 mg, Oral, Q12H MOLLY, MARY BETH Momin  •  memantine (NAMENDA) tablet 10 mg, 10 mg, Oral, BID, MARY BETH Momin  •  [START ON 4/15/2023] pantoprazole (PROTONIX) EC tablet 40 mg, 40 mg, Oral, Early Morning, MARY BETH Momin  No Known Allergies   Patient Active Problem List    Diagnosis Date Noted   • Acute respiratory failure with hypoxia ([facility] ) 04/14/2023   • Non-small cell carcinoma of right lung ([facility] ) 03/27/2023   • Brain mass 03/14/2023   • Ambulatory dysfunction 04/07/2023   • Encounter for antineoplastic immunotherapy 03/31/2023   • Chemotherapy induced neutropenia ([facility] ) 03/30/2023   • 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([facility] ) 03/30/2023   • Severe protein-calorie malnutrition ([facility] ) 03/16/2023   • Lung mass 03/14/2023   • Hypertension 03/14/2023     Past Medical History:   Diagnosis Date   • Hypertension      Past Surgical History:   Procedure Laterality Date   • HERNIA REPAIR     • IR BIOPSY LUNG  3/17/2023     Social History     Socioeconomic History   • Marital status: Single     Spouse name: Not on file   • Number of children: Not on file   • Years of education: Not on file   • Highest education level: Not on file   Occupational History   • Not on file   Tobacco Use   • Smoking status: Former   • Smokeless tobacco: Not on file   • Tobacco comments:     quit Wednesday   Substance and Sexual Activity   • Alcohol use: Never   • Drug use: Never   • Sexual activity: Not on file   Other Topics Concern   • Not on file   Social History Narrative   • Not on file     Social Determinants of Health     Financial Resource Strain: Not on file   Food Insecurity: No Food Insecurity   • Worried About Running Out of Food in the Last Year: Never true   • Ran Out of Food in the Last Year: Never true   Transportation Needs: No Transportation Needs   • Lack of Transportation (Medical): No   • Lack of Transportation (Non-Medical): No   Physical Activity: Not on file   Stress: Not on file   Social Connections: Not on file   Intimate Partner Violence: Not on file   Housing Stability: Low Risk    • Unable to Pay for Housing in the Last Year: No   • Number of Places Lived in the Last Year: 1   • Unstable Housing in the Last Year: No     Social History     Tobacco Use   Smoking Status Former   Smokeless Tobacco Not on file   Tobacco Comments    quit Wednesday     Social History     Substance and Sexual Activity   Alcohol Use Never     No family history on file  Medical Necessity Criteria for [facility] Admission: Bowel/Bladder Management and Incision/Wound care  In addition, the preadmission screen, post-admission physical evaluation, overall plan of care and admissions order demonstrate a reasonable expectation that the following criteria were met at the time of admission to the [facility]  1  The patient requires active and ongoing therapeutic intervention of multiple therapy disciplines (physical therapy, occupational therapy, speech-language pathology, or prosthetics/orthotics), one of which is physical or occupational therapy  2  Patient requires an intensive rehabilitation therapy program, as defined in Chapter 1, section 110 2 2 of the CMS Medicare Policy Manual  This intensive rehabilitation therapy program will consist of at least 3 hours of therapy per day at least 5 days per week or at least 15 hours of intensive rehabilitation therapy within a 7 consecutive day period, beginning with the date of admission to the [facility]  3  The patient is reasonably expected to actively participate in, and benefit significantly from, the intensive rehabilitation therapy program as defined in Chapter 1, section 110 2 2 of the CMS Medicare Policy Manual at this time of admission to the [facility]   He can reasonably be expected to make measurable improvement (that will be of practical value to improve the patient’s functional capacity or adaptation to impairments) as a result of the rehabilitation treatment, as defined in section 110 3, and such improvement can be expected to be made within the prescribed period of time  As noted in the CMS Medicare Policy Manual, the patient need not be expected to achieve complete independence in the domain of self-care nor be expected to return to his or her prior level of functioning in order to meet this standard  4  The patient must require physician supervision by a rehabilitation physician  As such, a rehabilitation physician will conduct face-to-face visits with the patient at least 3 days per week throughout the patient’s stay in the [facility] to assess the patient both medically and functionally, as well as to modify the course of treatment as needed to maximize the patient’s capacity to benefit from the rehabilitation process  5  The patient requires an intensive and coordinated interdisciplinary approach to providing rehabilitation, as defined in Chapter 1, section 110 2 5 of the CMS Medicare Policy Manual  This will be achieved through periodic team conferences, conducted at least once in a 7-day period, and comprising of an interdisciplinary team of medical professionals consisting of: a rehabilitation physician, registered nurse,  and/or , and a licensed/certified therapist from each therapy discipline involved in treating the patient  Changes Since Pre-admission Assessment: None -This patient's participation in rehab continues to be reasonable, necessary and appropriate  CMS Required Post-Admission Physician Evaluation Elements  History and Physical, including medical history, functional history and active comorbidities as in above text       Post-Admission Physician Evaluation:  The patient has the potential to make improvement and is in need of physical, occupational, and/or therapy services  The patient may also need nutritional services  Given the patient's complex medical condition and risk of further medical complications, rehabilitative services cannot be safely provided at a lower level of care, such as a skilled nursing facility  I have reviewed the patient's functional and medical status at the time of the preadmission screening and they are the same as on the day of this admission  I acknowledge that I have personally performed a full physical examination on this patient within 24 hours of admission  The patient and/or family demonstrated understanding the rehabilitation program and the discharge process after we discussed them       Agree in entirety: yes  Minor adaptions: none    Major changes: none    MARY BETH Leon  Physical Medicine and [facility]

## 2023-04-14 NOTE — CONSULTS
Podiatry - Consultation    Patient Information:   Sveta Zhu 81 y o  male MRN: [id number]  Unit/Bed#: -01 Encounter: [id number]  PCP: No primary care provider on file  Date of Admission:  4/14/2023  Date of Consultation: 04/14/23  Requesting Physician: Peggy Cooney DO      ASSESSMENT:    Sveta Zhu is a 81 y o  male with:    1  Hypertrophic toenails  2  Ambulatory dysfunction  3  Severe protein calorie nutrition     PLAN:    · Hypertrophic nails trimmed with large nail nipper without incident  · Patient is stable from podiatric standpoint, signing off at this time  Appreciate the consult  · Elevation and offloading on green foam wedges or pillows when non-ambulatory  · Rest of care per primary team   · Will discuss this plan with my attending and update as needed  Weightbearing status: Weightbearing as tolerated    SUBJECTIVE:    History of Present Illness:    Sveta Zhu is a 81 y o  male who is originally admitted 4/14/2023 due to acute respiratory failure with hypoxia  Patient has a past medical history of brain mass, severe protein-calorie malnutrition, non-small cell carcinoma of right lung, hypertension, chemotherapy induced neutropenia, and ambulatory dysfunction  We are consulted for elongated toenails of bilateral lower extremities  Patient poor historian regarding medical history  Review of Systems:    Constitutional: Negative  HENT: Negative  Eyes: Negative  Respiratory: Negative  Cardiovascular: Negative  Gastrointestinal: Negative  Musculoskeletal: Negative    Skin: Hypertrophic toenails    Neurological: Negative    Psych: Negative       Past Medical and Surgical History:     Past Medical History:   Diagnosis Date   • Hypertension        Past Surgical History:   Procedure Laterality Date   • HERNIA REPAIR     • IR BIOPSY LUNG  3/17/2023       Meds/Allergies:    Medications Prior to Admission   Medication   • atorvastatin (LIPITOR) 40 mg tablet   • "dexamethasone (DECADRON) 4 mg tablet   • [START ON 4/21/2023] dexamethasone (DECADRON) 4 mg tablet   • [START ON 4/28/2023] dexamethasone (DECADRON) 4 mg tablet   • levETIRAcetam (KEPPRA) 500 mg tablet   • memantine (NAMENDA) 5 mg tablet   • omeprazole (PriLOSEC) 40 MG capsule   • ondansetron (ZOFRAN-ODT) 4 mg disintegrating tablet       No Known Allergies    Social History:     Marital Status: Single    Substance Use History:   Social History     Substance and Sexual Activity   Alcohol Use Never     Social History     Tobacco Use   Smoking Status Former   Smokeless Tobacco Not on file   Tobacco Comments    quit Wednesday     Social History     Substance and Sexual Activity   Drug Use Never       Family History:    No family history on file  OBJECTIVE:    Vitals:   Blood Pressure: 96/52 (04/14/23 1345)  Pulse: 72 (04/14/23 1345)  Temperature: 97 6 °F (36 4 °C) (04/14/23 1345)  Temp Source: Oral (04/14/23 1345)  Respirations: 17 (04/14/23 1345)  Height: 5' 6\" (167 6 cm) (04/14/23 1345)  Weight - Scale: 59 kg (130 lb 1 1 oz) (04/14/23 1345)  SpO2: 92 % (04/14/23 1453)    Physical Exam:    General Appearance: Alert, cooperative, no distress  HEENT: Head normocephalic, atraumatic, without obvious abnormality  Heart: Normal rate and rhythm  Lungs: Non-labored breathing  No respiratory distress  Abdomen: Without distension  Psychiatric: AAOx3  Lower Extremity:    Vascular:   DP: Right: 1+ Left: 1+  PT: Right: 1+ Left: 1+  CRT < 3 seconds at the digits  +0/4 edema noted at bilateral lower extremities  Pedal hair is absent  Skin temperature is WNL bilaterally  Musculoskeletal:  MMT is 5/5 in all muscle compartments [id number]  ROM at the 1st MPJ and ankle joint are WNL bilaterally with the leg extended  No Pain on palpation of bilateral lower extremities  No gross deformities noted       Dermatological:  - Hypertrophic toenails x10   - Skin temperature and turgor WNL  - No open wounds or lesions " "present    Neurological:  Gross sensation is intact  Light touch is intact  Protective sensation is intact  Additional data:     Lab Results: I have personally reviewed pertinent labs including:    Results from last 7 days   Lab Units 04/13/23  0528   WBC Thousand/uL 10 92*   HEMOGLOBIN g/dL 11 5*   HEMATOCRIT % 37 0   PLATELETS Thousands/uL 153     Results from last 7 days   Lab Units 04/13/23  0528   POTASSIUM mmol/L 4 5   CHLORIDE mmol/L 103   CO2 mmol/L 28   BUN mg/dL 30*   CREATININE mg/dL 0 49*   CALCIUM mg/dL 8 0*           Cultures: I have personally reviewed pertinent cultures including:              Imaging: I have personally reviewed pertinent reports in PACS  EKG, Pathology, and Other Studies: I have personally reviewed pertinent reports  Time Spent for Care: 30 minutes  More than 50% of total time spent on counseling and coordination of care as described above  ** Please Note: Portions of the record may have been created with voice recognition software  Occasional wrong word or \"sound a like\" substitutions may have occurred due to the inherent limitations of voice recognition software  Read the chart carefully and recognize, using context, where substitutions have occurred   **  "

## 2023-04-14 NOTE — PLAN OF CARE
Problem: PAIN - ADULT  Goal: Verbalizes/displays adequate comfort level or baseline comfort level  Description: Interventions:  - Encourage patient to monitor pain and request assistance  - Assess pain using appropriate pain scale  - Administer analgesics based on type and severity of pain and evaluate response  - Implement non-pharmacological measures as appropriate and evaluate response  - Consider cultural and social influences on pain and pain management  - Notify physician/advanced practitioner if interventions unsuccessful or patient reports new pain  Outcome: Progressing     Problem: INFECTION - ADULT  Goal: Absence or prevention of progression during hospitalization  Description: INTERVENTIONS:  - Assess and monitor for signs and symptoms of infection  - Monitor lab/diagnostic results  - Monitor all insertion sites, i e  indwelling lines, tubes, and drains  - Monitor endotracheal if appropriate and nasal secretions for changes in amount and color  - [place] appropriate cooling/warming therapies per order  - Administer medications as ordered  - Instruct and encourage patient and family to use good hand hygiene technique  - Identify and instruct in appropriate isolation precautions for identified infection/condition  Outcome: Progressing  Goal: Absence of fever/infection during neutropenic period  Description: INTERVENTIONS:  - Monitor WBC    Outcome: Progressing     Problem: SAFETY ADULT  Goal: Patient will remain free of falls  Description: INTERVENTIONS:  - Educate patient/family on patient safety including physical limitations  - Instruct patient to call for assistance with activity   - Consult OT/PT to assist with strengthening/mobility   - Keep Call bell within reach  - Keep bed low and locked with side rails adjusted as appropriate  - Keep care items and personal belongings within reach  - Initiate and maintain comfort rounds  - Make Fall Risk Sign visible to staff  - Offer Toileting zehra advance of need  - Initiat  - Obtain necessary fall risk manage  - Apply yellow socks and bracelet for high fall risk patients  - Consider moving patient to room near nurses station  Outcome: Progressing  Goal: Maintain or return to baseline ADL function  Description: INTERVENTIONS:  -  Assess patient's ability to carry out ADLs; assess patient's baseline for ADL function and identify physical deficits which impact ability to perform ADLs (bathing, care of mouth/teeth, toileting, grooming, dressing, etc )  - Assess/evaluate cause of self-care deficits   - Assess range of motion  - Assess patient's mobility; develop plan if impaired  - Assess patient's need for assistive devices and provide as appropriate  - Encourage maximum independence but intervene and supervise when necessary  - Involve family in performance of ADLs  - Assess for home care needs following discharge   - Consider OT consult to assist with ADL evaluation and planning for discharge  - Provide patient education as appropriate  Outcome: Progressing  Goal: Maintains/Returns to pre admission functional level  Description: INTERVENTIONS:  - Perform BMAT or MOVE assessment daily    - Set and communicate daily mobility goal to care team and patient/family/caregiver  - Collaborate with rehabilitation services on mobility goals if consulted  - Perform Range of Mots a day    - Reposition    - Dangle pa  - Isaac  - Ambulate   - Out of - Out of bed for  - Out of bed for toileting  - Record patient progress and toleration of activity level   Outcome: Progressing     Problem: DISCHARGE PLANNING  Goal: Discharge to home or other facility with appropriate resources  Description: INTERVENTIONS:  - Identify barriers to discharge w/patient and caregiver  - Arrange for needed discharge resources and transportation as appropriate  - Identify discharge learning needs (meds, wound care, etc )  - Arrange for interpretive services to assist at discharge as needed  - Refer to Case Management Department for coordinating discharge planning if the patient needs post-hospital services based on physician/advanced practitioner order or complex needs related to functional status, cognitive ability, or social support system  Outcome: Progressing

## 2023-04-14 NOTE — ASSESSMENT & PLAN NOTE
- stage IV NSCLC w/ brain metastases  - confirmed with lung biopsy 03/17  - per Dr Milly Bethea; okay to hold chemotherapy, no more than 2 weeks  - Follow-up with [facility] on 04/27 with plans to start chemotherapy on 5/2 Pt provided with urinal will call when urine specimen is ready.

## 2023-04-14 NOTE — TREATMENT PLAN
Individualized Plan of [facility] MERVIN Perez 81 y o  male MRN: [id number]  Unit/Bed#: -01 Encounter: [id number]     PATIENT INFORMATION  ADMISSION DATE: 4/14/2023  1:30 PM CHERI CATEGORY:Debility:  12  Debility (Non-cardiac/Non-pulmonary)   ADMISSION DIAGNOSIS: Pneumothorax [J93 9]  Acute respiratory failure with hypoxia (HCC) [J96 01]  SIRS (systemic inflammatory response syndrome) ([facility]) [R65 10]  EXPECTED LOS: 10 to 14 days     MEDICAL/FUNCTIONAL PROGNOSIS  Based on my assessment of the patient's medical conditions and current functional status, the prognosis for attaining medical and functional goals or the IRF stay is:  Good    Medical Goals: Patient will be medically stable for discharge to [facility] upon completion of rehab program and Patient will be able to manage medical conditions and comorbid conditions with medications and follow up upon completion of rehab program    [address]: Home - independent/modified independent  INSTITUTIONAL SETTING: Intermediate care  Is a 24-hr caregiver available? Yes  Has discharge plan been discussed with primary caregiver? Yes  Date of Discussion: 04/14/23    ANTICIPATED FOLLOW-UP SERVICE:   Outpatient Therapy Services: PT and OT      DISCIPLINE SPECIFIC PLANS:  Required Disciplines & Services: Rehabillitation Nursing, Case Management and Dietay/Nutrition    REQUIRED THERAPY:  Therapy Hours per Day Days per Week Total Days   Physical Therapy 1 5-6 10-14   Occupational Therapy 1 5-6 10-14   Speech/Language Therapy 1 3-5 10-14   NOTE: Additional therapy time(s) or changes to allocation of therapies as appropriate to meet patient needs and to achieve functional goals        Patient will participate in above therapy regimen consisting of PT, OT and SLP due to the following medical procedure/condition:Debility:  16  Debility "(Non-cardiac/Non-pulmonary)    ANTICIPATED FUNCTIONAL OUTCOMES:  ADL:  modified independent   Bladder/Bowel:  modified independent   Transfers:  modified independent   Locomotion:  modified independent   Cognitive:  modified independent     DISCHARGE PLANNING NEEDS  Equipment needs: Discharge needs to be reviewed with team      REHAB ANTICIPATED PARTICIPATION RESTRICTIONS:  Decreased Insight to Deficits, Decreaed Safety Awareness, Inability to Drive, Inaccessibel Home Entrance, Requires Assist with Heavy Homemaking and Requires Assit with Homemaking    Medical Necessity Criteria for ARC Admission:  The preadmission screen, post-admission physical evaluation, overall plan of care and admissions order demonstrate a reasonable expectation that the following criteria were met at the time of admission to the [facility]  (See \"Specific areas of management and oversight in ARC setting\" for additional details on medical necessity as outlined below)  1  The patient requires active and ongoing therapeutic intervention of multiple therapy disciplines (physical therapy, occupational therapy, speech-language pathology, or prosthetics/orthotics), one of which is physical or occupational therapy  2  Patient requires an intensive rehabilitation therapy program, as defined in Chapter 1, section 110 2 2 of the CMS Medicare Policy Manual  This intensive rehabilitation therapy program will consist of at least 3 hours of therapy per day at least 5 days per week or at least 15 hours of intensive rehabilitation therapy within a 7 consecutive day period, beginning with the date of admission to the [facility]  3  The patient is reasonably expected to actively participate in, and benefit significantly from, the intensive rehabilitation therapy program as defined in Chapter 1, section 110 2 2 of the CMS Medicare Policy Manual at this time of admission to the [facility]   He can reasonably be expected to make measurable improvement (that will be of " practical value to improve the patient’s functional capacity or adaptation to impairments) as a result of the rehabilitation treatment, as defined in section 110 3, and such improvement can be expected to be made within the prescribed period of time  As noted in the CMS Medicare Policy Manual, the patient need not be expected to achieve complete independence in the domain of self-care nor be expected to return to his or her prior level of functioning in order to meet this standard  4  The patient must require physician supervision by a rehabilitation physician  As such, a rehabilitation physician will conduct face-to-face visits with the patient at least 3 days per week throughout the patient’s stay in the [facility] to assess the patient both medically and functionally, as well as to modify the course of treatment as needed to maximize the patient’s capacity to benefit from the rehabilitation process  5  The patient requires an intensive and coordinated interdisciplinary approach to providing rehabilitation, as defined in Chapter 1, section 110 2 5 of the CMS Medicare Policy Manual  This will be achieved through periodic team conferences, conducted at least once in a 7-day period, and comprising of an interdisciplinary team of medical professionals consisting of: a rehabilitation physician, registered nurse,  and/or , and a licensed/certified therapist from each therapy discipline involved in treating the patient  Changes Since Pre-admission Assessment: None -This patient's participation in rehab continues to be reasonable, necessary and appropriate  CMS Required Post-Admission Physician Evaluation Elements  History and Physical, including medical history, functional history and active comorbidities as in above text  Post-Admission Physician Evaluation:  The patient has the potential to make improvement and is in need of physical, occupational, and/or therapy services   The patient may also need nutritional services  Given the patient's complex medical condition and risk of further medical complications, rehabilitative services cannot be safely provided at a lower level of care, such as a skilled nursing facility  I have reviewed the patient's functional and medical status at the time of the preadmission screening and they are the same as on the day of this admission  I acknowledge that I have personally performed a full physical examination on this patient within 24 hours of admission  The patient demonstrated understanding the rehabilitation program and the discharge process after we discussed them  Agree in entirety: yes  Minor adaptions: none    Major changes: none    Specific areas of management and oversight in ARC setting:      Hypoxia prevention: Ensure appropriate level of oxygenation at rest and with activity to avoid symptomatic hypoxia, maximize functional performance, and decrease risk of atelectasis/pneumonia through close and frequent monitoring, providing appropriate respiratory treatments (such as incentive spirometry), and when necessary provide/adjust respiratory medications  Pain management:  Pain will improve with frequent evaluation of pain, careful adjustments in medications, frequent re-evaluation of patient's pain and medical/neurologic status to ensure optimal pain control, avoidance of potential serious and even life-threatening side-effects and drug interactions, as well as weaning pain medications as soon as possible to decrease risk of short and long-term use       Neurologic Disorder: brain metasteses causing impaired mobility, ADLs, and gait:  intensive skilled therapies with physical therapy and occupational therapy with close oversight and management by rehab specialized physician in acute rehabilitation setting to most expeditiously and effectively improve functional mobility, transfers, upper and lower body strengthening, conditioning, balance, and gait training with appropriate assistive device  Patient will have optimal supervision and management of patient's underlying neurologic disorder with specialized rehabilitation physician during this period of recovery to ensure most expeditious and optimal recovery with decreased risks of fall/injury and other complications including acute worsening of neuro disorder, decrease risk of VTE, PNA, and skin ulceration  Cognitive impairment: intensive skilled therapies including speech language pathology and occupation therapy to evaluate and treat deficits in cognition  Close oversight and management by rehab specialized physician of cognitive deficits and potential confounding factors (prevention of, monitoring for, and if found treatment of possible complications such as infections, as well as optimally managing sleep, mood, and medications which can negatively impact cognition and functional recovery)  Dysphagia: improve swallowing through SLP evaluation with intensive treatments, optimal nutrition, and fluid intake  Adjust diet and swallowing precautions as indicated to decrease risk of aspiration pneumonia and respiratory distress  Close oversight and management by rehab specialized physician  Inpatient rehabilitation education/teaching: To be provided to patient and typically family/caregiver (if able to be identified) by all skilled therapists, rehab nursing, case management, and rehab specialized physician to ensure optimal recovery and decrease risks of complications in both acute rehabilitation setting as well as after discharge  Protein-calorie malnutrition with failure to thrive:  Close monitoring of nutrition status, caloric intake, nutrition specialist with adjustments in diet, supplements, and at times medications to optimize nutritional status for both short-term and long-term functional recovery and prevention of complications associated with malnutrition      Skin wounds: Appropriate skin checks for wound/skin evaluation including evaluation of healing, worsening of wounds, or signs of infection  Wound care management from rehab nursing, wound care nursing, physicians  Ensure frequent appropriate turning, positioning in bed, in chair, when mobilizing, and when appropriate with use of appropriate devices to optimize healing and decrease risk of worsening or new skin breakdown        Adia miDrivealeksandra, [address] [facility]

## 2023-04-14 NOTE — ASSESSMENT & PLAN NOTE
- Brain metastases  - received whole brain radiation 10 treatments, completed 04/12  - continue Keppra 500 mg q12h, decadron 4mg taper, memantine 10 mg q12h  - follows with Oncology, Dr Alex Villatoro  - Palliative following, will contact daughter Idalia Dias for [address]  - Decadron q12h through 04/27, then daily  - Follow up with HemOnc as out patient

## 2023-04-14 NOTE — CONSULTS
Internal Medicine Consultation Note    Patient: Brian Beyer  Age/sex: 81 y o  male  Medical Record #: [id number]      ASSESSMENT PLAN    Non-small cell lung cancer with brain mets  · Chemotherapy on hold until pt discharged - no more than 2 weeks  · Continue whole brain radiation  · Continue Decadron, Keppra and memantine  · Follow-up with Dr Jj Henry and radiation oncology  Pneumothorax  · Occurred after lung biopsy  · Now resolved  · Monitor for recurrence  Leukocytosis  · Mild  Likely reactive  No signs of infx  HTN  · Listed in pt's history but BPs have been soft  · Will monitor  Subjective/ HPI: Patient seen and examined  Yary Cardoso is a 69yo male, with non-small cell lung CA with mets to the brain and pelvis, HTN and BPH, who presented to [facility] 4/1/23 with shortness of breath for 1 day  EMS found his O2 sats to be in the 70s  He was placed on Bipap and given nebs and steroids  CXR revealed a large Rt pneumothorax  He had a chest tube placed in the ED  He was found to have leukocytosis as well as elevated lactic acid and procalcitonin  He was treated with IV abx for 8 days due to concerns for PNA  On 4/2/23 the chest tube was found to not be in the correct location so it was discontinued  A new chest tube was placed  It was able to be removed 4/7/23  He was determined to be stable for transfer to the [facility] 4/14/23  IM consulted for medical management of comorbid conditions  ROS:   A 10 point ROS was performed; negative except as noted above  Social History:    Substance Use History:   Social History     Substance and Sexual Activity   Alcohol Use Never     Social History     Tobacco Use   Smoking Status Former   Smokeless Tobacco Not on file   Tobacco Comments    quit Wednesday     Social History     Substance and Sexual Activity   Drug Use Never       Family History:    No family history on file        Review of Scheduled Meds:      Labs:     Results from last 7 days Lab Units 23  0528 23  0549   WBC Thousand/uL 10 92* 10 88*   HEMOGLOBIN g/dL 11 5* 11 5*   HEMATOCRIT % 37 0 36 3*   PLATELETS Thousands/uL 153 167     Results from last 7 days   Lab Units 23  0528   SODIUM mmol/L 135   POTASSIUM mmol/L 4 5   CHLORIDE mmol/L 103   CO2 mmol/L 28   BUN mg/dL 30*   CREATININE mg/dL 0 49*   CALCIUM mg/dL 8 0*                      Lab Results   Component Value Date    BLOODCX No Growth After 5 Days  2023    BLOODCX No Growth After 5 Days  2023    URINECX No Growth <1000 cfu/mL 2021       Input and Output Summary (last 24 hours):     No intake or output data in the 24 hours ending 23 1350    Imaging:     No orders to display       *Labs /Radiology studiesLabs reviewed  *Medications reviewed and reconciled as needed  *Please refer to order section for additional ordered labs studies  *Case discussed with primary attending during morning huddle case rounds    Vitals:   Temp (24hrs), Av 7 °F (36 5 °C), Min:97 4 °F (36 3 °C), Max:98 3 °F (36 8 °C)    Temp:  [97 4 °F (36 3 °C)-98 3 °F (36 8 °C)] 97 6 °F (36 4 °C)  HR:  [] 72  Resp:  [17-18] 17  BP: ()/(52-65) 96/52  SpO2:  [72 %-95 %] 72 %  Body mass index is 20 99 kg/m²  Physical Exam:   HEENT:  Head: Normocephalic, no lesions, without obvious abnormality  CARDIAC:  regular rate and rhythm, S1, S2 normal, no murmur, click, rub or gallop  LUNGS:  normal air entry, lungs clear to auscultation  ABDOMEN:  soft, non-tender  Bowel sounds normal  No masses, no organomegaly  EXTREMITIES:  extremities normal, warm and well-perfused; no cyanosis, clubbing, or edema  NEURO:   Very [place]  Awake and alert  PSYCH:  Agitated  Invasive Devices     None                  VTE Pharmacologic Prophylaxis: Enoxaparin (Lovenox)  Code Status: Prior  Current Length of Stay: 0 day(s)    Total floor / unit time spent today 1 hour with more than 50% spent counseling/coordinating care   Counseling includes discussion with patient re: progress  and discussion with patient of his/her current medical state/information  Coordination of patient's care was performed in conjunction with primary service  Time invested included review of patient's labs, vitals, and management of their comorbidities with continued monitoring  In addition, this patient was discussed with medical team including physician and advanced extenders  The care of the patient was extensively discussed and appropriate treatment plan was formulated unique for this patient by supervising physician unless stated otherwise in their attestation statement  ** Please Note: voice to text software may have been used in the creation of this document   Audio transcription errors may occur**

## 2023-04-14 NOTE — ASSESSMENT & PLAN NOTE
- small pneumothorax s/p lung mass biopsy 03/17  - 04/01- shortness of breath  - 04/01 - imaging showed a complete right pneumothorax  - 04/01- right chest tube placed   - 04/02- chest tube dislodges; developed subcutaneous emphysema extending to neck,   - trauma consulted; removed initial chest tube, trauma placed new chest tube to suction  - monitor oxygenation  - 04/07 chest tube removed  - CXR- LLL PNA;  Cefepime completed, tessalon pearls for cough  - Stable  - Comprehensive therapies 3 hr a day, 5-6 days a week

## 2023-04-15 RX ADMIN — LEVETIRACETAM 500 MG: 500 TABLET, FILM COATED ORAL at 21:42

## 2023-04-15 RX ADMIN — MEMANTINE 10 MG: 10 TABLET ORAL at 18:27

## 2023-04-15 RX ADMIN — MEMANTINE 10 MG: 10 TABLET ORAL at 08:45

## 2023-04-15 RX ADMIN — DEXAMETHASONE 4 MG: 4 TABLET ORAL at 05:34

## 2023-04-15 RX ADMIN — LEVETIRACETAM 500 MG: 500 TABLET, FILM COATED ORAL at 08:45

## 2023-04-15 RX ADMIN — DEXAMETHASONE 4 MG: 4 TABLET ORAL at 14:54

## 2023-04-15 RX ADMIN — ENOXAPARIN SODIUM 40 MG: 40 INJECTION SUBCUTANEOUS at 08:45

## 2023-04-15 RX ADMIN — PANTOPRAZOLE SODIUM 40 MG: 40 TABLET, DELAYED RELEASE ORAL at 05:34

## 2023-04-15 RX ADMIN — ATORVASTATIN CALCIUM 40 MG: 40 TABLET, FILM COATED ORAL at 18:27

## 2023-04-15 RX ADMIN — DEXAMETHASONE 4 MG: 4 TABLET ORAL at 21:42

## 2023-04-15 NOTE — PROGRESS NOTES
"Rehabilitation Diagnosis: Impairment of mobility, safety, Activities of Daily Living (ADLs), and cognitive/communication skills due to Disability:  15  Disability    ASSESSMENT: Stable    PLAN:  - impulsiveness, redirection agitates patient  Ordered 4 side rails with mats  Consider 1:1 continuous observation if current intervention ineffective  Rehabilitation  • Continue current rehabilitation plan of care to maximize function  • No funcitonal barriers identified    Medical issues  • No acute concerns  • Continue current medical plan of care  Appreciate IM consultants co-management  SUBJECTIVE: Patient seen face to face  No acute issues overnight  Slept well  Progressing as expected in rehabilitation program  Patient seen after working with occupational therapy, patient has poor carryover and poor insight into functional deficits  Incontinent of bowel and bladder, discussed timed voiding program, patient not interested in participating  Will continue to encourage  Denies chest pain, shortness of breath, fever, chills, nausea, abdominal pain  ROS:  A ten point review of systems was completed on 04/15/23 and pertinent positives are listed in subjective section  All other systems reviewed were negative  OBJECTIVE:   BP 99/55 (BP Location: Right arm)   Pulse 70   Temp (!) 97 4 °F (36 3 °C) (Oral)   Resp 17   Ht 5' 6\" (1 676 m)   Wt 59 kg (130 lb 1 1 oz)   SpO2 95%   BMI 20 99 kg/m²     Physical Exam   Gen: No acute distress, Well-nourished, well-appearing  HEENT: Moist mucus membranes, Normocephalic/Atraumatic  Cardiovascular: Regular rate, rhythm, S1/S2  Distal pulses palpable  Heme/Extr: No edema/clubbing/cyanosis  Pulmonary: Non-labored breathing  Lungs CTAB  : incontinent voiding  GI: Soft, non-tender, non-distended  BS+, LBM 04/15  MSK: ROM is WFL in all extremities  No effusions or deformities  Bulk is symmetric  Integumentary: Skin is warm, dry  No rashes or ulcers   " Neuro: AAOx3, CN 2-12 intact  Sensation intact to light touch throughout  Speech is intact  Appropriate to questioning  Psych: Normal mood and affect         Personally reviewed on 04/15/23:   Lab Results   Component Value Date    WBC 10 92 (H) 04/13/2023    HGB 11 5 (L) 04/13/2023    HCT 37 0 04/13/2023    MCV 92 04/13/2023     04/13/2023     Lab Results   Component Value Date    SODIUM 135 04/13/2023    K 4 5 04/13/2023     04/13/2023    CO2 28 04/13/2023    BUN 30 (H) 04/13/2023    CREATININE 0 49 (L) 04/13/2023    GLUC 135 04/13/2023    CALCIUM 8 0 (L) 04/13/2023     Lab Results   Component Value Date    INR 1 00 03/15/2023    PROTIME 13 4 03/15/2023           Current Facility-Administered Medications:   •  acetaminophen (TYLENOL) tablet 650 mg, 650 mg, Oral, Q6H PRN, MARY BETH Momin  •  atorvastatin (LIPITOR) tablet 40 mg, 40 mg, Oral, QPM, MARY BETH Momin, 40 mg at 04/14/23 1737  •  dexamethasone (DECADRON) tablet 4 mg, 4 mg, Oral, Q8H MOLLY, 4 mg at 04/15/23 0534 **FOLLOWED BY** [START ON 4/21/2023] dexamethasone (DECADRON) tablet 4 mg, 4 mg, Oral, Q12H [address] **FOLLOWED BY** [START ON 4/28/2023] dexamethasone (DECADRON) tablet 4 mg, 4 mg, Oral, Daily, MARY BETH Momin  •  enoxaparin (LOVENOX) subcutaneous injection 40 mg, 40 mg, Subcutaneous, Daily, MARY BETH Momin, 40 mg at 04/15/23 0845  •  levETIRAcetam (KEPPRA) tablet 500 mg, 500 mg, Oral, Q12H [address], MARY BETH Momin, 500 mg at 04/15/23 0845  •  memantine (NAMENDA) tablet 10 mg, 10 mg, Oral, BID, MARY BETH Momin, 10 mg at 04/15/23 0845  •  pantoprazole (PROTONIX) EC tablet 40 mg, 40 mg, Oral, Early Morning, MARY BETH Momin, 40 mg at 04/15/23 0534    Past Medical History:   Diagnosis Date   • Hypertension        Patient Active Problem List    Diagnosis Date Noted   • Acute respiratory failure with hypoxia ([facility] ) 04/14/2023   • Non-small cell carcinoma of right lung ([facility] ) 03/27/2023   • Brain mass 03/14/2023   • Ambulatory dysfunction 04/07/2023   • Encounter for antineoplastic immunotherapy 03/31/2023   • Chemotherapy induced neutropenia ([facility] ) 03/30/2023   • 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([facility] ) 03/30/2023   • Severe protein-calorie malnutrition ([facility] ) 03/16/2023   • Lung mass 03/14/2023   • Hypertension 03/14/2023      MARY BETH Salinas  Physical [address] [place] [address]

## 2023-04-15 NOTE — PROGRESS NOTES
SLP Cognitive Linguistic Communication Assessment       04/15/23 0915   Pain Assessment   Pain Assessment Tool 0-10   Pain Score No Pain   Restrictions/Precautions   Precautions Bed/chair alarms;Cognitive; Fall Risk;Hard of hearing;Visual deficit;Supervision on toilet/commode   Weight Bearing Restrictions No   ROM Restrictions No   Cognitive Linguisitic Assessments   The [employer] Mental Status (SLUMS) Examinaction Pt completed the SLUMS cognitive assessment  Pt total score was 17/30 which as compared to those with high school education correlates with moderate neurocognitive disorder and is indicative of dementia  Breakdown of scores as follows:    Orientation: 2/3  Functional problem solving with math: 3/3  Divergent naming (timed 1 minute- animals): 3 (9 animals total)  Word recall:   Mental flexibility with number order: 2  Clock Drawin/4   Following directions given shapes: 2/2  Story recall comprehension questions:     Total score: 17/30    Based on score, patient will benefit from further skilled SLP services to maximize overall cognitive lingusitic communication abilities for increased independence and decreased burden of car   Comprehension   Assist Devices Glasses; Amp Headset   Auditory Basic   Visual Basic   Findings Pt completed SLUMS assessment tool- see SLP rehab note for details  QI: Comprehension 3  Usually Understands: Understands most conversations, but misses some part/intent of message  Requires cues at times to understand  Comprehension (FIM) 4 - Understands basic info/conversation 75-90% of time   Expression   Verbal Basic   Non-Verbal Basic   Intelligibility Sentence   Findings Pt completed SLUMS assessment tool- see SLP rehab note for details  QI: Expression 3   Exhibits some difficulty with expressing needs and ideas (e g , some words or finishing thoughts) or speech is not clear   Expression (FIM) 4 - Expresses basic info/needs 75-90% of time   Social Interaction   Cooperation with staff   Participation Individual   Behaviors observed Agitated; Restless   Findings Pt completed SLUMS assessment tool- see SLP rehab note for details  Social Interaction (FIM) 3 - Interacts 50-74% of time   Problem Solving   Routine Manages call bell   Findings Pt completed SLUMS assessment tool- see SLP rehab note for details  Problem solving (FIM) 3 - Solves basic problmes 50-74% of time   Memory   Recognize People No   Remember Routine No   Initiates Tasks No   Short-Term Impaired   Long Term Intact   Recalls Precaution No   Findings Pt completed SLUMS assessment tool- see SLP rehab note for details  Memory (FIM) 3 - Recognizes, recalls/performs 50-74%   Memory Skills   Orientation Level Oriented to person;Oriented to place;Oriented to situation   Speech/Language/Cognition Assessmetn   Treatment Assessment Pt completed SLUMS assessment tool- results as listed above  Pt presents with MODERATE cognitive deficits with decreased processing, orientation, decreased ST/working memory, decreased executive function skills (problem solving, reasoning, organization, judgement) and overall insight to deficits which currently impact safety and functional mobility  Pt was able to recall orientation information with correct place, situation and date (month/year only)  Pt recalled his biographical information as well with no errors  Pt limited historian but reports lives with significant other, Gerardo Garcia and has a supportive daughter, Ly Rubio  Per chart and discussion with [facility] OT who spoke with daughter when she was present for her ADL eval this morning, pt was independent with most ADL and IADLs prior  Will plan to assess cognitive skills with IADL tasks however suspecting pt will require assistance at discharge  During assessment, pt agitated easily by questioning but able to be redirected with direct explanation or command following   Pt with poor initiation for conversation and assessment "tasks, often asking \"is that is, are we done\"  Suspect pt will benefit from functional cognitive tasks to maximize skills as well as combined education as to reasoning behind tasks for increased participation  Pt will benefit from further skilled SLP services targeting cognitive linguistic communication skills as well as family education in regards to current cognitive skills to allow increased independence and decreased burden of care at this time     SLP Therapy Minutes   SLP Time In 5   SLP Time Out 0950   SLP Total Time (minutes) 35   SLP Mode of treatment - Individual (minutes) 35   SLP Mode of treatment - Concurrent (minutes) 0   SLP Mode of treatment - Group (minutes) 0   SLP Mode of treatment - Co-treat (minutes) 0   SLP Mode of Treatment - Total time(minutes) 35 minutes   SLP Cumulative Minutes 35       "

## 2023-04-15 NOTE — PROGRESS NOTES
SLP ARC TAA       04/15/23 0915   Patient Data   Rehab Impairment Impairment of mobility, safety, Activities of Daily Living (ADLs), and cognitive/communication skills due to Disability:  15  Disability   Etiologic Diagnosis Pneumothorax with Acute respiratory failure with hypoxia ; SIRS   Date of Onset 04/01/23   Support System   Name Kaden Guerrero   Relationship significant other   Baseline Information   Transportation Family/friends drive   Prior IADL Participation   Money Management Identify Money;Estimate Costs;Estimate Change   Meal Preparation Full Participation   Laundry Partial Participation   Home Cleaning Partial Participation   Patient Preference   Nickname (Patient Preference) William   Psychosocial   Psychosocial (WDL) X   Patient Behaviors/Mood Flat affect;Irritable   Restrictions/Precautions   Precautions Bed/chair alarms;Cognitive; Fall Risk;Hard of hearing;Visual deficit;Supervision on toilet/commode   Weight Bearing Restrictions No   ROM Restrictions No   Pain Assessment   Pain Assessment Tool 0-10   Pain Score No Pain   Comprehension   Assist Devices Glasses; Amp Headset   Auditory Basic   Visual Basic   Findings Pt completed SLUMS assessment tool- see SLP rehab note for details  QI: Comprehension 3  Usually Understands: Understands most conversations, but misses some part/intent of message  Requires cues at times to understand  Comprehension (FIM) 4 - Understands basic info/conversation 75-90% of time   Expression   Verbal Basic   Non-Verbal Basic   Intelligibility Sentence   Findings Pt completed SLUMS assessment tool- see SLP rehab note for details  QI: Expression 3  Exhibits some difficulty with expressing needs and ideas (e g , some words or finishing thoughts) or speech is not clear   Expression (FIM) 4 - Expresses basic info/needs 75-90% of time   Social Interaction   Cooperation with staff   Participation Individual   Behaviors observed Agitated; Restless   Findings Pt completed SLUMS assessment "tool- see SLP rehab note for details  Social Interaction (FIM) 3 - Interacts 50-74% of time   Problem Solving   Routine Manages call bell   Findings Pt completed SLUMS assessment tool- see SLP rehab note for details  Problem solving (FIM) 3 - Solves basic problmes 50-74% of time   Memory   Recognize People No   Remember Routine No   Initiates Tasks No   Short-Term Impaired   Long Term Intact   Recalls Precaution No   Findings Pt completed SLUMS assessment tool- see SLP rehab note for details  Memory (FIM) 3 - Recognizes, recalls/performs 50-74%   Cognition   Orientation Level Oriented to person;Oriented to place;Oriented to situation   Discharge Information   Vocational Plan Retired/not working   Patient's Discharge Plan home with family support/assistance   Patient's Rehab Expectations \"to go home and have a visiting nurse\"   Barriers to Discharge Home Limited Family Support;Decreased Cognitive Function;Decreased Strength;Decreased Endurance;Depression;Pain; Safety Considerations   Impressions Pt is a 82 yo male with PMHx recent hospitalization from 3/14 - 3/19 found to have 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, HTN, BPH who presented to [address] [place] [address] on 4/1 with SOB  CXR showed large right-sided pneumothorax with essentially complete collapse of the right lung and a chest tube was placed  It was removed on 4/7  Pt was receiving radiation therapy with 4/12, last day of scheduled whole brain radiation treatment  Pt had not yet started chemotherapy  Pt was cleared for rehab but no more than 2 weeks so pt can start back with chemotherapy  Pt was approved for acute rehab and transferred on 4/14/2023  Orders received for skilled cognitive assessment which was initiated using informal assessment and SLUMS given severity of hearing and visual impairments  Initial interview with review of history, orientation and events completed as well    At this time, pt is a fair to good rehab " candidate to achieve min A to supervision for cognitive linguistic skills while in the acute rehab center w/ anticipated discharge home w/ family support/supervision  Current barriers which present at this time include: decreased processing, decreased ST/working memory, decreased executive function skills (problem solving, reasoning, organization, judgement) and overall insight to deficits which currently impact safety and functional mobility  ELOS ~ 7-10 days  Focus of sessions to be on family training as pt will require assistance at home and through the progression of his chemotherapy  Pt will benefit from skilled SLP services at this time to maximize overall functional cognitive linguistic skills to decrease burden of care for family at time of discharge     SLP Therapy Minutes   SLP Time In 5   SLP Time Out 0950   SLP Total Time (minutes) 35   SLP Mode of treatment - Individual (minutes) 35   SLP Mode of treatment - Concurrent (minutes) 0   SLP Mode of treatment - Group (minutes) 0   SLP Mode of treatment - Co-treat (minutes) 0   SLP Mode of Treatment - Total time(minutes) 35 minutes   SLP Cumulative Minutes 35   Cumulative Minutes   Cumulative therapy minutes 140

## 2023-04-15 NOTE — PROGRESS NOTES
04/15/23 1200   Rehab Team Goals   Transfer Team Goal Patient will require assist with transfers with least restrictive device upon completion of rehab program   Locomotion Team Goal Patient will require assist with locomotion with least restrictive device upon completion of rehab program   Rehab Team Interventions   PT Interventions Gait Training; Therapeutic Exercise;Neuromuscualr Reeducation;Transfer Training;Bed Mobility; Wheelchair Mobility; Patient/Family Education;Group Therapy;Modalities   PT Transfer Goal   Roll left and right Goal 06  Independent - Patient completes the activity by him/herself with no assistance from a helper  Sit to lying Goal 06  Independent - Patient completes the activity by him/herself with no assistance from a helper  Lying to sitting on side of bed Goal 06  Independent - Patient completes the activity by him/herself with no assistance from a helper  Sit to stand Goal 04  Supervision or touching assistance- [place] provides VERBAL CUES or supervision throughout activity  Chair/bed-to-chair transfer Goal 04  Supervision or touching assistance- [place] provides VERBAL CUES or supervision throughout activity  Car Transfer Goal 04  Supervision or touching assistance- [place] provides VERBAL CUES or supervision throughout activity  Assistive Device Roller Walker   Safety Precautions Hand Placement   Environment Level Surface   Status Target goal - two weeks; Ongoing   Locomotion Goal   Primary discharge mode of locomotion Both   Target Walk Distance 150 ft   Assist Device Roller Walker   Gait Pattern Improvement Ataxic   Environment Level Surface   Walk 10 feet Goal 04  TOUCHING/ STEADYING assistance as patient completes activity  Walk 50 feet with 2 turns Goal 04  TOUCHING/ STEADYING assistance as patient completes activity  Walk 150 feet Goal 04  TOUCHING/ STEADYING assistance as patient completes activity  Walking 10 feet on uneven surface 04   TOUCHING/ STEADYING assistance as patient completes activity  Walking Goal Status Target goal - two weeks   Type of Wheelchair Used 1  Manual   Target Wheel Distance- Level 150 ft   Wheel 50 feet with 2 turns Goal 06  Independent - Patient completes the activity by him/herself with no assistance from a helper  Wheel 150 feet Goal 06  Independent - Patient completes the activity by him/herself with no assistance from a helper  Decrease Assist With Locking Brakes;Obstacles   Environment Level Surface   Stairs Goal   1 step or curb goal 04  TOUCHING/ STEADYING assistance as patient completes activity  4 steps Goal 09  Not applicable   12 steps Goal 09  Not applicable   Assist Level Contact Guard   Number of Stairs 1   Technique Curb Step   Status Target goal - two weeks   Object Retrieval Goal   Picking up object Goal 04  Supervision or touching assistance- [place] provides VERBAL CUES or supervision throughout activity     Assistive Device  None   Small Object Picked Up marker

## 2023-04-15 NOTE — PROGRESS NOTES
Internal Medicine Progress Note  Patient: Ethel Tomlinson  Age/sex: 81 y o  male  Medical Record #: [id number]      ASSESSMENT/PLAN: (Interval History)  Ethel Tomlinson is seen and examined and management for following issues:    Non-small cell lung cancer right lung with brain mets  • Chemotherapy on hold until pt discharged - no more than 2 weeks  • S/p whole brain radiation x 10 treatments  Completed 4/12/23  • Continue Decadron taper, Keppra and memantine  • Follow-up with Dr Juanis Gutierrez and radiation oncology      Pneumothorax  • Occurred after lung biopsy from 3/17/23  • S/p chest tube  • Now resolved      LLL PNA  · S/p tx with Cefepime  · Will watch  · Denies cough/SOB    Behavioral issues  · Brain lesions are B/L frontal likely contributing  · Was yelling at staff and had impulsive behavior 4/14/23 but cooperative with OT this AM    Leukocytosis  • Mild  Likely reactive  No signs of infx      HTN  • Listed in pt's history but BPs have been soft  • This AM with OT BPs: 108 supine and 118 sitting      Discharge date:  Team      The above assessment and plan was reviewed and updated as determined by my evaluation of the patient on 4/15/2023      Labs:   Results from last 7 days   Lab Units 04/13/23  0528 04/11/23  0549   WBC Thousand/uL 10 92* 10 88*   HEMOGLOBIN g/dL 11 5* 11 5*   HEMATOCRIT % 37 0 36 3*   PLATELETS Thousands/uL 153 167     Results from last 7 days   Lab Units 04/13/23  0528   SODIUM mmol/L 135   POTASSIUM mmol/L 4 5   CHLORIDE mmol/L 103   CO2 mmol/L 28   BUN mg/dL 30*   CREATININE mg/dL 0 49*   CALCIUM mg/dL 8 0*                   Review of Scheduled Meds:  Current Facility-Administered Medications   Medication Dose Route Frequency Provider Last Rate   • acetaminophen  650 mg Oral Q6H PRN MARY BETH Momin     • atorvastatin  40 mg Oral QPM MARY BETH Momin     • dexamethasone  4 mg Oral Q8H [address] MARY BETH Momin      Followed by   • [START ON 4/21/2023] dexamethasone  4 mg Oral "Q12H [address] MARY BETH Momin      Followed by   • [START ON 4/28/2023] dexamethasone  4 mg Oral Daily MARY BETH Momin     • enoxaparin  40 mg Subcutaneous Daily MARY BETH Momin     • levETIRAcetam  500 mg Oral Q12H [address] MARY BETH Momin     • memantine  10 mg Oral BID MARY BETH Momin     • pantoprazole  40 mg Oral Early Morning MARY BETH Momin         Subjective/ HPI: Patient seen and examined  Patients overnight issues or events were reviewed with nursing or staff during rounds or morning huddle session  New or overnight issues include the following:     No new or overnight issues  Offers no complaints    ROS:   A 10 point ROS was performed; negative except as noted above  Imaging:     No orders to display       *Labs /Radiology studies reviewed  *Medications reviewed and reconciled as needed  *Please refer to order section for additional ordered labs studies  *Case discussed with primary attending during morning huddle case rounds    Physical Examination:  Vitals:   Vitals:    04/14/23 1345 04/14/23 1453 04/15/23 0534   BP: 96/52  99/55   BP Location: Right arm  Right arm   Pulse: 72  70   Resp: 17  17   Temp: 97 6 °F (36 4 °C)  (!) 97 4 °F (36 3 °C)   TempSrc: Oral  Oral   SpO2: 92% 92% 95%   Weight: 59 kg (130 lb 1 1 oz)     Height: 5' 6\" (1 676 m)         General Appearance: no distress, conversive  HEENT: PERRLA, conjuctiva normal; oropharynx clear; mucous membranes moist   Neck:  Supple, normal ROM  Lungs: CTA, normal respiratory effort, no retractions, expiratory effort normal  CV: regular rate and rhythm; no rubs/murmurs/gallops, PMI normal   ABD: soft; ND/NT; +BS  EXT: no edema  Skin: normal turgor, normal texture, no rashes  Psych: affect normal, mood normal  Neuro: AA      The above physical exam was reviewed and updated as determined by my evaluation of the patient on 4/15/2023      Invasive Devices     None                    VTE Pharmacologic Prophylaxis: Enoxaparin  Code " Status: Level 2 - DNAR: but accepts endotracheal intubation  Current Length of Stay: 1 day(s)      Total time spent:  30 minutes with more than 50% spent counseling/coordinating care  Counseling includes discussion with patient re: progress  and discussion with patient of his/her current medical state/information  Coordination of patient's care was performed in conjunction with primary service  Time invested included review of patient's labs, vitals, and management of their comorbidities with continued monitoring  In addition, this patient was discussed with medical team including physician and advanced extenders  The care of the patient was extensively discussed and appropriate treatment plan was formulated unique for this patient  Medical decision making for the day was made by supervising physician unless otherwise noted in their attestation statement  ** Please Note:  voice to text software may have been used in the creation of this document   Although proof errors in transcription or interpretation are a potential of such software**

## 2023-04-15 NOTE — PLAN OF CARE
Problem: PAIN - ADULT  Goal: Verbalizes/displays adequate comfort level or baseline comfort level  Description: Interventions:  - Encourage patient to monitor pain and request assistance  - Assess pain using appropriate pain scale  - Administer analgesics based on type and severity of pain and evaluate response  - Implement non-pharmacological measures as appropriate and evaluate response  - Consider cultural and social influences on pain and pain management  - Notify physician/advanced practitioner if interventions unsuccessful or patient reports new pain  4/15/2023 1541 by Maria Elena Ren RN  Outcome: Progressing  4/15/2023 1541 by Maria Elena Ren RN  Outcome: Progressing     Problem: INFECTION - ADULT  Goal: Absence or prevention of progression during hospitalization  Description: INTERVENTIONS:  - Assess and monitor for signs and symptoms of infection  - Monitor lab/diagnostic results  - Monitor all insertion sites, i e  indwelling lines, tubes, and drains  - Monitor endotracheal if appropriate and nasal secretions for changes in amount and color  - [place] appropriate cooling/warming therapies per order  - Administer medications as ordered  - Instruct and encourage patient and family to use good hand hygiene technique  - Identify and instruct in appropriate isolation precautions for identified infection/condition  4/15/2023 1541 by Maria Elena Ren RN  Outcome: Progressing  4/15/2023 1541 by Maria Elena Ren RN  Outcome: Progressing  Goal: Absence of fever/infection during neutropenic period  Description: INTERVENTIONS:  - Monitor WBC    4/15/2023 1541 by Maria Elena Ren RN  Outcome: Progressing  4/15/2023 1541 by Maria Elena Ren RN  Outcome: Progressing     Problem: SAFETY ADULT  Goal: Patient will remain free of falls  Description: INTERVENTIONS:  - Educate patient/family on patient safety including physical limitations  - Instruct patient to call for assistance with activity   - Consult OT/PT to assist with strengthening/mobility   - Keep Call bell within reach  - Keep bed low and locked with side rails adjusted as appropriate  - Keep care items and personal belongings within reach  - Initiate and maintain comfort rounds  - Make Fall Risk Sign visible to staff  - Offer Toileting every Hours, in advance of need  - Initiate/Maintain alarm  - Obtain necessary fall risk management equipment:   - Apply yellow socks and bracelet for high fall risk patients  - Consider moving patient to room near nurses station  4/15/2023 1541 by Inder Stroud RN  Outcome: Progressing  4/15/2023 1541 by Inder Stroud RN  Outcome: Progressing  Goal: Maintain or return to baseline ADL function  Description: INTERVENTIONS:  -  Assess patient's ability to carry out ADLs; assess patient's baseline for ADL function and identify physical deficits which impact ability to perform ADLs (bathing, care of mouth/teeth, toileting, grooming, dressing, etc )  - Assess/evaluate cause of self-care deficits   - Assess range of motion  - Assess patient's mobility; develop plan if impaired  - Assess patient's need for assistive devices and provide as appropriate  - Encourage maximum independence but intervene and supervise when necessary  - Involve family in performance of ADLs  - Assess for home care needs following discharge   - Consider OT consult to assist with ADL evaluation and planning for discharge  - Provide patient education as appropriate  4/15/2023 1541 by Inder Stroud RN  Outcome: Progressing  4/15/2023 1541 by Inder Stroud RN  Outcome: Progressing  Goal: Maintains/Returns to pre admission functional level  Description: INTERVENTIONS:  - Perform BMAT or MOVE assessment daily    - Set and communicate daily mobility goal to care team and patient/family/caregiver  - Collaborate with rehabilitation services on mobility goals if consulted  - Perform Range of Motion times a day  - Reposition patient every  hours    - Dangle patient  times a day  - Stand patient  times a day  - Ambulate patient  times a day  - Out of bed to chair  times a day   - Out of bed for meals times a day  - Out of bed for toileting  - Record patient progress and toleration of activity level   4/15/2023 1541 by Desirae Dejesus RN  Outcome: Progressing  4/15/2023 1541 by Desirae Dejesus RN  Outcome: Progressing     Problem: DISCHARGE PLANNING  Goal: Discharge to home or other facility with appropriate resources  Description: INTERVENTIONS:  - Identify barriers to discharge w/patient and caregiver  - Arrange for needed discharge resources and transportation as appropriate  - Identify discharge learning needs (meds, wound care, etc )  - Arrange for interpretive services to assist at discharge as needed  - Refer to Case Management Department for coordinating discharge planning if the patient needs post-hospital services based on physician/advanced practitioner order or complex needs related to functional status, cognitive ability, or social support system  4/15/2023 1541 by Desirae Dejesus RN  Outcome: Progressing  4/15/2023 1541 by Desirae Dejesus RN  Outcome: Progressing     Problem: Nutrition/Hydration-ADULT  Goal: Nutrient/Hydration intake appropriate for improving, restoring or maintaining nutritional needs  Description: Monitor and assess patient's nutrition/hydration status for malnutrition  Collaborate with interdisciplinary team and initiate plan and interventions as ordered  Monitor patient's weight and dietary intake as ordered or per policy  Utilize nutrition screening tool and intervene as necessary  Determine patient's food preferences and provide high-protein, high-caloric foods as appropriate       INTERVENTIONS:  - Monitor oral intake, urinary output, labs, and treatment plans  - Assess nutrition and hydration status and recommend course of action  - Evaluate amount of meals eaten  - Assist patient with eating if necessary   - Allow adequate time for meals  - Recommend/ encourage appropriate diets, oral nutritional supplements, and vitamin/mineral supplements  - Order, calculate, and assess calorie counts as needed  - Recommend, monitor, and adjust tube feedings and TPN/PPN based on assessed needs  - Assess need for intravenous fluids  - Provide specific nutrition/hydration education as appropriate  - Include patient/family/caregiver in decisions related to nutrition  4/15/2023 1541 by Christopher Mendez RN  Outcome: Progressing  4/15/2023 1541 by Christopher Mendez RN  Outcome: Progressing     Problem: Prexisting or High Potential for Compromised Skin Integrity  Goal: Skin integrity is maintained or improved  Description: INTERVENTIONS:  - Identify patients at risk for skin breakdown  - Assess and monitor skin integrity  - Assess and monitor nutrition and hydration status  - Monitor labs   - Assess for incontinence   - Turn and reposition patient  - Assist with mobility/ambulation  - Relieve pressure over bony prominences  - Avoid friction and shearing  - Provide appropriate hygiene as needed including keeping skin clean and dry  - Evaluate need for skin moisturizer/barrier cream  - Collaborate with interdisciplinary team   - Patient/family teaching  - Consider wound care consult   4/15/2023 1541 by Christopher Mendez RN  Outcome: Progressing  4/15/2023 1541 by Christopher Mendez RN  Outcome: Progressing     Problem: SAFETY,RESTRAINT: [place]/NON-SELF DESTRUCTIVE BEHAVIOR  Goal: Remains free of harm/injury (restraint for non violent/non self-detsructive behavior)  Description: INTERVENTIONS:  - Instruct patient/family regarding restraint use   - Assess and monitor physiologic and psychological status   - Provide interventions and comfort measures to meet assessed patient needs   - Identify and implement measures to help patient regain control  - Assess readiness for release of restraint   4/15/2023 1541 by Christopher Mendez RN  Outcome: Progressing  4/15/2023 1541 by Christopher Mendez RN  Outcome: Progressing  Goal: Returns to optimal restraint-free functioning  Description: INTERVENTIONS:  - Assess the patient's behavior and symptoms that indicate continued need for restraint  - Identify and implement measures to help patient regain control  - Assess readiness for release of restraint   4/15/2023 1541 by Judith Childs RN  Outcome: Progressing  4/15/2023 1541 by Judith Childs, RN  Outcome: Progressing

## 2023-04-15 NOTE — PROGRESS NOTES
OT LONG TERM GOALS          04/15/23 0700   Rehab Team Goals   ADL Team Goal Patient will require assist with ADLs with least restrictive device upon completion of rehab program   Rehab Team Interventions   OT Interventions Self Care;Home Management;Cognitive Retraining;Energy Conservation;Patient/Family Education; Therapeutic Exercise   Eating Goal   Eating Goal 05  Setup or clean-up assistance - [place] SETS UP or CLEANS UP, patient completes activity  [place] assists only prior to or following the activity  Assist Device Upper Dentures; Lower Dentures   Status Ongoing; Target goal - two weeks; Target goal - one week   Interventions Optimal Position   Grooming Goal   Oral Hygiene Goal 04  TOUCHING/ STEADYING assistance as patient completes activity  Task Wash/Dry Face;Wash/Dry Hands   Environment Stand at [employer]   Status Ongoing; Target goal - two weeks; Target goal - one week   Intervention Balance Work; Therapeutic Exercise; Tolerance Work   Tub/Shower Transfer Goal   Method Tub Shower  (min assist)   Assist Device Transfer Board;Hand Held Shower   Status Ongoing; Target goal - two weeks; Target goal - one week   Interventions ADL Training;Assistive Device   Bathing Goal   Shower/bathe self Goal 03  Partial/moderate assistance - [place] does less than half the effort  [place] lifts or holds trunk or limbs and provides more than half the effort  Environment Seated;Standing   Status Ongoing; Target goal - one week; Target goal - two weeks   Intervention Therapeutic Exercise;Assistive Device; ADL Training   Upper Body Dressing Goal   Upper body dressing Goal 04  Supervision or touching assistance- [place] provides VERBAL CUES or supervision throughout activity  Task Upper Body   Environment Seated   Status Target goal - two weeks; Target goal - one week; Ongoing   Intervention Balance Work; Therapeutic Exercise; Tolerance Work   Lower Body Dressing Goal   Lower body dressing Goal 03   Partial/moderate assistance - [place] does less than half the effort  [place] lifts or holds trunk or limbs and provides more than half the effort  Putting on/taking off footwear Goal 03  Partial/moderate assistance - [place] does less than half the effort  [place] lifts or holds trunk or limbs and provides more than half the effort  Task Lower Body   Status Ongoing; Target goal - one week; Target goal - two weeks   Intervention Balance Work;Assistive Device; Therapeutic Exercise; Tolerance Work   Toileting Transfer Goal   Toilet transfer Goal 04  TOUCHING/ STEADYING assistance as patient completes activity  Status Ongoing; Target goal - two weeks; Target goal - one week   Intervention Balance Work;ADL Training;Assistive Device   Toileting Goal   Toileting hygiene Goal 03  Partial/moderate assistance - [place] does less than half the effort  [place] lifts or holds trunk or limbs and provides more than half the effort  Task Pants Up;Pants Down;Hygiene   Status Ongoing; Target goal - two weeks; Target goal - one week   Intervention Assistive Device;Balance [facility]

## 2023-04-15 NOTE — PROGRESS NOTES
Occupational Therapy Evaluation         04/15/23 0700   Patient Data   Rehab Impairment Debility:  12  Debility (Non-cardiac/Non-pulmonary)   Etiologic Diagnosis Pneumothorax with Acute respiratory failure with hypoxia ; SIRS   Date of Onset 04/01/23   Support System   Name per pt and pt's dtr Nupur Derry, pt lives with his girlfriend Dominguez Walker  She is home and able to assist pt somewhat physically, but when pt had a fall she wasu nable to assist him off the ground  Pt's dtr Nupur Vizcarra is an ED night nurse at [employer] and was present at beginning of session  Home Setup   Type of Home Apartment   Method of Entry Stairs   Number of Stairs 1   First Floor Bathroom Full;Tub; Shower;Combo   First Floor Setup Available Yes   Available Equipment Roller Walker  (received 1 month prior during last hospital admission)   Baseline Information   Vocation   (retired)   Transportation Family/friends drive   Prior IADL Participation   Money Management Identify Money;Estimate Costs;Estimate Change   Meal Preparation Full Participation   Laundry Partial Participation   Home Cleaning Partial Participation   Prior Level of Function   Self-Care 3  Independent - Patient completed the activities by him/herself, with or without an assistive device, with no assistance from a helper  Indoor-Mobility (Ambulation) 3  Independent - Patient completed the activities by him/herself, with or without an assistive device, with no assistance from a helper  Stairs 3  Independent - Patient completed the activities by him/herself, with or without an assistive device, with no assistance from a helper  Functional Cognition 2  Needed Some Help - Patient needed a partial assistance from another person to complete activities  Prior Assistance Needed for Driving   Prior Device Used D   [employer] (formerly [employer]) Yo  (within past month but dtr reports pt didn't use it well)   Falls in the Last Year   Number of falls in the past 12 months 2   Type of Injury Associated with Fall Injury Patient Preference   Nickname (Patient Preference) William   Psychosocial   Psychosocial (WDL) X   Patient Behaviors/Mood Anxious   Restrictions/Precautions   Precautions Bed/chair alarms;Cognitive; Fall Risk;Supervision on toilet/commode;Visual deficit;Hard of hearing  (per MARYB ETH Daniels she is ordering 4 bed rails up)   Pain Assessment   Pain Assessment Tool 0-10   Pain Score No Pain   Eating Assessment   Type of Assistance Needed Set-up / clean-up   Physical Assistance Level No physical assistance   Comment seated OOB in chair  Eating CARE Score 5   Oral Hygiene   Type of Assistance Needed Physical assistance   Physical Assistance Level 51%-75%   Comment pt able to partially wash dentures while seated at EOB pt requires assist to fully wash   Oral Hygiene CARE Score 2   Tub/Shower Transfer   Reason Not Assessed Sponge Bath;Safety; Endurance;Balance   Shower/Bathe Self   Type of Assistance Needed Physical assistance   Physical Assistance Level Total assistance   Comment seated at EOB pt able to complete bathing of UB, groin and thighs with supervision  Pt requires assist for lower legs/feet and assist x1 in stance with 2nd person to perform rear hygiene   Shower/Bathe Self CARE Score 1   Dressing/Undressing Clothing   Type of Assistance Needed Physical assistance   Physical Assistance Level 26%-50%   Comment seated at EOB   Upper Body Dressing CARE Score 3   Type of Assistance Needed Physical assistance   Physical Assistance Level Total assistance   Comment assist to thread LEs into panst while seated, stands with mod assist x1 using RW and assist to fully manage clothing over hips   recommend assist x2 when no RW is present   Lower Body Dressing CARE Score 1   Putting On/Taking Off Footwear   Type of Assistance Needed Physical assistance   Physical Assistance Level 51%-75%   Comment pt able to doff socks, requires assist to don   Putting On/Taking Off Footwear CARE Score 2   [address] [place]   Type of Assistance "Needed Physical assistance; Adaptive equipment   Physical Assistance Level Total assistance   Comment assist x1 in front for stance mod assist while 2nd person assists with hygiene and clothing management   Toileting Hygiene CARE Score 1   Toilet Transfer   Type of Assistance Needed Physical assistance; Adaptive equipment   Physical Assistance Level 51%-75%   Comment stand pivot assist x1 in front \"give me a hug\" cue to [facility]   Toilet Transfer CARE Score 2   Transfer Bed/Chair/Wheelchair   Type of Assistance Needed Physical assistance   Physical Assistance Level 51%-75%   Comment trialed stand pivot with RW but pt has poor carryover and technique, which per chart review pt had in acute and per pt's dtr pt had difficulty using RW at home PTA  OT recommending assist x1 in front stand pivot with NO RW at this time, using \"give me a hug\" cue   Chair/Bed-to-Chair Transfer CARE Score 2   Sit to Lying   Type of Assistance Needed Physical assistance   Physical Assistance Level 26%-50%   Sit to Lying CARE Score 3   Lying to Sitting on Side of Bed   Type of Assistance Needed Physical assistance   Physical Assistance Level 26%-50%   Lying to Sitting on Side of Bed CARE Score 3   Sit to Stand   Type of Assistance Needed Physical assistance   Physical Assistance Level 26%-50%   Comment no device or with RW   Sit to Stand CARE Score 3   Comprehension   QI: Comprehension 3  Usually Understands: Understands most conversations, but misses some part/intent of message  Requires cues at times to understand  Expression   QI: Expression 3   Exhibits some difficulty with expressing needs and ideas (e g , some words or finishing thoughts) or speech is not clear   RUE Assessment   RUE Assessment WFL   LUE Assessment   LUE Assessment WFL   Coordination   Movements are Fluid and Coordinated 0   Coordination and Movement Description impaired UE coordination   Cognition   Overall Cognitive Status Impaired   Arousal/Participation Alert   Attention " "Difficulty attending to directions   Orientation Level Oriented to person;Oriented to place;Oriented to situation  (oriented to month/year)   Memory Decreased recall of precautions;Decreased recall of recent events;Decreased short term memory   Following Commands Follows one step commands with increased time or repetition   Comments while pt is orietned, he is impulsive and requires cues for safety, technique  Pt presents with impaired short-term memory and carryover/recall of new learning technqiues  Pt also presents with anxious behaviors/seeks assurance \"am I doing ok? \" throughout session  Pt is very [place], staff to obtain pocket talker  Pt perseverates on having his alarm clock close to him but then asks \"is this 7 at night? \"  Pt with impaired insight  Attempts to constantly lay flat and does not want to sit OOB  OT educated staff to lock bed functions when pt laying in bed after meals to allow for 30-45 minutes of upright after eating  Vision   Vision Comments will need to continue to assess  pt wears glasses at baseline  Discharge Information   Vocational Plan Retired/not working   Patient's Discharge Plan per chart review, pt's chemo could be placed on hold for 2 weeks to accomodate rehab stay  Recommendation will be for 24/7 assist upon d/c   Patient's Rehab Expectations to get stronger   Impressions Pt is a 81 y o  male who was admitted to [address] [place] [address] on 4/14/2023  Pt's current problem list includes: Pneumothorax with Acute respiratory failure with hypoxia ; SIRS  However within past month pt was found to have 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, s/p whole brain radiation 4/12 and was allowed to postpone chemotherapy treatment for 2 weeks to participate in acute rehab services  Pt's precautions include: anticipate at least moderate cognitive impairment, vision, [place]   Prior to recent cancer dx pt had been independent, however received a walker within past month and had 2 falls at home per " pt's dtr  Currently pt requires mod assist for overall ADLS and mod assist for functional transfers  Pt currently presents with impairments in the following categories - activity tolerance, endurance, standing balance/tolerance, memory, insight, safety , judgement , attention , coping skills  and inhibition  These impairments, as well as pt's fatigue, decreased caregiver support, risk for falls and home environment  limit pt's ability to safely engage in all baseline areas of occupation, including grooming, bathing, dressing, toileting, functional mobility/transfers, community mobility, laundry , house maintenance, meal prep, cleaning, social participation  and leisure activities    Pt presents with fair rehab potential  Pt is unsafe to D/C home at this time, recommending ELOS 10 days to achieve min assist level goals, anticipate recommendation for wheelchair upon d/c  Will focus on family training during pt's rehab course   Pt's vitals during session -108/60 supine, 118/68 sit manual L UE; 02 92-95% room air, HR 95     OT Therapy Minutes   OT Time In 0700   OT Time Out 0845   OT Total Time (minutes) 105   OT Mode of treatment - Individual (minutes) 105   OT Mode of treatment - Concurrent (minutes) 0   OT Mode of treatment - Group (minutes) 0   OT Mode of treatment - Co-treat (minutes) 0   OT Mode of Treatment - Total time(minutes) 105 minutes   OT Cumulative Minutes 105   Cumulative Minutes   Cumulative therapy minutes 105

## 2023-04-15 NOTE — PROGRESS NOTES
04/15/23 1200   Patient Data   Rehab Impairment Impairment of mobility, safety, Activities of Daily Living (ADLs), and cognitive/communication skills due to Disability:  15  Disability   Etiologic Diagnosis Pneumothorax with Acute respiratory failure with hypoxia ; SIRS   Date of Onset 04/01/23   Support System   Name Eamon Petty   Relationship sig other   Home Setup   Type of Home Apartment   Method of Entry Stairs   Number of Stairs 1   Number of Stairs in Home 0   First Floor Setup Available Yes   Available Equipment Roller Walker   Baseline Information   Transportation Family/friends drive   Prior Level of Function   Self-Care 3  Independent - Patient completed the activities by him/herself, with or without an assistive device, with no assistance from a helper  Indoor-Mobility (Ambulation) 3  Independent - Patient completed the activities by him/herself, with or without an assistive device, with no assistance from a helper  Stairs 3  Independent - Patient completed the activities by him/herself, with or without an assistive device, with no assistance from a helper  Prior Assistance Needed for Driving   Prior Device Used MEEK Farooq in the Last Year   Number of falls in the past 12 months 2   Type of Injury Associated with Fall Injury   Patient Preference   Nickname (Patient Preference) William   Psychosocial   Patient Behaviors/Mood Irritable; Cooperative   Restrictions/Precautions   Precautions Bed/chair alarms;Cognitive; Fall Risk;Hard of hearing; Impulsive;Visual deficit;Supervision on toilet/commode  (4 bed rails up, floor mat)   Weight Bearing Restrictions No   ROM Restrictions No   Pain Assessment   Pain Assessment Tool 0-10   Pain Score No Pain   Transfer Bed/Chair/Wheelchair   Adaptive Equipment Roller Walker   Type of Assistance Needed Physical assistance;Verbal cues; Adaptive equipment   Physical Assistance Level 26%-50%   Comment min/mod assist, cueing for hand placement and completely turn with RW  Inconsistent carry over  Pt tend to prematurely sit or grab the armrest without totally backing to chair  With episode of retropulsion  Chair/Bed-to-Chair Transfer CARE Score 3   Roll Left and Right   Type of Assistance Needed Supervision;Verbal cues   Physical Assistance Level No physical assistance   Comment HOB flat, no HR   Roll Left and Right CARE Score 4   Sit to Lying   Type of Assistance Needed Supervision;Verbal cues   Physical Assistance Level No physical assistance   Sit to Lying CARE Score 4   Lying to Sitting on Side of Bed   Type of Assistance Needed Physical assistance;Verbal cues   Physical Assistance Level 25% or less   Lying to Sitting on Side of Bed CARE Score 3   Sit to Stand   Type of Assistance Needed Physical assistance;Verbal cues; Adaptive equipment   Physical Assistance Level 26%-50%   Comment RW, cueing for hand placement   Sit to Stand CARE Score 3   Picking Up Object   Type of Assistance Needed Physical assistance;Verbal cues; Adaptive equipment   Physical Assistance Level 26%-50%   Comment RW, cone, no reacher   Picking Up Object CARE Score 3   Car Transfer   Type of Assistance Needed Physical assistance;Verbal cues; Adaptive equipment   Physical Assistance Level 26%-50%   Comment min/mod assist with RW, cushion on seat   Car Transfer CARE Score 3   Ambulation   Primary Mode of Locomotion Prior to Admission Walk   Distance Walked (feet) 60 ft  (10ft)   Assist Device Walker   Gait Pattern Ataxic;Decreased foot clearance; Inconsistant Lulú; Slow Lulú; Shuffle   Limitations Noted In Balance; Coordination;Device Management; Heel Strike; Endurance; Safety;Speed;Strength;Swing   Provided Assistance with: Balance;Direction   Walk Assist Level Moderate Assist;Assist x 1;Chair Follow   Findings min/mod assist of 1 and 2nd person for w/c follow, ataxic to shuffling   Walk 10 Feet   Type of Assistance Needed Physical assistance;Verbal cues; Adaptive equipment   Physical Assistance Level Total assistance   Comment min/mod of 1 and CF of 2nd person   Walk 10 Feet CARE Score 1   Walk 50 Feet with Two Turns   Type of Assistance Needed Physical assistance;Verbal cues; Adaptive equipment   Physical Assistance Level 26%-50%   Comment min/mod of 1 and CF of 2nd person, RW   Walk 50 Feet with Two Turns CARE Score 3   Walk 150 Feet   Reason if not Attempted Safety concerns   Walk 150 Feet CARE Score 88   Walking 10 Feet on Uneven Surfaces   Reason if not Attempted Safety concerns   Walking 10 Feet on Uneven Surfaces CARE Score 88   Wheelchair mobility   Type of Wheelchair Used 1  Manual   Method Right upper extremity; Left upper extremity   Assistance Provided For Remove Leg Rest;Replace Leg Rest;Locking Brakes   Distance Level Surface (feet) 50 ft   Wheel 50 Feet with Two Turns   Type of Assistance Needed Physical assistance;Verbal cues   Physical Assistance Level 26%-50%   Comment assist with turning   Wheel 50 Feet with Two Turns CARE Score 3   Wheel 150 Feet   Reason if not Attempted Safety concerns   Wheel 150 Feet CARE Score 88   Curb or Single Stair   Style negotiated Single stair   Type of Assistance Needed Physical assistance; Adaptive equipment;Verbal cues   Physical Assistance Level Total assistance   Comment min/mod of 2, ascend/descend 6 inch  step, descend backward   1 Step (Curb) CARE Score 1   4 Steps   Reason if not Attempted Activity not applicable   4 Steps CARE Score 9   12 Steps   Reason if not Attempted Activity not applicable   12 Steps CARE Score 9   Stairs   Type  Steps   # of Steps 1   Assist Devices Bilateral Rail   Comprehension   Assist Devices Glasses   Auditory Basic   Visual Basic   QI: Comprehension 3  Usually Understands: Understands most conversations, but misses some part/intent of message  Requires cues at times to understand  Expression   Verbal Complex   Non-Verbal Complex   Intelligibility Sentence   QI: Expression 3   Exhibits some difficulty with expressing needs and ideas (e g , some words or finishing thoughts) or speech is not clear   Expression (FIM) 4 - Expresses basic info/needs 75-90% of time   Memory   Short-Term Impaired   RLE Assessment   RLE Assessment WFL   Strength RLE   RLE Overall Strength 4-/5   LLE Assessment   LLE Assessment WFL   Strength LLE   LLE Overall Strength 4-/5   Cognition   Overall Cognitive Status Impaired   Arousal/Participation Alert   Attention Attends with cues to redirect   Orientation Level Oriented X4   Memory Decreased short term memory   Following Commands Follows one step commands with increased time or repetition   Vision   Vision Comments Pt wears glasses at baseline   Objective Measure   PT Measure(s) Supported static/dynamic standing balance: Poor+/Poor+Poor   Therapeutic Exercise   Therapeutic Exercise/Activity NU step B UE/LE x 10 mins Level 1   Discharge Information   Vocational Plan Retired/not working   Patient's Discharge Plan Home with  sig other   Patient's Rehab Expectations walk again   Barriers to Discharge Home Limited Family Support;Decreased Cognitive Function;Decreased Strength;Decreased Endurance   Impressions Pt is a 81 y o  male who was admitted to [address] [place] [address] on 4/14/2023  Pt's current problem list includes: Pneumothorax with Acute respiratory failure with hypoxia ; SIRS  However within past month pt was found to have 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, s/p whole brain radiation 4/12 and was allowed to postpone chemotherapy treatment for 2 weeks to participate in acute rehab services  Pt's precautions include: anticipate at least moderate cognitive impairment, vision, [place]  Prior to recent cancer dx pt had been independent, however received a walker within past month and had 2 falls at home per pt's dtr  PT eval completed, pt demonstrate deficit in safety, cognition, balance, mobility, strength and activity tolerance   Current mobility, pt needed min/mod assist with RW for transfer and reinforcement for safe technique with poor carry over  He able to amb c RW x 60ft and 10ft, min/mod assist of 1 with 2nd person CF for maximum safety  He present with ataxic and shuffling gait  PTA, pt lives with his sig other, 1 level house with 1 NAILA, Pt is mod I with RW for mobility at home  He had hx of 2 falls in the past 6mos  Pt will benefit from continued skilled PT to address said deficit, maximized functional mobility recovery with appropriate AD, dec risk of fall and burden of care  Pt will be seen in PT 1-2hrs/day x 5-7days/wk x 14days  Pt will participate in thera ex, thera act, balance, gait, group prn, modalities prn and family ed     PT Therapy Minutes   PT Time In 1200   PT Time Out 1300   PT Total Time (minutes) 60   PT Mode of treatment - Individual (minutes) 60   PT Mode of treatment - Concurrent (minutes) 0   PT Mode of treatment - Group (minutes) 0   PT Mode of treatment - Co-treat (minutes) 0   PT Mode of Treatment - Total time(minutes) 60 minutes   PT Cumulative Minutes 60   Cumulative Minutes   Cumulative therapy minutes 200

## 2023-04-16 RX ADMIN — MEMANTINE 10 MG: 10 TABLET ORAL at 17:09

## 2023-04-16 RX ADMIN — PANTOPRAZOLE SODIUM 40 MG: 40 TABLET, DELAYED RELEASE ORAL at 06:05

## 2023-04-16 RX ADMIN — LEVETIRACETAM 500 MG: 500 TABLET, FILM COATED ORAL at 12:41

## 2023-04-16 RX ADMIN — DEXAMETHASONE 4 MG: 4 TABLET ORAL at 12:41

## 2023-04-16 RX ADMIN — LEVETIRACETAM 500 MG: 500 TABLET, FILM COATED ORAL at 21:19

## 2023-04-16 RX ADMIN — ENOXAPARIN SODIUM 40 MG: 40 INJECTION SUBCUTANEOUS at 12:41

## 2023-04-16 RX ADMIN — DEXAMETHASONE 4 MG: 4 TABLET ORAL at 06:06

## 2023-04-16 RX ADMIN — MEMANTINE 10 MG: 10 TABLET ORAL at 12:41

## 2023-04-16 RX ADMIN — ATORVASTATIN CALCIUM 40 MG: 40 TABLET, FILM COATED ORAL at 17:09

## 2023-04-16 RX ADMIN — DEXAMETHASONE 4 MG: 4 TABLET ORAL at 21:19

## 2023-04-16 NOTE — PROGRESS NOTES
"Rehabilitation Diagnosis: Other pulmonary    ASSESSMENT: Stable      PLAN:    Rehabilitation  • Continue current rehabilitation plan of care to maximize function  • No funcitonal barriers identified    Medical issues  • Patient impulsive at times: 4 side rails up with mats ordered  • Incontinence- timed void every 2 hours during day, every 4 hours at night  • Continue current medical plan of care  Appreciate IM consultants co-management  SUBJECTIVE: Patient seen face to face  No acute issues  Progressing as expected in rehabilitation program      ROS:  A ten point review of systems was completed on 04/16/23 and pertinent positives are listed in subjective section  All other systems reviewed were negative  OBJECTIVE:   /59   Pulse 68   Temp 98 2 °F (36 8 °C) (Oral)   Resp 18   Ht 5' 6\" (1 676 m)   Wt 59 kg (130 lb 1 1 oz)   SpO2 92%   BMI 20 99 kg/m²     Physical Exam     Gen: No acute distress, Well-nourished, well-appearing  HEENT: Moist mucus membranes, Normocephalic/Atraumatic  Cardiovascular: Regular rate, rhythm, S1/S2  Distal pulses palpable  Heme/Extr: No edema/clubbing/cyanosis  Pulmonary: Non-labored breathing  Lungs CTAB  : voiding, incontinent  GI: Soft, non-tender, non-distended  BS+, LBM 04/16  MSK: ROM is WFL in all extremities  No effusions or deformities  Bulk is symmetric  Integumentary: Skin is warm, dry  No rashes or ulcers  Neuro: AAOx3, CN 2-12 intact  Sensation intact to light touch throughout  Speech is intact  Appropriate to questioning  Tone is normal    Psych: Normal mood and affect         Personally reviewed on 04/16/23:   Lab Results   Component Value Date    WBC 10 92 (H) 04/13/2023    HGB 11 5 (L) 04/13/2023    HCT 37 0 04/13/2023    MCV 92 04/13/2023     04/13/2023     Lab Results   Component Value Date    SODIUM 135 04/13/2023    K 4 5 04/13/2023     04/13/2023    CO2 28 04/13/2023    BUN 30 (H) 04/13/2023    CREATININE 0 49 (L) " 04/13/2023    GLUC 135 04/13/2023    CALCIUM 8 0 (L) 04/13/2023     Lab Results   Component Value Date    INR 1 00 03/15/2023    PROTIME 13 4 03/15/2023           Current Facility-Administered Medications:   •  acetaminophen (TYLENOL) tablet 650 mg, 650 mg, Oral, Q6H PRN, MARY BETH Momin  •  atorvastatin (LIPITOR) tablet 40 mg, 40 mg, Oral, QPM, MARY BETH Momin, 40 mg at 04/15/23 1827  •  dexamethasone (DECADRON) tablet 4 mg, 4 mg, Oral, Q8H MOLLY, 4 mg at 04/16/23 1241 **FOLLOWED BY** [START ON 4/21/2023] dexamethasone (DECADRON) tablet 4 mg, 4 mg, Oral, Q12H [address] **FOLLOWED BY** [START ON 4/28/2023] dexamethasone (DECADRON) tablet 4 mg, 4 mg, Oral, Daily, MARY BETH Momin  •  enoxaparin (LOVENOX) subcutaneous injection 40 mg, 40 mg, Subcutaneous, Daily, MARY BETH Momin, 40 mg at 04/16/23 1241  •  levETIRAcetam (KEPPRA) tablet 500 mg, 500 mg, Oral, Q12H MOLLY, MARY BETH Momin, 500 mg at 04/16/23 1241  •  memantine (NAMENDA) tablet 10 mg, 10 mg, Oral, BID, MARY BETH Momin, 10 mg at 04/16/23 1241  •  pantoprazole (PROTONIX) EC tablet 40 mg, 40 mg, Oral, Early Morning, MARY BETH Momin, 40 mg at 04/16/23 3829    Past Medical History:   Diagnosis Date   • Hypertension        Patient Active Problem List    Diagnosis Date Noted   • Acute respiratory failure with hypoxia ([facility] ) 04/14/2023   • Non-small cell carcinoma of right lung ([facility] ) 03/27/2023   • Brain mass 03/14/2023   • Ambulatory dysfunction 04/07/2023   • Encounter for antineoplastic immunotherapy 03/31/2023   • Chemotherapy induced neutropenia ([facility] ) 03/30/2023   • 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([facility] ) 03/30/2023   • Severe protein-calorie malnutrition ([facility] ) 03/16/2023   • Lung mass 03/14/2023   • Hypertension 03/14/2023        MARY BETH Douglass  Physical [address]    Major changes: Admission diagnosis changed to other pulmonary from debility to reflect patient's current treatment plan based on admission assessment, functional history and status, and active co-morbidities

## 2023-04-16 NOTE — PROGRESS NOTES
"Occupational Therapy Treatment Note       04/16/23 0800   Pain Assessment   Pain Assessment Tool 0-10   Pain Score No Pain   Restrictions/Precautions   Precautions Bed/chair alarms;Cognitive; Fall Risk;Supervision on toilet/commode;Visual deficit;Hard of hearing; Impulsive   Lifestyle   Autonomy Didier Maisha does my clock say it's sunday the 16th? it's been saying that for 3 days now\"  pt re oriented to correct date/time which is sunday 16th   Eating   Type of Assistance Needed Set-up / clean-up   Physical Assistance Level No physical assistance   Eating CARE Score 5   Lower Body Dressing   Type of Assistance Needed Physical assistance   Physical Assistance Level Total assistance   Comment pt incontinent of urine in brief and pants upon arrival, despite being changed 1 hour prior to session by nursing staff  Pt requires total assist to change brief and pants  assist x2 for safety when toileting   Lower Body Dressing CARE Score 1   Sit to Stand   Type of Assistance Needed Physical assistance   Physical Assistance Level 26%-50%   Comment assist x1 in front   Sit to Stand CARE Score 3   Bed-Chair Transfer   Type of Assistance Needed Physical assistance   Physical Assistance Level 26%-50%   Comment stand pivot assist x1 in front with no device   Chair/Bed-to-Chair Transfer CARE Score 3   Toileting Hygiene   Type of Assistance Needed Physical assistance   Physical Assistance Level Total assistance   Comment pt incontinent of urine but then reports needing to have bowel movement and was continent of that   assist x1 in front to stablize and 2nd in back for clothing management and hygiene   Toileting Hygiene CARE Score 1   Toilet Transfer   Type of Assistance Needed Physical assistance   Physical Assistance Level 26%-50%   Comment stand pivot assist x1 in front no device   Toilet Transfer CARE Score 3   Cognition   Overall Cognitive Status Impaired   Attention Attends with cues to redirect   Orientation Level Oriented to " "person;Oriented to situation   Memory Decreased recall of precautions;Decreased recall of recent events;Decreased short term memory   Following Commands Follows one step commands with increased time or repetition   Activity Tolerance   Activity Tolerance   (limited 2* cognition)   Assessment   Treatment Assessment Pt participated in skilled OT tx session  See above for further details on functional performance  Pt presents with moderate/severe cognitive deficits  Upon arrival pt grossly incontinent of urine although had just been cleaned of urine 1 hour prior by nursing  When OT educated pt on this pt reports \"I don't Fu**ing care about that\" and initially refuses to get cleaned but then states \"I have to take a sh** now! \" and was assisted to [facility] where he was continent of bowel and he then was agreeable for LB clothing to be changed  Of note while pt uses inappropriate language and speaks loudly (but is very [place]) pt is redirectable by stating \"I am here to help\" and this seems to help de-escalate behaviors  OT called pt's significant other Cleveland Reyes, after speaking with pt and dtr yesterday Cleveland Reyes is who pt lives with and she is home with pt  Setup family training for this Tuesday at [address] [facility] (her son will drive her) as anticipate extensive family training will be needed as pt will require 24/7 assist, will recommend use of bed/chair alarms at home 2* cognitive deficits, and anticipate w/c will be recommended  Per pt's dtr yesterday pt's use of RW is new within the past month and pt was not using appropriately at home PTA, which is what occurs now as well  Pt's best transfer is assist x1 in front without device stating \"give me a hug and dance\"  However may need to repetitively practice stand pivots with use of RW to decrease caregiver burden at home and allow support while in stance for LB ADLs while caregivers are assiting   However, given pt's cognitive/physical deficits as well as plan for chemo which can cause more " weakness in future, anticipate w/c would be recommended level of mobility for safety at home, and transport chair would be recommended for community mobility  Pt left positioned in recliner with chair alarm on and call amaro in reach  Pt has demonstrated ability to correctly use call bell today and yesterday and has called for staff to bring him things, however per nursing pt transferred himself back to bed from chair (alarm was on and going off) prior to staff being able to answer alarm  After therapy, plan to return pt to bed with 4 bed rails up per order  After eating, OT recommends staff lock bed controls to maintain upright position for 30 minutes, as when pt lays in bed he lays himself immediately flat and does not demonstrate ability to understand need to keep [address] elevated for aspiration precautions  Problem List Decreased strength;Decreased endurance; Impaired balance;Decreased mobility; Decreased cognition; Impaired judgement;Decreased safety awareness; Impaired vision; Impaired hearing   Barriers to Discharge Decreased caregiver support; Inaccessible home environment   Plan   Treatment/Interventions ADL retraining;Functional transfer training; Therapeutic exercise; Endurance training;Patient/family training;Equipment eval/education; Compensatory technique education   Progress Slow progress, cognitive deficits   Recommendation   OT Discharge Recommendation   (24/7 assist)   OT Therapy Minutes   OT Time In 0800   OT Time Out 0840   OT Total Time (minutes) 40   OT Mode of treatment - Individual (minutes) 40   OT Mode of treatment - Concurrent (minutes) 0   OT Mode of treatment - Group (minutes) 0   OT Mode of treatment - Co-treat (minutes) 0   OT Mode of Treatment - Total time(minutes) 40 minutes   OT Cumulative Minutes 145   Therapy Time missed   Time missed?  No

## 2023-04-16 NOTE — NURSING NOTE
I reached out to Briana charge nurse for [facility] regarding a possibility that pt still has his upper dentures, razor  & cell phone on their unit  Pt does not have these items documented here on our admission  She will check into these items  Pt was in room 231

## 2023-04-16 NOTE — PROGRESS NOTES
"   04/16/23 7296   Pain Assessment   Pain Assessment Tool 0-10   Pain Score No Pain   Restrictions/Precautions   Precautions Bed/chair alarms;Cognitive; Fall Risk;Hard of hearing; Impulsive;Visual deficit;Supervision on toilet/commode   Weight Bearing Restrictions No   ROM Restrictions No   Comprehension   Comprehension (FIM) 3 - Understands basic info/conversation 50-74% of time   Expression   Expression (FIM) 4 - Expresses basic info/needs 75-90% of time   Social Interaction   Social Interaction (FIM) 3 - Interacts 50-74% of time   Problem Solving   Problem solving (FIM) 2 - Solves basic problems 25-49% of time   Memory   Memory (FIM) 2 - Recognizes, recalls/performs 25-49%   Speech/Language/Cognition Assessmetn   Treatment Assessment Pt seen for skilled speech therapy session targeting cognitive linguistic communication skills  Pt resting in bed laying on left side in fetal position with remote up to ear so he can hear the TV  Pt awakens to greeting and responses appropriately  SLP reintroduced self and role as well as reminded him of meeting him yesterday and completing the cognitive assessment  Pt stating \"yes\" in agreement to all statements however suspect he does not actually recall situation and therapist  Educated pt that he is doing well but there are cognitive deficits present which here on rehab we want to maximize the best we can  Pt again simply stating \"okay\" to each statement SLP provides  Pt oriented to place and rough situation  Pt then stated \"It's been the 16th for the last 3 days I know what I'm saying I'm not crazy\" as he has a clock on his bed close to his face that tells him date and time of day  SLP provided pt with calendar and crossed off days as well to better reinforce current day of week  Pt redirected to admission to rehab unit was Friday, evaluations were done yesterday which was Saturday and that today starts a new week with it being Sunday   Pt again, simply stating \"okay\" to all " "statements SLP providing  Provided another visual aid for safety which stated to use the call bell and not get up by himself  Per staff, pt has been ringing appropriate for needs however is too impatient and impulsive that he is already setting off alarms before they can get to him  Sign taped to to sink as pt mainly lays on his left side in bed therefore this is at eye level for him to see  Same with calendar, taped on wall, for him to look at as well  Discussed plan for the week with Darrell Bonner coming in on Tuesday at 1pm for training  Reviewed with pt plan to discuss her helping him with medications at home  Pt recalls taking \"one blue and one white one\"  When attempting further comprehension as to purpose or name of previous meds, pt lashed out with \"you should know you're the ian** nurse\"  SLP redirected pt to further gain his recall and comprehension of what the meds were for prior- then pt able to communicate one for \"seizures\" and one for \"swelling\"  SLP reviewing the 5 pills and 1 lovenox injection that he is on now, and again pt simply stating \"okay\" multiple times however suspect poor actual comprehension/retention  Pt then stating \"I'm done with this, I want to sleep\"  Attempted redirection to more tasks however pt with increased frustration shouting \"I just want to ian*jo-ann sleep! \"  Session ended to avoid further escalation  Discussed with [facility] OT as well with plan to focus on family training this week in order to maximize safety for discharge home to then further complete chemo as planned by oncology      SLP Therapy Minutes   SLP Time In 1330   SLP Time Out 1350   SLP Total Time (minutes) 20   SLP Mode of treatment - Individual (minutes) 20   SLP Mode of treatment - Concurrent (minutes) 0   SLP Mode of treatment - Group (minutes) 0   SLP Mode of treatment - Co-treat (minutes) 0   SLP Mode of Treatment - Total time(minutes) 20 minutes   SLP Cumulative Minutes 55       "

## 2023-04-16 NOTE — PROGRESS NOTES
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  • Chemotherapy on hold until pt discharged - no more than 2 weeks  • S/p whole brain radiation x 10 treatments  Completed 4/12/23  • Continue Decadron taper, Keppra and memantine  • Follow-up with Dr Shashank Yarbrough and radiation oncology      Pneumothorax  • Occurred after lung biopsy from 3/17/23  • S/p chest tube  • Now resolved      LLL PNA  • S/p tx with Cefepime  • Will watch  • Denies cough/SOB     Behavioral issues  • Brain lesions are B/L frontal likely contributing  • Was yelling at staff and had impulsive behavior 4/14/23 but cooperative with PT/OT on 4/15/23     Leukocytosis  • Mild  Likely reactive  No signs of infx      HTN  • Listed in pt's history but BPs have been soft  • On 4/15/23 with OT BPs: 108 supine and 118 sitting        Discharge date:  Team    The above assessment and plan was reviewed and updated as determined by my evaluation of the patient on 4/16/2023      Labs:   Results from last 7 days   Lab Units 04/13/23  0528 04/11/23  0549   WBC Thousand/uL 10 92* 10 88*   HEMOGLOBIN g/dL 11 5* 11 5*   HEMATOCRIT % 37 0 36 3*   PLATELETS Thousands/uL 153 167     Results from last 7 days   Lab Units 04/13/23  0528   SODIUM mmol/L 135   POTASSIUM mmol/L 4 5   CHLORIDE mmol/L 103   CO2 mmol/L 28   BUN mg/dL 30*   CREATININE mg/dL 0 49*   CALCIUM mg/dL 8 0*                   Review of Scheduled Meds:  Current Facility-Administered Medications   Medication Dose Route Frequency Provider Last Rate   • acetaminophen  650 mg Oral Q6H PRN MARY BETH Momin     • atorvastatin  40 mg Oral QPM MARY BETH Momin     • dexamethasone  4 mg Oral Q8H [address] MARY BETH Momin      Followed by   • [START ON 4/21/2023] dexamethasone 4 mg Oral Q12H [address] MARY BETH Momin      Followed by   • [START ON 4/28/2023] dexamethasone  4 mg Oral Daily MARY BETH Momin     • enoxaparin  40 mg Subcutaneous Daily MARY BETH Momin     • levETIRAcetam  500 mg Oral Q12H [address] MARY BETH Momin     • memantine  10 mg Oral BID MARY BETH Momin     • pantoprazole  40 mg Oral Early Morning MARY BETH Momin         Subjective/ HPI: Patient seen and examined  Patients overnight issues or events were reviewed with nursing or staff during rounds or morning huddle session  New or overnight issues include the following:     No new or overnight issues  Offers no complaints    ROS:   A 10 point ROS was performed; negative except as noted above  Imaging:     No orders to display       *Labs /Radiology studies reviewed  *Medications reviewed and reconciled as needed  *Please refer to order section for additional ordered labs studies  *Case discussed with primary attending during morning huddle case rounds    Physical Examination:  Vitals:   Vitals:    04/15/23 0534 04/15/23 1506 04/15/23 2238 04/16/23 0603   BP: 99/55 98/65 106/58 107/59   BP Location: Right arm Left arm Right arm    Pulse: 70 74 75 68   Resp: 17 18 20 18   Temp: (!) 97 4 °F (36 3 °C) 98 °F (36 7 °C) 98 6 °F (37 °C) 98 2 °F (36 8 °C)   TempSrc: Oral Oral Oral Oral   SpO2: 95%  93% 92%   Weight:       Height:           General Appearance: no distress, conversive  HEENT:  External ear normal   Nose normal w/o drainage  Mucous membranes are moist  Oropharynx is clear  Conjunctiva clear w/o icterus or redness  Neck:  Supple, normal ROM  Lungs: BBS without crackles/wheeze/rhonchi; respirations unlabored with normal inspiratory/expiratory effort  No retractions noted  On RA  CV: regular rate and rhythm; no rubs/murmurs/gallops, PMI normal   ABD: Abdomen is soft  Bowel sounds all quadrants  Nontender with no distention      EXT: no edema  Skin: normal turgor, normal texture, no rashes  Psych: affect normal, mood normal  Neuro: AA      The above physical exam was reviewed and updated as determined by my evaluation of the patient on 4/16/2023  Invasive Devices     None                    VTE Pharmacologic Prophylaxis: Enoxaparin  Code Status: Level 2 - DNAR: but accepts endotracheal intubation  Current Length of Stay: 2 day(s)      Total time spent:  30 minutes with more than 50% spent counseling/coordinating care  Counseling includes discussion with patient re: progress  and discussion with patient of his/her current medical state/information  Coordination of patient's care was performed in conjunction with primary service  Time invested included review of patient's labs, vitals, and management of their comorbidities with continued monitoring  In addition, this patient was discussed with medical team including physician and advanced extenders  The care of the patient was extensively discussed and appropriate treatment plan was formulated unique for this patient  Medical decision making for the day was made by supervising physician unless otherwise noted in their attestation statement  ** Please Note:  voice to text software may have been used in the creation of this document   Although proof errors in transcription or interpretation are a potential of such software**

## 2023-04-16 NOTE — PLAN OF CARE
Problem: PAIN - ADULT  Goal: Verbalizes/displays adequate comfort level or baseline comfort level  Description: Interventions:  - Encourage patient to monitor pain and request assistance  - Assess pain using appropriate pain scale  - Administer analgesics based on type and severity of pain and evaluate response  - Implement non-pharmacological measures as appropriate and evaluate response  - Consider cultural and social influences on pain and pain management  - Notify physician/advanced practitioner if interventions unsuccessful or patient reports new pain  Outcome: Progressing     Problem: INFECTION - ADULT  Goal: Absence or prevention of progression during hospitalization  Description: INTERVENTIONS:  - Assess and monitor for signs and symptoms of infection  - Monitor lab/diagnostic results  - Monitor all insertion sites, i e  indwelling lines, tubes, and drains  - Monitor endotracheal if appropriate and nasal secretions for changes in amount and color  - [place] appropriate cooling/warming therapies per order  - Administer medications as ordered  - Instruct and encourage patient and family to use good hand hygiene technique  - Identify and instruct in appropriate isolation precautions for identified infection/condition  Outcome: Progressing  Goal: Absence of fever/infection during neutropenic period  Description: INTERVENTIONS:  - Monitor WBC    Outcome: Progressing     Problem: SAFETY ADULT  Goal: Patient will remain free of falls  Description: INTERVENTIONS:  - Educate patient/family on patient safety including physical limitations  - Instruct patient to call for assistance with activity   - Consult OT/PT to assist with strengthening/mobility   - Keep Call bell within reach  - Keep bed low and locked with side rails adjusted as appropriate  - Keep care items and personal belongings within reach  - Initiate and maintain comfort rounds  - Make Fall Risk Sign visible to staff  - Offer Toileting every  Hours, in advance of need  - Initiate/Maintain alarm  - Obtain necessary fall risk management equipment:   - Apply yellow socks and bracelet for high fall risk patients  - Consider moving patient to room near nurses station  Outcome: Progressing  Goal: Maintain or return to baseline ADL function  Description: INTERVENTIONS:  -  Assess patient's ability to carry out ADLs; assess patient's baseline for ADL function and identify physical deficits which impact ability to perform ADLs (bathing, care of mouth/teeth, toileting, grooming, dressing, etc )  - Assess/evaluate cause of self-care deficits   - Assess range of motion  - Assess patient's mobility; develop plan if impaired  - Assess patient's need for assistive devices and provide as appropriate  - Encourage maximum independence but intervene and supervise when necessary  - Involve family in performance of ADLs  - Assess for home care needs following discharge   - Consider OT consult to assist with ADL evaluation and planning for discharge  - Provide patient education as appropriate  Outcome: Progressing  Goal: Maintains/Returns to pre admission functional level  Description: INTERVENTIONS:  - Perform BMAT or MOVE assessment daily    - Set and communicate daily mobility goal to care team and patient/family/caregiver  - Collaborate with rehabilitation services on mobility goals if consulted  - Perform Range of Motion  times a day  - Reposition patient every  hours    - Dangle patient  times a day  - Stand patient  times a day  - Ambulate patient  times a day  - Out of bed to chair  times a day   - Out of bed for meals  times a day  - Out of bed for toileting  - Record patient progress and toleration of activity level   Outcome: Progressing     Problem: DISCHARGE PLANNING  Goal: Discharge to home or other facility with appropriate resources  Description: INTERVENTIONS:  - Identify barriers to discharge w/patient and caregiver  - Arrange for needed discharge resources and transportation as appropriate  - Identify discharge learning needs (meds, wound care, etc )  - Arrange for interpretive services to assist at discharge as needed  - Refer to Case Management Department for coordinating discharge planning if the patient needs post-hospital services based on physician/advanced practitioner order or complex needs related to functional status, cognitive ability, or social support system  Outcome: Progressing     Problem: Nutrition/Hydration-ADULT  Goal: Nutrient/Hydration intake appropriate for improving, restoring or maintaining nutritional needs  Description: Monitor and assess patient's nutrition/hydration status for malnutrition  Collaborate with interdisciplinary team and initiate plan and interventions as ordered  Monitor patient's weight and dietary intake as ordered or per policy  Utilize nutrition screening tool and intervene as necessary  Determine patient's food preferences and provide high-protein, high-caloric foods as appropriate       INTERVENTIONS:  - Monitor oral intake, urinary output, labs, and treatment plans  - Assess nutrition and hydration status and recommend course of action  - Evaluate amount of meals eaten  - Assist patient with eating if necessary   - Allow adequate time for meals  - Recommend/ encourage appropriate diets, oral nutritional supplements, and vitamin/mineral supplements  - Order, calculate, and assess calorie counts as needed  - Recommend, monitor, and adjust tube feedings and TPN/PPN based on assessed needs  - Assess need for intravenous fluids  - Provide specific nutrition/hydration education as appropriate  - Include patient/family/caregiver in decisions related to nutrition  Outcome: Progressing     Problem: Prexisting or High Potential for Compromised Skin Integrity  Goal: Skin integrity is maintained or improved  Description: INTERVENTIONS:  - Identify patients at risk for skin breakdown  - Assess and monitor skin integrity  - Assess and monitor nutrition and hydration status  - Monitor labs   - Assess for incontinence   - Turn and reposition patient  - Assist with mobility/ambulation  - Relieve pressure over bony prominences  - Avoid friction and shearing  - Provide appropriate hygiene as needed including keeping skin clean and dry  - Evaluate need for skin moisturizer/barrier cream  - Collaborate with interdisciplinary team   - Patient/family teaching  - Consider wound care consult   Outcome: Progressing     Problem: SAFETY,RESTRAINT: NV/NON-SELF DESTRUCTIVE BEHAVIOR  Goal: Remains free of harm/injury (restraint for non violent/non self-detsructive behavior)  Description: INTERVENTIONS:  - Instruct patient/family regarding restraint use   - Assess and monitor physiologic and psychological status   - Provide interventions and comfort measures to meet assessed patient needs   - Identify and implement measures to help patient regain control  - Assess readiness for release of restraint   Outcome: Progressing  Goal: Returns to optimal restraint-free functioning  Description: INTERVENTIONS:  - Assess the patient's behavior and symptoms that indicate continued need for restraint  - Identify and implement measures to help patient regain control  - Assess readiness for release of restraint   Outcome: Progressing

## 2023-04-17 RX ADMIN — MEMANTINE 10 MG: 10 TABLET ORAL at 09:22

## 2023-04-17 RX ADMIN — MEMANTINE 10 MG: 10 TABLET ORAL at 17:14

## 2023-04-17 RX ADMIN — PANTOPRAZOLE SODIUM 40 MG: 40 TABLET, DELAYED RELEASE ORAL at 05:30

## 2023-04-17 RX ADMIN — LEVETIRACETAM 500 MG: 500 TABLET, FILM COATED ORAL at 09:22

## 2023-04-17 RX ADMIN — LEVETIRACETAM 500 MG: 500 TABLET, FILM COATED ORAL at 21:03

## 2023-04-17 RX ADMIN — DEXAMETHASONE 4 MG: 4 TABLET ORAL at 05:30

## 2023-04-17 RX ADMIN — DEXAMETHASONE 4 MG: 4 TABLET ORAL at 21:03

## 2023-04-17 RX ADMIN — ENOXAPARIN SODIUM 40 MG: 40 INJECTION SUBCUTANEOUS at 09:22

## 2023-04-17 RX ADMIN — DEXAMETHASONE 4 MG: 4 TABLET ORAL at 14:43

## 2023-04-17 RX ADMIN — ATORVASTATIN CALCIUM 40 MG: 40 TABLET, FILM COATED ORAL at 17:14

## 2023-04-17 NOTE — PROGRESS NOTES
"   04/17/23 0700   Pain Assessment   Pain Assessment Tool 0-10   Pain Score No Pain   Restrictions/Precautions   Precautions Bed/chair alarms;Cognitive; Fall Risk;Hard of hearing; Impulsive;Supervision on toilet/commode;Visual deficit  (per MARY BETH Daniels she is ordering 4 bed rails up)   Weight Bearing Restrictions No   ROM Restrictions No   Lifestyle   Autonomy \"I'm not being mean, I just have a loud voice! \"   Eating   Type of Assistance Needed Set-up / clean-up   Physical Assistance Level No physical assistance   Comment seated in recliner chair for breakfast   Eating CARE Score 5   Oral Hygiene   Type of Assistance Needed Physical assistance   Physical Assistance Level 51%-75%   Comment pt able to use mouth wash to rinse mouth, assist to wash/brush dentures and apply denture adhesive; pt able to place in mouth   Oral Hygiene CARE Score 2   Shower/Bathe Self   Type of Assistance Needed Physical assistance   Physical Assistance Level Total assistance   Comment seated EOB, pt able to bathe UB with min A only for unsupported sitting balance, no physical assist to actually bathe each part  pt then able to bathe LEs again with assist to maintain unsupported sitting balance  Ax2 in stance for bathing [place]ley/rear  Ax1 provided for standing balance and Ax1 for completing bathing tasks  Shower/Bathe Self CARE Score 1   Upper Body Dressing   Type of Assistance Needed Physical assistance   Physical Assistance Level 51%-75%   Comment seated EOB, pt noted with impaired unsupported sitting balance as well as low frustration tolerance requiring up to mod/max A for donning shirt  Upper Body Dressing CARE Score 2   Lower Body Dressing   Type of Assistance Needed Physical assistance   Physical Assistance Level Total assistance   Comment TA to don tab brief in stance   due to low frustration tolerance, pt requires assist to partially thread pants over feet, Ax2 in stance for CM due to no RW support   Lower Body Dressing CARE Score 1 " Putting On/Taking Off Footwear   Type of Assistance Needed Physical assistance   Physical Assistance Level 26%-50%   Comment seated in recliner, pt able to doff socks, pt able to don over toes with assist to thread over heels, pt able to pull up remainder of the way  pt able to don shoes over toes, requires assist to get fully over heels  Putting On/Taking Off Footwear CARE Score 3   Sit to Stand   Type of Assistance Needed Physical assistance   Physical Assistance Level 26%-50%   Comment assist x1 in front   Sit to Stand CARE Score 3   Bed-Chair Transfer   Type of Assistance Needed Physical assistance   Physical Assistance Level 26%-50%   Comment SPT no AD; assist x1 in front   Chair/Bed-to-Chair Transfer CARE Score 3   Toileting Hygiene   Type of Assistance Needed Physical assistance   Physical Assistance Level Total assistance   Comment declined to use BSC/toilet; brief noted to be saturated; Ax2 in stance for hygiene/CM   Toileting Hygiene CARE Score 1   Exercise Tools   Exercise Tools Yes   Other Exercise Tool 1 pt engages in light UE strengthening using 2# FW, 3x10 for each of the following: shoulder press, chest press, elbow flex/ext  fair tolerance to exercises requiring rest breaks in between each set to manage proximal fatigue  strengthening completed in order to inc overall strength and endurance for ADLs/transfers  Cognition   Overall Cognitive Status Impaired   Arousal/Participation Alert   Attention Attends with cues to redirect   Orientation Level Oriented to person   Memory Decreased recall of precautions;Decreased recall of recent events;Decreased short term memory   Following Commands Follows one step commands with increased time or repetition   Comments pt oriented to month, year and reason for admission  pt reports he is at [address] [place] [address] but when informed of incorrect location, pt able to quickly recall he was in [place]   pt unable to recall date or day of the week even "with visual aid of calender  Activity Tolerance   Activity Tolerance Patient limited by fatigue  (limited by impaired func cog/low frustration tolerance)   Assessment   Treatment Assessment pt engages in 90 minute skilled OT session focusing on ADL routine, func transfers, standing roshan/bal and UE strenghtening  see above for full func details  pt agreeable to ADL session this date but noted with low frustration tolerance especially during dressing tasks often requesting assistance even before trialing himself first  pt continues with impaired func cog and overall safety requiring direct supervision/assistance at all times  while pt does use inappropriate language at times, it was not directed towards this FOREMAN in a disrespectful manor and more so just used in \"normal conversation\"  due to impaired cog, pt benefits from short, simple directions and at times, inc VCs for sequencing of steps during ADL routine  recommend continued skilled care to focus on ADL retraining, func transfers, standing roshan/bal for self care tasks, strength and endurance in order to decrease burden of care at d/c  Prognosis Fair   Problem List Decreased strength;Decreased endurance; Impaired balance;Decreased mobility; Decreased cognition; Impaired judgement;Decreased safety awareness; Impaired vision; Impaired hearing   Barriers to Discharge Decreased caregiver support; Inaccessible home environment   Plan   Treatment/Interventions ADL retraining;Functional transfer training; Therapeutic exercise; Endurance training;Cognitive reorientation;Patient/family training;Equipment eval/education; Compensatory technique education   OT Therapy Minutes   OT Time In 0700   OT Time Out 0830   OT Total Time (minutes) 90   OT Mode of treatment - Individual (minutes) 90   OT Mode of treatment - Concurrent (minutes) 0   OT Mode of treatment - Group (minutes) 0   OT Mode of treatment - Co-treat (minutes) 0   OT Mode of Treatment - Total time(minutes) 90 minutes " OT Cumulative Minutes 235   Therapy Time missed   Time missed?  No

## 2023-04-17 NOTE — PROGRESS NOTES
Internal Medicine Progress Note  Patient: Ethel Tomlinson  Age/sex: 81 y o  male  Medical Record #: [id number]      ASSESSMENT/PLAN: (Interval History)  Ethel Tomlinson is seen and examined and management for following issues:    Non-small cell lung cancer right lung with brain mets  • Chemotherapy on hold until pt discharged - no more than 2 weeks  • S/p whole brain radiation x 10 treatments  Completed 4/12/23  • Continue Decadron taper, Keppra and memantine  • Follow-up with Dr Juanis Gutierrez and radiation oncology      Pneumothorax  • Occurred after lung biopsy from 3/17/23  • S/p chest tube  • Now resolved      LLL PNA  • S/p tx with Cefepime  • Will watch  • Denies cough/SOB     Behavioral issues  • Brain lesions are B/L frontal likely contributing  • Was yelling at staff and had impulsive behavior 4/14/23 but has been more cooperative lately      Leukocytosis  • Mild  Likely reactive  No signs of infx      HTN  • Listed in pt's history but BPs have been soft  • Continue to monitor  Pt is asymptomatic         Discharge date:  Team    The above assessment and plan was reviewed and updated as determined by my evaluation of the patient on 4/17/2023      Labs:   Results from last 7 days   Lab Units 04/13/23  0528 04/11/23  0549   WBC Thousand/uL 10 92* 10 88*   HEMOGLOBIN g/dL 11 5* 11 5*   HEMATOCRIT % 37 0 36 3*   PLATELETS Thousands/uL 153 167     Results from last 7 days   Lab Units 04/13/23  0528   SODIUM mmol/L 135   POTASSIUM mmol/L 4 5   CHLORIDE mmol/L 103   CO2 mmol/L 28   BUN mg/dL 30*   CREATININE mg/dL 0 49*   CALCIUM mg/dL 8 0*                   Review of Scheduled Meds:  Current Facility-Administered Medications   Medication Dose Route Frequency Provider Last Rate   • acetaminophen  650 mg Oral Q6H PRN MARY BETH Momin     • atorvastatin  40 mg Oral QPM MARY BETH Momin     • dexamethasone  4 mg Oral Q8H [facility] MARY BETH Momin      Followed by   • [START ON 4/21/2023] dexamethasone  4 mg Oral Q12H [facility] MARY BETH Momin      Followed by   • [START ON 4/28/2023] dexamethasone  4 mg Oral Daily MARY BETH Momin     • enoxaparin  40 mg Subcutaneous Daily MARY BETH Momin     • levETIRAcetam  500 mg Oral Q12H [facility] MARY BETH Momin     • memantine  10 mg Oral BID MAYR BETH Momin     • pantoprazole  40 mg Oral Early Morning MARY BETH Momin         Subjective/ HPI: Patient seen and examined  Patients overnight issues or events were reviewed with nursing or staff during rounds or morning huddle session  New or overnight issues include the following:     Pt seen in PT  He denies any current complaints  ROS:   A 10 point ROS was performed; negative except as noted above  Imaging:     No orders to display       *Labs /Radiology studies reviewed  *Medications reviewed and reconciled as needed  *Please refer to order section for additional ordered labs studies  *Case discussed with primary attending during morning huddle case rounds    Physical Examination:  Vitals:   Vitals:    04/16/23 0603 04/16/23 1300 04/16/23 2130 04/17/23 0537   BP: 107/59 90/58 99/52 90/60   BP Location:  Right arm Left arm Left arm   Pulse: 68 83 80 77   Resp: 18 18 18 18   Temp: 98 2 °F (36 8 °C) 98 8 °F (37 1 °C) 98 4 °F (36 9 °C) 98 4 °F (36 9 °C)   TempSrc: Oral Oral Oral Oral   SpO2: 92% 94% 96% 94%   Weight:       Height:           General Appearance: no distress, conversive  HEENT:  External ear normal   Nose normal w/o drainage  Mucous membranes are moist  Oropharynx is clear  Conjunctiva clear w/o icterus or redness  [place]  Neck:  Supple, normal ROM  Lungs: BBS without crackles/wheeze/rhonchi; respirations unlabored with normal inspiratory/expiratory effort  No retractions noted  On RA  CV: regular rate and rhythm; no rubs/murmurs/gallops, PMI normal   ABD: Abdomen is soft  Bowel sounds all quadrants  Nontender with no distention      EXT: no edema  Skin: normal turgor, normal texture, no rashes  Psych: affect normal, mood normal  Neuro: AA      The above physical exam was reviewed and updated as determined by my evaluation of the patient on 4/17/2023  Invasive Devices     None                    VTE Pharmacologic Prophylaxis: Enoxaparin  Code Status: Level 2 - DNAR: but accepts endotracheal intubation  Current Length of Stay: 3 day(s)      Total time spent:  30 minutes with more than 50% spent counseling/coordinating care  Counseling includes discussion with patient re: progress  and discussion with patient of his/her current medical state/information  Coordination of patient's care was performed in conjunction with primary service  Time invested included review of patient's labs, vitals, and management of their comorbidities with continued monitoring  In addition, this patient was discussed with medical team including physician and advanced extenders  The care of the patient was extensively discussed and appropriate treatment plan was formulated unique for this patient  Medical decision making for the day was made by supervising physician unless otherwise noted in their attestation statement  ** Please Note:  voice to text software may have been used in the creation of this document   Although proof errors in transcription or interpretation are a potential of such software**

## 2023-04-17 NOTE — PROGRESS NOTES
"   04/17/23 1030   Pain Assessment   Pain Assessment Tool 0-10   Pain Score No Pain   Restrictions/Precautions   Precautions Bed/chair alarms;Cognitive; Fall Risk;Supervision on toilet/commode;Visual deficit;Hard of hearing  (all bed rails up with fall mat on floor)   Cognition   Overall Cognitive Status Impaired   Arousal/Participation Alert; Cooperative   Attention Attends with cues to redirect   Subjective   Subjective pt asleep in bed but was agreeable to PT when woken up  per pt he sleeps most of the time at home and only eats 4 pm to 10 om per baseline practice when asked why he did not ask lunch  Despite education he still refused to order lunch so pt assisted back to bed to rest at end of tx per request    Roll Left and Right   Type of Assistance Needed Supervision   Comment HOB flat, no rail   Roll Left and Right CARE Score 4   Sit to Lying   Type of Assistance Needed Supervision;Verbal cues   Comment HOB flat, no rail x 3 reps   Sit to Lying CARE Score 4   Lying to Sitting on Side of Bed   Type of Assistance Needed Supervision;Verbal cues   Comment HOB flat, no rail x 2 reps- VC to avoid using rail stating \" I can't do it\" but with encouragement able to complete at S level   Lying to Sitting on Side of Bed CARE Score 4   Sit to Stand   Type of Assistance Needed Physical assistance;Verbal cues; Adaptive equipment   Physical Assistance Level 26%-50%   Comment min-mod of 1 with retropulsion tendencies due to impaired balance, weakness and baseline kyphotic posture   Sit to Stand CARE Score 3   Bed-Chair Transfer   Type of Assistance Needed Physical assistance; Adaptive equipment;Verbal cues   Physical Assistance Level 51%-75%   Comment SPT with UE support on arm rest mod initially then progress to min A with VC/TC/assist for proper hand placement and VC to move feet while doing the pivot   more mod A of 1 than min A with SPT using a RW due to retropulsion tendencies and difficulty with RW management  with " "shuffling steps  at this point if pt to go home w/c level he will be safer doing SPT without an AD but with UE support  will provide pt 16 x 16 w/c later this PM but currently using 18x 18 w/c   Chair/Bed-to-Chair Transfer CARE Score 2   Walk 10 Feet   Type of Assistance Needed Physical assistance;Verbal cues; Adaptive equipment   Physical Assistance Level 51%-75%   Comment mod of 1 with RW, assist for walker management to facilitate gait flow as well as max VC to promote step through and foot clearance with poor carry over at this time  able to walk x 10' without CFA of 2nd person   Walk 10 Feet CARE Score 2   Walk 50 Feet with Two Turns   Type of Assistance Needed Physical assistance;Verbal cues; Adaptive equipment   Physical Assistance Level Total assistance   Comment 50x 2 with RW, CFA of 2nd person   Walk 50 Feet with Two Turns CARE Score 1   Ambulation   Gait Pattern Inconsistant Lulú; Slow Lulú;Decreased foot clearance;Retropulsion; Shuffle;Decreased R stance;Decreased L stance; Forward Flexion   Wheel 50 Feet with Two Turns   Type of Assistance Needed Verbal cues; Adaptive equipment;Physical assistance   Physical Assistance Level 25% or less   Comment S with VC most of the time to min A to avoid grazing R hand against the door frame   also encouragement to try obstacle negotiation after pt stated \" I can't do that\" x 70'   Wheel 50 Feet with Two Turns CARE Score 3   Therapeutic Interventions   Strengthening seated marches with tactile cues x 10 reps x 2 sets and LAQ x 10 reps x 2 setsx 5 SH   Balance standing tolerance/balance and UE strengthening training with bean bag tossing x 1 min 51 secs with 1 bean bag in, 2 mins and 5 secs x 10 bean bags in on 2nd trial (bean hole adjusted closer)  with intermittent use of walker for support but required mod A to maintain standing balance due to retropulsion and poor self awareness and correction   Assessment   Treatment Assessment pt unsure if w/c will fit in his " house but anticipate 16x16 w/c will fit in pt's living areas at home  at this time pt will be safer with w/c level transfers and mobilities vs using a RW to dec risk for falls at home and dec caregiver burden  although requires encouragement to perform tasks simulating home set up for noted tendencies to ask for help immediately  CRNP and RN notified of pt's eating habits  Pt SpO2 remained stable as monitored ranging % while GA  bpm after task performances  no reported lightheadedness or dizziness during tx  PT Barriers   Functional Limitation Car transfers; Ramp negotiation;Stair negotiation;Standing;Transfers; Walking; Wheelchair management   Plan   Treatment/Interventions Therapeutic exercise; Endurance training;Functional transfer training;LE strengthening/ROM; Bed mobility;Gait training;Spoke to nursing;OT;Spoke to advanced practitioner;Spoke to case management;Spoke to MD;Compensatory technique education;Equipment eval/education   Progress Slow progress, cognitive deficits   Recommendation   PT Discharge Recommendation Home with home health rehabilitation   Equipment Recommended Wheelchair   PT Therapy Minutes   PT Time In 1030   PT Time Out 1130   PT Total Time (minutes) 60   PT Mode of treatment - Individual (minutes) 60   PT Mode of treatment - Concurrent (minutes) 0   PT Mode of treatment - Group (minutes) 0   PT Mode of treatment - Co-treat (minutes) 0   PT Mode of Treatment - Total time(minutes) 60 minutes   PT Cumulative Minutes 120

## 2023-04-17 NOTE — PROGRESS NOTES
04/17/23 1230   Pain Assessment   Pain Assessment Tool 0-10   Pain Score No Pain   Restrictions/Precautions   Precautions Fall Risk;Cognitive;Bed/chair alarms;Supervision on toilet/commode;Visual deficit;Hard of hearing  (pocket talker, all bedrails up per order with fall mat)   Cognition   Overall Cognitive Status Impaired   Arousal/Participation Alert; Cooperative   Subjective   Subjective pt in bed just finished urinating in the urinal when PT came in  pt reported spilling urine on pants/shirt  Pt also expressed wanting to eat lunch after putting on his dentures, so assisted pt ordering lunch over the phone using menu  of note PT also found pt's upper dentures on the pocket of his red shirt, RN and PT manager notified  Pt assisted in recliner at end of tx to wait for lunch, with alarm on  Pt instructed to use call bell to ask staff assistance back into the bed after he eats lunch  Per RN pt called appropriately afterwards  Roll Left and Right   Type of Assistance Needed Supervision   Comment HOB flat, no rails   Roll Left and Right CARE Score 4   Sit to Lying   Type of Assistance Needed Supervision   Comment HOB flat, no rails   Sit to Lying CARE Score 4   Lying to Sitting on Side of Bed   Type of Assistance Needed Supervision   Comment encouragement not to use bedrail, HOB flat completed at S level   Lying to Sitting on Side of Bed CARE Score 4   Sit to Stand   Type of Assistance Needed Physical assistance;Verbal cues; Adaptive equipment   Physical Assistance Level 51%-75%   Comment mod A with VC for fwd weightshifting using nose over toes VCas well as VC for hand placement micheal reaching back for chair prior to sitting down  repeated sit to stand from recliner with RW x 5 reps with emphasis on fwd weight shifting and hand placement   Sit to Stand CARE Score 2   Bed-Chair Transfer   Type of Assistance Needed Physical assistance;Verbal cues; Adaptive equipment   Physical Assistance Level 51%-75%   Comment mod A with RW, cont to demo retropulsion tendencies with poor walker management requiring assist to steer   Chair/Bed-to-Chair Transfer CARE Score 2   Walk 10 Feet   Type of Assistance Needed Physical assistance;Verbal cues; Adaptive equipment   Physical Assistance Level Total assistance   Comment due to retropulsion required mod-min of 2 person assist to walker x 30' x 2, 10' x 1 with RW, assist for walker steering as well  no carry over of gait improvement utilizing internal/external VC   Walk 10 Feet CARE Score 1   Toilet Transfer   Findings pt spilled urine on pants and shirt for utilized urinal in bed while in sidelying  Pt cont to have tendency to ask for assistance with functional activities requiring re-ed purpose of therapy to facilitate inc indep in preparation for home d/c  pt required max A for LB dressing(donning/doffing pants, diaper) with encouragement able to thread and remove LE to/from pants but assist with diaper as wel as to move pants/diaper over hips while standing with RW support, total assist for doffing/donning slippers without AD, S for UB dressing  Assessment   Treatment Assessment Pt engaged in ADLs and functional household distance mobilities this PM for 30 mins including ordering lunch  Pt cont to be significantly high risk for falls when mobilizing using a RW due to poor safety and management of device as well as shuffling gait and impaired standing balance so highly recommend for pt to be w/c level at d/c with emphasis on stand pivot transfers without AD but with UE support on furniture  Family/Caregiver Present no   Barriers to Discharge Inaccessible home environment;Decreased caregiver support   Plan   Treatment/Interventions Functional transfer training; Therapeutic exercise; Endurance training;Bed mobility;Gait training;Spoke to nursing;OT   Progress Slow progress, cognitive deficits   Recommendation   PT Discharge Recommendation Home with home health rehabilitation   Equipment Recommended Wheelchair   PT Therapy Minutes   PT Time In 1230   PT Time Out 1300   PT Total Time (minutes) 30   PT Mode of treatment - Individual (minutes) 30   PT Mode of treatment - Concurrent (minutes) 0   PT Mode of treatment - Group (minutes) 0   PT Mode of treatment - Co-treat (minutes) 0   PT Mode of Treatment - Total time(minutes) 30 minutes   PT Cumulative Minutes 150   Therapy Time missed   Time missed?  No

## 2023-04-17 NOTE — PCC OCCUPATIONAL THERAPY
4/24/23  Pt demo's slow progress toward OT LTGs  Pt completing UB/LB dressing and bathing with mod/maxA and requires TA with toileting tasks  Pt requiring frequent simple vc's for task initiation and encouragement, as pt often requests assistance with tasks he has demo'ed he is able to complete himself I e  threading LEs into pants during dressing tasks  Pt currently completes SPT with modA without AD  Pt currently presents with impairments in the following categories - activity tolerance, endurance, standing balance/tolerance, functional strength, memory, insight, safety, judgement, attention, coping skills and inhibition  These impairments, as well as pt's fatigue, decreased caregiver support, risk for falls and home environment limit pt's ability to safely engage in all baseline areas of occupation, including grooming, bathing, dressing, toileting, functional mobility/transfers, community mobility, laundry, house maintenance, meal prep, cleaning, social participation and leisure activities  Pt is currently unsafe to D/C home and family is unable to provide necessary SUP/assistance required at this time or fit necessary DME in home  Anticipating d/c to SNF

## 2023-04-17 NOTE — PROGRESS NOTES
"   04/17/23 1030   Pain Assessment   Pain Assessment Tool 0-10   Pain Score No Pain   Restrictions/Precautions   Precautions Bed/chair alarms;Cognitive; Fall Risk;Supervision on toilet/commode;Visual deficit;Hard of hearing  (all bed rails up with fall mat on floor)   Cognition   Overall Cognitive Status Impaired   Arousal/Participation Alert; Cooperative   Attention Attends with cues to redirect   Subjective   Subjective pt asleep in bed but was agreeable to PT when woken up  per pt he sleeps most of the time at home and only eats 4 pm to 10 om per baseline practice when asked why he did not order lunch  Despite education he still refused to order lunch so pt assisted back to bed to rest at end of tx per request    Roll Left and Right   Type of Assistance Needed Supervision   Comment HOB flat, no rail   Roll Left and Right CARE Score 4   Sit to Lying   Type of Assistance Needed Supervision;Verbal cues   Comment HOB flat, no rail x 3 reps   Sit to Lying CARE Score 4   Lying to Sitting on Side of Bed   Type of Assistance Needed Supervision;Verbal cues   Comment HOB flat, no rail x 2 reps- VC to avoid using rail stating \" I can't do it\" but with encouragement able to complete at S level   Lying to Sitting on Side of Bed CARE Score 4   Sit to Stand   Type of Assistance Needed Physical assistance;Verbal cues; Adaptive equipment   Physical Assistance Level 51%-75%   Comment mod of 1 most of the time with retropulsion tendencies due to impaired balance, weakness and baseline kyphotic posture with poor self awareness/self correction  Noted improve carry over of proper hand placement to stand up but still requires assist/max VC to reach back for controlled sitting down even with visual cues on walker  Sit to Stand CARE Score 2   Bed-Chair Transfer   Type of Assistance Needed Physical assistance; Adaptive equipment;Verbal cues   Physical Assistance Level 51%-75%   Comment SPT with UE support on arm rest mod initially then " "progress to min A with VC/TC/assist for proper hand placement and VC to move feet while doing the pivot  more mod A of 1 with SPT using a RW due to retropulsion tendencies and difficulty with RW management  with shuffling steps  at this point if pt to go home w/c level he will be safer doing SPT without an AD but with UE support  will provide pt 16 x 16 w/c later this PM but currently using 18x 18 w/c   Chair/Bed-to-Chair Transfer CARE Score 2   Walk 10 Feet   Type of Assistance Needed Physical assistance;Verbal cues; Adaptive equipment   Physical Assistance Level 51%-75%   Comment mod of 1 with RW, assist for walker management to facilitate gait flow as well as max VC to promote step through and foot clearance with poor carry over at this time  able to walk x 10' without CFA of 2nd person   Walk 10 Feet CARE Score 2   Walk 50 Feet with Two Turns   Type of Assistance Needed Physical assistance;Verbal cues; Adaptive equipment   Physical Assistance Level Total assistance   Comment 50x 2 with RW, CFA of 2nd person   Walk 50 Feet with Two Turns CARE Score 1   Ambulation   Gait Pattern Inconsistant Lulú; Slow Lulú;Decreased foot clearance;Retropulsion; Shuffle;Decreased R stance;Decreased L stance; Forward Flexion   Wheel 50 Feet with Two Turns   Type of Assistance Needed Verbal cues; Adaptive equipment;Physical assistance   Physical Assistance Level 25% or less   Comment S with VC most of the time to min A to avoid grazing R hand against the door frame   also encouragement to try obstacle negotiation after pt stated \" I can't do that\" x 70'   Wheel 50 Feet with Two Turns CARE Score 3   Therapeutic Interventions   Strengthening seated marches with tactile cues x 10 reps x 2 sets and LAQ x 10 reps x 2 setsx 5 SH   Balance standing tolerance/balance and UE strengthening training with bean bag tossing x 1 min 51 secs with 1 bean bag in, 2 mins and 5 secs x 10 bean bags in on 2nd trial (bean hole adjusted closer)  with " intermittent use of walker for support but required mod A to maintain standing balance due to retropulsion and poor self awareness and correction   Assessment   Treatment Assessment pt unsure if w/c will fit in his house but anticipate 16x16 w/c will fit in pt's living areas at home  at this time pt will be safer with w/c level transfers and mobilities vs using a RW to dec risk for falls at home and dec caregiver burden  although requires encouragement to perform tasks simulating home set up for noted tendencies to ask for help immediately  CRNP and RN notified of pt's eating habits  Pt SpO2 remained stable as monitored ranging % while NH  bpm after task performances  no reported lightheadedness or dizziness during tx  PT Barriers   Functional Limitation Car transfers; Ramp negotiation;Stair negotiation;Standing;Transfers; Walking; Wheelchair management   Plan   Treatment/Interventions Therapeutic exercise; Endurance training;Functional transfer training;LE strengthening/ROM; Bed mobility;Gait training;Spoke to nursing;OT;Spoke to advanced practitioner;Spoke to case management;Spoke to MD;Compensatory technique education;Equipment eval/education   Progress Slow progress, cognitive deficits   Recommendation   PT Discharge Recommendation Home with home health rehabilitation   Equipment Recommended Wheelchair   PT Therapy Minutes   PT Time In 1030   PT Time Out 1130   PT Total Time (minutes) 60   PT Mode of treatment - Individual (minutes) 60   PT Mode of treatment - Concurrent (minutes) 0   PT Mode of treatment - Group (minutes) 0   PT Mode of treatment - Co-treat (minutes) 0   PT Mode of Treatment - Total time(minutes) 60 minutes   PT Cumulative Minutes 120

## 2023-04-17 NOTE — PCC PHYSICAL THERAPY
4/24/2023    pt will greatly benefit from SNF PT intervention to address ongoing impairments to improve overall functions and activity tolerance in order for pt to return home safely with dec caregiver burden  At this time PT will cont to focus on if not DC tomorrow     w/c level mobility/transfer training since anticipate this will be the safest mode for pt to return home given 2 recent falls at home while using a RW and ongoing poor safety with walker management  Pt schedule for SNF DC tomorrow at noon with his family aware of DC

## 2023-04-17 NOTE — PROGRESS NOTES
"   04/17/23 1000   Pain Assessment   Pain Assessment Tool 0-10   Pain Score No Pain   Restrictions/Precautions   Precautions Bed/chair alarms;Cognitive; Fall Risk;Supervision on toilet/commode;Hard of hearing;Visual deficit   Comprehension   Comprehension (FIM) 3 - Understands basic info/conversation 50-74% of time   Expression   Expression (FIM) 4 - Expresses basic info/needs 75-90% of time   Social Interaction   Social Interaction (FIM) 3 - Interacts 50-74% of time   Problem Solving   Problem solving (FIM) 3 - Solves basic problmes 50-74% of time   Memory   Memory (FIM) 3 - Recognizes, recalls/performs 50-74%   Speech/Language/Cognition Assessmetn   Treatment Assessment Pt asleep upon arrival awaken to verbal stim then agreeable to repositioning sitting upright in bed  Pt required use of pocket talker for the duration of session d/t [place] status, pt w/ dec self advocacy/ initiative -did not request pocket talker or ask for repetition when unable to hear student SLP initially then stated \" that's a good idea\" when student SLP suggested use of pocket talker  Pt speech intelligible, however w/ inappropriate volume in conversational speech  Pt also w/ abrupt disposition throughout tx activities and conversation using increased speaking volume, profanity at times and taking cards from student SLP hand abruptly  Pt w/ dec conversational turntaking req continuous prompting from student SLP to engage in rapport building and conversational speech throughout session  Upon orientation review, pt oriented x4, although admitted he was uncertain of which [place] location he was currently at,  \" either [place] or [address]  [employer]  \" Pt referring to alarm clock he was holding in bed then the wall calendar when asked the date and ANN demo use of strategies  Pt also w/ questionable LTM/ biographical recall, stating he currently has a girlfriend and wife and re-iterating then naming both when questioned   Pt engaged in problem-solving " picture card task where he was asked to identify the problem depicted in each picture scene as well as the reason that situation can create a problem  Pt w/ accuracy of 8/16 in identifying problems in pictures, w/ verbal cueing increasing accuracy to 10/16  Pt unable to recognize the remaining problems even w/ increased verbal and visual cues becoming increasingly frustrated/ agitated w/ use of cues  Of note, pt able to identify obvious problems ie prescription label stating 1 tablet and 3 pills in hand vs metal in microwave or grabbing a dish from the oven w/out oven mitts demo dec judgment reasoning and problem solving skills  At this time pt continues to benefit from skilled ST services targeting cognitive linguistic skills in order to minimize caregiver burden upon d/c  SLP Therapy Minutes   SLP Time In 1000   SLP Time Out 1030   SLP Total Time (minutes) 30   SLP Mode of treatment - Individual (minutes) 30   SLP Mode of treatment - Concurrent (minutes) 0   SLP Mode of treatment - Group (minutes) 0   SLP Mode of treatment - Co-treat (minutes) 0   SLP Mode of Treatment - Total time(minutes) 30 minutes   SLP Cumulative Minutes 85   Therapy Time missed   Time missed?  No

## 2023-04-17 NOTE — PROGRESS NOTES
Pastoral Care Progress Note    2023  Patient: Mac Mckenzie : 1950  Admission Date & Time: 2023 1330  MRN: [id number] CSN: [id number]        Was rounding and was going to introduce myself to the PT, who was sleeping   will try to meet him another day

## 2023-04-17 NOTE — PROGRESS NOTES
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 who presented to [facility] on 04/01 with a 1 day history of shortness of breath s/p lung mass biopsy with a small pneumothorax  On presentation his  Oxygen saturation was 70% on room air, he received Bipap then transitioned to nasal cannula  Chest x-ray showed large right sided pneumothorax with complete lung collapse  A chest tube was place, it became dislodges and the patient developed subcutaneous emphysema  Trauma was consulted, reinserted tube without complications  Chest tube was removed on 04/07  Patient also presented with leukocytosis, tachycardia, elevated lactic acid and procalcitonin  Acute respiratory failure was likely secondary to right-sided pneumothorax with possible LLL pneumonia  Patient completed cefepime with prn tessalon pearls for cough  He was able to continue with whole brain radiation treatments, completed on 04/12  Chemotherapy treatments on hold, per Dr Althea Quezada, can be held no longer than two weeks  The patient was evaluated by the Rehabilitation team and deemed an appropriate candidate for comprehensive inpatient rehabilitation and admitted to the [facility] on 04/14/23  SUBJECTIVE: Patient seen face to face  No acute issues  Progressing as expected in rehabilitation  Denies pain, slept well  Patient reports upper dentures are missing, he placed them in his gown pocket, unsure of what day this occurred  Incontinent void, LBM 04/16   Denies chest pain, shortness of breath, fever, chills, nausea, abdominal pain  ASSESSMENT: Stable, progressing    PLAN:  - incontinence- timed void, encourage call bell use for assistance to toilet/urinal use  - upper dentures, patient care manager aware  - continue current rehabilitation and medical plan of care    Rehabilitation  • Functional deficits:  Mobility, self-care  • Continue current rehabilitation plan of care to maximize function  • Functional update:   o PT: mobility- supervision, transfers- mod A, ambulation- mod A RW- 50', wheelchair mobility- mod A mod A, stairs- total A  o OT: ADL: bathing- total A, dressing UB max A, LB total A, footwear- mod A, toileting- total A  o SLP: comprehension mod A, problem solving and memory max A  • Estimated Discharge: anticipated 10-14 days      Pain  • Not an issue, prn tylenol    DVT prophylaxis  • Lovenox    Bladder plan  • Continent    Bowel plan  • Continent      * Acute respiratory failure with hypoxia (HCC)  Assessment & Plan  - small pneumothorax s/p lung mass biopsy 03/17  - 04/01- shortness of breath  - 04/01 - imaging showed a complete right pneumothorax  - 04/01- right chest tube placed   - 04/02- chest tube dislodges; developed subcutaneous emphysema extending to neck,   - trauma consulted; removed initial chest tube, trauma placed new chest tube to suction  - monitor oxygenation  - 04/07 chest tube removed  - CXR- LLL PNA;  Cefepime completed, tessalon pearls for cough    - Comprehensive therapies 3 hr a day, 5-6 days a week      Non-small cell carcinoma of right lung (HCC)  Assessment & Plan  - stage IV NSCLC w/ brain metastases  - confirmed with lung biopsy 03/17  - per Dr Althea Quezada; okay to hold chemotherapy, no more than 2 weeks    Brain mass  Assessment & Plan  - Brain metastases  - received whole brain radiation 10 treatments, completed 04/12  - continue Keppra 500 mg q12h, decadron 4mg q6h, memantine 10 mg q12h  - follows with Oncology, Dr Ramon Ni Dr  "Mendez    Severe protein-calorie malnutrition ([facility] [address] )  Assessment & Plan  - BMI 20 99  - nutrition consult  - nutritional supplements  - monitor I&O      Appreciate IM consultants medical co-management  Personally reviewed labs, medications, and imaging  ROS:  A ten point review of systems was completed on 04/17/23 and pertinent positives are listed in subjective section  All other systems reviewed were negative  Review of Systems     OBJECTIVE:   BP 90/60 (BP Location: Left arm)   Pulse 77   Temp 98 4 °F (36 9 °C) (Oral)   Resp 18   Ht 5' 6\" (1 676 m)   Wt 59 kg (130 lb 1 1 oz)   SpO2 94%   BMI 20 99 kg/m²     Physical Exam  Constitutional:       Appearance: Normal appearance  He is ill-appearing  HENT:      Head: Normocephalic and atraumatic  Mouth/Throat:      Mouth: Mucous membranes are moist    Cardiovascular:      Rate and Rhythm: Normal rate and regular rhythm  Pulses: Normal pulses  Heart sounds: Normal heart sounds  Pulmonary:      Effort: Pulmonary effort is normal       Breath sounds: Normal breath sounds  Abdominal:      General: Bowel sounds are normal       Palpations: Abdomen is soft  Musculoskeletal:         General: Normal range of motion  Cervical back: Normal range of motion  Skin:     General: Skin is warm and dry  Capillary Refill: Capillary refill takes less than 2 seconds  Findings: Bruising present  Neurological:      Mental Status: He is alert  He is disoriented  Comments: - disoriented to place, situation; reoriented   Upper extremity dysmetria   Psychiatric:         Mood and Affect: Mood normal          Judgment: Judgment normal           Lab Results   Component Value Date    WBC 10 92 (H) 04/13/2023    HGB 11 5 (L) 04/13/2023    HCT 37 0 04/13/2023    MCV 92 04/13/2023     04/13/2023     Lab Results   Component Value Date    SODIUM 135 04/13/2023    K 4 5 04/13/2023     04/13/2023    CO2 28 04/13/2023    BUN 30 (H) " 04/13/2023    CREATININE 0 49 (L) 04/13/2023    GLUC 135 04/13/2023    CALCIUM 8 0 (L) 04/13/2023     Lab Results   Component Value Date    INR 1 00 03/15/2023    PROTIME 13 4 03/15/2023       Current Facility-Administered Medications:   •  acetaminophen (TYLENOL) tablet 650 mg, 650 mg, Oral, Q6H PRN, MARY BETH Momin  •  atorvastatin (LIPITOR) tablet 40 mg, 40 mg, Oral, QPM, MARY BETH Momin, 40 mg at 04/16/23 1709  •  dexamethasone (DECADRON) tablet 4 mg, 4 mg, Oral, Q8H MOLLY, 4 mg at 04/17/23 0530 **FOLLOWED BY** [START ON 4/21/2023] dexamethasone (DECADRON) tablet 4 mg, 4 mg, Oral, Q12H [address] **FOLLOWED BY** [START ON 4/28/2023] dexamethasone (DECADRON) tablet 4 mg, 4 mg, Oral, Daily, MARY BETH Momin  •  enoxaparin (LOVENOX) subcutaneous injection 40 mg, 40 mg, Subcutaneous, Daily, MARY BETH Momin, 40 mg at 04/17/23 2571  •  levETIRAcetam (KEPPRA) tablet 500 mg, 500 mg, Oral, Q12H MOLLY, MARY BETH Momin, 500 mg at 04/17/23 4257  •  memantine (NAMENDA) tablet 10 mg, 10 mg, Oral, BID, MARY BETH Momin, 10 mg at 04/17/23 6362  •  pantoprazole (PROTONIX) EC tablet 40 mg, 40 mg, Oral, Early Morning, MARY BETH Momin, 40 mg at 04/17/23 0530    Past Medical History:   Diagnosis Date   • Hypertension        Patient Active Problem List    Diagnosis Date Noted   • Acute respiratory failure with hypoxia ([facility] [address] ) 04/14/2023   • Non-small cell carcinoma of right lung ([facility] [address] ) 03/27/2023   • Brain mass 03/14/2023   • Ambulatory dysfunction 04/07/2023   • Encounter for antineoplastic immunotherapy 03/31/2023   • Chemotherapy induced neutropenia ([address] ) 03/30/2023   • 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([place] [address] ) 03/30/2023   • Severe protein-calorie malnutrition ([place] [address] ) 03/16/2023   • Lung mass 03/14/2023   • Hypertension 03/14/2023      MARY BETH Flanagan  Physical Medicine and [facility]

## 2023-04-18 PROBLEM — E46 UNSPECIFIED PROTEIN-CALORIE MALNUTRITION (HCC): Status: ACTIVE | Noted: 2023-01-01

## 2023-04-18 RX ADMIN — LEVETIRACETAM 500 MG: 500 TABLET, FILM COATED ORAL at 08:52

## 2023-04-18 RX ADMIN — DEXAMETHASONE 4 MG: 4 TABLET ORAL at 13:52

## 2023-04-18 RX ADMIN — ENOXAPARIN SODIUM 40 MG: 40 INJECTION SUBCUTANEOUS at 08:52

## 2023-04-18 RX ADMIN — DEXAMETHASONE 4 MG: 4 TABLET ORAL at 21:22

## 2023-04-18 RX ADMIN — ATORVASTATIN CALCIUM 40 MG: 40 TABLET, FILM COATED ORAL at 16:46

## 2023-04-18 RX ADMIN — LEVETIRACETAM 500 MG: 500 TABLET, FILM COATED ORAL at 21:22

## 2023-04-18 RX ADMIN — MEMANTINE 10 MG: 10 TABLET ORAL at 16:46

## 2023-04-18 RX ADMIN — MEMANTINE 10 MG: 10 TABLET ORAL at 08:52

## 2023-04-18 RX ADMIN — DEXAMETHASONE 4 MG: 4 TABLET ORAL at 05:03

## 2023-04-18 RX ADMIN — PANTOPRAZOLE SODIUM 40 MG: 40 TABLET, DELAYED RELEASE ORAL at 05:03

## 2023-04-18 NOTE — PROGRESS NOTES
"Occupational Therapy Treatment Note         04/18/23 0700   Pain Assessment   Pain Assessment Tool 0-10   Pain Score No Pain   Restrictions/Precautions   Precautions Bed/chair alarms;Cognitive; Fall Risk;Supervision on toilet/commode;Hard of hearing   Lifestyle   Autonomy \"I want to rest\"   Eating   Type of Assistance Needed Set-up / clean-up   Physical Assistance Level No physical assistance   Eating CARE Score 5   Oral Hygiene   Type of Assistance Needed Supervision   Physical Assistance Level No physical assistance   Comment seated at sink with verbal cues for denture care   Oral Hygiene CARE Score 4   Shower/Bathe Self   Type of Assistance Needed Physical assistance   Physical Assistance Level 76% or more   Comment seated at EOB pt able to bathe UB,thighs and groin with cues for thoroughness  Pt requires assist for LEs  Pt not able to stand with RW safely with assist x1 during bathing of buttock, but is able to stand at stable countertop if in w/c with assist x1 in stance and assisting with rear hygiene  Regarding options at home, would recommend sponge bathing at bed level for bathing of buttock  Shower/Bathe Self CARE Score 2   Tub/Shower Transfer   Reason Not Assessed Sponge Bath   Upper Body Dressing   Type of Assistance Needed Physical assistance   Physical Assistance Level 26%-50%   Comment seated at EOB pt able to doff tshirt with supervision  able to thread L UE into button up shirt but requires assist to bring around back and thread R UE into   Upper Body Dressing CARE Score 3   Lower Body Dressing   Type of Assistance Needed Physical assistance   Physical Assistance Level Total assistance   Comment assist x2 when in stance at bedside   could be max assist x1 if in stance with stable sink vanity in front   Lower Body Dressing CARE Score 1   Putting On/Taking Off Footwear   Type of Assistance Needed Physical assistance   Physical Assistance Level Total assistance   Putting On/Taking Off Footwear CARE " "Score 1   Sit to Lying   Type of Assistance Needed Supervision; Adaptive equipment   Physical Assistance Level No physical assistance   Sit to Lying CARE Score 4   Lying to Sitting on Side of Bed   Type of Assistance Needed Supervision; Adaptive equipment   Physical Assistance Level No physical assistance   Comment +bed rails   Lying to Sitting on Side of Bed CARE Score 4   Sit to Stand   Type of Assistance Needed Physical assistance   Physical Assistance Level 26%-50%   Comment assist x1 in front no device   Sit to Stand CARE Score 3   Bed-Chair Transfer   Type of Assistance Needed Physical assistance   Physical Assistance Level 26%-50%   Comment assist x1 in front no device   Chair/Bed-to-Chair Transfer CARE Score 3   Toileting Hygiene   Type of Assistance Needed Physical assistance; Adaptive equipment   Physical Assistance Level Total assistance   Comment assist x1 in front to stand no device, assist of 2nd in back   [address] Score 1   Toilet Transfer   Type of Assistance Needed Physical assistance; Adaptive equipment   Physical Assistance Level 26%-50%   Comment stand pivot assist x1 in front no device   Toilet Transfer CARE Score 3   Cognition   Overall Cognitive Status Impaired   Attention Attends with cues to redirect   Orientation Level Oriented to person;Oriented to situation;Oriented to place; Disoriented to time   Memory Decreased recall of precautions;Decreased recall of recent events;Decreased short term memory   Following Commands Follows one step commands with increased time or repetition   Activity Tolerance   Activity Tolerance Patient limited by fatigue   Assessment   Treatment Assessment Pt participated in skilled OT tx session  See above for further details on functional performance  Pt remains limited in ADL routine and OT tx 2* cognitive deficits as well as pt fatigue and low frustration tolerance  If pt is unable to do something within 5 seconds he will say \"help me do this! \"   Family " training planned this afternoon with pt's girlfriend Bridgette Mensah to review recommendations for 24/7 assistance at w/c level  Pt will continue to benefit from skilled OT intervention to address stand pivot transfers using w/c and squat pivot technique to/from drop arm BSC, boosting from [facility] with assist x1 in front for clothing management with goal to decrease need for 2nd person during toileting, UE strengthening, caregiver training in order to maximize functional independence in ADLS, functional mobility/transfers, while decreasing burden of care  Pt left positioned in recliner with call bell in reach and chair alarm on  Pt instructed to press call bell before transferring  About 5 minutes later pt's alarm going off, OT responded and pt standing attempting to get into bed without assistance, although he had just pushed his call button however staff did not respond fast enough  Informed staff that pt does not want to sit OOB in chair and immediately after meal can be returned to bed with bed control functions locked so HOB is elevated as pt attempts to lay flat once he is in bed even immediately after eating  BP during session: supine 102/60, sitting 110/76, after transferring but while sitting 112/87  Pt reports feeling tired when standing but not dizzy  Prognosis Fair   Problem List Decreased strength;Decreased endurance; Impaired balance;Decreased mobility; Decreased cognition; Impaired judgement;Decreased safety awareness   Barriers to Discharge Decreased caregiver support; Inaccessible home environment   Plan   Treatment/Interventions ADL retraining;Functional transfer training; Therapeutic exercise; Endurance training;Cognitive reorientation;Patient/family training;Equipment eval/education; Bed mobility; Compensatory technique education   Progress Slow progress, cognitive deficits   OT Therapy Minutes   OT Time In 0700   OT Time Out 0800   OT Total Time (minutes) 60   OT Mode of treatment - Individual (minutes) 60   OT Mode of treatment - Concurrent (minutes) 0   OT Mode of treatment - Group (minutes) 0   OT Mode of treatment - Co-treat (minutes) 0   OT Mode of Treatment - Total time(minutes) 60 minutes   OT Cumulative Minutes 295   Therapy Time missed   Time missed?  No

## 2023-04-18 NOTE — PCC NURSING
Patient is a 68-year-old male with a history of hypertension, BPH and recent hospitalization in early to mid March 2023 found to have non-small cell lung cancer with metastasis to the brain and pelvis who presents on 4/1 with shortness of breath after a lung biopsy found to have a small apical pneumothorax  His oxygen saturations were in the 70s on room air and he was placed on BiPAP and transitioned eventually to nasal cannula 2 L  Chest x-ray was completed showing a right large sided pneumothorax with complete collapse chest tube was placed  Patient also presented with leukocytosis and elevated procalcitonin/lactic acid and was treated with cefepime for potential left lower lobe pneumonia  This was completed on 4/12 chemotherapy under direction of Dr Sara Freeman will be held 2 weeks in order to allow patient to have adequate therapies  4/18/23- Pt requires VS, daily head to toe assessment and skin checks as per ordered  Patient is irritable, impulsive, cognitively impaired and requires 4 side rails up with mats as per ordered for fall prevention  Pt is incontinent of B&B and requires timed void every 2 hours during day, every 4 hours at night  He requires assistance with hygiene and linen changes s/p incontinence  He does not report any pain at present  He turns and repositions self WIB  He requires maintaining  P- Allevyn to mid back and inner knee due to bony prominences to avoid skin breakdown  He is sleeping fairly well due to fatigue  Safety and fall precautions are maintained and  reinforced

## 2023-04-18 NOTE — PROGRESS NOTES
"   04/18/23 1230   Pain Assessment   Pain Assessment Tool 0-10   Pain Score No Pain   Restrictions/Precautions   Precautions Bed/chair alarms;Cognitive; Fall Risk;Hard of hearing;Supervision on toilet/commode   Comprehension   Comprehension (FIM) 3 - Understands basic info/conversation 50-74% of time   Expression   Expression (FIM) 4 - Expresses basic info/needs 75-90% of time   Social Interaction   Social Interaction (FIM) 4 - Needs redirecting for appropriate language or to initiate interaction   Problem Solving   Problem solving (FIM) 3 - Solves basic problmes 50-74% of time   Memory   Memory (FIM) 3 - Recognizes, recalls/performs 50-74%   Speech/Language/Cognition Assessmetn   Treatment Assessment Pt asleep upon arrival, awaken to verbal stim and repositioned to sitting upright in bed  Pocket talker placed and used throughout session d/t pt's [place]  Pt initially w/ aburpt  disposition, \" why do I need to answer these f___ing questions again? \" pt education then provided again on nature of cognitive linguistic tx sessions, to work on pt current cognitive linguistic deficits while on the ARC to increase his independence upon d/c  Pt responsive to calm tone and reasoning, agreeable to complete tx task  Pt then engaged in reading comprehension/ introductory medication management task where pt provided medication label then asked questions regarding information on medication label  Pt completed task w/ accuracy of 8/10, increasing to 10/10 when provided w/ verbal cues  Pt holding paper in front of him and removed glasses after the first stimulus item able to read the remaining items correctly, however w/ dec sequencing noted to skip over stimulus items req verbal cues to return to missed stimulus items and complete them  Pt then completed medication review where he was provided with the name of his current medications and asked the reason/ use for the medication   Pt w/ accuracy of 2/6 in recalling reason/use of current " "medications  Pt unable to recall which medications he was taking prior to admission, but stated that he managed them himself  Student SLP inquired about pt's plan for medication management upon d/c and pt stated he is \"hopeless,\" student SLP then asked if pt's girlfriend Haile Fallon) or daughter Mahesh Stanley) will be assisting him and he stated \" I hope so, because I can't  \" Pt reminded that Scooby is coming in for family training this afternoon, (pt \" I'll believe it when I see it\") and a plan for completion of his prior ADL/ IADLs will be discussed  Pt also engaged in written problem solving task to which pt was provided w/ a situation and four choices asked to determine the best possible choice  Pt w/ accuracy of 9/16 on this task demo his dec judgement/ problem solving reiterating of pt need for family supervision/ assist upon d/c  Pt responding to \" if you slip and fall what is the best thing to do\" with \" stay down\" when verbal cue provided pt answer unchanged, still unaware of error  Throughout both written tx tasks pt continued to skip read stimulus items out of order, intermittently going back to previous items idependently other times req verbal cues to readdress omitted stimulus items d/t dec sequencing, organization, processing  At this time, pt continues to benefit from skilled ST services targeting cognitive linguistic skills to maximize pt independence and minimize caregiver burden upon d/c   SLP Therapy Minutes   SLP Time In 1230   SLP Time Out 1300   SLP Total Time (minutes) 30   SLP Mode of treatment - Individual (minutes) 30   SLP Mode of treatment - Concurrent (minutes) 0   SLP Mode of treatment - Group (minutes) 0   SLP Mode of treatment - Co-treat (minutes) 0   SLP Mode of Treatment - Total time(minutes) 30 minutes   SLP Cumulative Minutes 145   Therapy Time missed   Time missed?  No       "

## 2023-04-18 NOTE — PROGRESS NOTES
"Occupational Therapy Treatment Note       04/18/23 2470   Pain Assessment   Pain Assessment Tool 0-10   Pain Score No Pain   Restrictions/Precautions   Precautions Bed/chair alarms;Cognitive; Fall Risk;Supervision on toilet/commode;Hard of hearing  (4 bed rails up per order)   Lifestyle   Autonomy \"maybe I should just die\"   Sit to Lying   Type of Assistance Needed Supervision; Adaptive equipment   Physical Assistance Level No physical assistance   Comment +bed rails, verbal cues   Sit to Lying CARE Score 4   Lying to Sitting on Side of Bed   Type of Assistance Needed Supervision; Adaptive equipment   Physical Assistance Level No physical assistance   Comment +bed rails, verbal cues   Lying to Sitting on Side of Bed CARE Score 4   Sit to Stand   Type of Assistance Needed Physical assistance   Physical Assistance Level 26%-50%   Comment assist x1 in front   Sit to Stand CARE Score 3   Bed-Chair Transfer   Type of Assistance Needed Physical assistance   Physical Assistance Level 26%-50%   Comment stand pivot assist x1 in front with no device   Chair/Bed-to-Chair Transfer CARE Score 3   Toileting Hygiene   Type of Assistance Needed Physical assistance; Adaptive equipment   Physical Assistance Level Total assistance   Comment pt reporting \"I have to pee  Wait I am peeing in my pants right now\"  Pt assisted to [facility], 2nd person needed for toileting  Pt's brief soaked with urine  Once seated on [facility] pt did have small bowel movement  assist x1 in front to stand with 2nd in back for clothing/hygiene   Toileting Hygiene CARE Score 1   Toilet Transfer   Type of Assistance Needed Physical assistance; Adaptive equipment   Physical Assistance Level 26%-50%   Comment stand pivot mod assist x1 in front to [facility]   Toilet Transfer CARE Score 3   Cognition   Overall Cognitive Status Impaired   Arousal/Participation Alert   Attention Attends with cues to redirect   Orientation Level Oriented to person;Oriented to situation   Memory Decreased " "recall of precautions;Decreased short term memory;Decreased recall of recent events   Activity Tolerance   Activity Tolerance Patient limited by fatigue  (and cognition)   Assessment   Treatment Assessment Pt participated in skilled OT tx session  See above for further details on functional performance  Session focused on family training with pt's girlfriend Timo Wynn  OT educated on the following - recommend 24/7 assistance for ADLs and transfers, recommend w/c level as main level of mobility to decrease fall risk  Per Timo Wynn, she cannot physically assist pt anymore  She was tearful during session, but reported that it had been hard for her to assist pt PTA and she states that 2* her own medical conditions she cannot be providing him physical assist at this level  Additionally she reports a wheelchair won't fit within her apartment  OT called pt's dtr Yuki Purcell and setup family meeting with team for tomorrow at 1:30 to further discuss d/c plan/options, but Yuki Purcell lives in 3rd floor walk up apartment and reports pt's wife Jaylan Lopez lives with 7 other people in a house and therefore pt won't be able to stay with either of them  Pt has cognitive deficits, but reports \"I will need help them\" once he was aware Timo Wynn cannot assist him/he cannot return there  Prognosis Fair   Problem List Decreased strength;Decreased endurance;Decreased mobility; Impaired balance;Decreased cognition; Impaired judgement;Decreased safety awareness; Impaired vision; Impaired hearing   Barriers to Discharge Decreased caregiver support; Inaccessible home environment   Plan   Treatment/Interventions ADL retraining;Functional transfer training; Therapeutic exercise; Endurance training;Patient/family training;Equipment eval/education; Bed mobility; Compensatory technique education   Progress Slow progress, cognitive deficits   Recommendation   OT Discharge Recommendation   (24/7 assist)   OT Therapy Minutes   OT Time In 1320   OT Time Out 1400   OT Total Time " (minutes) 40   OT Mode of treatment - Individual (minutes) 40   OT Mode of treatment - Concurrent (minutes) 0   OT Mode of treatment - Group (minutes) 0   OT Mode of treatment - Co-treat (minutes) 0   OT Mode of Treatment - Total time(minutes) 40 minutes   OT Cumulative Minutes 335   Therapy Time missed   Time missed?  No

## 2023-04-18 NOTE — PROGRESS NOTES
"   04/18/23 1000   Pain Assessment   Pain Assessment Tool 0-10   Pain Score No Pain   Restrictions/Precautions   Precautions Bed/chair alarms;Cognitive; Fall Risk;Hard of hearing;Supervision on toilet/commode  (all 4 bed rails up per order)   Cognition   Overall Cognitive Status Impaired   Arousal/Participation Cooperative   Attention Attends with cues to redirect   Memory Decreased short term memory;Decreased recall of recent events   Following Commands Follows one step commands with increased time or repetition   Subjective   Subjective \"I'm tired\"  Pt agreeable to therapy  Roll Left and Right   Type of Assistance Needed Supervision   Comment HOB flat, no bed rails   Roll Left and Right CARE Score 4   Therapeutic Interventions   Strengthening Supine in bed: glute sets 5SH 2x10, quad sets 5SH 2x10, heel slides 2x10, SAQ with pillow under leg 2x10, SLR with therapist A to emphasize eccentric control 2x10   Assessment   Treatment Assessment Pt participated in 30 min skilled PT focusing on LE strengthening  Pt required VC and tactile cues to emphasize eccentric control with exercises to avoid \"flopping\"  PT to work on transfers, standing tolerance, ambulation, w/c mobility and management later today  Pt left in supine in bed with all rails up, bed alarm activated, and all needs within reach  Problem List Decreased strength;Decreased endurance; Impaired balance;Decreased mobility; Decreased cognition; Impaired judgement;Decreased safety awareness; Impaired vision; Impaired hearing   Barriers to Discharge Inaccessible home environment;Decreased caregiver support   PT Barriers   Physical Impairment Decreased strength;Decreased endurance; Impaired balance;Decreased mobility; Decreased cognition; Impaired judgement;Decreased safety awareness; Impaired vision; Impaired hearing   Plan   Treatment/Interventions Functional transfer training;LE strengthening/ROM; Therapeutic exercise; Endurance training;Patient/family training;Equipment " eval/education; Bed mobility;Gait training; Compensatory technique education   Progress Slow progress, cognitive deficits   Recommendation   PT Discharge Recommendation Home with home health rehabilitation   PT Therapy Minutes   PT Time In 1000   PT Time Out 1030   PT Total Time (minutes) 30   PT Mode of treatment - Individual (minutes) 30   PT Mode of treatment - Concurrent (minutes) 0   PT Mode of treatment - Group (minutes) 0   PT Mode of treatment - Co-treat (minutes) 0   PT Mode of Treatment - Total time(minutes) 30 minutes   PT Cumulative Minutes 180   Therapy Time missed   Time missed?  No

## 2023-04-18 NOTE — PLAN OF CARE
Problem: PAIN - ADULT  Goal: Verbalizes/displays adequate comfort level or baseline comfort level  Description: Interventions:  - Encourage patient to monitor pain and request assistance  - Assess pain using appropriate pain scale  - Administer analgesics based on type and severity of pain and evaluate response  - Implement non-pharmacological measures as appropriate and evaluate response  - Consider cultural and social influences on pain and pain management  - Notify physician/advanced practitioner if interventions unsuccessful or patient reports new pain  Outcome: Progressing     Problem: INFECTION - ADULT  Goal: Absence or prevention of progression during hospitalization  Description: INTERVENTIONS:  - Assess and monitor for signs and symptoms of infection  - Monitor lab/diagnostic results  - Monitor all insertion sites, i e  indwelling lines, tubes, and drains  - Monitor endotracheal if appropriate and nasal secretions for changes in amount and color  - [place] appropriate cooling/warming therapies per order  - Administer medications as ordered  - Instruct and encourage patient and family to use good hand hygiene technique  - Identify and instruct in appropriate isolation precautions for identified infection/condition  Outcome: Progressing  Goal: Absence of fever/infection during neutropenic period  Description: INTERVENTIONS:  - Monitor WBC    Outcome: Progressing     Problem: SAFETY ADULT  Goal: Patient will remain free of falls  Description: INTERVENTIONS:  - Educate patient/family on patient safety including physical limitations  - Instruct patient to call for assistance with activity   - Consult OT/PT to assist with strengthening/mobility   - Keep Call bell within reach  - Keep bed low and locked with side rails adjusted as appropriate  - Keep care items and personal belongings within reach  - Initiate and maintain comfort rounds  - Make Fall Risk Sign visible to staff  - Offer [address] yellow socks and bracelet for high fall risk patients  - Consider moving patient to room near nurses station  Outcome: Progressing  Goal: Maintain or return to baseline ADL function  Description: INTERVENTIONS:  -  Assess patient's ability to carry out ADLs; assess patient's baseline for ADL function and identify physical deficits which impact ability to perform ADLs (bathing, care of mouth/teeth, toileting, grooming, dressing, etc )  - Assess/evaluate cause of self-care deficits   - Assess range of motion  - Assess patient's mobility; develop plan if impaired  - Assess patient's need for assistive devices and provide as appropriate  - Encourage maximum independence but intervene and supervise when necessary  - Involve family in performance of ADLs  - Assess for home care needs following discharge   - Consider OT consult to assist with ADL evaluation and planning for discharge  - Provide patient education as appropriate  Outcome: Progressing  Goal: Maintains/Returns to pre admission functional level  Description: INTERVENTIONS:  - Perform BMAT or MOVE assessment daily    - Set and communicate daily mobility goal to care team and patient/family/caregiver     - Collaborate with rehabilitation services on mobility goals if consulted  - Perform Range of Mot  - O  - Out of bed for toileting  - Record patient progress and toleration of activity level   Outcome: Progressing     Problem: DISCHARGE PLANNING  Goal: Discharge to home or other facility with appropriate resources  Description: INTERVENTIONS:  - Identify barriers to discharge w/patient and caregiver  - Arrange for needed discharge resources and transportation as appropriate  - Identify discharge learning needs (meds, wound care, etc )  - Arrange for interpretive services to assist at discharge as needed  - Refer to Case Management Department for coordinating discharge planning if the patient needs post-hospital services based on physician/advanced practitioner order or complex needs related to functional status, cognitive ability, or social support system  Outcome: Progressing     Problem: Nutrition/Hydration-ADULT  Goal: Nutrient/Hydration intake appropriate for improving, restoring or maintaining nutritional needs  Description: Monitor and assess patient's nutrition/hydration status for malnutrition  Collaborate with interdisciplinary team and initiate plan and interventions as ordered  Monitor patient's weight and dietary intake as ordered or per policy  Utilize nutrition screening tool and intervene as necessary  Determine patient's food preferences and provide high-protein, high-caloric foods as appropriate       INTERVENTIONS:  - Monitor oral intake, urinary output, labs, and treatment plans  - Assess nutrition and hydration status and recommend course of action  - Evaluate amount of meals eaten  - Assist patient with eating if necessary   - Allow adequate time for meals  - Recommend/ encourage appropriate diets, oral nutritional supplements, and vitamin/mineral supplements  - Order, calculate, and assess calorie counts as needed  - Recommend, monitor, and adjust tube feedings and TPN/PPN based on assessed needs  - Assess need for intravenous fluids  - Provide specific nutrition/hydration education as appropriate  - Include patient/family/caregiver in decisions related to nutrition  Outcome: Progressing     Problem: Prexisting or High Potential for Compromised Skin Integrity  Goal: Skin integrity is maintained or improved  Description: INTERVENTIONS:  - Identify patients at risk for skin breakdown  - Assess and monitor skin integrity  - Assess and monitor nutrition and hydration status  - Monitor labs   - Assess for incontinence   - Turn and reposition patient  - Assist with mobility/ambulation  - Relieve pressure over bony prominences  - Avoid friction and shearing  - Provide appropriate hygiene as needed including keeping skin clean and dry  - Evaluate need for skin moisturizer/barrier cream  - Collaborate with interdisciplinary team   - Patient/family teaching  - Consider wound care consult   Outcome: Progressing     Problem: SAFETY,RESTRAINT: NV/NON-SELF DESTRUCTIVE BEHAVIOR  Goal: Remains free of harm/injury (restraint for non violent/non self-detsructive behavior)  Description: INTERVENTIONS:  - Instruct patient/family regarding restraint use   - Assess and monitor physiologic and psychological status   - Provide interventions and comfort measures to meet assessed patient needs   - Identify and implement measures to help patient regain control  - Assess readiness for release of restraint   Outcome: Progressing  Goal: Returns to optimal restraint-free functioning  Description: INTERVENTIONS:  - Assess the patient's behavior and symptoms that indicate continued need for restraint  - Identify and implement measures to help patient regain control  - Assess readiness for release of restraint   Outcome: Progressing

## 2023-04-18 NOTE — PCC SPEECH THERAPY
Pt currently being followed for cognitive tx sessions  Pt completed the [employer] cognitive assessment  Pt total score was 17/30 which as compared to those with high school education correlates with moderate neurocognitive disorder and is indicative of dementia  Barriers which presented included decreased processing, orientation, decreased ST/working memory, decreased executive function skills (problem solving, reasoning, organization, judgement) and overall insight to deficits which currently impact safety and functional mobility  Pt was able to recall orientation information with correct place, situation and date (month/year only)  Pt recalled his biographical information as well with no errors  Pt limited historian but reports lives with significant other, Anastasiya Sánchez and has a supportive daughter, Ananth Velazco  Despite the observed cognitive deficits which present, pt's overall participation has fluctuated given sessions, but is noted to engage in tasks when the plan of session is presented accordingly at beginning of sessions  Visual aids given larger digital clock as well as written calendar have been used for improvement in orientation given time/date  There has been reports of pt completing ADL and I ADL tasks independently prior to admission, however, due to observed cognitive deficits which present, pt will likely require supervision/assistance for these tasks moving forward  Family training is planned for today (4/18) w/o pt's SO, Melonie  Pt would continue to benefit from ongoing skilled SLP services at this time to maximize functional cognitive skills at this time in attempts to decrease caregiver burden over time  Update week of 4/24/2023: Pt is planned for discharge on 4/25/2023 to subacute rehab facility for continued therapy  Pt was being followed for cognitive linguistic therapy where pt is currently functioning at min assist for comprehension and expression and mod assist for problem solving and memory   Pt continues to present with barriers in the following areas: decreased processing, orientation, decreased ST/working memory, decreased executive function skills (problem solving, reasoning, organization, judgement) and overall insight to deficits  Pt is recommended for continued skilled SLP services at this time and at next level of care in order to further maximize overall cognitive linguistic skills

## 2023-04-18 NOTE — PLAN OF CARE
Problem: PAIN - ADULT  Goal: Verbalizes/displays adequate comfort level or baseline comfort level  Description: Interventions:  - Encourage patient to monitor pain and request assistance  - Assess pain using appropriate pain scale  - Administer analgesics based on type and severity of pain and evaluate response  - Implement non-pharmacological measures as appropriate and evaluate response  - Consider cultural and social influences on pain and pain management  - Notify physician/advanced practitioner if interventions unsuccessful or patient reports new pain  Outcome: Progressing     Problem: INFECTION - ADULT  Goal: Absence or prevention of progression during hospitalization  Description: INTERVENTIONS:  - Assess and monitor for signs and symptoms of infection  - Monitor lab/diagnostic results  - Monitor all insertion sites, i e  indwelling lines, tubes, and drains  - Monitor endotracheal if appropriate and nasal secretions for changes in amount and color  - [place] appropriate cooling/warming therapies per order  - Administer medications as ordered  - Instruct and encourage patient and family to use good hand hygiene technique  - Identify and instruct in appropriate isolation precautions for identified infection/condition  Outcome: Progressing  Goal: Absence of fever/infection during neutropenic period  Description: INTERVENTIONS:  - Monitor WBC    Outcome: Progressing     Problem: SAFETY ADULT  Goal: Patient will remain free of falls  Description: INTERVENTIONS:  - Educate patient/family on patient safety including physical limitations  - Instruct patient to call for assistance with activity   - Consult OT/PT to assist with strengthening/mobility   - Keep Call bell within reach  - Keep bed low and locked with side rails adjusted as appropriate  - Keep care items and personal belongings within reach  - Initiate and maintain comfort rounds  - Make Fall Risk Sign visible to staff  - Offer Toileting every Hours, in advance of need  - Initiate/Maintain alarm  - Obtain necessary fall risk management equipment:   - Apply yellow socks and bracelet for high fall risk patients  - Consider moving patient to room near nurses station  Outcome: Progressing  Goal: Maintain or return to baseline ADL function  Description: INTERVENTIONS:  -  Assess patient's ability to carry out ADLs; assess patient's baseline for ADL function and identify physical deficits which impact ability to perform ADLs (bathing, care of mouth/teeth, toileting, grooming, dressing, etc )  - Assess/evaluate cause of self-care deficits   - Assess range of motion  - Assess patient's mobility; develop plan if impaired  - Assess patient's need for assistive devices and provide as appropriate  - Encourage maximum independence but intervene and supervise when necessary  - Involve family in performance of ADLs  - Assess for home care needs following discharge   - Consider OT consult to assist with ADL evaluation and planning for discharge  - Provide patient education as appropriate  Outcome: Progressing  Goal: Maintains/Returns to pre admission functional level  Description: INTERVENTIONS:  - Perform BMAT or MOVE assessment daily    - Set and communicate daily mobility goal to care team and patient/family/caregiver  - Collaborate with rehabilitation services on mobility goals if consulted  - Perform Range of Motion times a day  - Reposition patient every  hours    - Dangle patient  times a day  - Stand patient  times a day  - Ambulate patient  times a day  - Out of bed to chair  times a day   - Out of bed for meals  times a day  - Out of bed for toileting  - Record patient progress and toleration of activity level   Outcome: Progressing     Problem: DISCHARGE PLANNING  Goal: Discharge to home or other facility with appropriate resources  Description: INTERVENTIONS:  - Identify barriers to discharge w/patient and caregiver  - Arrange for needed discharge resources and transportation as appropriate  - Identify discharge learning needs (meds, wound care, etc )  - Arrange for interpretive services to assist at discharge as needed  - Refer to Case Management Department for coordinating discharge planning if the patient needs post-hospital services based on physician/advanced practitioner order or complex needs related to functional status, cognitive ability, or social support system  Outcome: Progressing     Problem: Nutrition/Hydration-ADULT  Goal: Nutrient/Hydration intake appropriate for improving, restoring or maintaining nutritional needs  Description: Monitor and assess patient's nutrition/hydration status for malnutrition  Collaborate with interdisciplinary team and initiate plan and interventions as ordered  Monitor patient's weight and dietary intake as ordered or per policy  Utilize nutrition screening tool and intervene as necessary  Determine patient's food preferences and provide high-protein, high-caloric foods as appropriate       INTERVENTIONS:  - Monitor oral intake, urinary output, labs, and treatment plans  - Assess nutrition and hydration status and recommend course of action  - Evaluate amount of meals eaten  - Assist patient with eating if necessary   - Allow adequate time for meals  - Recommend/ encourage appropriate diets, oral nutritional supplements, and vitamin/mineral supplements  - Order, calculate, and assess calorie counts as needed  - Recommend, monitor, and adjust tube feedings and TPN/PPN based on assessed needs  - Assess need for intravenous fluids  - Provide specific nutrition/hydration education as appropriate  - Include patient/family/caregiver in decisions related to nutrition  Outcome: Progressing     Problem: Prexisting or High Potential for Compromised Skin Integrity  Goal: Skin integrity is maintained or improved  Description: INTERVENTIONS:  - Identify patients at risk for skin breakdown  - Assess and monitor skin integrity  - Assess and monitor nutrition and hydration status  - Monitor labs   - Assess for incontinence   - Turn and reposition patient  - Assist with mobility/ambulation  - Relieve pressure over bony prominences  - Avoid friction and shearing  - Provide appropriate hygiene as needed including keeping skin clean and dry  - Evaluate need for skin moisturizer/barrier cream  - Collaborate with interdisciplinary team   - Patient/family teaching  - Consider wound care consult   Outcome: Progressing     Problem: SAFETY,RESTRAINT: NV/NON-SELF DESTRUCTIVE BEHAVIOR  Goal: Remains free of harm/injury (restraint for non violent/non self-detsructive behavior)  Description: INTERVENTIONS:  - Instruct patient/family regarding restraint use   - Assess and monitor physiologic and psychological status   - Provide interventions and comfort measures to meet assessed patient needs   - Identify and implement measures to help patient regain control  - Assess readiness for release of restraint   Outcome: Progressing  Goal: Returns to optimal restraint-free functioning  Description: INTERVENTIONS:  - Assess the patient's behavior and symptoms that indicate continued need for restraint  - Identify and implement measures to help patient regain control  - Assess readiness for release of restraint   Outcome: Progressing

## 2023-04-18 NOTE — TEAM CONFERENCE
Acute RehabilitationTeam Conference Note  Date: 4/18/2023   Time: 10:53 AM       Patient Name:  Brittnee Mullins       Medical Record Number: [id number]    YOB: 1950  Sex: Male          Room/Bed:  [facility] 458/[facility] 458-01  Payor Info:  Payor: MEDICARE / Plan: MEDICARE A AND B / Product Type: Medicare A & B Fee for Service /      Admitting Diagnosis: Pneumothorax [J93 9]  Acute respiratory failure with hypoxia ([facility] ) [J96 01]  SIRS (systemic inflammatory response syndrome) (HCC) [R65 10]   Admit Date/Time:  4/14/2023  1:30 PM  Admission Comments: No comment available     Primary Diagnosis:  Acute respiratory failure with hypoxia (HCC)  Principal Problem: Acute respiratory failure with hypoxia [facility])    Patient Active Problem List    Diagnosis Date Noted   • Acute respiratory failure with hypoxia ([facility] ) 04/14/2023   • Ambulatory dysfunction 04/07/2023   • Encounter for antineoplastic immunotherapy 03/31/2023   • Chemotherapy induced neutropenia ([facility] ) 03/30/2023   • 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([facility] ) 03/30/2023   • Non-small cell carcinoma of right lung ([facility] ) 03/27/2023   • Severe protein-calorie malnutrition ([facility] ) 03/16/2023   • Brain mass 03/14/2023   • Lung mass 03/14/2023   • Hypertension 03/14/2023       Physical Therapy:    Weight Bearing Status: Full Weight Bearing  Transfers:  Moderate Assistance  Bed Mobility: Minimal Assistance, Incidental Touching  Amulation Distance (ft): 50 feet  Ambulation: Minimal Assistance, Moderate Assistance, Assist of 2 (CFA of 2nd person for safety)  Assistive Device for Ambulation: Roller Walker  Wheelchair Mobility Distance: 50 ft  Number of Stairs: 1  Assistive Device for Stairs: Bilateral Hand Rails  Stair Assistance: Assist of 2  Discharge Recommendations: Home with:  DC Home with[de-identified] 24 Hour Assisteance, 24 Hour Supervision, Family Support, First Floor Setup, Home Physical Therapy    Pt is a 81year old male admitted to [facility] due to functional declined s/p acute respiratory failure with hypoxia and recent diagnosis of non- small cell lung CA with mets to brain and pelvis, s/p whole brain radiation 4/12 with plan chemotherapy after ARC d/c  Aside from 1 NAILA and dec caregiver availability to provide assist/S at d/c additional barriers that impacts pt's functional indep and safety include impaired cognition with dec safety awareness and low frustration tolerance, dec standing balance/tolerance, impaired righting reaction with retropulsion tendencies, dec endurance, gait dysfunction increasing pt's risk for falls and inc caregiver burden  At this time pt requires 1-2 person assist to complete functional activities using AD  So pt will greatly benefit from additional skilled PT intervention to address ongoing impairments to improve overall functions and activity tolerance in order for pt to return home safely with dec caregiver burden  At this time PT will focus on w/c level mobility/transfer training since anticipate this will be the safest mode for pt to return home given 2 recent falls at home while using a RW and ongoing poor safety with walker management  Occupational Therapy:  Eating: Supervision (set up)  Grooming: Moderate Assistance  Bathing: Assist of 2  Bathing: Assist of 2  Upper Body Dressing: Moderate Assistance  Lower Body Dressing: Assist of 2 (when in stance for CM)  Toileting: Assist of 2 (when in stance for hygiene/CM)  Toilet Transfer: Minimal Assistance  Cognition: Exceptions to WNL  Cognition: Decreased Memory, Decreased Executive Functions, Decreased Attention, Decreased Comprehension, Decreased Safety, Behavioral Considerations, Impulsive  Orientation: Person, Situation  Discharge Recommendations: Home with:  [address] with[de-identified] 24 Hour Supervision, 24 Hour Assistance       Pt demo's slow progress toward OT LTGs of mod A to Supervision for ADLs  Pt currently at [address] A x1 for SPT no AD, and Ax2 when in stance for hadley/rear hygiene/CM   Prior to recent cancer dx pt had been independent, however received a walker within past month and had 2 falls at home per pt's dtr  Pt currently presents with impairments in the following categories - activity tolerance, endurance, standing balance/tolerance, memory, insight, safety, judgement, attention, coping skills and inhibition  These impairments, as well as pt's fatigue, decreased caregiver support, risk for falls and home environment limit pt's ability to safely engage in all baseline areas of occupation, including grooming, bathing, dressing, toileting, functional mobility/transfers, community mobility, laundry, house maintenance, meal prep, cleaning, social participation and leisure activities  Pt presents with fair rehab potential  Pt is unsafe to D/C home at this time, recommending ELOS 10 days to achieve min assist level goals, anticipate recommendation for wheelchair upon d/c  Will focus on family training during pt's rehab course  Goals for this week: progress pt to Ax1 for hygiene/CM when in stance     D/C date: TBD  Speech Therapy:  Mode of Communication: Verbal  Cognition: Exceptions to WNL  Cognition: Decreased Memory, Decreased Executive Functions, Decreased Attention, Decreased Comprehension, Decreased Safety, Impulsive  Orientation: Person, Place, Situation  Discharge Recommendations: Home with:  [address] with[de-identified] 24 Hour Supervision, 24 Hour Assisteance, Family Support (therapy recommendations pending pt progress, but would likely benefit from some intervention of ST at d/c )  Pt currently being followed for cognitive tx sessions  Pt completed the UMS cognitive assessment  Pt total score was 17/30 which as compared to those with high school education correlates with moderate neurocognitive disorder and is indicative of dementia   Barriers which presented included decreased processing, orientation, decreased ST/working memory, decreased executive function skills (problem solving, reasoning, organization, judgement) and overall insight to deficits which currently impact safety and functional mobility  Pt was able to recall orientation information with correct place, situation and date (month/year only)  Pt recalled his biographical information as well with no errors  Pt limited historian but reports lives with significant other, Dequan Donnelly and has a supportive daughter, Belén Díaz  Despite the observed cognitive deficits which present, pt's overall participation has fluctuated given sessions, but is noted to engage in tasks when the plan of session is presented accordingly at beginning of sessions  Visual aids given larger digital clock as well as written calendar have been used for improvement in orientation given time/date  There has been reports of pt completing ADL and I ADL tasks independently prior to admission, however, due to observed cognitive deficits which present, pt will likely require supervision/assistance for these tasks moving forward  Family training is planned for today (4/18) w/o pt's SO, Melonie  Pt would continue to benefit from ongoing skilled SLP services at this time to maximize functional cognitive skills at this time in attempts to decrease caregiver burden over time  Nursing Notes:  Appetite: Good  Diet Type: Regular/House                                                                        Pain Score: 0                       Hospital Pain Intervention(s): Repositioned, Rest          Patient is a 79-year-old male with a history of hypertension, BPH and recent hospitalization in early to mid March 2023 found to have non-small cell lung cancer with metastasis to the brain and pelvis who presents on 4/1 with shortness of breath after a lung biopsy found to have a small apical pneumothorax  His oxygen saturations were in the 70s on room air and he was placed on BiPAP and transitioned eventually to nasal cannula 2 L    Chest x-ray was completed showing a right large sided pneumothorax with complete collapse chest tube was placed  Patient also presented with leukocytosis and elevated procalcitonin/lactic acid and was treated with cefepime for potential left lower lobe pneumonia  This was completed on 4/12 chemotherapy under direction of Dr Juanis Gutierrez will be held 2 weeks in order to allow patient to have adequate therapies  4/18/23- Pt requires VS, daily head to toe assessment and skin checks as per ordered  Patient is irritable, impulsive, cognitively impaired and requires 4 side rails up with mats as per ordered for fall prevention  Pt is incontinent of B&B and requires timed void every 2 hours during day, every 4 hours at night  He requires assistance with hygiene and linen changes s/p incontinence  He does not report any pain at present  He turns and repositions self WIB  He requires maintaining  P- Allevyn to mid back and inner knee due to bony prominences to avoid skin breakdown  He is sleeping fairly well due to fatigue  Safety and fall precautions are maintained and  reinforced  Case Management:     Discharge Planning  Living Arrangements: Lives w/ Spouse/significant other  Support Systems: Spouse/significant other, Children  Assistance Needed: none  Type of Current Residence: Private residence  Current [facility] [address] : No  4/18- Pt new admit, cm to assess  Is the patient actively participating in therapies? yes  List any modifications to the treatment plan: None    Barriers Interventions   Lung cancer Biopsy, chest tube now out   Pneumonia Treated    Oxygen/bipap Discontinued, improving   Mod/severe cog- insight, 17/30 on [employer] SLP Services, family training   Incontinent bowel/bladder Monitoring, briefs toileting program   Impaired standing balance/tolerance 2 people assist for toileting    Bilateral LE weakness There ex   Visual impairment Education   Hard of hearing Pocket talker   Decreased safety awareness Education     Is the patient making expected progress toward goals?  yes  List any update or changes to goals: None    Medical Goals: Patient will be medically stable for discharge to [facility] upon completion of rehab program and Patient will be able to manage medical conditions and comorbid conditions with medications and follow up upon completion of rehab program    Weekly Team Goals:   Rehab Team Goals  ADL Team Goal: Patient will require assist with ADLs with least restrictive device upon completion of rehab program  Transfer Team Goal: Patient will require assist with transfers with least restrictive device upon completion of rehab program  Locomotion Team Goal: Patient will require assist with locomotion with least restrictive device upon completion of rehab program  Cognitive Team Goal: Patient will require assist for basic cognitive tasks upon completion of rehab program    Discussion: Pt participating in therapies and rehab program  Max to total assist for ADLs, w/c level  W/c level and bed levels for goals  Pt functioning mod assist for cog skills  Team recommending  Dc Tuesday 4/25 pending family training and progress  Anticipated Discharge Date:  Dc Tuesday 4/25 with HOME RN PT [id number] Team Members Present: The following team members are supervising care for this patient and were present during this Weekly Team Conference      Physician: Dr Zac Manuel DO  : ANABELLA Castle  Registered Nurse: Sveta Umanzor, MJ  Physical Therapist: Frances Aguilera DPT  Occupational Therapist: Loy Olvera MS, OTR/L, CBIS  Speech Therapist: Mili Douglas, CCC-SLP

## 2023-04-18 NOTE — PROGRESS NOTES
PM&R PROGRESS NOTE:  Nat Juarez 81 y o  male MRN: [id number]  Unit/Bed#: -01 Encounter: [id number]        Rehabilitation Diagnosis: Impairment of mobility, safety, Activities of Daily Living (ADLs), and cognitive/communication skills due to Pulmonary Disorders:  10 9  Other Pulmonary    HPI: Nakita Agarwal is a 81 y o  male with a medical history of hypertension, BPH, and a recent hospitalization on 3/14-3/19 where he was found to have 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 who presented to [facility] on 04/01 with a 1 day history of shortness of breath s/p lung mass biopsy with a small pneumothorax  On presentation his  Oxygen saturation was 70% on room air, he received Bipap then transitioned to nasal cannula  Chest x-ray showed large right sided pneumothorax with complete lung collapse  A chest tube was place, it became dislodges and the patient developed subcutaneous emphysema  Trauma was consulted, reinserted tube without complications  Chest tube was removed on 04/07  Patient also presented with leukocytosis, tachycardia, elevated lactic acid and procalcitonin  Acute respiratory failure was likely secondary to right-sided pneumothorax with possible LLL pneumonia  Patient completed cefepime with prn tessalon pearls for cough  He was able to continue with whole brain radiation treatments, completed on 04/12  Chemotherapy treatments on hold, per Dr Lisa Damon, can be held no longer than two weeks  The patient was evaluated by the Rehabilitation team and deemed an appropriate candidate for comprehensive inpatient rehabilitation and admitted to the [facility] on 04/14/23  SUBJECTIVE: Patient seen and evaluated bedside  No acute issues overnight  Slightly impulsive at times when on try to get out of the bed  Patient has been participating but low reluctantly at times and is very hard of hearing    Behavioral issues continue however outputting staff and the harms risk at this time but will continue to monitor behavior  Discussed in team conference and will require a family meeting which is being arranged for tomorrow to discuss disposition planning  Patient denies fever, chills, nausea, emesis, cough, shortness of breath, diarrhea, or constipation  Sleep was fine, mood stable  Pain is controlled although limited discomfort at times      ASSESSMENT: Stable, progressing    PLAN:  - incontinence- timed void, encourage call bell use for assistance to toilet/urinal use  - upper dentures, patient care manager aware  - continue current rehabilitation and medical plan of care    Rehabilitation  • Functional deficits:  Mobility, self-care  • Continue current rehabilitation plan of care to maximize function  • Functional update:   o PT: mobility- supervision, transfers- mod A, ambulation- mod A RW- 50', wheelchair mobility- mod A mod A, stairs- total A  o OT: ADL: bathing- total A, dressing UB max A, LB total A, footwear- mod A, toileting- total A  o SLP: comprehension mod A, problem solving and memory max A  • Estimated Discharge: anticipated 10-14 days      Pain  • Not an issue, prn tylenol    DVT prophylaxis  • Lovenox    Bladder plan  • Continent    Bowel plan  • Continent  • Last BM 4/18      * Acute respiratory failure with hypoxia (HCC)  Assessment & Plan  - small pneumothorax s/p lung mass biopsy 03/17  - 04/01- shortness of breath  - 04/01 - imaging showed a complete right pneumothorax  - 04/01- right chest tube placed   - 04/02- chest tube dislodges; developed subcutaneous emphysema extending to neck,   - trauma consulted; removed initial chest tube, trauma placed new chest tube to suction  - monitor oxygenation  - 04/07 chest tube removed  - CXR- LLL PNA;  Cefepime completed, tessalon pearls for cough    - Comprehensive therapies 3 hr a day, 5-6 days a week      Non-small cell carcinoma of right lung (HCC)  Assessment & Plan  - stage IV NSCLC w/ brain metastases  - confirmed "with lung biopsy 03/17  - per Dr Daniela Motley; okay to hold chemotherapy, no more than 2 weeks    Unspecified protein-calorie malnutrition (HCC)  Assessment & Plan  - BMI 20 99  - nutrition consult  - nutritional supplements  - monitor I&O      Brain mass  Assessment & Plan  - Brain metastases  - received whole brain radiation 10 treatments, completed 04/12  - continue Keppra 500 mg q12h, decadron 4mg q6h, memantine 10 mg q12h  - follows with Oncology, Dr Cale Gallardo [facility] medical co-management  Personally reviewed labs, medications, and imaging  ROS:  A ten point review of systems was completed on 04/18/23 and pertinent positives are listed in subjective section  All other systems reviewed were negative  OBJECTIVE:   /70 (BP Location: Left arm)   Pulse 72   Temp 98 °F (36 7 °C) (Oral)   Resp 16   Ht 5' 6\" (1 676 m)   Wt 59 kg (130 lb 1 1 oz)   SpO2 95%   BMI 20 99 kg/m²     Physical Exam  Constitutional:       General: He is not in acute distress  Appearance: He is ill-appearing  HENT:      Head: Normocephalic and atraumatic  Right Ear: External ear normal       Left Ear: External ear normal       Ears:      Comments: Hard of hearing     Nose: Nose normal  No rhinorrhea  Mouth/Throat:      Mouth: Mucous membranes are moist    Eyes:      General: No scleral icterus  Cardiovascular:      Rate and Rhythm: Normal rate and regular rhythm  Pulses: Normal pulses  Pulmonary:      Effort: Pulmonary effort is normal  No respiratory distress  Abdominal:      General: There is no distension  Palpations: Abdomen is soft  Musculoskeletal:      Cervical back: Normal range of motion  Right lower leg: No edema  Left lower leg: No edema  Skin:     General: Skin is warm and dry  Findings: Bruising present  Neurological:      Mental Status: He is alert  He is disoriented        Coordination: Coordination abnormal      " Comments: - disoriented to place, situation; reoriented   Upper extremity dysmetria   Psychiatric:         Mood and Affect: Mood normal          Judgment: Judgment normal           Lab Results   Component Value Date    WBC 10 92 (H) 04/13/2023    HGB 11 5 (L) 04/13/2023    HCT 37 0 04/13/2023    MCV 92 04/13/2023     04/13/2023     Lab Results   Component Value Date    SODIUM 135 04/13/2023    K 4 5 04/13/2023     04/13/2023    CO2 28 04/13/2023    BUN 30 (H) 04/13/2023    CREATININE 0 49 (L) 04/13/2023    GLUC 135 04/13/2023    CALCIUM 8 0 (L) 04/13/2023     Lab Results   Component Value Date    INR 1 00 03/15/2023    PROTIME 13 4 03/15/2023       Current Facility-Administered Medications:   •  acetaminophen (TYLENOL) tablet 650 mg, 650 mg, Oral, Q6H PRN, MARY BETH Momin  •  atorvastatin (LIPITOR) tablet 40 mg, 40 mg, Oral, QPM, MARY BETH Momin, 40 mg at 04/18/23 1646  •  dexamethasone (DECADRON) tablet 4 mg, 4 mg, Oral, Q8H MOLLY, 4 mg at 04/18/23 1352 **FOLLOWED BY** [START ON 4/21/2023] dexamethasone (DECADRON) tablet 4 mg, 4 mg, Oral, Q12H [address] **FOLLOWED BY** [START ON 4/28/2023] dexamethasone (DECADRON) tablet 4 mg, 4 mg, Oral, Daily, MARY BETH Momin  •  enoxaparin (LOVENOX) subcutaneous injection 40 mg, 40 mg, Subcutaneous, Daily, MARY BETH Momin, 40 mg at 04/18/23 7917  •  levETIRAcetam (KEPPRA) tablet 500 mg, 500 mg, Oral, Q12H [address], MARY BETH Momin, 500 mg at 04/18/23 3494  •  memantine (NAMENDA) tablet 10 mg, 10 mg, Oral, BID, MARY BETH Momin, 10 mg at 04/18/23 1646  •  pantoprazole (PROTONIX) EC tablet 40 mg, 40 mg, Oral, Early Morning, MARY BETH Momin, 40 mg at 04/18/23 0503    Past Medical History:   Diagnosis Date   • Hypertension        Patient Active Problem List    Diagnosis Date Noted   • Acute respiratory failure with hypoxia ([facility] [address] ) 04/14/2023   • Ambulatory dysfunction 04/07/2023   • Encounter for antineoplastic immunotherapy 03/31/2023   • Chemotherapy [facility] neutropenia ([facility] ) 03/30/2023   • 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([facility] ) 03/30/2023   • Non-small cell carcinoma of right lung ([facility] ) 03/27/2023   • Unspecified protein-calorie malnutrition ([facility] ) 03/16/2023   • Brain mass 03/14/2023   • Lung mass 03/14/2023   • Hypertension 03/14/2023      Gabriele Moore,   Physical Medicine and Alfredo Rosas    I have spent a total time of 35 minutes on 04/18/23 in caring for this patient including Counseling / Coordination of care, Documenting in the medical record, Communicating with other healthcare professionals  and Discussing case during team conference with , nursing, therapy staff as well as setting up family meeting for tomorrow to further discuss disposition plans  Candida Hy

## 2023-04-18 NOTE — PROGRESS NOTES
"   04/18/23 1400   Pain Assessment   Pain Assessment Tool 0-10   Pain Score No Pain   Restrictions/Precautions   Precautions Cognitive;Bed/chair alarms; Fall Risk;Supervision on toilet/commode;Hard of hearing  (4 rails up, fall mat on floor)   Cognition   Overall Cognitive Status Impaired   Arousal/Participation Alert; Cooperative   Attention Attends with cues to redirect   Subjective   Subjective pt in bed but awake, agreeable  to PT although during treatment ask why he has to do repeated transfer training so provided re-education  During tx session when ask if goal is to go home pt stated \" just let me die\" so provided with redirection  Roll Left and Right   Type of Assistance Needed Supervision   Roll Left and Right CARE Score 4   Sit to Lying   Type of Assistance Needed Supervision   Comment HOB flat, no rails   Sit to Lying CARE Score 4   Lying to Sitting on Side of Bed   Type of Assistance Needed Supervision   Comment HOB flat, no rails   Lying to Sitting on Side of Bed CARE Score 4   Sit to Stand   Type of Assistance Needed Physical assistance;Verbal cues; Adaptive equipment   Physical Assistance Level 26%-50%   Comment min-mod of 1 with VC for hand placement with RW   Sit to Stand CARE Score 3   Bed-Chair Transfer   Type of Assistance Needed Physical assistance;Verbal cues; Adaptive equipment   Physical Assistance Level 26%-50%   Comment repeated bed/chair <>w/c stand/sit pivot with UE support using arm rests mod initially then progressed to min A with repeated training and emphasis on proper hand placement and fwd weight shifting  pt does better with stand pivot vs sit pivot transfers with step by step VC for sequencing  Chair/Bed-to-Chair Transfer CARE Score 3   Walk 10 Feet   Type of Assistance Needed Physical assistance;Verbal cues; Adaptive equipment   Physical Assistance Level Total assistance   Comment mod x1 with CGA/CFA of 2nd person x 10' x 2 reps with RW   Walk 10 Feet CARE Score 1   Wheel 50 Feet " with Two Turns   Type of Assistance Needed Supervision;Verbal cues   Wheel 50 Feet with Two Turns CARE Score 4   Wheel 150 Feet   Type of Assistance Needed Verbal cues; Supervision   Comment 150' with 2 rest breaks using all ext although VC to use LE more than UE with VC for obstacle negotiation   Wheel 150 Feet CARE Score 4   Therapeutic Interventions   Strengthening marches x10 reps x 2, LAQ x 5 SH x 10 reps x 2 sets, heel raises and toe raises x 20 reps   Flexibility bilat hamstring stretch x 20 SH x 4 reps, bilat soleus stretch x 15 SH x 4 reps  Nu step level 1 all ext, SPM>35 x 10 mins, SpO2 ranged 94-96%, [place] 96-97   Assessment   Treatment Assessment skilled PT initially focused on w/c level mobilities with emphasis on repeated stand pivot transfer with UE support while SO Melonie observed  pt initially required mod A to complete stand pivot but progressed to min A with repeated training although needed 100% for sequencing  pt now using 16x16 w/c to optimize fit and was able to manage w/c brake and propulsion at S level  But due to cognitive deficit and STM impairment does not anticipate pt to be indep with w/c propulsion for will not be able to recall appropriate time to lock/unlock w/c brake or manage w/c safely around obstacles/furnitures  PT will cont to work on repeated stand pivot w/c level transfer training with family meeting scheduled for tomorrow with pt's daughter  Family/Caregiver Present yes   PT Family training done with: Aparna Santana present and observed PT session  per Hal Mcdaniel no NAILA into their apt but a w/c will not fit in the apt even with narrowest 16x16 w/c with a width of 25 inches  at this time Hal Mcdaniel repeatedly verbalized inability to provide care/assist to the pt at home  Barriers to Discharge Inaccessible home environment;Decreased caregiver support   PT Barriers   Functional Limitation Car transfers; Ramp negotiation;Stair negotiation;Standing;Transfers; Walking; Wheelchair management   Plan Treatment/Interventions Functional transfer training;LE strengthening/ROM; Therapeutic exercise; Endurance training;Bed mobility;Gait training;Spoke to nursing;OT;Spoke to advanced practitioner;Spoke to case management;Spoke to MD;Compensatory technique education   Progress Slow progress, cognitive deficits   Recommendation   PT Discharge Recommendation   (SNF vs home pending progress and family meeting)   Equipment Recommended Wheelchair   PT Therapy Minutes   PT Time In 1400   PT Time Out 1500   PT Total Time (minutes) 60   PT Mode of treatment - Individual (minutes) 60   PT Mode of treatment - Concurrent (minutes) 0   PT Mode of treatment - Group (minutes) 0   PT Mode of treatment - Co-treat (minutes) 0   PT Mode of Treatment - Total time(minutes) 60 minutes   PT Cumulative Minutes 240   Therapy Time missed   Time missed?  No

## 2023-04-18 NOTE — PROGRESS NOTES
"   04/18/23 9444   Pain Assessment   Pain Assessment Tool 0-10   Pain Score No Pain   Restrictions/Precautions   Precautions Bed/chair alarms;Cognitive; Fall Risk;Hard of hearing;Supervision on toilet/commode   Comprehension   Comprehension (FIM) 3 - Needs parts of sentences repeated   Expression   Expression (FIM) 4 - Expresses basic info/needs 75-90% of time   Social Interaction   Social Interaction (FIM) 4 - Needs redirecting for appropriate language or to initiate interaction   Problem Solving   Problem solving (FIM) 3 - Solves basic problmes 50-74% of time   Memory   Memory (FIM) 3 - Recognizes, recalls/performs 50-74%   Speech/Language/Cognition Assessmetn   Treatment Assessment Pt was sleeping on L side upon arrival quickly awaken w/ verbal stim and responsive to one step commands to reposition pt to sitting upright in bed  Pt required use of pocket talker for the duration of tx session d/t pt [facility]  Pt then engaged in orientation review where pt demo carryover of strategies utilizing his alarm clock as a visual aid when asked about the ANN and date  Pt able to accurately state the ANN, date, his situation and location today  Location was improved from yesterday where pt was unclear whether he was at the [facility] or [facility]  Pt also able to state his birthday and age, pt also demo improved remote recall annoyed w/ inc volume asking Danna Avendano are you asking me the same questions again? \" to which student SLP explained the purpose of orientation and biographical questions and how they aid in assessing pt current cognitive state/ how we can better help him w/ cognitive linguistic skills while he is on the [facility]  Pt receptive to this, calming down and agreeable to complete tx activities  Pt then engaged in verbal problem solving task where pt provided w/ description of activity and asked to determine the activity described  Pt w/ accuracy of 2/13 req verbal cues and repetition to increase accuracy to 11/13   Pt " "answering impulsively throughout activity, often giving up stating \" I don't know\" immediately before giving himself time to process  Pt req encouragement throughout task to continue and attempt to answer stimulus items  Pt still w/ inappropriate volume and pressed speech intermittently throughout session at times of frustration/ annoyance  Pt stating \" I can't help it\" when student SLP inquiring about his use of increased volume/ pressed speech however more responsive to verbal re-direction today  At this time pt continues to benefit from skilled ST services targeting cognitive linguistic skills to maximize pt independence and minimize caregiver burden upon d/c  SLP Therapy Minutes   SLP Time In 0930   SLP Time Out 1000   SLP Total Time (minutes) 30   SLP Mode of treatment - Individual (minutes) 30   SLP Mode of treatment - Concurrent (minutes) 0   SLP Mode of treatment - Group (minutes) 0   SLP Mode of treatment - Co-treat (minutes) 0   SLP Mode of Treatment - Total time(minutes) 30 minutes   SLP Cumulative Minutes 115   Therapy Time missed   Time missed?  No       "

## 2023-04-18 NOTE — PROGRESS NOTES
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  • Chemotherapy on hold until pt discharged - no more than 2 weeks  • S/p whole brain radiation x 10 treatments  Completed 4/12/23  • Continue Decadron taper, Keppra and memantine  • Follow-up with Dr Milly Bethea and radiation oncology      Pneumothorax  • Occurred after lung biopsy from 3/17/23  • S/p chest tube  • Now resolved      LLL PNA  • S/p tx with Cefepime  • stable     Behavioral issues  • Brain lesions are B/L frontal likely contributing  • Was yelling at staff and had impulsive behavior   • Has been more cooperative lately      Leukocytosis  • Mild  Likely reactive  No signs of infx      HTN  • Listed in pt's history but BPs have been soft  • Continue to monitor  • Pt is asymptomatic         Discharge date:  Team    The above assessment and plan was reviewed and updated as determined by my evaluation of the patient on 4/18/2023      Labs:   Results from last 7 days   Lab Units 04/13/23  0528   WBC Thousand/uL 10 92*   HEMOGLOBIN g/dL 11 5*   HEMATOCRIT % 37 0   PLATELETS Thousands/uL 153     Results from last 7 days   Lab Units 04/13/23  0528   SODIUM mmol/L 135   POTASSIUM mmol/L 4 5   CHLORIDE mmol/L 103   CO2 mmol/L 28   BUN mg/dL 30*   CREATININE mg/dL 0 49*   CALCIUM mg/dL 8 0*                   Review of Scheduled Meds:  Current Facility-Administered Medications   Medication Dose Route Frequency Provider Last Rate   • acetaminophen  650 mg Oral Q6H PRN MARY BETH Momin     • atorvastatin  40 mg Oral QPM MARY BETH Momin     • dexamethasone  4 mg Oral Q8H [address] MARY BETH Momin      Followed by   • [START ON 4/21/2023] dexamethasone  4 mg Oral Q12H [address] MARY BETH Momin      Followed by   • [START ON 4/28/2023] dexamethasone  4 mg Oral Daily MARY BETH Momin     • enoxaparin  40 mg Subcutaneous Daily MARY BETH Momin     • levETIRAcetam  500 mg Oral Q12H [address] MARY BETH Momin     • memantine  10 mg Oral BID MARY BETH Momin     • pantoprazole  40 mg Oral Early Morning MARY BETH Momin         Subjective/ HPI: Patient seen and examined  Patients overnight issues or events were reviewed with nursing or staff during rounds or morning huddle session  New or overnight issues include the following:     Pt seen in his room  [place] is ongoing  He denies any present concerns or issues    ROS:   A 10 point ROS was performed; negative except as noted above         Imaging:     No orders to display       *Labs /Radiology studies reviewed  *Medications reviewed and reconciled as needed  *Please refer to order section for additional ordered labs studies  *Case discussed with primary attending during morning huddle case rounds    Physical Examination:  Vitals:   Vitals:    04/17/23 2115 04/18/23 0500 04/18/23 0710 04/18/23 0715   BP: (!) 89/59 (!) 92/49 102/60 110/76   BP Location: Left arm Left arm Left arm Left arm   Pulse: 80 70     Resp: 16 16     Temp: 98 3 °F (36 8 °C) 98 °F (36 7 °C)     TempSrc: Oral Oral     SpO2: 94% 92%     Weight:       Height:           GEN: No apparent distress, interactive  NEURO: Alert and oriented x3; remains impulsive however improved from admission  HEENT: Pupils are equal and reactive, EOMI, mucous membranes are moist, face symmetrical; [place]  CV: S1 S2 regular, no MRG, no peripheral edema noted  RESP: Lungs are decreased throughout, no wheezes, rales or rhonchi noted, on room air, respirations easy and non labored  GI: Flat, soft non tender, non distended; +BS x4  : Voiding without difficulty  MUSC: Moves all extremities  SKIN: pink, warm and dry, normal turgor, no rashes, lesions          The above physical exam was reviewed and updated as determined by my evaluation of the patient on 4/18/2023  Invasive Devices     None                    VTE Pharmacologic Prophylaxis: Enoxaparin  Code Status: Level 2 - DNAR: but accepts endotracheal intubation  Current Length of Stay: 4 day(s)      Total time spent:  30 minutes with more than 50% spent counseling/coordinating care  Counseling includes discussion with patient re: progress  and discussion with patient of his/her current medical state/information  Coordination of patient's care was performed in conjunction with primary service  Time invested included review of patient's labs, vitals, and management of their comorbidities with continued monitoring  In addition, this patient was discussed with medical team including physician and advanced extenders  The care of the patient was extensively discussed and appropriate treatment plan was formulated unique for this patient  Medical decision making for the day was made by supervising physician unless otherwise noted in their attestation statement  ** Please Note:  voice to text software may have been used in the creation of this document   Although proof errors in transcription or interpretation are a potential of such software**

## 2023-04-18 NOTE — CASE MANAGEMENT
Case Management Update:  Prior to admission, pt was living in first floor apartment with DEIRDRE Wilhelm, 0 NAILA  Prior to admission, pt was independent with ADL IADLs, pt does have r/w in home  Pt's family is reporting they are unable to care for pt at home, team is currently recommending assistance for pt to [place] home  Family meeting scheduled 4/19 @ 1pm with daughter Opal Carter and Stevo Silva who is legally  from pt to discuss dispo plan  The patient was educated that other members of the patient's support are able to ask questions and observe as the patient wished  The patient was educated on the rehabilitation process including therapy program, the interdisciplinary team, and weekly team meetings  Estimated length of stay was reviewed with the patient as well as expectations of discussions of discharge planning  The role of the  was reviewed including providing care coordination, discharge planning and discharge facilitation  IMM was reviewed with the patient and a copy was provided for their reference  The patient verbalized understanding of the information provided and denied any further questions at this time  CM will continue to follow and assist the patient throughout their rehabilitation stay

## 2023-04-19 RX ADMIN — ATORVASTATIN CALCIUM 40 MG: 40 TABLET, FILM COATED ORAL at 17:58

## 2023-04-19 RX ADMIN — MEMANTINE 10 MG: 10 TABLET ORAL at 17:58

## 2023-04-19 RX ADMIN — ENOXAPARIN SODIUM 40 MG: 40 INJECTION SUBCUTANEOUS at 09:06

## 2023-04-19 RX ADMIN — DEXAMETHASONE 4 MG: 4 TABLET ORAL at 05:27

## 2023-04-19 RX ADMIN — MEMANTINE 10 MG: 10 TABLET ORAL at 09:06

## 2023-04-19 RX ADMIN — DEXAMETHASONE 4 MG: 4 TABLET ORAL at 14:09

## 2023-04-19 RX ADMIN — LEVETIRACETAM 500 MG: 500 TABLET, FILM COATED ORAL at 09:06

## 2023-04-19 RX ADMIN — PANTOPRAZOLE SODIUM 40 MG: 40 TABLET, DELAYED RELEASE ORAL at 05:27

## 2023-04-19 NOTE — PROGRESS NOTES
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  • Chemotherapy on hold until pt discharged - no more than 2 weeks  • S/p whole brain radiation x 10 treatments  Completed 4/12/23  • Continue Decadron taper, Keppra and memantine  • Follow-up with Dr Mayra Sevilla and radiation oncology      Pneumothorax  • Occurred after lung biopsy from 3/17/23  • S/p chest tube  • Now resolved      LLL PNA  • S/p tx with Cefepime  • stable     Behavioral issues  • Brain lesions are B/L frontal likely contributing  • Was yelling at staff and had impulsive behavior   • Has been more cooperative lately      Leukocytosis  • Mild  Likely reactive  No signs of infx  • Cbc in am  • afebrile     HTN  • Listed in pt's history but BPs have been soft  • Continue to monitor  • Pt is asymptomatic         Discharge date:  Team meeting 4/19 with family regarding plan    The above assessment and plan was reviewed and updated as determined by my evaluation of the patient on 4/19/2023      Labs:   Results from last 7 days   Lab Units 04/13/23  0528   WBC Thousand/uL 10 92*   HEMOGLOBIN g/dL 11 5*   HEMATOCRIT % 37 0   PLATELETS Thousands/uL 153     Results from last 7 days   Lab Units 04/13/23  0528   SODIUM mmol/L 135   POTASSIUM mmol/L 4 5   CHLORIDE mmol/L 103   CO2 mmol/L 28   BUN mg/dL 30*   CREATININE mg/dL 0 49*   CALCIUM mg/dL 8 0*                   Review of Scheduled Meds:  Current Facility-Administered Medications   Medication Dose Route Frequency Provider Last Rate   • acetaminophen  650 mg Oral Q6H PRN MARY BETH Momin     • atorvastatin  40 mg Oral QPM MARY BETH Momin     • dexamethasone  4 mg Oral Q8H [address] MARY BETH Momin      Followed by   • [START ON 4/21/2023] dexamethasone  4 mg Oral Q12H [address] MARY BETH Momin      Followed by   • [START ON 4/28/2023] dexamethasone  4 mg Oral Daily MARY BETH Momin     • enoxaparin  40 mg Subcutaneous Daily MARY BETH Momin     • levETIRAcetam  500 mg Oral Q12H [address] MARY BETH Momin     • memantine  10 mg Oral BID MARY BETH Momin     • pantoprazole  40 mg Oral Early Morning MARY BETH Momin         Subjective/ HPI: Patient seen and examined  Patients overnight issues or events were reviewed with nursing or staff during rounds or morning huddle session  New or overnight issues include the following:     Pt seen in his room  No overnight issues      ROS:   A 10 point ROS was performed; negative except as noted above         Imaging:     No orders to display       *Labs /Radiology studies reviewed  *Medications reviewed and reconciled as needed  *Please refer to order section for additional ordered labs studies  *Case discussed with primary attending during morning huddle case rounds    Physical Examination:  Vitals:   Vitals:    04/18/23 1000 04/18/23 2017 04/19/23 0609 04/19/23 0623   BP: 124/70 103/59 117/62    BP Location: Left arm Right arm Right arm    Pulse: 72 82 62    Resp:  20     Temp:  98 4 °F (36 9 °C) 97 7 °F (36 5 °C)    TempSrc:  Oral Oral    SpO2: 95% 94% 93%    Weight:    58 kg (127 lb 13 9 oz)   Height:           GEN: No apparent distress, interactive  NEURO: Alert and oriented x2; remains impulsive however improved from admission  HEENT: Pupils are equal and reactive, EOMI, mucous membranes are moist, face symmetrical; [place]  CV: S1 S2 regular, no MRG, no peripheral edema noted  RESP: Lungs are decreased throughout, no wheezes, rales or rhonchi noted, on room air, respirations easy and non labored  GI: Flat, soft non tender, non distended; +BS x4  : Voiding without difficulty  MUSC: Moves all extremities, generalized deconditioning noted  SKIN: pink, warm and dry, normal turgor, no rashes, lesions          The above physical exam was reviewed and updated as determined by my evaluation of the patient on 4/19/2023  Invasive Devices     None                    VTE Pharmacologic Prophylaxis: Enoxaparin  Code Status: Level 2 - DNAR: but accepts endotracheal intubation  Current Length of Stay: 5 day(s)      Total time spent:  30 minutes with more than 50% spent counseling/coordinating care  Counseling includes discussion with patient re: progress  and discussion with patient of his/her current medical state/information  Coordination of patient's care was performed in conjunction with primary service  Time invested included review of patient's labs, vitals, and management of their comorbidities with continued monitoring  In addition, this patient was discussed with medical team including physician and advanced extenders  The care of the patient was extensively discussed and appropriate treatment plan was formulated unique for this patient  Medical decision making for the day was made by supervising physician unless otherwise noted in their attestation statement  ** Please Note:  voice to text software may have been used in the creation of this document   Although proof errors in transcription or interpretation are a potential of such software**

## 2023-04-19 NOTE — PROGRESS NOTES
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 who presented to [facility] on 04/01 with a 1 day history of shortness of breath s/p lung mass biopsy with a small pneumothorax  On presentation his  Oxygen saturation was 70% on room air, he received Bipap then transitioned to nasal cannula  Chest x-ray showed large right sided pneumothorax with complete lung collapse  A chest tube was place, it became dislodges and the patient developed subcutaneous emphysema  Trauma was consulted, reinserted tube without complications  Chest tube was removed on 04/07  Patient also presented with leukocytosis, tachycardia, elevated lactic acid and procalcitonin  Acute respiratory failure was likely secondary to right-sided pneumothorax with possible LLL pneumonia  Patient completed cefepime with prn tessalon pearls for cough  He was able to continue with whole brain radiation treatments, completed on 04/12  Chemotherapy treatments on hold, per Dr Grecia Pugh, can be held no longer than two weeks  The patient was evaluated by the Rehabilitation team and deemed an appropriate candidate for comprehensive inpatient rehabilitation and admitted to the [facility] on 04/14/23  SUBJECTIVE: Patient seen face to face  No acute issues overnight  reports slept well, denies pain  Voiding, incontinent, LBM 04/18  Family meeting today with daughterAutumn  Patient will need assistance at baseline the family is unable to provide  CM, working on placement to subacute/SNF   Denies chest pain, shortness of breath, fever, chills, nausea, abdominal pain  ASSESSMENT: Stable, progressing    PLAN:  - incontinence- continue to encourage urinal, timed void  - upper dentures located and with patient  - family meeting with daughter, Christine Ulrich, patient will require physical assistance at discharge family is unable to provide  Arturo Osiel is working on placement for continued care at discharge  - Coral  going to see patient today, later this afternoon  - continue current medical and rehabilitation plan of care    Rehabilitation  • Functional deficits:  Mobility, self-care  • Continue current rehabilitation plan of care to maximize function  • Functional update:   o PT: mobility- supervision, transfers- mod A, ambulation- mod A RW- 50', wheelchair mobility- mod A ambulation: min-mod A 10', stairs- total A  o OT: ADL: bathing- total A, dressing UB max A, LB total A, footwear- mod A, toileting- total A  o SLP: comprehension mod A, problem solving and memory max A  • Estimated Discharge: pending subacute placement     Pain  • Not an issue, prn tylenol    DVT prophylaxis  • Lovenox    Bladder plan  • Continent    Bowel plan  • Continent      * Acute respiratory failure with hypoxia (HCC)  Assessment & Plan  - small pneumothorax s/p lung mass biopsy 03/17  - 04/01- shortness of breath  - 04/01 - imaging showed a complete right pneumothorax  - 04/01- right chest tube placed   - 04/02- chest tube dislodges; developed subcutaneous emphysema extending to neck,   - trauma consulted; removed initial chest tube, trauma placed new chest tube to suction  - monitor oxygenation  - 04/07 chest tube removed  - CXR- LLL PNA;  Cefepime completed, tessalon pearls for cough    - Comprehensive therapies 3 hr a day, 5-6 days a week      Non-small cell carcinoma of right lung (HCC)  Assessment & Plan  - stage IV NSCLC w/ brain metastases  - confirmed with lung biopsy 03/17  - per Dr Eva Tello; okay to hold chemotherapy, "no more than 2 weeks    Brain mass  Assessment & Plan  - Brain metastases  - received whole brain radiation 10 treatments, completed 04/12  - continue Keppra 500 mg q12h, decadron 4mg q6h, memantine 10 mg q12h  - follows with Oncology, Dr Alex Villatoro    Unspecified protein-calorie malnutrition [facility])  Assessment & Plan  - BMI 20 99  - nutrition consult  - nutritional supplements  - monitor I&O      Appreciate IM consultants medical co-management  Labs, medications, and imaging reviewed  ROS:  Review of Systems   A 10 point review of systems was negative except for what is noted in the HPI  OBJECTIVE:   /62 (BP Location: Right arm)   Pulse 62   Temp 97 7 °F (36 5 °C) (Oral)   Resp 20   Ht 5' 6\" (1 676 m)   Wt 58 kg (127 lb 13 9 oz)   SpO2 93%   BMI 20 64 kg/m²     Physical Exam  Constitutional:       Appearance: Normal appearance  He is ill-appearing  HENT:      Head: Normocephalic and atraumatic  Mouth/Throat:      Mouth: Mucous membranes are moist    Cardiovascular:      Rate and Rhythm: Normal rate and regular rhythm  Pulses: Normal pulses  Pulmonary:      Effort: Pulmonary effort is normal       Breath sounds: Normal breath sounds  Abdominal:      General: Bowel sounds are normal       Palpations: Abdomen is soft  Musculoskeletal:         General: Normal range of motion  Cervical back: Normal range of motion  Skin:     General: Skin is warm and dry  Capillary Refill: Capillary refill takes less than 2 seconds  Findings: Bruising present  Neurological:      Mental Status: He is alert and oriented to person, place, and time  Motor: Weakness present            Lab Results   Component Value Date    WBC 10 92 (H) 04/13/2023    HGB 11 5 (L) 04/13/2023    HCT 37 0 04/13/2023    MCV 92 04/13/2023     04/13/2023     Lab Results   Component Value Date    SODIUM 135 04/13/2023    K 4 5 04/13/2023     04/13/2023    CO2 28 " 04/13/2023    BUN 30 (H) 04/13/2023    CREATININE 0 49 (L) 04/13/2023    GLUC 135 04/13/2023    CALCIUM 8 0 (L) 04/13/2023     Lab Results   Component Value Date    INR 1 00 03/15/2023    PROTIME 13 4 03/15/2023       Current Facility-Administered Medications:   •  acetaminophen (TYLENOL) tablet 650 mg, 650 mg, Oral, Q6H PRN, MARY BETH Momin  •  atorvastatin (LIPITOR) tablet 40 mg, 40 mg, Oral, QPM, MARY BETH Momin, 40 mg at 04/18/23 1646  •  dexamethasone (DECADRON) tablet 4 mg, 4 mg, Oral, Q8H MOLLY, 4 mg at 04/19/23 0527 **FOLLOWED BY** [START ON 4/21/2023] dexamethasone (DECADRON) tablet 4 mg, 4 mg, Oral, Q12H [address] **FOLLOWED BY** [START ON 4/28/2023] dexamethasone (DECADRON) tablet 4 mg, 4 mg, Oral, Daily, MARY BETH Momin  •  enoxaparin (LOVENOX) subcutaneous injection 40 mg, 40 mg, Subcutaneous, Daily, MARY BETH Momin, 40 mg at 04/19/23 0906  •  levETIRAcetam (KEPPRA) tablet 500 mg, 500 mg, Oral, Q12H MOLLY, MARY BETH Momin, 500 mg at 04/19/23 2567  •  memantine (NAMENDA) tablet 10 mg, 10 mg, Oral, BID, MARY BETH Momin, 10 mg at 04/19/23 2606  •  pantoprazole (PROTONIX) EC tablet 40 mg, 40 mg, Oral, Early Morning, MARY BETH Momin, 40 mg at 04/19/23 0527    Past Medical History:   Diagnosis Date   • Hypertension        Patient Active Problem List    Diagnosis Date Noted   • Acute respiratory failure with hypoxia ([facility] [address] ) 04/14/2023   • Non-small cell carcinoma of right lung ([facility] [address] ) 03/27/2023   • Brain mass 03/14/2023   • Ambulatory dysfunction 04/07/2023   • Encounter for antineoplastic immunotherapy 03/31/2023   • Chemotherapy induced neutropenia ([facility] ) 03/30/2023   • 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([place] [id number] ) 03/30/2023   • Unspecified protein-calorie malnutrition ([place] [address] ) 03/16/2023   • Lung mass 03/14/2023   • Hypertension 03/14/2023      MARY BETH Tsang  Physical Medicine and [place] [id number]

## 2023-04-19 NOTE — PLAN OF CARE
Problem: PAIN - ADULT  Goal: Verbalizes/displays adequate comfort level or baseline comfort level  Description: Interventions:  - Encourage patient to monitor pain and request assistance  - Assess pain using appropriate pain scale  - Administer analgesics based on type and severity of pain and evaluate response  - Implement non-pharmacological measures as appropriate and evaluate response  - Consider cultural and social influences on pain and pain management  - Notify physician/advanced practitioner if interventions unsuccessful or patient reports new pain  Outcome: Progressing     Problem: INFECTION - ADULT  Goal: Absence or prevention of progression during hospitalization  Description: INTERVENTIONS:  - Assess and monitor for signs and symptoms of infection  - Monitor lab/diagnostic results  - Monitor all insertion sites, i e  indwelling lines, tubes, and drains  - Monitor endotracheal if appropriate and nasal secretions for changes in amount and color  - [place] appropriate cooling/warming therapies per order  - Administer medications as ordered  - Instruct and encourage patient and family to use good hand hygiene technique  - Identify and instruct in appropriate isolation precautions for identified infection/condition  Outcome: Progressing  Goal: Absence of fever/infection during neutropenic period  Description: INTERVENTIONS:  - Monitor WBC    Outcome: Progressing     Problem: SAFETY ADULT  Goal: Patient will remain free of falls  Description: INTERVENTIONS:  - Educate patient/family on patient safety including physical limitations  - Instruct patient to call for assistance with activity   - Consult OT/PT to assist with strengthening/mobility   - Keep Call bell within reach  - Keep bed low and locked with side rails adjusted as appropriate  - Keep care items and personal belongings within reach  - Initiate and maintain comfort rounds  - Make Fall Risk Sign visible to staff  - Offer Toileting every  Hours, in advance of need  - Initiate/Maintain alarm  - Obtain necessary fall risk management equipment:   - Apply yellow socks and bracelet for high fall risk patients  - Consider moving patient to room near nurses station  Outcome: Progressing  Goal: Maintain or return to baseline ADL function  Description: INTERVENTIONS:  -  Assess patient's ability to carry out ADLs; assess patient's baseline for ADL function and identify physical deficits which impact ability to perform ADLs (bathing, care of mouth/teeth, toileting, grooming, dressing, etc )  - Assess/evaluate cause of self-care deficits   - Assess range of motion  - Assess patient's mobility; develop plan if impaired  - Assess patient's need for assistive devices and provide as appropriate  - Encourage maximum independence but intervene and supervise when necessary  - Involve family in performance of ADLs  - Assess for home care needs following discharge   - Consider OT consult to assist with ADL evaluation and planning for discharge  - Provide patient education as appropriate  Outcome: Progressing  Goal: Maintains/Returns to pre admission functional level  Description: INTERVENTIONS:  - Perform BMAT or MOVE assessment daily    - Set and communicate daily mobility goal to care team and patient/family/caregiver  - Collaborate with rehabilitation services on mobility goals if consulted  - Perform Range of Motion times a day  - Reposition patient every  hours    - Dangle patient  times a day  - Stand patient  times a day  - Ambulate patient  times a day  - Out of bed to chair  times a day   - Out of bed for meals  times a day  - Out of bed for toileting  - Record patient progress and toleration of activity level   Outcome: Progressing     Problem: DISCHARGE PLANNING  Goal: Discharge to home or other facility with appropriate resources  Description: INTERVENTIONS:  - Identify barriers to discharge w/patient and caregiver  - Arrange for needed discharge resources and transportation as appropriate  - Identify discharge learning needs (meds, wound care, etc )  - Arrange for interpretive services to assist at discharge as needed  - Refer to Case Management Department for coordinating discharge planning if the patient needs post-hospital services based on physician/advanced practitioner order or complex needs related to functional status, cognitive ability, or social support system  Outcome: Progressing     Problem: Nutrition/Hydration-ADULT  Goal: Nutrient/Hydration intake appropriate for improving, restoring or maintaining nutritional needs  Description: Monitor and assess patient's nutrition/hydration status for malnutrition  Collaborate with interdisciplinary team and initiate plan and interventions as ordered  Monitor patient's weight and dietary intake as ordered or per policy  Utilize nutrition screening tool and intervene as necessary  Determine patient's food preferences and provide high-protein, high-caloric foods as appropriate       INTERVENTIONS:  - Monitor oral intake, urinary output, labs, and treatment plans  - Assess nutrition and hydration status and recommend course of action  - Evaluate amount of meals eaten  - Assist patient with eating if necessary   - Allow adequate time for meals  - Recommend/ encourage appropriate diets, oral nutritional supplements, and vitamin/mineral supplements  - Order, calculate, and assess calorie counts as needed  - Recommend, monitor, and adjust tube feedings and TPN/PPN based on assessed needs  - Assess need for intravenous fluids  - Provide specific nutrition/hydration education as appropriate  - Include patient/family/caregiver in decisions related to nutrition  Outcome: Progressing     Problem: Prexisting or High Potential for Compromised Skin Integrity  Goal: Skin integrity is maintained or improved  Description: INTERVENTIONS:  - Identify patients at risk for skin breakdown  - Assess and monitor skin integrity  - Assess and monitor nutrition and hydration status  - Monitor labs   - Assess for incontinence   - Turn and reposition patient  - Assist with mobility/ambulation  - Relieve pressure over bony prominences  - Avoid friction and shearing  - Provide appropriate hygiene as needed including keeping skin clean and dry  - Evaluate need for skin moisturizer/barrier cream  - Collaborate with interdisciplinary team   - Patient/family teaching  - Consider wound care consult   Outcome: Progressing     Problem: SAFETY,RESTRAINT: NV/NON-SELF DESTRUCTIVE BEHAVIOR  Goal: Remains free of harm/injury (restraint for non violent/non self-detsructive behavior)  Description: INTERVENTIONS:  - Instruct patient/family regarding restraint use   - Assess and monitor physiologic and psychological status   - Provide interventions and comfort measures to meet assessed patient needs   - Identify and implement measures to help patient regain control  - Assess readiness for release of restraint   Outcome: Progressing  Goal: Returns to optimal restraint-free functioning  Description: INTERVENTIONS:  - Assess the patient's behavior and symptoms that indicate continued need for restraint  - Identify and implement measures to help patient regain control  - Assess readiness for release of restraint   Outcome: Progressing

## 2023-04-19 NOTE — PROGRESS NOTES
"   04/19/23 0700   Pain Assessment   Pain Assessment Tool 0-10   Pain Score No Pain   Restrictions/Precautions   Precautions Bed/chair alarms;Cognitive; Fall Risk;Hard of hearing;Supervision on toilet/commode   Weight Bearing Restrictions No   ROM Restrictions No   Lifestyle   Autonomy \"I always do what you want me to  Don't I?\"   Eating   Type of Assistance Needed Set-up / clean-up   Physical Assistance Level No physical assistance   Comment seated EOB per pt requested, however S provided and returned to bed following meal    Eating CARE Score 5   Oral Hygiene   Type of Assistance Needed Supervision   Physical Assistance Level No physical assistance   Comment seated at sink w/ cues for denture care   Oral Hygiene CARE Score 4   Shower/Bathe Self   Type of Assistance Needed Physical assistance;Verbal cues; Set-up / clean-up   Physical Assistance Level 76% or more   Comment while seated in w/c, pt bathed BUE, groin, and upper legs w/ cues to initiate  Mod A to steady in stance w/ BUE support of sink, A to bathe buttocks  A to bathe lower legs  Shower/Bathe Self CARE Score 2   Tub/Shower Transfer   Reason Not Assessed Sponge Bath   Upper Body Dressing   Type of Assistance Needed Physical assistance   Physical Assistance Level 26%-50%   Comment seated in w/c  able to thread BUE w/ A to fully pull OH and down back 2* low frustration tolerance  Upper Body Dressing CARE Score 3   Lower Body Dressing   Type of Assistance Needed Physical assistance   Physical Assistance Level 76% or more   Comment pt able to partially thread BLE while seated, Mod A to steady in stance w/ BUE support of sink w/ A for clothing management  Lower Body Dressing CARE Score 2   Putting On/Taking Off Footwear   Type of Assistance Needed Physical assistance   Physical Assistance Level Total assistance   Comment A to don/doff socks     Putting On/Taking Off Footwear CARE Score 1   Sit to Lying   Type of Assistance Needed Supervision   Physical " Assistance Level No physical assistance   Sit to Lying CARE Score 4   Lying to Sitting on Side of Bed   Type of Assistance Needed Supervision   Physical Assistance Level No physical assistance   Lying to Sitting on Side of Bed CARE Score 4   Sit to Stand   Type of Assistance Needed Physical assistance   Physical Assistance Level 26%-50%   Comment cues for proper hand placement and body mechanics  Sit to Stand CARE Score 3   Bed-Chair Transfer   Type of Assistance Needed Physical assistance;Verbal cues; Set-up / clean-up   Physical Assistance Level 26%-50%   Comment stand pivot without AD, cues for proper hand placement and body mechanics  Chair/Bed-to-Chair Transfer CARE Score 3   Functional Standing Tolerance   Time 1 minute x3 trials   Activity static standing at table w/ BUE support, req encouragement to maximize stand tolerance  Extended seated rest break provided between stand trials  Cognition   Overall Cognitive Status Impaired   Vision   Vision Comments Due to cognitive deficits, unable to complete formalized visual screen/assessment, therefore engaged in parquetry puzzle while seated w/ focus on assess fxnl vision  Pt reports wear bifocals at baseline, however declined wearing for task  Pt noted w/ impaired visual acuity, brining puzzle pieces into near-sight  Pt req cues at times to locate puzzle pieces on pt's R, however pt denied R field cut when questioned, although pt w/ cognitive deficits  Pt completed R head turns w/ cues for initiation  Add'lly noted w/ difficulty ID puzzle piece colors, could ID light/dark color w/ difficulty w/ specific color for example pt reported yellow to be white and blue to be black  Assessment   Treatment Assessment Pt seen for skilled OT session focusing on self-care management, fxnl standing tolerance, and fxnl visual activity  Pt req ongoing encouragement and edu to maximize participation   Details on sponge bath ADL noted above, cont to req physical A for fxnl xfers and when in fxnl stance  Due to cognitive deficits, limited skill carryover noted and req vc's for sequencing and fxnl problem solving to inc safety  Cont OT POC: fxnl xfers, ADL retraining, fxnl standing tolerance, endurance work, and repetitive safety training  Pt was left resting in bed w/ all needs within reach and alarm activated  Prognosis Fair   Problem List Decreased strength;Decreased endurance;Decreased mobility; Impaired balance;Decreased cognition; Impaired judgement;Decreased safety awareness; Impaired vision; Impaired hearing   Plan   Treatment/Interventions ADL retraining;Functional transfer training; Therapeutic exercise; Endurance training;Cognitive reorientation;Patient/family training   Progress Slow progress, cognitive deficits   Recommendation   OT Discharge Recommendation Post acute rehabilitation services   OT Therapy Minutes   OT Time In 0700   OT Time Out 0830   OT Total Time (minutes) 90   OT Mode of treatment - Individual (minutes) 90   OT Mode of treatment - Concurrent (minutes) 0   OT Mode of treatment - Group (minutes) 0   OT Mode of treatment - Co-treat (minutes) 0   OT Mode of Treatment - Total time(minutes) 90 minutes   OT Cumulative Minutes 425   Therapy Time missed   Time missed?  No

## 2023-04-19 NOTE — CASE MANAGEMENT
"Family Meeting Summary:  Cm, MD, NP, PT, OT, ST, pt and pt's daughter Shayna Case present at bedside to discuss dispo planning and dc options  Team discussed the dc home potential as SO Scooby is reporting she is unable to care for him at home and does not want him to return home  Pt's daughter Shayna Case reports she lives in 3rd floor walk up apartment and is unable to accommodate pt at her home, pt's daughter also reported pt's wife who pt is  from is also unable to accommodate pt as she currently lives with 7 other people  Pt's daughter report that they are unable to care for pt in any home setting and they report pt will need \"to go to a nursing home\" since pt is unable to have supervision for safety at home  CM and team explained the subacute process and that while that is a short term plan, pt will need assistance applying with medical assistance if they plan on pt living there long term  Cm encouraged pt and pt's family to review dual eligible facilities  Cm to follow up with pt and family to review SNF choices     "

## 2023-04-19 NOTE — NURSING NOTE
Pt called asking for his pulse ox to be checked  When asked if he feels short of breath- he replied yes  No outward signs of dtspnea  O2 sat 92%  Pt feels that is not high enough  Appears to get anxious about it  O2 2l nc applied and pulse ox 98%- pt fell back to sleep

## 2023-04-19 NOTE — PROGRESS NOTES
Pastoral Care Progress Note    2023  Patient: Xiomara Le[place] : 1950  Admission Date & Time: 2023 1330  MRN: [id number] [facility]: [id number]      Nurse referred Pt to , saying Pt had been wanting to talk to a   Pt  Hard of hearing and it was a challenge at first to get him to understand that I was a , but when I changed to saying I was a Susan Amezcua, he recognized that term and proceeded to talk  Said he wanted to be forgiven of his sin, could he do a confession with me? What should he say to do it, he asked? Led him through a prayer of confession for any wrong things he has done, for any good he has left undone, for anything else he wanted to confess to God  Reminded him of God's love and forgiveness through [employer], and assured him that upon his confession, he was forgiven of his sin, and did not have to live with guilt  Of his own volition, he quoted a few scripture verse (some of Psa 23, for example)  He said that he believed in [employer], the son of God, as his personal savior, and he wanted his wife and daughters to know that he is a Hindu  I provided more words or reassurance, affirmed his rickey, and told him I would come back to see him on Friday if he's here  He then asked if he could close his eyes (to rest), and I assured him that was fine   remains available                 Chaplaincy Interventions Utilized:   Empowerment: Clarified, confirmed, or reviewed information from treatment team     Exploration: Explored emotional needs & resources, Explored relational needs & resources, Explored spiritual needs & resources, and Facilitated expression of regret    Collaboration: Consulted with interdisciplinary team and Facilitated respect for spiritual/cultural practice during hospitalization    Relationship Building: Cultivated a relationship of care and support and Facilitated reconciliation with rickey community    Ritual: Provided prayer and Provided ritual    Chaplaincy Outcomes Achieved:  Distress reduced and Emotional resources utilized    Spiritual Coping Strategies Utilized:   Spiritual comfort       04/19/23 1800   Clinical Encounter Type   Visited With Patient   Routine Visit Introduction   Referral From Nurse   Referral To    Mormonism Encounters   Mormonism Needs Prayer   Patient Spiritual Encounters   Spiritual Assessment 4

## 2023-04-19 NOTE — PROGRESS NOTES
"   04/19/23 1400   Pain Assessment   Pain Assessment Tool 0-10   Pain Score No Pain   Restrictions/Precautions   Precautions Bed/chair alarms;Cognitive; Fall Risk;Hard of hearing;Supervision on toilet/commode  (4 bed rails)   Cognition   Overall Cognitive Status Impaired   Arousal/Participation Alert   Attention Attends with cues to redirect   Memory Decreased short term memory;Decreased recall of recent events;Decreased recall of precautions   Following Commands Follows one step commands with increased time or repetition   Subjective   Subjective \"Just let me die\"  Pt agreeable to bed exercises initially and eventually agreed for OOB     Roll Left and Right   Type of Assistance Needed Supervision   Comment HOB flat, no bed rail   Roll Left and Right CARE Score 4   Sit to Lying   Type of Assistance Needed Supervision   Comment HOB flat, no bed rail; pt prefers bed rail however is able to perform bed mobility without   Sit to Lying CARE Score 4   Lying to Sitting on Side of Bed   Type of Assistance Needed Supervision   Comment HOB flat, no bed rail; pt prefers bed rail however is able to perform bed mobility without   Lying to Sitting on Side of Bed CARE Score 4   Sit to Stand   Type of Assistance Needed Physical assistance   Physical Assistance Level 26%-50%   Comment Min-mod A no AD for SPT   Sit to Stand CARE Score 3   Bed-Chair Transfer   Type of Assistance Needed Physical assistance   Physical Assistance Level 26%-50%   Comment Min-mod A no AD for SPT bed <> w/c (x8); requires repetitive max VC for set up of transfer and what to do during transfer demonstrating poor carry over   Chair/Bed-to-Chair Transfer CARE Score 3   Wheel 50 Feet with Two Turns   Type of Assistance Needed Supervision   Comment using all extremities - prefers to only use arms, however educated on using LEs for further strengthening and propulsion   Wheel 50 Feet with Two Turns CARE Score 4   Wheel 150 Feet   Type of Assistance Needed " "Supervision   Comment using all extremities - prefers to only use arms, however educated on using LEs for further strengthening and propulsion   Wheel 150 Feet CARE Score 4   Therapeutic Interventions   Strengthening Bed exercises: clamshells 1x20, bridges with 3SH 2x10, supine hip abd 1x20 with therapist A with reducing friction from bed, glute sets 2x10 5SH, quad sets 2x10 5SH, SAQ with bolster 2x10 3SH, SLR with therapist A with eccentric control 2x10, heel slides 1x20, ankle pumps 2x30   Flexibility Supine gastroc self-stretch with bed sheet 4 x 20SH   Assessment   Treatment Assessment Pt tolerated 60 min of skilled PT focusing on LE strengthening, functional transfers, and w/c mobility  Pt stated 2x \"just let me die\" when asked to get out of bed for therapy - pt was redirected with education on importance of getting out of bed to get stronger and to improve quality of life which pt then agreed to perform w/c mobility  Pt was more willing to participate in therapy with constant reminders on how much time was left of session  Pt requires constant VC on what to do for transfers and how to perform exercises that were just performed demonstrating decreased STM and decreased carry over  Pt left sidelying in bed with all 4 bed rails, floor mat placed, bed alarm activated, and all needs within reach  Problem List Decreased strength;Decreased range of motion;Decreased endurance; Impaired balance;Decreased mobility; Decreased cognition; Impaired judgement;Decreased safety awareness; Impaired hearing   Barriers to Discharge Inaccessible home environment;Decreased caregiver support   PT Barriers   Physical Impairment Decreased strength;Decreased range of motion;Decreased endurance; Impaired balance;Decreased mobility; Decreased cognition; Impaired judgement;Decreased safety awareness; Impaired hearing   Plan   Treatment/Interventions Functional transfer training;LE strengthening/ROM; Therapeutic exercise; Endurance " training;Cognitive reorientation;Patient/family training;Equipment eval/education;Gait training; Compensatory technique education   Progress Slow progress, cognitive deficits   Recommendation   PT Discharge Recommendation Post acute rehabilitation services   PT Therapy Minutes   PT Time In 1400   PT Time Out 1500   PT Total Time (minutes) 60   PT Mode of treatment - Individual (minutes) 60   PT Mode of treatment - Concurrent (minutes) 0   PT Mode of treatment - Group (minutes) 0   PT Mode of treatment - Co-treat (minutes) 0   PT Mode of Treatment - Total time(minutes) 60 minutes   PT Cumulative Minutes 340   Therapy Time missed   Time missed?  No

## 2023-04-19 NOTE — PROGRESS NOTES
"   04/19/23 8380   Pain Assessment   Pain Assessment Tool 0-10   Pain Score No Pain   Restrictions/Precautions   Precautions Bed/chair alarms;Cognitive; Fall Risk;Hard of hearing;Supervision on toilet/commode   Speech/Language/Cognition Assessmetn   Treatment Assessment Focus of this session was engagement of pt and pt's dtr, Yanira Noel in a family meeting/education in regard to pt's current functional levels and attempting to determine plan for disposition given pt's current functional status  For meeting, staff members present included pt's primary therapy team, including PT- Nithin, OT-Maribell, ST (self), CM- Bethany, Juan Miguel and MD- Dr Keena Pineda  Each discipline provided updates to pt and pt's dtr about current functional status, to where focus of SLP's education was towards overall cognitive status to where pt is presenting to be \"better\" given BASIC problem solving tasks, but overall memory and higher level executive function skills are highly impacted at this time  Also provided education about pt's current low frustration tolerance given tasks presented and due to the stated difficulty in completing mobility, transfers, self care per OT/PT, SLP using this as an example to pt's dtr about how current cognitive decline highly impacts ability for carryover/follow thru in safety, needing a fair amount of cues/prompts to complete tasks  Additionally further education was provided in regard to I ADL tasks (medications, finances, cooking, etc) would not currently be recommended for pt to complete, to where pt would require FULL assistance given these tasks  As discussion/education progressed, dtr reporting that there is no likelihood that she would be able to provide assistance/supervision, as well as other family supports who are not available  Pt's Pascual Ruchi was present yesterday and reported that she would not be able to physically provide assistance for pt   Due to the higher need for care and assistance, dtr stating " that it is likely that pursuing subacute level of care is likely at this time  CM to provide list to dtr  When asking dtr about any questions, there was more questions due to medical status and pursuit of scans for determining next steps of care, which were answered by medical team  At this time, focus of ST sessions will continue to be towards maximizing functional cognitive skills in attempts to decrease caregiver support over time  SLP Therapy Minutes   SLP Time In 1330   SLP Time Out 1340   SLP Total Time (minutes) 10   SLP Mode of treatment - Individual (minutes) 10   SLP Mode of treatment - Concurrent (minutes) 0   SLP Mode of treatment - Group (minutes) 0   SLP Mode of treatment - Co-treat (minutes) 0   SLP Mode of Treatment - Total time(minutes) 10 minutes   SLP Cumulative Minutes 185   Therapy Time missed   Time missed?  No

## 2023-04-19 NOTE — PROGRESS NOTES
"Occupational Therapy Treatment Note         04/19/23 1340   Restrictions/Precautions   Precautions Bed/chair alarms;Cognitive; Fall Risk;Supervision on toilet/commode;Hard of hearing   Lifestyle   Autonomy \"just let me die\"   Cognition   Overall Cognitive Status Impaired   Attention Attends with cues to redirect   Orientation Level Oriented to person;Oriented to situation   Memory Decreased short term memory;Decreased recall of recent events;Decreased recall of precautions   Following Commands Follows one step commands with increased time or repetition   Assessment   Treatment Assessment Pt participated in skilled OT tx session  See above for further details on functional performance  Team meeting occurred with pt, pt's dtr Dr Milan Dunn  OT reviewed the following information: pt presents with moderate cognitive deficits and behavioral deficits, due to these cognitive deficits and pt's physical impairments in standing balance/tolerance and strength, recommendation is for pt to have 24/7 assistance for all ADLs/transfers and assist for all IADLs  Also recommend use of w/c as primary mobility mode and for ADL routine to be performed at w/c and bed level  PTA pt had been living with his girlfriend Lv Magana who was present yesterday for observation and reported she is no longer able to physically assist pt and cannot fit recommended DME within her house or provide him with physical assistance 2* her own health needs  Pt's dtr works full time as nurse and lives in 3rd floor walk up apartment  Per pt's dtr, no other family is available to assist pt upon d/c and they would like to pursue subacute rehab  However pt/family educated that subacute rehab is only short term plan and it is anticipated pt will require assistance in long term  Dtr questioning hospice appropriateness, as pt is not d/c home, it does not seem that chemo would occur  Team to follow up with oncology   From OT standpoint, will " continue plan of care focused on w/c mobility/safety, standing balance/tolerance, UE strengthening, stand pivot transfers with no device  Prognosis Fair   Problem List Decreased strength;Decreased endurance; Impaired balance;Decreased mobility; Decreased cognition; Impaired judgement;Decreased safety awareness   Barriers to Discharge Inaccessible home environment;Decreased caregiver support   Plan   Treatment/Interventions ADL retraining;Functional transfer training; Endurance training;Cognitive reorientation;Patient/family training;Equipment eval/education; Bed mobility; Compensatory technique education   Progress Slow progress, cognitive deficits   OT Therapy Minutes   OT Time In 1340   OT Time Out 1350   OT Total Time (minutes) 10   OT Mode of treatment - Individual (minutes) 10   OT Mode of treatment - Concurrent (minutes) 0   OT Mode of treatment - Group (minutes) 0   OT Mode of treatment - Co-treat (minutes) 0   OT Mode of Treatment - Total time(minutes) 10 minutes   OT Cumulative Minutes 345   Therapy Time missed   Time missed?  No

## 2023-04-19 NOTE — PROGRESS NOTES
04/19/23 1000   Pain Assessment   Pain Assessment Tool 0-10   Pain Score No Pain   Restrictions/Precautions   Precautions Bed/chair alarms;Cognitive; Fall Risk;Hard of hearing;Supervision on toilet/commode   Comprehension   Comprehension (FIM) 3 - Understands basic info/conversation 50-74% of time   Expression   Expression (FIM) 4 - Expresses basic info/needs 75-90% of time   Social Interaction   Social Interaction (FIM) 4 - Needs redirecting for appropriate language or to initiate interaction   Problem Solving   Problem solving (FIM) 3 - Solves basic problmes 50-74% of time   Memory   Memory (FIM) 3 - Recognizes, recalls/performs 50-74%   Speech/Language/Cognition Assessmetn   Treatment Assessment Pt asleep in bed upon arrival, awaken by verbal stim and agreeable to be repositioned sitting upright in bed  Pt then expressing that he needed to use the toilet and demo insight to current state, appropriately using call bell to call for assistance for transfer  Pt also w/ recall of safety precautions placing hands appropriately w/ min verbal cues throughout transfer to [facility] and back to bed  Pt utilized pocket talker for the duration of tx session d/t pt [place]  Pt w/ more positive disposition than previous tx sessions w/less episodes of pressed speech, still w/ inappropriate volume at times but able to be easily re-directed  Pt oriented to place and situation, w/ continued carryover of strategies using his alarm clock as visual aid for recall of ANN and date  Pt also demo dec remote memory req verbal cues to recall his visitors from yesterday, his breakfast and what he ordered for lunch  Pt engaged in picture sequencing task, where he was presented w/ four pictures and asked to place them in the correct order  Pt not initiating task, describing each picture instead for all trials req repetition of directions and scaffolded verbal cues to place pictures in correct order then w/ accuracy 4/5   Suspect pt compensating for dec processing by verbally describing pictures prior to and then while placing them in sequential order  Of note, pt not as impulsive w/ this task, carefully reviewing/ describing each picture before ordering them suspect d/t dec processing and tactile nature of task  At this time pt continues to benefit from skilled ST services targeting cognitive linguistic skills to minimize caregiver burden upon d/c  SLP Therapy Minutes   SLP Time In 1000   SLP Time Out 1030   SLP Total Time (minutes) 30   SLP Mode of treatment - Individual (minutes) 30   SLP Mode of treatment - Concurrent (minutes) 0   SLP Mode of treatment - Group (minutes) 0   SLP Mode of treatment - Co-treat (minutes) 0   SLP Mode of Treatment - Total time(minutes) 30 minutes   SLP Cumulative Minutes 175   Therapy Time missed   Time missed?  No

## 2023-04-19 NOTE — PROGRESS NOTES
"   04/19/23 1350   Cognition   Overall Cognitive Status Impaired   Arousal/Participation Alert   Subjective   Subjective pt sidelying in bed and agreeable to have family meeting with daughter Della Weston  pt wearing pocket talker to facilitate hearing and understanding  Assessment   Treatment Assessment Pt participated with family meeting with daughter Della Weston in attendance and [facility] TEAM included Dr Megan Hernández, ALEX Lacey, PT Jose D and CM Abby  At times pt would interject and state \" just let me die\"  or state \" I trust the lord Kory\"  Pt and daughter provided mobility update by PT and current recommendations for pt to have 24/7 care w/c level at d/c due to high risk for falls micheal when using a RW  Daughter verbalized understanding and in agreement however unable to provide necessary care/assist the pt will need at d/c  Daughter is requesting for SNF referral at this time with plan transition to long term care stay  CM provided SNF list to the daughter to start the process  Daughter also reported pt requesting to see a jared fuentes so pastoral care is schedule to see pt later today while medical team to f/u with palliative care as well  In the meantime PT will still cont to work on strengthening, repeated w/c level mobility training to inc indep and reduce caregiver burden at d/c     Family/Caregiver Present yes   PT Family training done with: daughter Della Weston present for family meeting with team   Recommendation   PT Discharge Recommendation Post acute rehabilitation services  (SNF)   PT Therapy Minutes   PT Time In 1350   PT Time Out 1400   PT Total Time (minutes) 10   PT Mode of treatment - Individual (minutes) 10   PT Mode of treatment - Concurrent (minutes) 0   PT Mode of treatment - Group (minutes) 0   PT Mode of treatment - Co-treat (minutes) 0   PT Mode of Treatment - Total time(minutes) 10 minutes   PT Cumulative Minutes 280       "

## 2023-04-19 NOTE — PROGRESS NOTES
04/19/23 0900   Pain Assessment   Pain Score No Pain   Restrictions/Precautions   Precautions Bed/chair alarms; Fall Risk;Supervision on toilet/commode;Hard of hearing  (4 rails up on bed)   Cognition   Attention Attends with cues to redirect   Memory Decreased recall of recent events;Decreased recall of precautions   Following Commands Follows one step commands with increased time or repetition   Subjective   Subjective Pt  was sleeping but easily awaken  Is ready to participate in therapy   Sit to Lying   Type of Assistance Needed Supervision   Sit to Lying CARE Score 4   Lying to Sitting on Side of Bed   Type of Assistance Needed Supervision   Lying to Sitting on Side of Bed CARE Score 4   Sit to Stand   Type of Assistance Needed Physical assistance   Physical Assistance Level 26%-50%   Comment can be CGA if standing up from higher surface (e g nu step)   Sit to Stand CARE Score 3   Bed-Chair Transfer   Type of Assistance Needed Physical assistance   Physical Assistance Level 26%-50%   Comment min A with step by step cues for technique with no AD   Chair/Bed-to-Chair Transfer CARE Score 3   Transfer Bed/Chair/Wheelchair   Adaptive Equipment None   Findings practiced SPT multiple times from bed<> w/c at start of session   Pt  has the need to be assisted to stand initially but mostly did task with min A   Walk 10 Feet   Type of Assistance Needed Physical assistance   Physical Assistance Level 26%-50%   Comment with RW, practiced turning to a surface from walking, no w/c follow   Walk 10 Feet CARE Score 3   Walk 50 Feet with Two Turns   Reason if not Attempted Safety concerns   Walk 50 Feet with Two Turns CARE Score 88   Walk 150 Feet   Reason if not Attempted Safety concerns   Walk 150 Feet CARE Score 88   Walking 10 Feet on Uneven Surfaces   Reason if not Attempted Safety concerns   Walking 10 Feet on Uneven Surfaces CARE Score 88   Ambulation   Primary Mode of Locomotion Prior to Admission Walk   Distance Walked (feet) 12 ft   Assist Device Roller Walker   Gait Pattern Forward Flexion; Inconsistant [place];Decreased foot clearance;Narrow LIDIA   Limitations Noted In Coordination;Device Management; Endurance; Safety   Provided Assistance with: Balance   Walk Assist Level Minimum Assist;Moderate Assist   Findings mod A to turn and sit on nu step   Does the patient walk? 2  Yes   Wheel 50 Feet with Two Turns   Type of Assistance Needed Supervision   Wheel 50 Feet with Two Turns CARE Score 4   Wheel 150 Feet   Reason if not Attempted Safety concerns   Wheel 150 Feet CARE Score 88   Wheelchair mobility   Does the patient use a wheelchair? 1  Yes   Type of Wheelchair Used 1  Manual   Method Right upper extremity; Left upper extremity;Right lower extremity; Left lower extremity  (alternates with LE only to B UE and LE)   Distance Level Surface (feet) 60 ft   Curb or Single Stair   Reason if not Attempted Safety concerns   1 Step (Curb) CARE Score 88   4 Steps   Reason if not Attempted Safety concerns   4 Steps CARE Score 88   12 Steps   Reason if not Attempted Safety concerns   12 Steps CARE Score 88   Therapeutic Interventions   Flexibility B hamstring and gastroc stretching   Equipment Use   NuStep Level 1 X 10 mins , pt  needs constant reminder for R LE positioning , pt  tends to turn in IR and constantly rubs on nu step part just below the monitor  Assessment   Treatment Assessment Pt  engaged in 30 mins session and able to tolerate above activities  Pt  is very [place] and also has dec safety awareness needing constant verbal cues to perform task safely  Pt  doing well in SPT without AD but still needs min A to initiate   Pt  able to ambulate 12 feet with min A but needed max cueing to trun safely towards nu step  No complaints reported by pt  Pt  was assisted back to bed at end of session with alarms in and 4 rails of bed up      Problem List Decreased strength;Decreased range of motion;Decreased endurance; Impaired balance;Decreased mobility; Decreased coordination; Impaired judgement;Decreased cognition;Decreased safety awareness; Impaired hearing   Barriers to Discharge Inaccessible home environment;Decreased caregiver support   PT Barriers   Functional Limitation Car transfers;Stair negotiation;Standing;Transfers; Walking; Wheelchair management   Plan   Treatment/Interventions Functional transfer training;LE strengthening/ROM; Elevations; Therapeutic exercise; Endurance training;Patient/family training;Equipment eval/education; Bed mobility;Gait training   Recommendation   PT Discharge Recommendation   (TBD)   Equipment Recommended   (TBD)   PT Therapy Minutes   PT Time In 0900   PT Time Out 0930   PT Total Time (minutes) 30   PT Mode of treatment - Individual (minutes) 30   PT Mode of treatment - Concurrent (minutes) 0   PT Mode of treatment - Group (minutes) 0   PT Mode of treatment - Co-treat (minutes) 0   PT Mode of Treatment - Total time(minutes) 30 minutes   PT Cumulative Minutes 270   Therapy Time missed   Time missed?  No

## 2023-04-20 LAB
ANION GAP SERPL CALCULATED.3IONS-SCNC: 2 MMOL/L (ref 4–13)
BASOPHILS # BLD AUTO: 0.01 THOUSANDS/ΜL (ref 0–0.1)
BASOPHILS NFR BLD AUTO: 0 % (ref 0–1)
BUN SERPL-MCNC: 35 MG/DL (ref 5–25)
CALCIUM SERPL-MCNC: 8.5 MG/DL (ref 8.3–10.1)
CHLORIDE SERPL-SCNC: 106 MMOL/L (ref 96–108)
CO2 SERPL-SCNC: 29 MMOL/L (ref 21–32)
CREAT SERPL-MCNC: 0.48 MG/DL (ref 0.6–1.3)
EOSINOPHIL # BLD AUTO: 0.02 THOUSAND/ΜL (ref 0–0.61)
EOSINOPHIL NFR BLD AUTO: 0 % (ref 0–6)
ERYTHROCYTE [DISTWIDTH] IN BLOOD BY AUTOMATED COUNT: 17.6 % (ref 11.6–15.1)
GFR SERPL CREATININE-BSD FRML MDRD: 111 ML/MIN/1.73SQ M
GLUCOSE SERPL-MCNC: 78 MG/DL (ref 65–140)
HCT VFR BLD AUTO: 37.4 % (ref 36.5–49.3)
HGB BLD-MCNC: 12.1 G/DL (ref 12–17)
IMM GRANULOCYTES # BLD AUTO: 0.08 THOUSAND/UL (ref 0–0.2)
IMM GRANULOCYTES NFR BLD AUTO: 1 % (ref 0–2)
LYMPHOCYTES # BLD AUTO: 0.42 THOUSANDS/ΜL (ref 0.6–4.47)
LYMPHOCYTES NFR BLD AUTO: 5 % (ref 14–44)
MCH RBC QN AUTO: 28.4 PG (ref 26.8–34.3)
MCHC RBC AUTO-ENTMCNC: 32.4 G/DL (ref 31.4–37.4)
MCV RBC AUTO: 88 FL (ref 82–98)
MONOCYTES # BLD AUTO: 0.59 THOUSAND/ΜL (ref 0.17–1.22)
MONOCYTES NFR BLD AUTO: 6 % (ref 4–12)
NEUTROPHILS # BLD AUTO: 8.06 THOUSANDS/ΜL (ref 1.85–7.62)
NEUTS SEG NFR BLD AUTO: 88 % (ref 43–75)
NRBC BLD AUTO-RTO: 0 /100 WBCS
PLATELET # BLD AUTO: 181 THOUSANDS/UL (ref 149–390)
PMV BLD AUTO: 10.3 FL (ref 8.9–12.7)
POTASSIUM SERPL-SCNC: 3.8 MMOL/L (ref 3.5–5.3)
RBC # BLD AUTO: 4.26 MILLION/UL (ref 3.88–5.62)
SODIUM SERPL-SCNC: 137 MMOL/L (ref 135–147)
WBC # BLD AUTO: 9.18 THOUSAND/UL (ref 4.31–10.16)

## 2023-04-20 RX ADMIN — MEMANTINE 10 MG: 10 TABLET ORAL at 17:53

## 2023-04-20 RX ADMIN — DEXAMETHASONE 4 MG: 4 TABLET ORAL at 13:24

## 2023-04-20 RX ADMIN — LEVETIRACETAM 500 MG: 500 TABLET, FILM COATED ORAL at 21:06

## 2023-04-20 RX ADMIN — DEXAMETHASONE 4 MG: 4 TABLET ORAL at 05:44

## 2023-04-20 RX ADMIN — PANTOPRAZOLE SODIUM 40 MG: 40 TABLET, DELAYED RELEASE ORAL at 05:44

## 2023-04-20 RX ADMIN — MEMANTINE 10 MG: 10 TABLET ORAL at 09:02

## 2023-04-20 RX ADMIN — ENOXAPARIN SODIUM 40 MG: 40 INJECTION SUBCUTANEOUS at 09:02

## 2023-04-20 RX ADMIN — LEVETIRACETAM 500 MG: 500 TABLET, FILM COATED ORAL at 09:02

## 2023-04-20 RX ADMIN — DEXAMETHASONE 4 MG: 4 TABLET ORAL at 21:06

## 2023-04-20 RX ADMIN — ATORVASTATIN CALCIUM 40 MG: 40 TABLET, FILM COATED ORAL at 17:53

## 2023-04-20 NOTE — PROGRESS NOTES
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  • Chemotherapy on hold until pt discharged - no more than 2 weeks  • S/p whole brain radiation x 10 treatments  Completed 4/12/23  • Continue Decadron taper, Keppra and memantine  • Follow-up with Dr Shashank Yarbrough and radiation oncology      Pneumothorax  • Occurred after lung biopsy from 3/17/23  • S/p chest tube  • Now resolved      LLL PNA  • S/p tx with Cefepime  • stable     Behavioral issues  • Brain lesions are B/L frontal likely contributing  • Was yelling at staff and had impulsive behavior   • Has been more cooperative lately      Leukocytosis  • Resolved      HTN  • Listed in pt's history but BPs have been soft  • Continue to monitor  • Pt is asymptomatic         Discharge date: TBD SNF    The above assessment and plan was reviewed and updated as determined by my evaluation of the patient on 4/20/2023      Labs:   Results from last 7 days   Lab Units 04/20/23  0537   WBC Thousand/uL 9 18   HEMOGLOBIN g/dL 12 1   HEMATOCRIT % 37 4   PLATELETS Thousands/uL 181     Results from last 7 days   Lab Units 04/20/23  0537   SODIUM mmol/L 137   POTASSIUM mmol/L 3 8   CHLORIDE mmol/L 106   CO2 mmol/L 29   BUN mg/dL 35*   CREATININE mg/dL 0 48*   CALCIUM mg/dL 8 5                   Review of Scheduled Meds:  Current Facility-Administered Medications   Medication Dose Route Frequency Provider Last Rate   • acetaminophen  650 mg Oral Q6H PRN MARY BETH Momin     • atorvastatin  40 mg Oral QPM MARY BETH Momin     • dexamethasone  4 mg Oral Q8H [address] MARY BETH Momin      Followed by   • [START ON 4/21/2023] dexamethasone  4 mg Oral Q12H [address] MARY BETH Momin      Followed by   • [START ON 4/28/2023] dexamethasone  4 mg Oral Daily MARY BETH Momin     • enoxaparin  40 mg Subcutaneous Daily MARY BETH Momin     • levETIRAcetam  500 mg Oral Q12H [address] MARY BETH Momin     • memantine  10 mg Oral BID MARY BETH Momin     • pantoprazole  40 mg Oral Early Morning MARY BETH Momin         Subjective/ HPI: Patient seen and examined  Patients overnight issues or events were reviewed with nursing or staff during rounds or morning huddle session  New or overnight issues include the following:     Pt seen in his room  He refused therapy this AM  He denies any other complaints  ROS:   A 10 point ROS was performed; negative except as noted above  Imaging:     No orders to display       *Labs /Radiology studies reviewed  *Medications reviewed and reconciled as needed  *Please refer to order section for additional ordered labs studies  *Case discussed with primary attending during morning huddle case rounds    Physical Examination:  Vitals:   Vitals:    04/19/23 1400 04/19/23 1619 04/19/23 2039 04/20/23 0535   BP: 104/58 93/53 100/59 103/61   BP Location: Right arm Right arm Right arm Right arm   Pulse: 90 63 89 68   Resp:  20 20 18   Temp:  98 5 °F (36 9 °C) 99 4 °F (37 4 °C) 98 5 °F (36 9 °C)   TempSrc:  Oral Oral Oral   SpO2: 91% 91% 94% 95%   Weight:       Height:           GEN: Agitated    NEURO: Alert and oriented x2; remains impulsive however improved from admission  HEENT: Pupils are equal and reactive, EOMI, mucous membranes are moist, face symmetrical; [place]  CV: S1 S2 regular, no MRG, no peripheral edema noted  RESP: Lungs are decreased throughout, no wheezes, rales or rhonchi noted, on room air, respirations easy and non labored  GI: Flat, soft non tender, non distended; +BS x4  : Voiding without difficulty  MUSC: Moves all extremities, generalized deconditioning noted   SKIN: pink, warm and dry, normal turgor, no rashes, lesions          The above physical exam was reviewed and updated as determined by my evaluation of the patient on 4/20/2023  Invasive Devices     None                    VTE Pharmacologic Prophylaxis: Enoxaparin  Code Status: Level 2 - DNAR: but accepts endotracheal intubation  Current Length of Stay: 6 day(s)      Total time spent:  30 minutes with more than 50% spent counseling/coordinating care  Counseling includes discussion with patient re: progress  and discussion with patient of his/her current medical state/information  Coordination of patient's care was performed in conjunction with primary service  Time invested included review of patient's labs, vitals, and management of their comorbidities with continued monitoring  In addition, this patient was discussed with medical team including physician and advanced extenders  The care of the patient was extensively discussed and appropriate treatment plan was formulated unique for this patient  Medical decision making for the day was made by supervising physician unless otherwise noted in their attestation statement  ** Please Note:  voice to text software may have been used in the creation of this document   Although proof errors in transcription or interpretation are a potential of such software**

## 2023-04-20 NOTE — PROGRESS NOTES
PM&R PROGRESS NOTE:  Brittnee Mullins 81 y o  male MRN: [id number]  Unit/Bed#: -01 Encounter: [id number]        Rehabilitation Diagnosis: Impairment of mobility, safety, Activities of Daily Living (ADLs), and cognitive/communication skills due to Pulmonary Disorders:  10 9  Other Pulmonary    HPI: Ruddy Sweeney is a 81 y o  male with a medical history of hypertension, BPH, and a recent hospitalization on 3/14-3/19 where he was found to have 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 who presented to [facility] on 04/01 with a 1 day history of shortness of breath s/p lung mass biopsy with a small pneumothorax  On presentation his  Oxygen saturation was 70% on room air, he received Bipap then transitioned to nasal cannula  Chest x-ray showed large right sided pneumothorax with complete lung collapse  A chest tube was place, it became dislodges and the patient developed subcutaneous emphysema  Trauma was consulted, reinserted tube without complications  Chest tube was removed on 04/07  Patient also presented with leukocytosis, tachycardia, elevated lactic acid and procalcitonin  Acute respiratory failure was likely secondary to right-sided pneumothorax with possible LLL pneumonia  Patient completed cefepime with prn tessalon pearls for cough  He was able to continue with whole brain radiation treatments, completed on 04/12  Chemotherapy treatments on hold, per Dr Lizette Waller, can be held no longer than two weeks  The patient was evaluated by the Rehabilitation team and deemed an appropriate candidate for comprehensive inpatient rehabilitation and admitted to the [facility] on 04/14/23  SUBJECTIVE: Patient seen face to face  No acute issues overnight  Patient seen this morning, refused morning therapy session with occupational therapy but was agreeable to work with physical therapy  He reports he slept well, denies pain  Incontinent voids, LBM 04/20   Denies chest pain, shortness of breath, fever, chills, nausea, abdominal pain  ASSESSMENT: Stable, progressing    PLAN:  - continue timed void, encourage urinal use, calling for assistance  - Palliative consulted for goals of care, recommendations appreciated  - continue medical and rehabilitation plan of care    Rehabilitation  • Functional deficits:  Mobility, self-care  • Continue current rehabilitation plan of care to maximize function  • Functional update:   o PT: mobility- supervision, transfers- mod A, ambulation- mod A RW- 50', wheelchair mobility- mod A ambulation: min-mod A 10', stairs- total A  o OT: ADL: bathing- total A, dressing UB max A, LB total A, footwear- mod A, toileting- total A  o SLP: comprehension mod A, problem solving and memory max A  • Estimated Discharge: pending subacute placement     Pain  • Not an issue, prn tylenol    DVT prophylaxis  • Lovenox    Bladder plan  • Continent    Bowel plan  • Continent      * Acute respiratory failure with hypoxia (HCC)  Assessment & Plan  - small pneumothorax s/p lung mass biopsy 03/17  - 04/01- shortness of breath  - 04/01 - imaging showed a complete right pneumothorax  - 04/01- right chest tube placed   - 04/02- chest tube dislodges; developed subcutaneous emphysema extending to neck,   - trauma consulted; removed initial chest tube, trauma placed new chest tube to suction  - monitor oxygenation  - 04/07 chest tube removed  - CXR- LLL PNA;  Cefepime completed, tessalon pearls for cough    - Comprehensive therapies 3 hr a day, 5-6 days a week      Non-small cell carcinoma of right lung (HCC)  Assessment & Plan  - stage IV NSCLC w/ brain metastases  - confirmed with lung biopsy 03/17  - per Dr Jin Baca; okay to hold chemotherapy, no more than 2 weeks    Brain mass  Assessment & Plan  - Brain metastases  - received whole brain radiation 10 treatments, completed 04/12  - continue Keppra 500 mg q12h, decadron 4mg q6h, memantine 10 mg q12h  - follows with Oncology, Dr Jin Baca, Radiation "Oncology Dr Yrn Reddy    Unspecified protein-calorie malnutrition [facility])  Assessment & Plan  - BMI 20 99  - nutrition consult  - nutritional supplements  - monitor I&O      Appreciate IM consultants medical co-management  Personally reviewed labs, medications, and imaging  ROS:  Review of Systems   A 10 point review of systems was negative except for what is noted in the HPI  OBJECTIVE:   /72 (BP Location: Right arm)   Pulse 77   Temp 98 5 °F (36 9 °C) (Oral)   Resp 18   Ht 5' 6\" (1 676 m)   Wt 58 kg (127 lb 13 9 oz)   SpO2 93%   BMI 20 64 kg/m²     Physical Exam  Constitutional:       Appearance: Normal appearance  He is ill-appearing  HENT:      Head: Normocephalic and atraumatic  Mouth/Throat:      Mouth: Mucous membranes are moist    Cardiovascular:      Rate and Rhythm: Normal rate and regular rhythm  Pulses: Normal pulses  Pulmonary:      Effort: Pulmonary effort is normal       Breath sounds: Normal breath sounds  Abdominal:      General: Bowel sounds are normal       Palpations: Abdomen is soft  Musculoskeletal:         General: Normal range of motion  Cervical back: Normal range of motion  Skin:     General: Skin is warm and dry  Capillary Refill: Capillary refill takes less than 2 seconds  Findings: Bruising present  Neurological:      Mental Status: He is alert and oriented to person, place, and time  Motor: Weakness present     Psychiatric:         Mood and Affect: Mood normal          Judgment: Judgment normal           Lab Results   Component Value Date    WBC 9 18 04/20/2023    HGB 12 1 04/20/2023    HCT 37 4 04/20/2023    MCV 88 04/20/2023     04/20/2023     Lab Results   Component Value Date    SODIUM 137 04/20/2023    K 3 8 04/20/2023     04/20/2023    CO2 29 04/20/2023    BUN 35 (H) 04/20/2023    CREATININE 0 48 (L) 04/20/2023    GLUC 78 04/20/2023    CALCIUM 8 5 04/20/2023     Lab Results   Component Value Date    INR 1 00 " 03/15/2023    PROTIME 13 4 03/15/2023       Current Facility-Administered Medications:   •  acetaminophen (TYLENOL) tablet 650 mg, 650 mg, Oral, Q6H PRN, MARY BETH Momin  •  atorvastatin (LIPITOR) tablet 40 mg, 40 mg, Oral, QPM, MARY BETH Momin, 40 mg at 04/19/23 1758  •  dexamethasone (DECADRON) tablet 4 mg, 4 mg, Oral, Q8H MOLLY, 4 mg at 04/20/23 1324 **FOLLOWED BY** [START ON 4/21/2023] dexamethasone (DECADRON) tablet 4 mg, 4 mg, Oral, Q12H [address] **FOLLOWED BY** [START ON 4/28/2023] dexamethasone (DECADRON) tablet 4 mg, 4 mg, Oral, Daily, MARY BETH Momin  •  enoxaparin (LOVENOX) subcutaneous injection 40 mg, 40 mg, Subcutaneous, Daily, MARY BETH Momin, 40 mg at 04/20/23 9834  •  levETIRAcetam (KEPPRA) tablet 500 mg, 500 mg, Oral, Q12H MOLLY, MARY BETH Momin, 500 mg at 04/20/23 9163  •  memantine (NAMENDA) tablet 10 mg, 10 mg, Oral, BID, MARY BETH Momin, 10 mg at 04/20/23 4301  •  pantoprazole (PROTONIX) EC tablet 40 mg, 40 mg, Oral, Early Morning, MARY BETH Momin, 40 mg at 04/20/23 0544    Past Medical History:   Diagnosis Date   • Hypertension        Patient Active Problem List    Diagnosis Date Noted   • [facility] ([facility] ) 04/14/2023   • Non-small cell carcinoma of right lung ([facility] ) 03/27/2023   • Brain mass 03/14/2023   • Ambulatory dysfunction 04/07/2023   • Encounter for antineoplastic immunotherapy 03/31/2023   • Chemotherapy induced neutropenia ([facility] ) 03/30/2023   • 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([facility] ) 03/30/2023   • Unspecified protein-calorie malnutrition ([facility] ) 03/16/2023   • Lung mass 03/14/2023   • Hypertension 03/14/2023      MARY BETH Daniel  [facility]

## 2023-04-20 NOTE — PROGRESS NOTES
"   04/20/23 1230   Pain Assessment   Pain Assessment Tool 0-10   Restrictions/Precautions   Precautions Bed/chair alarms;Cognitive; Fall Risk;Supervision on toilet/commode; Impulsive;Hard of hearing   Cognition   Overall Cognitive Status Impaired   Arousal/Participation Alert; Cooperative   Subjective   Subjective pt had no c/o and agreeable to have PT   Lying to Sitting on Side of Bed   Type of Assistance Needed Supervision   Comment HOB flat, no rail   encouragement to perform after he stated \" pull me up, I can't do it\"   Lying to Sitting on Side of Bed CARE Score 4   Sit to Stand   Type of Assistance Needed Physical assistance;Verbal cues; Adaptive equipment   Physical Assistance Level 26%-50%   Sit to Stand CARE Score 3   Bed-Chair Transfer   Type of Assistance Needed Physical assistance;Verbal cues   Physical Assistance Level 25% or less   Comment min A stand pivot with UE support on furniture  VC/TC for hand placement   Chair/Bed-to-Chair Transfer CARE Score 3   Car Transfer   Type of Assistance Needed Verbal cues; Physical assistance; Adaptive equipment   Physical Assistance Level Total assistance   Comment max of 1 to stand up from  car and A x 2 for stand pivot car to w/c   max of 1 SPT w/c to car  Car Transfer CARE Score 1   Wheel 50 Feet with Two Turns   Type of Assistance Needed Supervision;Verbal cues   Wheel 50 Feet with Two Turns CARE Score 4   Wheel 150 Feet   Type of Assistance Needed Supervision;Verbal cues   Comment indoor and outdoor level pavement using all ext  VC/TC to improve technique for efficiency   Wheel 150 Feet CARE Score 4   Toilet Transfer   Type of Assistance Needed Physical assistance;Verbal cues; Adaptive equipment   Physical Assistance Level 26%-50%   Comment BSC over toilet min-mod with grab bar and w/c arm rest   2 person assist for hygiene and clothing management due to retropulsion   Toilet Transfer CARE Score 3   Equipment Use   NuStep lvl 2 x 12 mins, SPM 30-40 all " extremities   Assessment   Treatment Assessment Pt pleasant and participated actively with PT this PM  Tx session focused on outdoor w/c mobility training and car/bed/chair/toilet transfer training  at this time pt demos inc difficulty with stand pivot car transfer so will need additional training to allow inc indep  Still requires ~ 75%VC/TC for hand placement during basic bed <> w/c transfer with inc VC/TC for for unfamiliar situation like car and BSC over toilet with grab bar transfer  PT will cont to work on SPT with UE support on furnitures, w/c propulsion on even and uneven surfaces, strengthening, standing balance/tolerance and gait training with RW as tolerated to improve overall functions and dec caregiver burden at d/c and risk for falls  Again current recommendation for pt to be w/c level with all mobilities at d/c  Family/Caregiver Present no   Plan   Treatment/Interventions Therapeutic exercise; Functional transfer training;LE strengthening/ROM; Endurance training;Bed mobility;Spoke to nursing;OT   Progress Slow progress, cognitive deficits   Recommendation   Equipment Recommended Wheelchair   PT Therapy Minutes   PT Time In 1230   PT Time Out 1330   PT Total Time (minutes) 60   PT Mode of treatment - Individual (minutes) 60   PT Mode of treatment - Concurrent (minutes) 0   PT Mode of treatment - Group (minutes) 0   PT Mode of treatment - Co-treat (minutes) 0   PT Mode of Treatment - Total time(minutes) 60 minutes   PT Cumulative Minutes 490

## 2023-04-20 NOTE — PROGRESS NOTES
"   04/20/23 8734   Pain Assessment   Pain Assessment Tool 0-10   Pain Score No Pain   Restrictions/Precautions   Precautions Bed/chair alarms;Cognitive; Fall Risk;Hard of hearing;Supervision on toilet/commode   Comprehension   Comprehension (FIM) 3 - Understands basic info/conversation 50-74% of time   Expression   Expression (FIM) 4 - Expresses basic info/needs 75-90% of time   Social Interaction   Social Interaction (FIM) 4 - Needs redirecting for appropriate language or to initiate interaction   Problem Solving   Problem solving (FIM) 3 - Solves basic problmes 50-74% of time   Memory   Memory (FIM) 3 - Recognizes, recalls/performs 50-74%   Speech/Language/Cognition Assessmetn   Treatment Assessment Pt seen in WC pulled up to kidney table in PT gym for ST tx session targeting cognitive linguistic skills  Pt remains w/ inappropriate speaking volume at times however w/ improved cooperation and engagement in tx tasks  Upon orientation review pt oriented to location, situation, month/year and ANN, unable to determine today's date  Pt demo dec insight \" its always the 19th or the 20th, Wednesday or Thursday, over and over again\" then stating that today was the \"21st \" Pt w/ recall of his most recent meal however w/ dec remote memory unable to recall speaking to his daughter Sunitha Garcia) or girlfriend Christie Menjivar) for the last few days, when they both participated in FT the last two days  Pt completed written problem solving task where pt stated preference for reading materials vs oral presentation then proceded to read situations and asked to draw appropriate conclusions  Pt completing task w/ impulsivity, rapidly answering questions w/ accuracy of 18/20  Pt then engaged in current medication review where he was provided with the name of his medication and asked to determine the reason/use  Pt asking to read the medication names himself then w/ acc of 4/6 in recall of medication reason/use   Pt unable to recall reason/use for " Keppra and Decadron, req verbal cues then pt w/ insight to when he started medication and demo inc understanding of why he was placed on it  Pt continued to familiarize w/ medication management by completing medication label reading comprehension task w/ acc of 3/7, increasing to 6/7 w/ inc support (visual and verbal cues)  Pt w/ dec sequencing skills completing questions out of order, stated awareness of this w/ verbal cue then responsive to visual cues to complete remaining questions in sequential order  SLP assisted pt back to pt room where pt able to recall his room number then req verbal cues for which direction to turn from PT gym and visual/verbal cues to look at room numbers to properly locate his  Pt then recognizing his room number when directly in front of it  In transfer from [facility] to bed pt req verbal cues from SLP for recall of safety precautions ( locking breaks on WC and hand placement)  Overall pt continues to benefit from ST services targeting cognitive linguistic skills to maximize pt independence and reduce caregiver burden upon d/c  SLP Therapy Minutes   SLP Time In 1330   SLP Time Out 1400   SLP Total Time (minutes) 30   SLP Mode of treatment - Individual (minutes) 30   SLP Mode of treatment - Concurrent (minutes) 0   SLP Mode of treatment - Group (minutes) 0   SLP Mode of treatment - Co-treat (minutes) 0   SLP Mode of Treatment - Total time(minutes) 30 minutes   SLP Cumulative Minutes 215   Therapy Time missed   Time missed?  No

## 2023-04-20 NOTE — PROGRESS NOTES
"   04/20/23 0700   Assessment   Treatment Assessment Attempted OT intervention 2x this AM  Pt reporting \"no therapy, I won't do therapy anymore  I am on hospice\"  OT educated pt that he is not on hospice and that he is still at rehab, but pt becoming frustrated stating \"I am on hospice  Just let me die  I am dying\"  Encouragement/education increases pt's frustration  Pt refusing OT services at this time  Will re attempt as able and make team aware  Therapy Time missed   Time missed?  Yes   Amount of time missed 90       "

## 2023-04-20 NOTE — PLAN OF CARE
Problem: PAIN - ADULT  Goal: Verbalizes/displays adequate comfort level or baseline comfort level  Description: Interventions:  - Encourage patient to monitor pain and request assistance  - Assess pain using appropriate pain scale  - Administer analgesics based on type and severity of pain and evaluate response  - Implement non-pharmacological measures as appropriate and evaluate response  - Consider cultural and social influences on pain and pain management  - Notify physician/advanced practitioner if interventions unsuccessful or patient reports new pain  Outcome: Progressing     Problem: INFECTION - ADULT  Goal: Absence or prevention of progression during hospitalization  Description: INTERVENTIONS:  - Assess and monitor for signs and symptoms of infection  - Monitor lab/diagnostic results  - Monitor all insertion sites, i e  indwelling lines, tubes, and drains  - Monitor endotracheal if appropriate and nasal secretions for changes in amount and color  - [place] appropriate cooling/warming therapies per order  - Administer medications as ordered  - Instruct and encourage patient and family to use good hand hygiene technique  - Identify and instruct in appropriate isolation precautions for identified infection/condition  Outcome: Progressing  Goal: Absence of fever/infection during neutropenic period  Description: INTERVENTIONS:  - Monitor WBC    Outcome: Progressing     Problem: SAFETY ADULT  Goal: Patient will remain free of falls  Description: INTERVENTIONS:  - Educate patient/family on patient safety including physical limitations  - Instruct patient to call for assistance with activity   - Consult OT/PT to assist with strengthening/mobility   - Keep Call bell within reach  - Keep bed low and locked with side rails adjusted as appropriate  - Keep care items and personal belongings within reach  - Initiate and maintain comfort rounds  - Make Fall Risk Sign visible to staff  - Offer Toileting every 2 Hours, in advance of need  - Initiate/Maintain bed alarm  - Obtain necessary fall risk management equipment: bed alarm  - Apply yellow socks and bracelet for high fall risk patients  - Consider moving patient to room near nurses station  Outcome: Progressing  Goal: Maintain or return to baseline ADL function  Description: INTERVENTIONS:  -  Assess patient's ability to carry out ADLs; assess patient's baseline for ADL function and identify physical deficits which impact ability to perform ADLs (bathing, care of mouth/teeth, toileting, grooming, dressing, etc )  - Assess/evaluate cause of self-care deficits   - Assess range of motion  - Assess patient's mobility; develop plan if impaired  - Assess patient's need for assistive devices and provide as appropriate  - Encourage maximum independence but intervene and supervise when necessary  - Involve family in performance of ADLs  - Assess for home care needs following discharge   - Consider OT consult to assist with ADL evaluation and planning for discharge  - Provide patient education as appropriate  Outcome: Progressing  Goal: Maintains/Returns to pre admission functional level  Description: INTERVENTIONS:  - Perform BMAT or MOVE assessment daily    - Set and communicate daily mobility goal to care team and patient/family/caregiver  - Collaborate with rehabilitation services on mobility goals if consulted  - Perform Range of Motion 3 times a day  - Reposition patient every 2 hours    - Dangle patient 3 times a day  - Stand patient 3 times a day  - Ambulate patient 3 times a day  - Out of bed to chair 3 times a day   - Out of bed for meals 3 times a day  - Out of bed for toileting  - Record patient progress and toleration of activity level   Outcome: Progressing     Problem: DISCHARGE PLANNING  Goal: Discharge to home or other facility with appropriate resources  Description: INTERVENTIONS:  - Identify barriers to discharge w/patient and caregiver  - Arrange for needed discharge resources and transportation as appropriate  - Identify discharge learning needs (meds, wound care, etc )  - Arrange for interpretive services to assist at discharge as needed  - Refer to Case Management Department for coordinating discharge planning if the patient needs post-hospital services based on physician/advanced practitioner order or complex needs related to functional status, cognitive ability, or social support system  Outcome: Progressing     Problem: Nutrition/Hydration-ADULT  Goal: Nutrient/Hydration intake appropriate for improving, restoring or maintaining nutritional needs  Description: Monitor and assess patient's nutrition/hydration status for malnutrition  Collaborate with interdisciplinary team and initiate plan and interventions as ordered  Monitor patient's weight and dietary intake as ordered or per policy  Utilize nutrition screening tool and intervene as necessary  Determine patient's food preferences and provide high-protein, high-caloric foods as appropriate       INTERVENTIONS:  - Monitor oral intake, urinary output, labs, and treatment plans  - Assess nutrition and hydration status and recommend course of action  - Evaluate amount of meals eaten  - Assist patient with eating if necessary   - Allow adequate time for meals  - Recommend/ encourage appropriate diets, oral nutritional supplements, and vitamin/mineral supplements  - Order, calculate, and assess calorie counts as needed  - Recommend, monitor, and adjust tube feedings and TPN/PPN based on assessed needs  - Assess need for intravenous fluids  - Provide specific nutrition/hydration education as appropriate  - Include patient/family/caregiver in decisions related to nutrition  Outcome: Progressing     Problem: Prexisting or High Potential for Compromised Skin Integrity  Goal: Skin integrity is maintained or improved  Description: INTERVENTIONS:  - Identify patients at risk for skin breakdown  - Assess and monitor skin integrity  - Assess and monitor nutrition and hydration status  - Monitor labs   - Assess for incontinence   - Turn and reposition patient  - Assist with mobility/ambulation  - Relieve pressure over bony prominences  - Avoid friction and shearing  - Provide appropriate hygiene as needed including keeping skin clean and dry  - Evaluate need for skin moisturizer/barrier cream  - Collaborate with interdisciplinary team   - Patient/family teaching  - Consider wound care consult   Outcome: Progressing     Problem: SAFETY,RESTRAINT: NV/NON-SELF DESTRUCTIVE BEHAVIOR  Goal: Remains free of harm/injury (restraint for non violent/non self-detsructive behavior)  Description: INTERVENTIONS:  - Instruct patient/family regarding restraint use   - Assess and monitor physiologic and psychological status   - Provide interventions and comfort measures to meet assessed patient needs   - Identify and implement measures to help patient regain control  - Assess readiness for release of restraint   Outcome: Progressing  Goal: Returns to optimal restraint-free functioning  Description: INTERVENTIONS:  - Assess the patient's behavior and symptoms that indicate continued need for restraint  - Identify and implement measures to help patient regain control  - Assess readiness for release of restraint   Outcome: Progressing

## 2023-04-20 NOTE — PROGRESS NOTES
04/20/23 1000   Pain Assessment   Pain Assessment Tool 0-10   Pain Score No Pain   Restrictions/Precautions   Precautions Bed/chair alarms;Cognitive; Fall Risk;Hard of hearing;Supervision on toilet/commode  (4 bed rails)   Cognition   Overall Cognitive Status Impaired   Arousal/Participation Alert; Cooperative   Attention Attends with cues to redirect   Memory Decreased recall of precautions;Decreased recall of recent events;Decreased short term memory   Following Commands Follows one step commands with increased time or repetition   Subjective   Subjective Pt asleep in bed, however easily woken and agreeable to begin therapy with bed exercises  Roll Left and Right   Type of Assistance Needed Supervision   Comment HOB flat; Refused to perform without use of bed rails   Roll Left and Right CARE Score 4   Sit to Lying   Type of Assistance Needed Supervision   Comment HOB flat; Refused to perform without use of bed rails   Sit to Lying CARE Score 4   Lying to Sitting on Side of Bed   Type of Assistance Needed Supervision   Comment HOB flat; Refused to perform without use of bed rails   Lying to Sitting on Side of Bed CARE Score 4   Sit to Stand   Type of Assistance Needed Physical assistance   Physical Assistance Level 25% or less   Comment Min A with RW and to window sill   Sit to Stand CARE Score 3   Bed-Chair Transfer   Type of Assistance Needed Physical assistance   Physical Assistance Level 26%-50%   Comment Min-mod A no AD for SPT bed <> w/c; max VC for positioning prior to transfer and what to do during transfer   Chair/Bed-to-Chair Transfer CARE Score 3   Walk 10 Feet   Type of Assistance Needed Physical assistance   Physical Assistance Level Total assistance   Comment min-mod x1 with RW with CFA of 2nd person; pt VC to take bigger steps, stay close to RW, and to keep hands on hand  of RW; 2x40 ft   Walk 10 Feet CARE Score 1   Ambulation   Does the patient walk? 2   Yes   Wheel 50 Feet with Two Turns "  Type of Assistance Needed Supervision   Comment all extremities; requires A and VC with locking/unlocking brakes   Wheel 50 Feet with Two Turns CARE Score 4   Wheel 150 Feet   Type of Assistance Needed Supervision   Comment all extremities; requires A and VC with locking/unlocking brakes; 1x200 ft   Wheel 150 Feet CARE Score 4   Wheelchair mobility   Does the patient use a wheelchair? 1  Yes   Type of Wheelchair Used 1  Manual   Therapeutic Interventions   Strengthening Bed exercises: clamshells 1x20, bridges with 5SH 2x10, supine hip abd with therapist A 1x20, glute sets 1x10 3SH, quad sets 2x10 3SH, heel slides 2x20; Seated: marches with therapist tactile/visual cues 2x10, LAQ with 3SH 2x10, knee flexion 2x10, heel raises 1x20, toe raises 1x20, hip abd with therapist manual resistance 2x10 5SH, hip add with ball 2x10 with 5SH   Balance Standing tolerance at window sill with numbered post-it notes putting post-it notes in different sequences for distraction - able to stand for 1 min 32 sec first round and 2 min 14 sec second round; observed pt's sitting tolerance EOB while eating - pt able to maintain good balance without UE support with bilat LE on floor for 15 min   Assessment   Treatment Assessment Pt tolerated 90 min of skilled PT focusing on LE strengthening, w/c mobility, gait training, standing tolerance, and therapeutic activity  See above for treatment details  Pt asked if he needed to use the bathroom/have brief changed and pt answered \"no\" - when taking covers off for therapy, pt found to have large urine incontinence in brief and, with convincing, agreed to be changed and cleaned up  Pt continues to require frequent reminders of how much time is left of therapy and what time it is for him to be more willing to participate  Pt also found to be more willing to partipate with instructional commands rather than giving pt options on what to do   Pt left in bed at end of session with 4 rails up, floor mats " on both sides of bed, HOB 30 degrees, bed alarm activated, and all needs within reach  Problem List Decreased strength;Decreased range of motion;Decreased endurance; Impaired balance;Decreased mobility; Decreased cognition; Impaired judgement;Decreased safety awareness; Impaired hearing   Barriers to Discharge Inaccessible home environment;Decreased caregiver support   PT Barriers   Physical Impairment Decreased strength;Decreased range of motion;Decreased endurance; Impaired balance;Decreased mobility; Decreased cognition; Impaired judgement;Decreased safety awareness; Impaired hearing   Plan   Treatment/Interventions Functional transfer training;LE strengthening/ROM; Therapeutic exercise; Endurance training;Cognitive reorientation;Patient/family training;Equipment eval/education; Bed mobility;Gait training; Compensatory technique education   Progress Slow progress, cognitive deficits   Recommendation   PT Discharge Recommendation Post acute rehabilitation services   PT Therapy Minutes   PT Time In 1000   PT Time Out 1130   PT Total Time (minutes) 90   PT Mode of treatment - Individual (minutes) 90   PT Mode of treatment - Concurrent (minutes) 0   PT Mode of treatment - Group (minutes) 0   PT Mode of treatment - Co-treat (minutes) 0   PT Mode of Treatment - Total time(minutes) 90 minutes   PT Cumulative Minutes 430

## 2023-04-20 NOTE — PLAN OF CARE
Problem: PAIN - ADULT  Goal: Verbalizes/displays adequate comfort level or baseline comfort level  Description: Interventions:  - Encourage patient to monitor pain and request assistance  - Assess pain using appropriate pain scale  - Administer analgesics based on type and severity of pain and evaluate response  - Implement non-pharmacological measures as appropriate and evaluate response  - Consider cultural and social influences on pain and pain management  - Notify physician/advanced practitioner if interventions unsuccessful or patient reports new pain  Outcome: Progressing     Problem: INFECTION - ADULT  Goal: Absence or prevention of progression during hospitalization  Description: INTERVENTIONS:  - Assess and monitor for signs and symptoms of infection  - Monitor lab/diagnostic results  - Monitor all insertion sites, i e  indwelling lines, tubes, and drains  - Monitor endotracheal if appropriate and nasal secretions for changes in amount and color  - [place] appropriate cooling/warming therapies per order  - Administer medications as ordered  - Instruct and encourage patient and family to use good hand hygiene technique  - Identify and instruct in appropriate isolation precautions for identified infection/condition  Outcome: Progressing  Goal: Absence of fever/infection during neutropenic period  Description: INTERVENTIONS:  - Monitor WBC    Outcome: Progressing     Problem: SAFETY ADULT  Goal: Patient will remain free of falls  Description: INTERVENTIONS:  - Educate patient/family on patient safety including physical limitations  - Instruct patient to call for assistance with activity   - Consult OT/PT to assist with strengthening/mobility   - Keep Call bell within reach  - Keep bed low and locked with side rails adjusted as appropriate  - Keep care items and personal belongings within reach  - Initiate and maintain comfort rounds  - Make Fall Risk Sign visible to staff  - Offer Toileting every 2 Hours, in advance of need  - Initiate/Maintain bed/chair alarm  - Obtain necessary fall risk management equipment: nonskid socks  - Apply yellow socks and bracelet for high fall risk patients  - Consider moving patient to room near nurses station  Outcome: Progressing  Goal: Maintain or return to baseline ADL function  Description: INTERVENTIONS:  -  Assess patient's ability to carry out ADLs; assess patient's baseline for ADL function and identify physical deficits which impact ability to perform ADLs (bathing, care of mouth/teeth, toileting, grooming, dressing, etc )  - Assess/evaluate cause of self-care deficits   - Assess range of motion  - Assess patient's mobility; develop plan if impaired  - Assess patient's need for assistive devices and provide as appropriate  - Encourage maximum independence but intervene and supervise when necessary  - Involve family in performance of ADLs  - Assess for home care needs following discharge   - Consider OT consult to assist with ADL evaluation and planning for discharge  - Provide patient education as appropriate  Outcome: Progressing  Goal: Maintains/Returns to pre admission functional level  Description: INTERVENTIONS:  - Perform BMAT or MOVE assessment daily    - Set and communicate daily mobility goal to care team and patient/family/caregiver  - Collaborate with rehabilitation services on mobility goals if consulted  - Perform Range of Motion 3 times a day  - Reposition patient every 2 hours    - Dangle patient 3 times a day  - Stand patient 3 times a day  - Ambulate patient 3 times a day  - Out of bed to chair 3 times a day   - Out of bed for meals 3 times a day  - Out of bed for toileting  - Record patient progress and toleration of activity level   Outcome: Progressing     Problem: DISCHARGE PLANNING  Goal: Discharge to home or other facility with appropriate resources  Description: INTERVENTIONS:  - Identify barriers to discharge w/patient and caregiver  - Arrange for needed discharge resources and transportation as appropriate  - Identify discharge learning needs (meds, wound care, etc )  - Arrange for interpretive services to assist at discharge as needed  - Refer to Case Management Department for coordinating discharge planning if the patient needs post-hospital services based on physician/advanced practitioner order or complex needs related to functional status, cognitive ability, or social support system  Outcome: Progressing     Problem: Nutrition/Hydration-ADULT  Goal: Nutrient/Hydration intake appropriate for improving, restoring or maintaining nutritional needs  Description: Monitor and assess patient's nutrition/hydration status for malnutrition  Collaborate with interdisciplinary team and initiate plan and interventions as ordered  Monitor patient's weight and dietary intake as ordered or per policy  Utilize nutrition screening tool and intervene as necessary  Determine patient's food preferences and provide high-protein, high-caloric foods as appropriate       INTERVENTIONS:  - Monitor oral intake, urinary output, labs, and treatment plans  - Assess nutrition and hydration status and recommend course of action  - Evaluate amount of meals eaten  - Assist patient with eating if necessary   - Allow adequate time for meals  - Recommend/ encourage appropriate diets, oral nutritional supplements, and vitamin/mineral supplements  - Order, calculate, and assess calorie counts as needed  - Recommend, monitor, and adjust tube feedings and TPN/PPN based on assessed needs  - Assess need for intravenous fluids  - Provide specific nutrition/hydration education as appropriate  - Include patient/family/caregiver in decisions related to nutrition  Outcome: Progressing     Problem: Prexisting or High Potential for Compromised Skin Integrity  Goal: Skin integrity is maintained or improved  Description: INTERVENTIONS:  - Identify patients at risk for skin breakdown  - Assess and monitor skin integrity  - Assess and monitor nutrition and hydration status  - Monitor labs   - Assess for incontinence   - Turn and reposition patient  - Assist with mobility/ambulation  - Relieve pressure over bony prominences  - Avoid friction and shearing  - Provide appropriate hygiene as needed including keeping skin clean and dry  - Evaluate need for skin moisturizer/barrier cream  - Collaborate with interdisciplinary team   - Patient/family teaching  - Consider wound care consult   Outcome: Progressing     Problem: SAFETY,RESTRAINT: [place]/NON-SELF DESTRUCTIVE BEHAVIOR  Goal: Remains free of harm/injury (restraint for non violent/non self-detsructive behavior)  Description: INTERVENTIONS:  - Instruct patient/family regarding restraint use   - Assess and monitor physiologic and psychological status   - Provide interventions and comfort measures to meet assessed patient needs   - Identify and implement measures to help patient regain control  - Assess readiness for release of restraint   Outcome: Progressing  Goal: Returns to optimal restraint-free functioning  Description: INTERVENTIONS:  - Assess the patient's behavior and symptoms that indicate continued need for restraint  - Identify and implement measures to help patient regain control  - Assess readiness for release of restraint   Outcome: Progressing

## 2023-04-21 RX ADMIN — MEMANTINE 10 MG: 10 TABLET ORAL at 18:28

## 2023-04-21 RX ADMIN — LEVETIRACETAM 500 MG: 500 TABLET, FILM COATED ORAL at 20:06

## 2023-04-21 RX ADMIN — DEXAMETHASONE 4 MG: 4 TABLET ORAL at 05:09

## 2023-04-21 RX ADMIN — DEXAMETHASONE 4 MG: 4 TABLET ORAL at 14:02

## 2023-04-21 RX ADMIN — ENOXAPARIN SODIUM 40 MG: 40 INJECTION SUBCUTANEOUS at 07:48

## 2023-04-21 RX ADMIN — ATORVASTATIN CALCIUM 40 MG: 40 TABLET, FILM COATED ORAL at 20:10

## 2023-04-21 RX ADMIN — PANTOPRAZOLE SODIUM 40 MG: 40 TABLET, DELAYED RELEASE ORAL at 05:09

## 2023-04-21 RX ADMIN — DEXAMETHASONE 4 MG: 4 TABLET ORAL at 20:07

## 2023-04-21 RX ADMIN — MEMANTINE 10 MG: 10 TABLET ORAL at 07:48

## 2023-04-21 RX ADMIN — LEVETIRACETAM 500 MG: 500 TABLET, FILM COATED ORAL at 07:48

## 2023-04-21 NOTE — CASE MANAGEMENT
Case Management Update:  Cm spoke with dtr Lopez Lopez regarding SNF choices for pt, Lopez Lopez reported she would like referrals sent to [facility] and [facility] to check availability  Cm sent referrals via [facility], awaiting determination  CM will continue to update pt and family regarding dispo planning

## 2023-04-21 NOTE — PROGRESS NOTES
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  • Chemotherapy on hold until pt discharged - no more than 2 weeks  • S/p whole brain radiation x 10 treatments  Completed 4/12/23  • Continue Decadron taper, Keppra and memantine  • Follow-up with Dr Lorena Estrada and radiation oncology  • Will need to update oncology with plan for dc since it has changed     Pneumothorax  • Occurred after lung biopsy from 3/17/23  • S/p chest tube  • Resolved      LLL PNA  • S/p tx with Cefepime  • stable     Behavioral issues  • Brain lesions are B/L frontal likely contributing  • Refuses therapy on and off and can at times yell at staff   • Has been more cooperative lately      HTN  • Stable on no medications  • Continue to monitor        Discharge date:  SNF    The above assessment and plan was reviewed and updated as determined by my evaluation of the patient on 4/21/2023      Labs:   Results from last 7 days   Lab Units 04/20/23  0537   WBC Thousand/uL 9 18   HEMOGLOBIN g/dL 12 1   HEMATOCRIT % 37 4   PLATELETS Thousands/uL 181     Results from last 7 days   Lab Units 04/20/23  0537   SODIUM mmol/L 137   POTASSIUM mmol/L 3 8   CHLORIDE mmol/L 106   CO2 mmol/L 29   BUN mg/dL 35*   CREATININE mg/dL 0 48*   CALCIUM mg/dL 8 5                   Review of Scheduled Meds:  Current Facility-Administered Medications   Medication Dose Route Frequency Provider Last Rate   • acetaminophen  650 mg Oral Q6H PRN MARY BETH Momin     • atorvastatin  40 mg Oral QPM MARY BETH Momin     • dexamethasone  4 mg Oral Q8H [address] MARY BETH Momin      Followed by   • dexamethasone  4 mg Oral Q12H [address] MARY BETH Momin      Followed by   • [START ON 4/28/2023] dexamethasone  4 mg Oral Daily MARY BETH Bergman • enoxaparin  40 mg Subcutaneous Daily MARY BETH Momin     • levETIRAcetam  500 mg Oral Q12H [address] MARY BETH Momin     • memantine  10 mg Oral BID MARY BETH Momin     • pantoprazole  40 mg Oral Early Morning MARY BETH Momin         Subjective/ HPI: Patient seen and examined  Patients overnight issues or events were reviewed with nursing or staff during rounds or morning huddle session  New or overnight issues include the following:     Pt seen in his room resting quietly  No complaints this morning    ROS:   A 10 point ROS was performed; negative except as noted above  Imaging:     No orders to display       *Labs /Radiology studies reviewed  *Medications reviewed and reconciled as needed  *Please refer to order section for additional ordered labs studies  *Case discussed with primary attending during morning huddle case rounds    Physical Examination:  Vitals:   Vitals:    04/20/23 1000 04/20/23 1500 04/20/23 2018 04/21/23 0512   BP: 118/72 106/63 97/56 110/70   BP Location: Right arm Right arm Right arm Right arm   Pulse: 77 77 85 66   Resp:  17 16 18   Temp:  99 °F (37 2 °C) 98 3 °F (36 8 °C) 98 3 °F (36 8 °C)   TempSrc:  Oral Oral Oral   SpO2: 93% 96% 94% 97%   Weight:       Height:           GEN: NAD, quiet and calm resting in bed  NEURO: Alert and oriented x2; remains impulsive   HEENT: Pupils are equal and reactive, EOMI, mucous membranes are moist, face symmetrical; [place]  CV: S1 S2 regular, no MRG, no peripheral edema noted  RESP: Lungs are decreased throughout, no wheezes, rales or rhonchi noted, on room air, respirations easy and non labored  GI: Flat, soft non tender, non distended; +BS x4  : Voiding without difficulty  MUSC: Moves all extremities, generalized deconditioning noted   SKIN: pink, warm and dry, normal turgor, no rashes, lesions          The above physical exam was reviewed and updated as determined by my evaluation of the patient on 4/21/2023      Invasive Devices None                    VTE Pharmacologic Prophylaxis: Enoxaparin  Code Status: Level 2 - DNAR: but accepts endotracheal intubation  Current Length of Stay: 7 day(s)      Total time spent:  30 minutes with more than 50% spent counseling/coordinating care  Counseling includes discussion with patient re: progress  and discussion with patient of his/her current medical state/information  Coordination of patient's care was performed in conjunction with primary service  Time invested included review of patient's labs, vitals, and management of their comorbidities with continued monitoring  In addition, this patient was discussed with medical team including physician and advanced extenders  The care of the patient was extensively discussed and appropriate treatment plan was formulated unique for this patient  Medical decision making for the day was made by supervising physician unless otherwise noted in their attestation statement  ** Please Note:  voice to text software may have been used in the creation of this document   Although proof errors in transcription or interpretation are a potential of such software**

## 2023-04-21 NOTE — PROGRESS NOTES
04/21/23 0830   Pain Assessment   Pain Assessment Tool Lawson-Baker FACES   Pain Score No Pain   Pain Location/Orientation Location: Back   Restrictions/Precautions   Precautions Bed/chair alarms;Cognitive; Fall Risk;Hard of hearing;Supervision on toilet/commode   Roll Left and Right   Type of Assistance Needed Supervision   Roll Left and Right CARE Score 4   Sit to Lying   Type of Assistance Needed Supervision   Sit to Lying CARE Score 4   Lying to Sitting on Side of Bed   Type of Assistance Needed Supervision   Lying to Sitting on Side of Bed CARE Score 4   Sit to Stand   Type of Assistance Needed Physical assistance   Physical Assistance Level 26%-50%   Comment vc 's for hand placement Valentina to RW   Sit to Stand CARE Score 3   Bed-Chair Transfer   Type of Assistance Needed Physical assistance   Physical Assistance Level 25% or less   Comment Valentina SPT  hand on WC   Chair/Bed-to-Chair Transfer CARE Score 3   Transfer Bed/Chair/Wheelchair   Adaptive Equipment None   Ambulation   Does the patient walk? 2  Yes   Equipment Use   NuStep lvl 2 for 13 min  SPM 40 all UE/LE   Assessment   Treatment Assessment Pt perform bed mobility with WC propl with his LE needs vc for direction and safety , pt xfers SPT modified Sit pivot with vc for hand placement on Nu Step and then back to WC positioning   [place] Pt overall Valentina to [address] [place] if fatigue , pt return to his bed lvl supine with all needs in reach and bed alarm on   Cont POC recomm/ pt con to be WC lvl for DC   Barriers to Discharge Decreased caregiver support; Inaccessible home environment   Plan   Progress Slow progress, cognitive deficits   PT Therapy Minutes   PT Time In 0830   PT Time Out 0900   PT Total Time (minutes) 30   PT Mode of treatment - Individual (minutes) 30   PT Mode of treatment - Concurrent (minutes) 0   PT Mode of treatment - Group (minutes) 0   PT Mode of treatment - Co-treat (minutes) 0   PT Mode of Treatment - Total time(minutes) 30 minutes   PT Cumulative Minutes 520   Therapy Time missed   Time missed?  No

## 2023-04-21 NOTE — CONSULTS
"Consultation - Palliative and Supportive Care   Tran Perez 81 y o  male [id number]    Assessment:  NSCLC with metastasis to brain  Severe protein-calorie malnutrition  Ambulatory dysfunction  Acute respiratory failure with hypoxia  LLL pneumonia  Palliative care consult  Goals of care counseling    Plan:  1  Symptom management  · Defer to primary team      2    Goals:                · Patient states \"I want to get better  \" He is agreeable to continued therapies today and states he has never refused treatments      · He had refused yesterday morning therapy, and has been reported to have worsening mood and yelling at some staff  He has also mentioned, \"just let me die,\" and \" I'm on hospice\" to some staff  · When asked what he knew of his health/rehab/treatments he would respond loudly, \"I don't know, you tell me  \"  · Patient appears to lack full insight into current medical status and has had wavering moods and competency  · He became increasingly visibly frustrated during the visit and said to speak with his daughter Nupur Vizcarra as she would be the one to make decisions  · Originally the goal was to rehab then go to SNF and start chemo, however after oncology had discussed tx plans with family it was decided against due to patient's poor performance status  · Called and left message with daughter/POA Nupur Perez to call back  · Will need to discuss goals of care and rehab plan in the setting of worsening clinical status and limited viable cancer treatment options  Code Status: DNAR - Level 2  Disease focused care with DNR limits in place  Decisional apparatus:  Patient is not fully competent on my exam today  Patient's substitute decision maker would default to daughter Jill Mayer                 Advance Directive / Living Will / POLST:  scanned in chart     Alice Perez Daughter  POA   [phone]   Geo Cowart Spouse  Seperated   [phone]   hilary reyes Significant Other " [phone]   [phone]       3  Social support:  • Daughter Ananth Velazco, [place]  There is spouse Joon Tejada, listed as secondary POA but they have been  awhile  There is a girlfriend Anastasiya Sánchze who he was living with  There seems to also be another daughter however no contact information listed, was to discuss this further with Ananth Velazco  · Supportive listening provided  · Normalized experience of patient/family  · Provided anxiety containment  · Provided anticipatory guidance  · Investigated spiritual needs    4  Follow up    • Palliative Care will continue to follow for goals of care discussions will be ongoing  Please reach out via [employer] if questions or concerns arise  I have reviewed the patient's controlled substance dispensing history in the Prescription Drug Monitoring Program in compliance with the [facility] regulations before prescribing any controlled substances  We appreciate the invitation to be involved in this patient's care  We will continue to follow throughout this hospitalization  Please do not hesitate to reach our on call provider through our clinic answering service at  should you have acute symptom control concerns  GEORGINA/JEANA Coffey  Palliative and Supportive Care  Clinic/Answering Service: [phone]  You can find me on [employer]! IDENTIFICATION:  Inpatient consult to Palliative Care  Consult performed by: GEORGINA/JEANA Coffey  Consult ordered by: MARY BETH Flanagan        Physician Requesting Consult: Lance Crump,   Reason for Consult / Principal Problem: [address] counseling secondary to Metastatic NSCLC  History of Present Illness:  Ander Foster is a 81 y o  male who presents with a palliative diagnosis of metastatic NSCLC, mets to brain  Hx of acute respiratory failure with hypoxia, severe protein-calorie malnutrition, dysphagia, HTN, LLL pneumonia, behavioral issues  Palliative consulted for [address]       [facility] and received 10x whole "brain radiation, last on 04/12/23  He was transferred to [facility] with goals of improving performance status then d/c to SNF to start chemo  Chemotherapy no longer a viable option as patient has poor performance status  Patient had refused therapy yesterday morning but had done last evenings therapy with encouragement from daughter  Staff reports he has been yelling and making comments during therapy that he wants to be comfortable, or that he is on hospice, or \"just let me die  \" However he also states he wants to improve and get better  Since original goal of rehab and then SNF and chemo no longer seems to be a viable option, GOC need to be readdressed to determine the patient and families new treatment focus and long-term plan  Patient today states he is determined to get better and will do all therapies needed  Unfortunately on exam he appears to lack insight into the severity of his condition and the current treatments being offered  He understands he is in the hospital to treat his cancer but cannot fully articulate what that entails  Conversation difficult as patient quickly transitioned from cooperative to not wanting to talk very quickly  He wanted to sleep and was becoming increasingly frustrated with answering questions during our visit  Finally he said to talk to his daughter Rinku Stratton as she would be the one to decide what to do  Review of Systems   Constitutional: Positive for fatigue  Negative for fever  HENT: Negative  Respiratory: Negative for shortness of breath  Cardiovascular: Negative for chest pain  Gastrointestinal: Negative for abdominal pain, diarrhea and vomiting  Genitourinary: Negative  Musculoskeletal: Negative for arthralgias and myalgias  Neurological: Negative  Memory loss   Psychiatric/Behavioral: Positive for confusion and dysphoric mood         Past Medical History:   Diagnosis Date   • Hypertension      Past Surgical History:   Procedure Laterality Date " • HERNIA REPAIR     • IR BIOPSY LUNG  3/17/2023     Social History     Socioeconomic History   • Marital status: Single     Spouse name: Not on file   • Number of children: Not on file   • Years of education: Not on file   • Highest education level: Not on file   Occupational History   • Not on file   Tobacco Use   • Smoking status: Former   • Smokeless tobacco: Not on file   • Tobacco comments:     quit Wednesday   Substance and Sexual Activity   • Alcohol use: Never   • Drug use: Never   • Sexual activity: Not on file   Other Topics Concern   • Not on file   Social History Narrative   • Not on file     Social Determinants of Health     Financial Resource Strain: Not on file   Food Insecurity: No Food Insecurity   • Worried About Running Out of Food in the Last Year: Never true   • Ran Out of Food in the Last Year: Never true   Transportation Needs: No Transportation Needs   • Lack of Transportation (Medical): No   • Lack of Transportation (Non-Medical): No   Physical Activity: Not on file   Stress: Not on file   Social Connections: Not on file   Intimate Partner Violence: Not on file   Housing Stability: Low Risk    • Unable to Pay for Housing in the Last Year: No   • Number of Places Lived in the Last Year: 1   • Unstable Housing in the Last Year: No     No family history on file      Medications:  all current active meds have been reviewed and current meds:   Current Facility-Administered Medications   Medication Dose Route Frequency   • acetaminophen (TYLENOL) tablet 650 mg  650 mg Oral Q6H PRN   • atorvastatin (LIPITOR) tablet 40 mg  40 mg Oral QPM   • dexamethasone (DECADRON) tablet 4 mg  4 mg Oral Q8H [facility]    Followed by   • dexamethasone (DECADRON) tablet 4 mg  4 mg Oral Q12H [facility]    Followed by   • [START ON 4/28/2023] dexamethasone (DECADRON) tablet 4 mg  4 mg Oral Daily   • enoxaparin (LOVENOX) subcutaneous injection 40 mg  40 mg Subcutaneous Daily   • levETIRAcetam (KEPPRA) tablet 500 mg  500 mg Oral "Q12H [address]   • memantine (NAMENDA) tablet 10 mg  10 mg Oral BID   • pantoprazole (PROTONIX) EC tablet 40 mg  40 mg Oral Early Morning       No Known Allergies    Objective:  /70 (BP Location: Right arm)   Pulse 66   Temp 98 3 °F (36 8 °C) (Oral)   Resp 18   Ht 5' 6\" (1 676 m)   Wt 58 kg (127 lb 13 9 oz)   SpO2 97%   BMI 20 64 kg/m²     Physical Exam  Vitals and nursing note reviewed  Constitutional:       General: He is not in acute distress  Appearance: He is ill-appearing  HENT:      Right Ear: External ear normal       Left Ear: External ear normal       Nose: Nose normal       Mouth/Throat:      Pharynx: Oropharynx is clear  Eyes:      Conjunctiva/sclera: Conjunctivae normal    Cardiovascular:      Rate and Rhythm: Normal rate  Pulmonary:      Effort: Pulmonary effort is normal    Musculoskeletal:         General: No swelling  Skin:     Findings: Bruising present  Neurological:      Mental Status: He is alert  He is confused  Comments: Oriented to self, place  Not time  Psychiatric:         Attention and Perception: Attention normal          Mood and Affect: Affect is blunt  Speech: Speech normal          Behavior: Behavior is agitated  Cognition and Memory: Memory is impaired  Lab Results: I have personally reviewed pertinent labs       Latest Reference Range & Units 04/20/23 05:37   Sodium 135 - 147 mmol/L 137   Potassium 3 5 - 5 3 mmol/L 3 8   Chloride 96 - 108 mmol/L 106   CO2 21 - 32 mmol/L 29   Anion Gap 4 - 13 mmol/L 2 (L)   BUN 5 - 25 mg/dL 35 (H)   Creatinine 0 60 - 1 30 mg/dL 0 48 (L)   Glucose, Random 65 - 140 mg/dL 78   Calcium 8 3 - 10 1 mg/dL 8 5   eGFR ml/min/1 73sq m 111   WBC 4 31 - 10 16 Thousand/uL 9 18   Red Blood Cell Count 3 88 - 5 62 Million/uL 4 26   Hemoglobin 12 0 - 17 0 g/dL 12 1   HCT 36 5 - 49 3 % 37 4   MCV 82 - 98 fL 88   MCH 26 8 - 34 3 pg 28 4   MCHC 31 4 - 37 4 g/dL 32 4   RDW 11 6 - 15 1 % 17 6 (H)   Platelet Count 931 - " 390 Thousands/uL 181   MPV 8 9 - 12 7 fL 10 3     Imaging Studies: I have personally reviewed pertinent reports  04/04/23 CT chest  IMPRESSION:  Chest tube in the right apical pleural space with trace right pneumothorax  Pneumomediastinum and subcutaneous emphysema  Redemonstration of acute right lower lobe tumor extending across the fissure into the right upper lobe  Left lower lobe pneumonia and small left pleural effusion  03/14/23 MRI Brain  IMPRESSION:  Multiple intracranial enhancing lesions are identified, total of 5, consistent with intracranial metastasis  Several of these demonstrate focal surrounding vasogenic with localized mass effect, most prominently the right cerebellar lesion  03/14/23 CT chest abd pelvis  IMPRESSION:  1  Right lower lobe mass extending along the fissure to the right middle and upper lobes and to the right hilum, measuring 10 x 12 12 cm, consistent with lung malignancy  Recommend pulmonary consultation  2   Left lower lobe subpleural 9 mm pulmonary nodule, indeterminate though concerning for malignancy  3   Few airspace opacities at the base of the right lower lobe and right middle lobe may represent pneumonia and/or aspiration  4   No evidence of malignancy in the abdomen or pelvis  5   Constipation  EKG, Pathology, and Other Studies: I have personally reviewed pertinent reports  EKG 12-Lead  Narrative & Impression  Sinus rhythm with Premature atrial complexes  Left axis deviation  Anteroseptal infarct (cited on or before 01-APR-2023)  Abnormal ECG  When compared with ECG of 13-MAR-2023 23:16,  Premature ventricular complexes are no longer Present  Questionable change in initial forces of Anterior leads     Counseling / Coordination of Care  Total floor / unit time spent today 60 minutes  Greater than 50% of total time was spent with the patient and / or family counseling and / or coordination of care   A description of the counseling / coordination of care: "Reviewed chart, determined goals of care, discussed comfort care and hospice care, discussed code status, provided anticipatory guidance, determined competency and POA/HCA, determined social/family support, provided psychosocial support  Reviewed with OT  Portions of this document may have been created using dictation software and as such some \"sound alike\" terms may have been generated by the system  Do not hesitate to contact me with any questions or clarifications      "

## 2023-04-21 NOTE — PROGRESS NOTES
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 on 04/01 with a 1 day history of shortness of breath s/p lung mass biopsy with a small pneumothorax  On presentation his  Oxygen saturation was 70% on room air, he received Bipap then transitioned to nasal cannula  Chest x-ray showed large right sided pneumothorax with complete lung collapse  A chest tube was place, it became dislodges and the patient developed subcutaneous emphysema  Trauma was consulted, reinserted tube without complications  Chest tube was removed on 04/07  Patient also presented with leukocytosis, tachycardia, elevated lactic acid and procalcitonin  Acute respiratory failure was likely secondary to right-sided pneumothorax with possible LLL pneumonia  Patient completed cefepime with prn tessalon pearls for cough  He was able to continue with whole brain radiation treatments, completed on 04/12  Chemotherapy treatments on hold, per Dr Grecia Pugh, can be held no longer than two weeks  The patient was evaluated by the Rehabilitation team and deemed an appropriate candidate for comprehensive inpatient rehabilitation and admitted to the [facility] on 04/14/23  SUBJECTIVE: Patient seen face to face  No acute issues overnight  Patient states all he wants to do is sleep, but is willing to work with therapies  Denies pain, voiding, LBM 04/20  Denies chest pain, shortness of breath, fever, chills, nausea, abdominal pain       ASSESSMENT: Stable, progressing    PLAN:  - Palliative following patient, recommendations appreciated  Palliative to follow-up with daughter, Gopal Goyal  - continue timed void  - continue medical and rehabilitation plan of care    Rehabilitation  • Functional deficits:  Mobility, self-care  • Continue current rehabilitation plan of care to maximize function  • Functional update:   o PT: mobility- supervision, transfers- mod A, ambulation- mod A RW- 50', wheelchair mobility- mod A ambulation: min-mod A 10', stairs- total A  o OT: ADL: bathing- total A, dressing UB max A, LB total A, footwear- mod A, toileting- total A  o SLP: comprehension mod A, problem solving and memory max A  • Estimated Discharge: pending subacute placement     Pain  • Not an issue, prn tylenol    DVT prophylaxis  • Lovenox    Bladder plan  • Continent    Bowel plan  • Continent      * Acute respiratory failure with hypoxia (HCC)  Assessment & Plan  - small pneumothorax s/p lung mass biopsy 03/17  - 04/01- shortness of breath  - 04/01 - imaging showed a complete right pneumothorax  - 04/01- right chest tube placed   - 04/02- chest tube dislodges; developed subcutaneous emphysema extending to neck,   - trauma consulted; removed initial chest tube, trauma placed new chest tube to suction  - monitor oxygenation  - 04/07 chest tube removed  - CXR- LLL PNA;  Cefepime completed, tessalon pearls for cough    - Comprehensive therapies 3 hr a day, 5-6 days a week      Non-small cell carcinoma of right lung (HCC)  Assessment & Plan  - stage IV NSCLC w/ brain metastases  - confirmed with lung biopsy 03/17  - per Dr Suzan Diez; okay to hold chemotherapy, no more than 2 weeks    Brain mass  Assessment & Plan  - Brain metastases  - received whole brain radiation 10 treatments, completed 04/12  - continue Keppra 500 mg q12h, decadron 4mg q6h, memantine 10 mg q12h  - follows with Oncology, Dr Helen Bailey  - Palliative following, will contact daughter Gopal Goyal for [address]    Unspecified "protein-calorie malnutrition ([facility] [address] )  Assessment & Plan  - BMI 20 99  - nutrition consult  - nutritional supplements  - monitor I&O      Appreciate IM consultants medical co-management  Personally reviewed labs, medications, and imaging  ROS:  Review of Systems   A 10 point review of systems was negative except for what is noted in the HPI  OBJECTIVE:   /70 (BP Location: Right arm)   Pulse 66   Temp 98 3 °F (36 8 °C) (Oral)   Resp 18   Ht 5' 6\" (1 676 m)   Wt 58 kg (127 lb 13 9 oz)   SpO2 97%   BMI 20 64 kg/m²     Physical Exam  Constitutional:       Appearance: Normal appearance  He is ill-appearing  HENT:      Head: Normocephalic and atraumatic  Mouth/Throat:      Mouth: Mucous membranes are moist    Cardiovascular:      Rate and Rhythm: Normal rate and regular rhythm  Pulses: Normal pulses  Pulmonary:      Effort: Pulmonary effort is normal       Breath sounds: Normal breath sounds  Abdominal:      General: Bowel sounds are normal       Palpations: Abdomen is soft  Musculoskeletal:         General: Normal range of motion  Cervical back: Normal range of motion  Skin:     General: Skin is warm and dry  Capillary Refill: Capillary refill takes less than 2 seconds  Findings: Bruising present  Neurological:      Mental Status: He is alert and oriented to person, place, and time  Motor: Weakness present     Psychiatric:         Mood and Affect: Mood normal          Judgment: Judgment normal           Lab Results   Component Value Date    WBC 9 18 04/20/2023    HGB 12 1 04/20/2023    HCT 37 4 04/20/2023    MCV 88 04/20/2023     04/20/2023     Lab Results   Component Value Date    SODIUM 137 04/20/2023    K 3 8 04/20/2023     04/20/2023    CO2 29 04/20/2023    BUN 35 (H) 04/20/2023    CREATININE 0 48 (L) 04/20/2023    GLUC 78 04/20/2023    CALCIUM 8 5 04/20/2023     Lab Results   Component Value Date    INR 1 00 03/15/2023    PROTIME 13 4 03/15/2023 " Current Facility-Administered Medications:   •  acetaminophen (TYLENOL) tablet 650 mg, 650 mg, Oral, Q6H PRN, MARY BETH Momin  •  atorvastatin (LIPITOR) tablet 40 mg, 40 mg, Oral, QPM, MARY BETH Momin, 40 mg at 04/20/23 1753  •  dexamethasone (DECADRON) tablet 4 mg, 4 mg, Oral, Q8H MOLLY, 4 mg at 04/21/23 0509 **FOLLOWED BY** dexamethasone (DECADRON) tablet 4 mg, 4 mg, Oral, Q12H [address] **FOLLOWED BY** [START ON 4/28/2023] dexamethasone (DECADRON) tablet 4 mg, 4 mg, Oral, Daily, MARY BETH Momin  •  enoxaparin (LOVENOX) subcutaneous injection 40 mg, 40 mg, Subcutaneous, Daily, MARY BETH Momin, 40 mg at 04/21/23 0748  •  levETIRAcetam (KEPPRA) tablet 500 mg, 500 mg, Oral, Q12H MOLLY, MARY BETH Momin, 500 mg at 04/21/23 0748  •  memantine (NAMENDA) tablet 10 mg, 10 mg, Oral, BID, MARY BETH Momin, 10 mg at 04/21/23 0748  •  pantoprazole (PROTONIX) EC tablet 40 mg, 40 mg, Oral, Early Morning, MARY BETH Momin, 40 mg at 04/21/23 0510    Past Medical History:   Diagnosis Date   • Hypertension        Patient Active Problem List    Diagnosis Date Noted   • Acute respiratory failure with hypoxia ([facility] [place] [facility] ) 04/14/2023   • Non-small cell carcinoma of right lung ([facility] [place] [facility] ) 03/27/2023   • Brain mass 03/14/2023   • Ambulatory dysfunction 04/07/2023   • Encounter for antineoplastic immunotherapy 03/31/2023   • Chemotherapy induced neutropenia ([facility] [place] [facility] ) 03/30/2023   • 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([facility] [place] [facility] ) 03/30/2023   • Unspecified protein-calorie malnutrition ([facility] [place] [facility] ) 03/16/2023   • Lung mass 03/14/2023   • Hypertension 03/14/2023      MARY BETH Yanes  Physical Medicine and [facility]

## 2023-04-21 NOTE — PROGRESS NOTES
04/21/23 1230   Pain Assessment   Pain Assessment Tool 0-10   Restrictions/Precautions   Precautions Bed/chair alarms;Cognitive; Fall Risk;Supervision on toilet/commode;Hard of hearing  (both bedrail up and floor mat and alarm on)   Subjective   Subjective agreeable to perform skilled PT   Roll Left and Right   Type of Assistance Needed Supervision   Roll Left and Right CARE Score 4   Sit to Lying   Type of Assistance Needed Supervision   Sit to Lying CARE Score 4   Lying to Sitting on Side of Bed   Type of Assistance Needed Supervision   Lying to Sitting on Side of Bed CARE Score 4   Bed-Chair Transfer   Type of Assistance Needed Physical assistance   Physical Assistance Level 26%-50%   Comment stand pivot using w/c arm rests, assist x1 in front  w/c to bed   Chair/Bed-to-Chair Transfer CARE Score 3   Transfer Bed/Chair/Wheelchair   Adaptive Equipment None   Car Transfer   Type of Assistance Needed Physical assistance;Verbal cues   Physical Assistance Level 76% or more   Comment BS   Car Transfer CARE Score 2   Walk 10 Feet   Reason if not Attempted Safety concerns   Walk 10 Feet CARE Score 88   Walk 50 Feet with Two Turns   Reason if not Attempted Safety concerns   Walk 50 Feet with Two Turns CARE Score 88   Walk 150 Feet   Reason if not Attempted Safety concerns   Walk 150 Feet CARE Score 88   Walking 10 Feet on Uneven Surfaces   Reason if not Attempted Safety concerns   Walking 10 Feet on Uneven Surfaces CARE Score 88   Wheel 50 Feet with Two Turns   Type of Assistance Needed Supervision;Verbal cues   Comment all extremities; requires A and VC with locking/unlocking brakes   Wheel 50 Feet with Two Turns CARE Score 4   Wheelchair mobility   Does the patient use a wheelchair? 1  Yes   Type of Wheelchair Used 1  Manual   Method Right upper extremity; Left upper extremity;Right lower extremity; Left lower extremity   Assistance Provided For Remove Leg Rest;Replace Leg Rest;Remove armrests;Replace armrests   Distance Level Surface (feet) 100 ft  (x5)   Distance Wheeled 3% Grade 20 ft  (ramp needs Valentina)   Findings outside WC propl different sufaces   Curb or Single Stair   Reason if not Attempted Safety concerns   1 Step (Curb) CARE Score 88   4 Steps   Reason if not Attempted Safety concerns   4 Steps CARE Score 88   12 Steps   Reason if not Attempted Safety concerns   12 Steps CARE Score 88   Therapeutic Interventions   Strengthening seated ouitside LAQ AP MARCHING 20 reps WC push ups x20 reps   Flexibility manual stretch LE   Balance EOB dynamic balance   Assessment   Treatment Assessment Focus on xfers sit pivot vs left / right side   pt sabrina to [employer] outside with VC at S lvl at times but not on ramp Valentina , pt willm cont to need skilled PT working on strengthening and conditioning , overall safety awareness , dec fall risk and pt to bed lvl with alarm on and all needs in reach   Barriers to Discharge Inaccessible home environment;Decreased caregiver support   Plan   Progress Slow progress, cognitive deficits   PT Therapy Minutes   PT Time In 1230   PT Time Out 1330   PT Total Time (minutes) 60   PT Mode of treatment - Individual (minutes) 60   PT Mode of treatment - Concurrent (minutes) 0   PT Mode of treatment - Group (minutes) 0   PT Mode of treatment - Co-treat (minutes) 0   PT Mode of Treatment - Total time(minutes) 60 minutes   PT Cumulative Minutes 580   Therapy Time missed   Time missed?  No

## 2023-04-21 NOTE — PROGRESS NOTES
Pastoral Care Progress Note    2023  Patient: Esther Evans : 1950  Admission Date & Time: 2023 1330  MRN: [id number] CSN: [id number]        Did follow up visit with Gerardo Ruiz, who was sleeping in his room  Tried several times to verbally awaken him without success  Prayed for him   remains available

## 2023-04-21 NOTE — PROGRESS NOTES
04/21/23 0700   Pain Assessment   Pain Assessment Tool 0-10   Pain Score No Pain   Restrictions/Precautions   Precautions Bed/chair alarms;Cognitive; Fall Risk;Hard of hearing;Pain;Supervision on toilet/commode  (bedrails, floor mats)   Weight Bearing Restrictions No   ROM Restrictions No   Eating   Type of Assistance Needed Set-up / clean-up   Physical Assistance Level No physical assistance   Comment seated EOB for breakfast meal at end of OT session   Eating CARE Score 5   Oral Hygiene   Reason if not Attempted Refused to perform   Oral Hygiene CARE Score 7   Grooming   Able To Wash/Dry Face;Wash/Dry Hands   Limitation Noted In Problem Solving; Safety;Strength;Timeliness   Findings Seated EOB with Sup to wash face/hands as part of sponge bath routine   Shower/Bathe Self   Type of Assistance Needed Physical assistance;Verbal cues   Physical Assistance Level 51%-75%   Comment Pt engaged in sponge bath while seated EOB, able to wash 7/10 parts, including UB, hadley area, and B/L upper legs while seated w/Sup and frequent vc's for encouragement and initiation of each body part washed  ModA in stance for balance, with A to wash rear, no LOB  Pt cont to require A to wash lower legs/feet  Shower/Bathe Self CARE Score 2   Bathing   Assessed Bath Style Sponge Bath   Anticipated D/C Bath Style Sponge Bath   Able to Gather/Transport No   Able to [employer] Temperature No   Able to Wash/Rinse/Dry (body part) Left Arm;Right Arm;L Upper Leg;R Upper Leg;Chest;Abdomen;Perineal Area   Limitations Noted in Balance; Endurance;Problem Solving;ROM;Safety; Sequencing;Strength;Timeliness   Positioning Seated;Standing   Tub/Shower Transfer   Reason Not Assessed Sponge Bath   Upper Body Dressing   Type of Assistance Needed Physical assistance   Physical Assistance Level 26%-50%   Comment seated EOB to don [place] shirt, requiring A to manage down back   Upper Body Dressing CARE Score 3   Lower Body Dressing   Type of Assistance Needed Physical assistance;Verbal cues   Physical Assistance Level 76% or more   Comment seated EOB, pt able to partially thread BLEs into pants, ModA for balance in stance with A for CM up over hips, no LOB  TA to doff/don tabbed brief  Lower Body Dressing CARE Score 2   Putting On/Taking Off Footwear   Type of Assistance Needed Physical assistance   Physical Assistance Level Total assistance   Comment pt dependent to doff/don B/L socks   Putting On/Taking Off Footwear CARE Score 1   Dressing/Undressing Clothing   Remove UB Clothes   ([facility]   [address] [facility] [place] LB Clothes Undergarment;Socks   Don LB Clothes Pants; Undergarment;Socks   Limitations Noted In Balance; Coordination; Endurance;Problem Solving; Safety; Sequencing;Strength;Vision;Timeliness   Positioning Sit Edge Of Bed;Standing   Roll Left and Right   Type of Assistance Needed Supervision   Physical Assistance Level No physical assistance   Roll Left and Right CARE Score 4   Sit to Lying   Type of Assistance Needed Supervision   Physical Assistance Level No physical assistance   Sit to Lying CARE Score 4   Lying to Sitting on Side of Bed   Type of Assistance Needed Supervision   Physical Assistance Level No physical assistance   Lying to Sitting on Side of Bed CARE Score 4   Sit to Stand   Type of Assistance Needed Physical assistance;Verbal cues   Physical Assistance Level 26%-50%   Comment no AD   Sit to Stand CARE Score 3   Cognition   Overall Cognitive Status Impaired   Arousal/Participation Alert   Attention Attends with cues to redirect   Orientation Level Oriented to person;Oriented to situation;Disoriented to time   Memory Decreased short term memory;Decreased recall of recent events;Decreased recall of precautions   Following Commands Follows one step commands with increased time or repetition   Activity Tolerance   Activity Tolerance Patient tolerated treatment well   Assessment   Treatment Assessment Pt seen for 60min skilled OT session focused on ADL skills retraining, bed mobility, and static standing balance/tolerance during LB ADLs, with focus on increased independence w/ADLs and decreasing caregiver burden  See detailed descriptions of fxl performance above  Pt tolerated session well overall, but continues to be limited by decreased fxl cognition as well as fatigue, with pt requiring frequent vc's for task initiation and encouragement, as pt frequently requests that therapist completes tasks for him that he has demonstrated ability to complete himself, such as threading LE's into pants during LB dressing routine  Pt would benefit from continued skilled OT focused on ADL retraining, w/c mobility, SPTs w/c<>BSC, orientation to time, STM/recall, and fxl attention  Pt was left positioned in bed with HOB locked in 30 degrees after eating breakfast meal with call bell in reach, bed alarm on, fall mats in place and bed rails up  Prognosis Fair   Problem List Decreased strength;Decreased endurance; Impaired balance;Decreased mobility; Decreased range of motion;Decreased cognition; Impaired judgement;Decreased safety awareness; Impaired vision; Impaired hearing   Barriers to Discharge Inaccessible home environment;Decreased caregiver support   Plan   Treatment/Interventions ADL retraining;Functional transfer training; Therapeutic exercise; Endurance training;Cognitive reorientation;Patient/family training;Equipment eval/education; Bed mobility; Compensatory technique education   Progress Slow progress, cognitive deficits   Recommendation   OT Discharge Recommendation Post acute rehabilitation services   OT Therapy Minutes   OT Time In 0700   OT Time Out 0800   OT Total Time (minutes) 60   OT Mode of treatment - Individual (minutes) 60   OT Mode of treatment - Concurrent (minutes) 0   OT Mode of treatment - Group (minutes) 0   OT Mode of treatment - Co-treat (minutes) 0   OT Mode of Treatment - Total time(minutes) 60 minutes   OT Cumulative Minutes 495   Therapy Time missed   Time missed?  No

## 2023-04-21 NOTE — PROGRESS NOTES
"   04/21/23 1400   Pain Assessment   Pain Assessment Tool 0-10   Pain Score No Pain   Restrictions/Precautions   Precautions Bed/chair alarms;Cognitive; Fall Risk;Hard of hearing;Pain;Visual deficit;Supervision on toilet/commode   Weight Bearing Restrictions No   ROM Restrictions No   Comprehension   Comprehension (FIM) 3 - Understands basic info/conversation 50-74% of time   Expression   Expression (FIM) 5 - Needs help/cues only RARELY (< 10% of the time)   Social Interaction   Social Interaction (FIM) 4 - Needs redirecting for appropriate language or to initiate interaction   Problem Solving   Problem solving (FIM) 3 - Solves basic problmes 50-74% of time   Memory   Memory (FIM) 3 - Recognizes, recalls/performs 50-74%   Speech/Language/Cognition Assessmetn   Treatment Assessment Pt seen for skilled speech therapy session targeting cognitive linguistic communication skills  Pt resting in bed in room upon arrival, easily awoken and oriented to this therapist  Pt did not recall working with SLP prior therefore engaged in brief rapport conversation  Pt oriented to place and rough situation  Pt stated \"I'm doing what they tell me to do\"  Utilizing his clock and additional visual aids, assisted with recalling Month, date, and year  Pt stated Brian Calix do you people ask me all the same questions\"- educated pt in regards to cognition and importance of repetition to aid in increased carryover and independence  Pt then simply stating \"okay okay\" to each of SLP's statements  Pt then engaged in simple problem solving task involving the cause/effect relations to situations  pt was able to ID causes to situations with 6/8acc as well as effects with 7/8acc  Pt often answering questions quickly and benefitting from repetition due to poor hearing versus retention/comprehension  Pt then stated \"this is silly why are we doing this when I can just sleep\"   Pt again, educated on importance of cognitive skills and completing therapy to get " "stronger, which appears to then redirect and appease him with responding \"okay okay\" again  Reviewed safety in the room with using call bell  Pt was able to ID call bell as he keeps it near his head  Pt was able to ID 3 situations (with prompting) as to when he would use his call bell (bathroom, help, drink)  Reviewed again to not only press the button but WAIT for someone to arrive before attempting to get up  Pt stating again \"okay okay' to each statement SLP made however suspect pt with decreased processing and carryover  Pt overall is making slow progress towards his goals- plan is for subacute placement as family is not able to provide adequate assistance at home  Plan to cont to target orientation, safety, problem solving and reasoning skills  Pt will cont to benefit from skilled SLP services to maximize overall cognitive linguistic communication abilities at this time  SLP Therapy Minutes   SLP Time In 1400   SLP Time Out 1430   SLP Total Time (minutes) 30   SLP Mode of treatment - Individual (minutes) 30   SLP Mode of treatment - Concurrent (minutes) 0   SLP Mode of treatment - Group (minutes) 0   SLP Mode of treatment - Co-treat (minutes) 0   SLP Mode of Treatment - Total time(minutes) 30 minutes   SLP Cumulative Minutes 245   Therapy Time missed   Time missed?  No       "

## 2023-04-21 NOTE — PROGRESS NOTES
"Occupational Therapy Treatment Note         04/21/23 1040   Pain Assessment   Pain Assessment Tool 0-10   Pain Score No Pain   Restrictions/Precautions   Precautions Bed/chair alarms;Cognitive; Fall Risk;Supervision on toilet/commode;Hard of hearing  (bedrails; floor mats;)   Lifestyle   Autonomy \"I am doing ok\"   Eating   Type of Assistance Needed Supervision   Physical Assistance Level No physical assistance   Comment changing pt to full supervision level 2* cognitive deficits  pt requires cues to utilize utensils instead of hands, notified [facility]AS and nursing   Eating CARE Score 4   Oral Hygiene   Type of Assistance Needed Supervision   Physical Assistance Level No physical assistance   Comment seated in w/c pt agreeable to use mouth wash   Oral Hygiene CARE Score 4   Sit to Stand   Type of Assistance Needed Physical assistance   Physical Assistance Level 26%-50%   Comment no device, assist x1 in front   Sit to Stand CARE Score 3   Bed-Chair Transfer   Type of Assistance Needed Physical assistance   Physical Assistance Level 26%-50%   Comment stand pivot using w/c arm rests, assist x1 in front  w/c to bed   Chair/Bed-to-Chair Transfer CARE Score 3   Toileting Hygiene   Type of Assistance Needed Physical assistance; Adaptive equipment   Physical Assistance Level Total assistance   Comment assist x1 for toilet at toilet while pt stands using B/L UE support on grab bars  Pt incontinent of bowel and urine, requires total assist for hygiene/pericare and clothing management and to change brief  nursing made aware   Toileting Hygiene CARE Score 1   Toilet Transfer   Type of Assistance Needed Physical assistance; Adaptive equipment   Physical Assistance Level 26%-50%   Comment stand pivot w/c to toilet usign grab bars   Toilet Transfer CARE Score 3   Cognition   Overall Cognitive Status Impaired   Arousal/Participation Alert   Attention Attends with cues to redirect   Orientation Level Oriented to person;Oriented to " situation;Disoriented to time   Memory Decreased short term memory;Decreased recall of recent events;Decreased recall of precautions   Following Commands Follows one step commands with increased time or repetition   Assessment   Treatment Assessment Pt participated in skilled OT tx session  See above for further details on functional performance  OT spoke with palliative care JEANA who was present at beginning of session regarding family meeting/d/c planning and pt's cognitive/physical deficits  OT changed pt to full supervision for meals as pt required increased cues today during meal, nursing staff notified and signs updated  Pt was unaware of his incontinence, benefits from Q2-3 toileting program where he sits on [facility] even if he declines need to use initially  Pt able to complete w/c mobility 150 feet this session with min assist for obstacle negotiation in hallway, focused on pt's problem solving within items with obstacles  Pt will continue to benefit from skilled OT intervention to address w/c mobility, stand pivot transfers to/from w/c and BSC, orientating to time, short-term memory and attention in order to maximize functional independence in ADLS, functional mobility/transfers, while decreasing burden of care  Pt left positioned in bed with HOB locked in 30 degrees after eating meal with call bell in reach, bed alarm on, fall mats in place and bed rails up  Prognosis Fair   Problem List Decreased strength;Decreased endurance; Impaired balance;Decreased mobility   Barriers to Discharge Decreased caregiver support; Inaccessible home environment   Plan   Treatment/Interventions ADL retraining;Functional transfer training; Therapeutic exercise; Endurance training;Cognitive reorientation;Patient/family training;Equipment eval/education; Bed mobility; Compensatory technique education   Progress Slow progress, cognitive deficits   OT Therapy Minutes   OT Time In 1040   OT Time Out 1150   OT Total Time (minutes) 70   OT Mode of treatment - Individual (minutes) 70   OT Mode of treatment - Concurrent (minutes) 0   OT Mode of treatment - Group (minutes) 0   OT Mode of treatment - Co-treat (minutes) 0   OT Mode of Treatment - Total time(minutes) 70 minutes   OT Cumulative Minutes 505   Therapy Time missed   Time missed?  No

## 2023-04-22 RX ADMIN — LEVETIRACETAM 500 MG: 500 TABLET, FILM COATED ORAL at 20:25

## 2023-04-22 RX ADMIN — ATORVASTATIN CALCIUM 40 MG: 40 TABLET, FILM COATED ORAL at 18:57

## 2023-04-22 RX ADMIN — MEMANTINE 10 MG: 10 TABLET ORAL at 18:57

## 2023-04-22 RX ADMIN — DEXAMETHASONE 4 MG: 4 TABLET ORAL at 20:25

## 2023-04-22 RX ADMIN — DEXAMETHASONE 4 MG: 4 TABLET ORAL at 09:25

## 2023-04-22 RX ADMIN — MEMANTINE 10 MG: 10 TABLET ORAL at 09:25

## 2023-04-22 RX ADMIN — LEVETIRACETAM 500 MG: 500 TABLET, FILM COATED ORAL at 09:25

## 2023-04-22 RX ADMIN — ENOXAPARIN SODIUM 40 MG: 40 INJECTION SUBCUTANEOUS at 09:25

## 2023-04-22 NOTE — PROGRESS NOTES
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  • Chemotherapy on hold until pt discharged - no more than 2 weeks  • S/p whole brain radiation x 10 treatments  Completed 4/12/23  • Continue Decadron taper, Keppra and memantine  • Follow-up with Dr Jeb Knight and radiation oncology  • Will need to update oncology with plan for dc since it has changed  • Palliative Care now following      Pneumothorax  • Occurred after lung biopsy from 3/17/23  • S/p chest tube  • Resolved      LLL PNA  • S/p tx with Cefepime  • stable     Behavioral issues  • Brain lesions are B/L frontal likely contributing  • Refuses therapy on and off and can at times yell at staff      HTN  • Stable on no medications  • Continue to monitor        Discharge date:  SNF    The above assessment and plan was reviewed and updated as determined by my evaluation of the patient on 4/22/2023      Labs:   Results from last 7 days   Lab Units 04/20/23  0537   WBC Thousand/uL 9 18   HEMOGLOBIN g/dL 12 1   HEMATOCRIT % 37 4   PLATELETS Thousands/uL 181     Results from last 7 days   Lab Units 04/20/23  0537   SODIUM mmol/L 137   POTASSIUM mmol/L 3 8   CHLORIDE mmol/L 106   CO2 mmol/L 29   BUN mg/dL 35*   CREATININE mg/dL 0 48*   CALCIUM mg/dL 8 5                   Review of Scheduled Meds:  Current Facility-Administered Medications   Medication Dose Route Frequency Provider Last Rate   • acetaminophen  650 mg Oral Q6H PRN MARY BETH Momin     • atorvastatin  40 mg Oral QPM MARY BETH Momin     • dexamethasone  4 mg Oral Q12H [address] MARY BETH Momin      Followed by   • [START ON 4/28/2023] dexamethasone  4 mg Oral Daily MARY BETH Momin     • enoxaparin  40 mg Subcutaneous Daily MARY BETH Gan     • levETIRAcetam  500 mg Oral Q12H [address] MARY BETH Momin     • memantine  10 mg Oral BID MARY BETH Momin     • pantoprazole  40 mg Oral Early Morning MARY BETH Momin         Subjective/ HPI: Patient seen and examined  Patients overnight issues or events were reviewed with nursing or staff during rounds or morning huddle session  New or overnight issues include the following:     Pt seen in his room eating breakfast  He denies any current complaints  ROS:   A 10 point ROS was performed; negative except as noted above  Imaging:     No orders to display       *Labs /Radiology studies reviewed  *Medications reviewed and reconciled as needed  *Please refer to order section for additional ordered labs studies  *Case discussed with primary attending during morning huddle case rounds    Physical Examination:  Vitals:   Vitals:    04/21/23 0512 04/21/23 1415 04/21/23 2328 04/22/23 0521   BP: 110/70 102/60 113/65 114/63   BP Location: Right arm Right arm     Pulse: 66 69 60 (!) 54   Resp: 18 17 14 20   Temp: 98 3 °F (36 8 °C) 98 3 °F (36 8 °C) 98 8 °F (37 1 °C) 98 2 °F (36 8 °C)   TempSrc: Oral Oral Axillary Oral   SpO2: 97% 90% 92% 93%   Weight:       Height:           GEN: NAD, eating breakfast, cooperative today  NEURO: Alert and oriented x2; remains impulsive   HEENT: Pupils are equal and reactive, EOMI, mucous membranes are moist, face symmetrical; [place]  CV: S1 S2 regular, no MRG, no peripheral edema noted  RESP: Lungs are decreased throughout, no wheezes, rales or rhonchi noted, on room air, respirations easy and non labored  GI: Flat, soft non tender, non distended; +BS x4  : Voiding without difficulty  MUSC: Moves all extremities, generalized deconditioning noted   SKIN: pink, warm and dry, normal turgor, no rashes, lesions          The above physical exam was reviewed and updated as determined by my evaluation of the patient on 4/22/2023      Invasive Devices     None                    VTE Pharmacologic Prophylaxis: Enoxaparin  Code Status: Level 2 - DNAR: but accepts endotracheal intubation  Current Length of Stay: 8 day(s)      Total time spent:  30 minutes with more than 50% spent counseling/coordinating care  Counseling includes discussion with patient re: progress  and discussion with patient of his/her current medical state/information  Coordination of patient's care was performed in conjunction with primary service  Time invested included review of patient's labs, vitals, and management of their comorbidities with continued monitoring  In addition, this patient was discussed with medical team including physician and advanced extenders  The care of the patient was extensively discussed and appropriate treatment plan was formulated unique for this patient  Medical decision making for the day was made by supervising physician unless otherwise noted in their attestation statement  ** Please Note:  voice to text software may have been used in the creation of this document   Although proof errors in transcription or interpretation are a potential of such software**

## 2023-04-22 NOTE — PROGRESS NOTES
04/22/23 0830   Pain Assessment   Pain Assessment Tool 0-10   Pain Score No Pain   Restrictions/Precautions   Precautions Bed/chair alarms;Cognitive; Fall Risk;Hard of hearing;Pain;Supervision on toilet/commode  (bedrails, fall mats)   Weight Bearing Restrictions No   ROM Restrictions No   Eating   Type of Assistance Needed Supervision;Verbal cues   Physical Assistance Level No physical assistance   Comment Pt full Sup for breakfast meal while seated EOB, pt required vc's to slow down pace of bringing food to mouth, as pt tends to 'shovel food', P carryover of cues  Pt cont to require cues to use utensils instead of hands to bring food to mouth, P carryover   Eating CARE Score 4   Oral Hygiene   Reason if not Attempted Refused to perform   Oral Hygiene CARE Score 7   Grooming   Able To Wash/Dry Face;Wash/Dry Hands   Limitation Noted In Problem Solving; Safety; Sequencing;Strength;Timeliness   Findings Sup for all grooming tasks while seated EOB, with vc's required for task initiation and sequencing   Shower/Bathe Self   Type of Assistance Needed Physical assistance;Verbal cues   Physical Assistance Level 51%-75%   Comment Pt engaged in sponge bath while seated EOB, able to wash 7/10 parts, including UB, hadley, and B/L upper legs while seated w/Sup and frequent vc's for encouragement and task initiation  ModA in stance for balance w/assist to wash rear  Pt cont to require A to wash lower legs/feet  Shower/Bathe Self CARE Score 2   Bathing   Assessed Bath Style Sponge Bath   Anticipated D/C Bath Style Sponge Bath   Able to Gather/Transport No   Able to [employer] Temperature No   Able to Wash/Rinse/Dry (body part) Left Arm;Right Arm;L Upper Leg;R Upper Leg;Chest;Abdomen;Perineal Area   Limitations Noted in Balance; Endurance;Problem Solving;ROM;Safety; Sequencing;Strength;Timeliness   Positioning Seated;Standing   Tub/Shower Transfer   Reason Not Assessed Sponge Bath   Upper Body Dressing   Type of Assistance Needed Physical assistance;Verbal cues   Physical Assistance Level 26%-50%   Comment seated EOB to doff/don OH shirt, cont to require A to fully manage shirt down back   Upper Body Dressing CARE Score 3   Lower Body Dressing   Type of Assistance Needed Physical assistance;Verbal cues   Physical Assistance Level 76% or more   Comment seated EOB pt able to partially thread BLEs into pants, w/ModA in stance for balance and cont to require A for CM up over hips, no LOB  Cont to require TotalA to doff/don tab brief for incontinence   Lower Body Dressing CARE Score 2   Putting On/Taking Off Footwear   Type of Assistance Needed Physical assistance   Physical Assistance Level Total assistance   Comment dependent to doff/don socks   Putting On/Taking Off Footwear CARE Score 1   Dressing/Undressing Clothing   Remove UB Clothes Pullover Shirt   Don UB Clothes Pullover Shirt   Remove LB Clothes Undergarment;Socks   Don LB Clothes Pants; Undergarment;Socks   Limitations Noted In Balance; Coordination; Endurance;Problem Solving; Safety; Sequencing;Strength;ROM; Timeliness;Vision   Positioning Sit Edge Of Bed;Standing   Sit to Lying   Type of Assistance Needed Supervision   Physical Assistance Level No physical assistance   Comment HOB flat, bedrails   Sit to Lying CARE Score 4   Lying to Sitting on Side of Bed   Type of Assistance Needed Supervision;Verbal cues   Physical Assistance Level No physical assistance   Comment HOB flat, vc's required for encouragement   Lying to Sitting on Side of Bed CARE Score 4   Sit to Stand   Type of Assistance Needed Physical assistance;Verbal cues   Physical Assistance Level 26%-50%   Comment no AD, Ax1 in front   Sit to Stand CARE Score 3   Exercise Tools   Exercise Tools Yes   Other Exercise Tool 1 Seated w/Sup, [id number] each of alternating UE bicep curls, chest press, OH press, prograde rowing, and retrograde rowing, using 2# DB, for UE strengthening for increased safety and indep during fxl transfers and sit<>stands  Pt tolerated with mod vc's for technique and encouragement  Pt required rest breaks between sets to manage fatigue  Cognition   Overall Cognitive Status Impaired   Arousal/Participation Alert; Cooperative   Attention Attends with cues to redirect   Orientation Level Oriented to person;Oriented to place   Memory Decreased short term memory;Decreased recall of recent events;Decreased recall of precautions   Following Commands Follows one step commands with increased time or repetition   Activity Tolerance   Activity Tolerance Patient tolerated treatment well   Assessment   Treatment Assessment Pt seen for 90min skilled OT session focused on ADL skills retraining, bed mobility, static standing balance/tolerance during LB ADLs, and UE strengthening, for increased independence w/ADLs and decreased caregiver burden  See detailed descriptions of fxl performance above  Pt tolerated session well, but continues to be limited by decreased fxl cognition as well as fatigue, with pt requiring frequent vc's for task initiation and encouragement, as pt continues to frequently request that therapist completes tasks for him that he has demonstrated ability to complete himself, such as threading LE's into pants during LB dressing routine  Pt would benefit from continued skilled OT focused on ADL retraining, w/c mobility, SPTs w/c<>BSC, orientation to time, STM/recall, and fxl attention  Pt was left positioned in bed with HOB locked in 30 degrees after eating breakfast meal with call bell in reach, bed alarm on, fall mats in place and bed rails up  Prognosis Fair   Problem List Decreased strength;Decreased endurance; Impaired balance;Decreased mobility; Decreased cognition; Impaired judgement;Decreased safety awareness; Impaired vision; Impaired hearing   Barriers to Discharge Inaccessible home environment;Decreased caregiver support   Plan   Treatment/Interventions ADL retraining;Functional transfer training; Therapeutic exercise; Endurance training;Cognitive reorientation;Patient/family training;Equipment eval/education; Bed mobility; Compensatory technique education   Progress Slow progress, cognitive deficits   Recommendation   OT Discharge Recommendation Post acute rehabilitation services   OT Therapy Minutes   OT Time In 0830   OT Time Out 1000   OT Total Time (minutes) 90   OT Mode of treatment - Individual (minutes) 90   OT Mode of treatment - Concurrent (minutes) 0   OT Mode of treatment - Group (minutes) 0   OT Mode of treatment - Co-treat (minutes) 0   OT Mode of Treatment - Total time(minutes) 90 minutes   OT Cumulative Minutes 655   Therapy Time missed   Time missed?  No

## 2023-04-22 NOTE — PLAN OF CARE
Problem: PAIN - ADULT  Goal: Verbalizes/displays adequate comfort level or baseline comfort level  Description: Interventions:  - Encourage patient to monitor pain and request assistance  - Assess pain using appropriate pain scale  - Administer analgesics based on type and severity of pain and evaluate response  - Implement non-pharmacological measures as appropriate and evaluate response  - Consider cultural and social influences on pain and pain management  - Notify physician/advanced practitioner if interventions unsuccessful or patient reports new pain  Outcome: Progressing     Problem: INFECTION - ADULT  Goal: Absence or prevention of progression during hospitalization  Description: INTERVENTIONS:  - Assess and monitor for signs and symptoms of infection  - Monitor lab/diagnostic results  - Monitor all insertion sites, i e  indwelling lines, tubes, and drains  - Monitor endotracheal if appropriate and nasal secretions for changes in amount and color  - [place] appropriate cooling/warming therapies per order  - Administer medications as ordered  - Instruct and encourage patient and family to use good hand hygiene technique  - Identify and instruct in appropriate isolation precautions for identified infection/condition  Outcome: Progressing  Goal: Absence of fever/infection during neutropenic period  Description: INTERVENTIONS:  - Monitor WBC    Outcome: Progressing     Problem: SAFETY ADULT  Goal: Patient will remain free of falls  Description: INTERVENTIONS:  - Educate patient/family on patient safety including physical limitations  - Instruct patient to call for assistance with activity   - Consult OT/PT to assist with strengthening/mobility   - Keep Call bell within reach  - Keep bed low and locked with side rails adjusted as appropriate  - Keep care items and personal belongings within reach  - Initiate and maintain comfort rounds  - Make Fall Risk Sign visible to staff  - Offer Toileting every Hours, in advance of need  - Initiate/Maintain alarm  - Obtain necessary fall risk management equipment:   - Apply yellow socks and bracelet for high fall risk patients  - Consider moving patient to room near nurses station  Outcome: Progressing  Goal: Maintain or return to baseline ADL function  Description: INTERVENTIONS:  -  Assess patient's ability to carry out ADLs; assess patient's baseline for ADL function and identify physical deficits which impact ability to perform ADLs (bathing, care of mouth/teeth, toileting, grooming, dressing, etc )  - Assess/evaluate cause of self-care deficits   - Assess range of motion  - Assess patient's mobility; develop plan if impaired  - Assess patient's need for assistive devices and provide as appropriate  - Encourage maximum independence but intervene and supervise when necessary  - Involve family in performance of ADLs  - Assess for home care needs following discharge   - Consider OT consult to assist with ADL evaluation and planning for discharge  - Provide patient education as appropriate  Outcome: Progressing  Goal: Maintains/Returns to pre admission functional level  Description: INTERVENTIONS:  - Perform BMAT or MOVE assessment daily    - Set and communicate daily mobility goal to care team and patient/family/caregiver  - Collaborate with rehabilitation services on mobility goals if consulted  - Perform Range of Motion  times a day  - Reposition patient every  hours    - Dangle patient  times a day  - Stand patient times a day  - Ambulate patient  times a day  - Out of bed to chair  times a day   - Out of bed for meals  times a day  - Out of bed for toileting  - Record patient progress and toleration of activity level   Outcome: Progressing     Problem: DISCHARGE PLANNING  Goal: Discharge to home or other facility with appropriate resources  Description: INTERVENTIONS:  - Identify barriers to discharge w/patient and caregiver  - Arrange for needed discharge resources and transportation as appropriate  - Identify discharge learning needs (meds, wound care, etc )  - Arrange for interpretive services to assist at discharge as needed  - Refer to Case Management Department for coordinating discharge planning if the patient needs post-hospital services based on physician/advanced practitioner order or complex needs related to functional status, cognitive ability, or social support system  Outcome: Progressing     Problem: Nutrition/Hydration-ADULT  Goal: Nutrient/Hydration intake appropriate for improving, restoring or maintaining nutritional needs  Description: Monitor and assess patient's nutrition/hydration status for malnutrition  Collaborate with interdisciplinary team and initiate plan and interventions as ordered  Monitor patient's weight and dietary intake as ordered or per policy  Utilize nutrition screening tool and intervene as necessary  Determine patient's food preferences and provide high-protein, high-caloric foods as appropriate       INTERVENTIONS:  - Monitor oral intake, urinary output, labs, and treatment plans  - Assess nutrition and hydration status and recommend course of action  - Evaluate amount of meals eaten  - Assist patient with eating if necessary   - Allow adequate time for meals  - Recommend/ encourage appropriate diets, oral nutritional supplements, and vitamin/mineral supplements  - Order, calculate, and assess calorie counts as needed  - Recommend, monitor, and adjust tube feedings and TPN/PPN based on assessed needs  - Assess need for intravenous fluids  - Provide specific nutrition/hydration education as appropriate  - Include patient/family/caregiver in decisions related to nutrition  Outcome: Progressing     Problem: Prexisting or High Potential for Compromised Skin Integrity  Goal: Skin integrity is maintained or improved  Description: INTERVENTIONS:  - Identify patients at risk for skin breakdown  - Assess and monitor skin integrity  - Assess and monitor nutrition and hydration status  - Monitor labs   - Assess for incontinence   - Turn and reposition patient  - Assist with mobility/ambulation  - Relieve pressure over bony prominences  - Avoid friction and shearing  - Provide appropriate hygiene as needed including keeping skin clean and dry  - Evaluate need for skin moisturizer/barrier cream  - Collaborate with interdisciplinary team   - Patient/family teaching  - Consider wound care consult   Outcome: Progressing     Problem: SAFETY,RESTRAINT: NV/NON-SELF DESTRUCTIVE BEHAVIOR  Goal: Remains free of harm/injury (restraint for non violent/non self-detsructive behavior)  Description: INTERVENTIONS:  - Instruct patient/family regarding restraint use   - Assess and monitor physiologic and psychological status   - Provide interventions and comfort measures to meet assessed patient needs   - Identify and implement measures to help patient regain control  - Assess readiness for release of restraint   Outcome: Progressing  Goal: Returns to optimal restraint-free functioning  Description: INTERVENTIONS:  - Assess the patient's behavior and symptoms that indicate continued need for restraint  - Identify and implement measures to help patient regain control  - Assess readiness for release of restraint   Outcome: Progressing     Problem: PAIN - ADULT  Goal: Verbalizes/displays adequate comfort level or baseline comfort level  Description: Interventions:  - Encourage patient to monitor pain and request assistance  - Assess pain using appropriate pain scale  - Administer analgesics based on type and severity of pain and evaluate response  - Implement non-pharmacological measures as appropriate and evaluate response  - Consider cultural and social influences on pain and pain management  - Notify physician/advanced practitioner if interventions unsuccessful or patient reports new pain  Outcome: Progressing     Problem: INFECTION - ADULT  Goal: Absence or prevention of progression during hospitalization  Description: INTERVENTIONS:  - Assess and monitor for signs and symptoms of infection  - Monitor lab/diagnostic results  - Monitor all insertion sites, i e  indwelling lines, tubes, and drains  - Monitor endotracheal if appropriate and nasal secretions for changes in amount and color  - [place] appropriate cooling/warming therapies per order  - Administer medications as ordered  - Instruct and encourage patient and family to use good hand hygiene technique  - Identify and instruct in appropriate isolation precautions for identified infection/condition  Outcome: Progressing  Goal: Absence of fever/infection during neutropenic period  Description: INTERVENTIONS:  - Monitor WBC    Outcome: Progressing     Problem: SAFETY ADULT  Goal: Patient will remain free of falls  Description: INTERVENTIONS:  - Educate patient/family on patient safety including physical limitations  - Instruct patient to call for assistance with activity   - Consult OT/PT to assist with strengthening/mobility   - Keep Call bell within reach  - Keep bed low and locked with side rails adjusted as appropriate  - Keep care items and personal belongings within reach  - Initiate and maintain comfort rounds  - Make Fall Risk Sign visible to staff  - Offer Toileting every  Hours, in advance of need  - Initiate/Maintain alarm  - Obtain necessary fall risk management equipment:   - Apply yellow socks and bracelet for high fall risk patients  - Consider moving patient to room near nurses station  Outcome: Progressing  Goal: Maintain or return to baseline ADL function  Description: INTERVENTIONS:  -  Assess patient's ability to carry out ADLs; assess patient's baseline for ADL function and identify physical deficits which impact ability to perform ADLs (bathing, care of mouth/teeth, toileting, grooming, dressing, etc )  - Assess/evaluate cause of self-care deficits   - Assess range of motion  - Assess patient's mobility; develop plan if impaired  - Assess patient's need for assistive devices and provide as appropriate  - Encourage maximum independence but intervene and supervise when necessary  - Involve family in performance of ADLs  - Assess for home care needs following discharge   - Consider OT consult to assist with ADL evaluation and planning for discharge  - Provide patient education as appropriate  Outcome: Progressing  Goal: Maintains/Returns to pre admission functional level  Description: INTERVENTIONS:  - Perform BMAT or MOVE assessment daily    - Set and communicate daily mobility goal to care team and patient/family/caregiver  - Collaborate with rehabilitation services on mobility goals if consulted  - Perform Range of Motion  times a day  - Reposition patient every  hours    - Dangle patient  times a day  - Stand patient  times a day  - Ambulate patient  times a day  - Out of bed to chair  times a day   - Out of bed for meals times a day  - Out of bed for toileting  - Record patient progress and toleration of activity level   Outcome: Progressing     Problem: DISCHARGE PLANNING  Goal: Discharge to home or other facility with appropriate resources  Description: INTERVENTIONS:  - Identify barriers to discharge w/patient and caregiver  - Arrange for needed discharge resources and transportation as appropriate  - Identify discharge learning needs (meds, wound care, etc )  - Arrange for interpretive services to assist at discharge as needed  - Refer to Case Management Department for coordinating discharge planning if the patient needs post-hospital services based on physician/advanced practitioner order or complex needs related to functional status, cognitive ability, or social support system  Outcome: Progressing     Problem: Nutrition/Hydration-ADULT  Goal: Nutrient/Hydration intake appropriate for improving, restoring or maintaining nutritional needs  Description: Monitor and assess patient's nutrition/hydration status for malnutrition  Collaborate with interdisciplinary team and initiate plan and interventions as ordered  Monitor patient's weight and dietary intake as ordered or per policy  Utilize nutrition screening tool and intervene as necessary  Determine patient's food preferences and provide high-protein, high-caloric foods as appropriate       INTERVENTIONS:  - Monitor oral intake, urinary output, labs, and treatment plans  - Assess nutrition and hydration status and recommend course of action  - Evaluate amount of meals eaten  - Assist patient with eating if necessary   - Allow adequate time for meals  - Recommend/ encourage appropriate diets, oral nutritional supplements, and vitamin/mineral supplements  - Order, calculate, and assess calorie counts as needed  - Recommend, monitor, and adjust tube feedings and TPN/PPN based on assessed needs  - Assess need for intravenous fluids  - Provide specific nutrition/hydration education as appropriate  - Include patient/family/caregiver in decisions related to nutrition  Outcome: Progressing     Problem: Prexisting or High Potential for Compromised Skin Integrity  Goal: Skin integrity is maintained or improved  Description: INTERVENTIONS:  - Identify patients at risk for skin breakdown  - Assess and monitor skin integrity  - Assess and monitor nutrition and hydration status  - Monitor labs   - Assess for incontinence   - Turn and reposition patient  - Assist with mobility/ambulation  - Relieve pressure over bony prominences  - Avoid friction and shearing  - Provide appropriate hygiene as needed including keeping skin clean and dry  - Evaluate need for skin moisturizer/barrier cream  - Collaborate with interdisciplinary team   - Patient/family teaching  - Consider wound care consult   Outcome: Progressing     Problem: SAFETY,RESTRAINT: NV/NON-SELF DESTRUCTIVE BEHAVIOR  Goal: Remains free of harm/injury (restraint for non violent/non self-detsructive behavior)  Description: INTERVENTIONS:  - Instruct patient/family regarding restraint use   - Assess and monitor physiologic and psychological status   - Provide interventions and comfort measures to meet assessed patient needs   - Identify and implement measures to help patient regain control  - Assess readiness for release of restraint   Outcome: Progressing  Goal: Returns to optimal restraint-free functioning  Description: INTERVENTIONS:  - Assess the patient's behavior and symptoms that indicate continued need for restraint  - Identify and implement measures to help patient regain control  - Assess readiness for release of restraint   Outcome: Progressing     Problem: PAIN - ADULT  Goal: Verbalizes/displays adequate comfort level or baseline comfort level  Description: Interventions:  - Encourage patient to monitor pain and request assistance  - Assess pain using appropriate pain scale  - Administer analgesics based on type and severity of pain and evaluate response  - Implement non-pharmacological measures as appropriate and evaluate response  - Consider cultural and social influences on pain and pain management  - Notify physician/advanced practitioner if interventions unsuccessful or patient reports new pain  Outcome: Progressing     Problem: INFECTION - ADULT  Goal: Absence or prevention of progression during hospitalization  Description: INTERVENTIONS:  - Assess and monitor for signs and symptoms of infection  - Monitor lab/diagnostic results  - Monitor all insertion sites, i e  indwelling lines, tubes, and drains  - Monitor endotracheal if appropriate and nasal secretions for changes in amount and color  - [place] appropriate cooling/warming therapies per order  - Administer medications as ordered  - Instruct and encourage patient and family to use good hand hygiene technique  - Identify and instruct in appropriate isolation precautions for identified infection/condition  Outcome: Progressing  Goal: Absence of fever/infection during neutropenic period  Description: INTERVENTIONS:  - Monitor WBC    Outcome: Progressing     Problem: SAFETY ADULT  Goal: Patient will remain free of falls  Description: INTERVENTIONS:  - Educate patient/family on patient safety including physical limitations  - Instruct patient to call for assistance with activity   - Consult OT/PT to assist with strengthening/mobility   - Keep Call bell within reach  - Keep bed low and locked with side rails adjusted as appropriate  - Keep care items and personal belongings within reach  - Initiate and maintain comfort rounds  - Make Fall Risk Sign visible to staff  - Offer Toileting every Hours, in advance of need  - Initiate/Maintain alarm  - Obtain necessary fall risk management equipment:   - Apply yellow socks and bracelet for high fall risk patients  - Consider moving patient to room near nurses station  Outcome: Progressing  Goal: Maintain or return to baseline ADL function  Description: INTERVENTIONS:  -  Assess patient's ability to carry out ADLs; assess patient's baseline for ADL function and identify physical deficits which impact ability to perform ADLs (bathing, care of mouth/teeth, toileting, grooming, dressing, etc )  - Assess/evaluate cause of self-care deficits   - Assess range of motion  - Assess patient's mobility; develop plan if impaired  - Assess patient's need for assistive devices and provide as appropriate  - Encourage maximum independence but intervene and supervise when necessary  - Involve family in performance of ADLs  - Assess for home care needs following discharge   - Consider OT consult to assist with ADL evaluation and planning for discharge  - Provide patient education as appropriate  Outcome: Progressing  Goal: Maintains/Returns to pre admission functional level  Description: INTERVENTIONS:  - Perform BMAT or MOVE assessment daily    - Set and communicate daily mobility goal to care team and patient/family/caregiver     - Collaborate with rehabilitation services on mobility goals if consulted  - Perform Range of Motion times a day  - Reposition patient every hours  - Dangle patient  times a day  - Stand patient  times a day  - Ambulate patient  times a day  - Out of bed to chair  times a day   - Out of bed for meals  times a day  - Out of bed for toileting  - Record patient progress and toleration of activity level   Outcome: Progressing     Problem: DISCHARGE PLANNING  Goal: Discharge to home or other facility with appropriate resources  Description: INTERVENTIONS:  - Identify barriers to discharge w/patient and caregiver  - Arrange for needed discharge resources and transportation as appropriate  - Identify discharge learning needs (meds, wound care, etc )  - Arrange for interpretive services to assist at discharge as needed  - Refer to Case Management Department for coordinating discharge planning if the patient needs post-hospital services based on physician/advanced practitioner order or complex needs related to functional status, cognitive ability, or social support system  Outcome: Progressing     Problem: Nutrition/Hydration-ADULT  Goal: Nutrient/Hydration intake appropriate for improving, restoring or maintaining nutritional needs  Description: Monitor and assess patient's nutrition/hydration status for malnutrition  Collaborate with interdisciplinary team and initiate plan and interventions as ordered  Monitor patient's weight and dietary intake as ordered or per policy  Utilize nutrition screening tool and intervene as necessary  Determine patient's food preferences and provide high-protein, high-caloric foods as appropriate       INTERVENTIONS:  - Monitor oral intake, urinary output, labs, and treatment plans  - Assess nutrition and hydration status and recommend course of action  - Evaluate amount of meals eaten  - Assist patient with eating if necessary   - Allow adequate time for meals  - Recommend/ encourage appropriate diets, oral nutritional supplements, and vitamin/mineral supplements  - Order, calculate, and assess calorie counts as needed  - Recommend, monitor, and adjust tube feedings and TPN/PPN based on assessed needs  - Assess need for intravenous fluids  - Provide specific nutrition/hydration education as appropriate  - Include patient/family/caregiver in decisions related to nutrition  Outcome: Progressing     Problem: Prexisting or High Potential for Compromised Skin Integrity  Goal: Skin integrity is maintained or improved  Description: INTERVENTIONS:  - Identify patients at risk for skin breakdown  - Assess and monitor skin integrity  - Assess and monitor nutrition and hydration status  - Monitor labs   - Assess for incontinence   - Turn and reposition patient  - Assist with mobility/ambulation  - Relieve pressure over bony prominences  - Avoid friction and shearing  - Provide appropriate hygiene as needed including keeping skin clean and dry  - Evaluate need for skin moisturizer/barrier cream  - Collaborate with interdisciplinary team   - Patient/family teaching  - Consider wound care consult   Outcome: Progressing     Problem: SAFETY,RESTRAINT: NV/NON-SELF DESTRUCTIVE BEHAVIOR  Goal: Remains free of harm/injury (restraint for non violent/non self-detsructive behavior)  Description: INTERVENTIONS:  - Instruct patient/family regarding restraint use   - Assess and monitor physiologic and psychological status   - Provide interventions and comfort measures to meet assessed patient needs   - Identify and implement measures to help patient regain control  - Assess readiness for release of restraint   Outcome: Progressing  Goal: Returns to optimal restraint-free functioning  Description: INTERVENTIONS:  - Assess the patient's behavior and symptoms that indicate continued need for restraint  - Identify and implement measures to help patient regain control  - Assess readiness for release of restraint   Outcome: Progressing

## 2023-04-23 RX ADMIN — LEVETIRACETAM 500 MG: 500 TABLET, FILM COATED ORAL at 08:06

## 2023-04-23 RX ADMIN — MEMANTINE 10 MG: 10 TABLET ORAL at 18:12

## 2023-04-23 RX ADMIN — ATORVASTATIN CALCIUM 40 MG: 40 TABLET, FILM COATED ORAL at 18:12

## 2023-04-23 RX ADMIN — LEVETIRACETAM 500 MG: 500 TABLET, FILM COATED ORAL at 21:50

## 2023-04-23 RX ADMIN — PANTOPRAZOLE SODIUM 40 MG: 40 TABLET, DELAYED RELEASE ORAL at 05:39

## 2023-04-23 RX ADMIN — ENOXAPARIN SODIUM 40 MG: 40 INJECTION SUBCUTANEOUS at 08:06

## 2023-04-23 RX ADMIN — DEXAMETHASONE 4 MG: 4 TABLET ORAL at 08:06

## 2023-04-23 RX ADMIN — DEXAMETHASONE 4 MG: 4 TABLET ORAL at 21:50

## 2023-04-23 RX ADMIN — MEMANTINE 10 MG: 10 TABLET ORAL at 08:06

## 2023-04-23 NOTE — PROGRESS NOTES
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  • Chemotherapy on hold until pt discharged - no more than 2 weeks  • S/p whole brain radiation x 10 treatments  Completed 4/12/23  • Continue Decadron taper, Keppra and memantine  • Follow-up with Dr Zaria French and radiation oncology  • Will need to update oncology with plan for dc since it has changed  • Palliative Care now following      Pneumothorax  • Occurred after lung biopsy from 3/17/23  • S/p chest tube  • Resolved      LLL PNA  • S/p tx with Cefepime  • stable     Behavioral issues  • Brain lesions are B/L frontal likely contributing  • Refuses therapy on and off and can at times yell at staff      HTN  • Stable on no medications  • Continue to monitor        Discharge date:  SNF    The above assessment and plan was reviewed and updated as determined by my evaluation of the patient on 4/23/2023      Labs:   Results from last 7 days   Lab Units 04/20/23  0537   WBC Thousand/uL 9 18   HEMOGLOBIN g/dL 12 1   HEMATOCRIT % 37 4   PLATELETS Thousands/uL 181     Results from last 7 days   Lab Units 04/20/23  0537   SODIUM mmol/L 137   POTASSIUM mmol/L 3 8   CHLORIDE mmol/L 106   CO2 mmol/L 29   BUN mg/dL 35*   CREATININE mg/dL 0 48*   CALCIUM mg/dL 8 5                   Review of Scheduled Meds:  Current Facility-Administered Medications   Medication Dose Route Frequency Provider Last Rate   • acetaminophen  650 mg Oral Q6H PRN MARY BETH Momin     • atorvastatin  40 mg Oral QPM MARY BETH Momin     • dexamethasone  4 mg Oral Q12H [address] MARY BETH Momin      Followed by   • [START ON 4/28/2023] dexamethasone  4 mg Oral Daily MARY BETH Momin     • enoxaparin  40 mg Subcutaneous Daily MARY BETH Cordero     • levETIRAcetam  500 mg Oral Q12H [facility] MARY BETH Momin     • memantine  10 mg Oral BID MARY BETH Momin     • pantoprazole  40 mg Oral Early Morning MARY BETH Momin         Subjective/ HPI: Patient seen and examined  Patients overnight issues or events were reviewed with nursing or staff during rounds or morning huddle session  New or overnight issues include the following:     Pt seen in his room  He denies any current complaints  ROS:   A 10 point ROS was performed; negative except as noted above  Imaging:     No orders to display       *Labs /Radiology studies reviewed  *Medications reviewed and reconciled as needed  *Please refer to order section for additional ordered labs studies  *Case discussed with primary attending during morning huddle case rounds    Physical Examination:  Vitals:   Vitals:    04/22/23 0521 04/22/23 1515 04/22/23 2017 04/23/23 0532   BP: 114/63 92/54 90/56 123/61   BP Location:  Right arm Right arm Right arm   Pulse: (!) 54 60 89 69   Resp: 20 20 20 20   Temp: 98 2 °F (36 8 °C) 98 3 °F (36 8 °C) 98 °F (36 7 °C) 98 3 °F (36 8 °C)   TempSrc: Oral Oral Oral Oral   SpO2: 93% 93% 93% 99%   Weight:       Height:           GEN: NAD, conversive  NEURO: Alert and oriented x2; remains impulsive   HEENT: Pupils are equal and reactive, EOMI, mucous membranes are moist, face symmetrical; [place]  CV: S1 S2 regular, no MRG, no peripheral edema noted  RESP: Lungs are decreased throughout, no wheezes, rales or rhonchi noted, on room air, respirations easy and non labored  GI: Flat, soft non tender, non distended; +BS x4  : Voiding without difficulty  MUSC: Moves all extremities, generalized deconditioning noted   SKIN: pink, warm and dry, normal turgor, no rashes, lesions          The above physical exam was reviewed and updated as determined by my evaluation of the patient on 4/23/2023      Invasive Devices     None                    VTE Pharmacologic Prophylaxis: Enoxaparin  Code Status: Level 2 - DNAR: but accepts endotracheal intubation  Current Length of Stay: 9 day(s)      Total time spent:  30 minutes with more than 50% spent counseling/coordinating care  Counseling includes discussion with patient re: progress  and discussion with patient of his/her current medical state/information  Coordination of patient's care was performed in conjunction with primary service  Time invested included review of patient's labs, vitals, and management of their comorbidities with continued monitoring  In addition, this patient was discussed with medical team including physician and advanced extenders  The care of the patient was extensively discussed and appropriate treatment plan was formulated unique for this patient  Medical decision making for the day was made by supervising physician unless otherwise noted in their attestation statement  ** Please Note:  voice to text software may have been used in the creation of this document   Although proof errors in transcription or interpretation are a potential of such software**

## 2023-04-23 NOTE — PLAN OF CARE
Problem: PAIN - ADULT  Goal: Verbalizes/displays adequate comfort level or baseline comfort level  Description: Interventions:  - Encourage patient to monitor pain and request assistance  - Assess pain using appropriate pain scale  - Administer analgesics based on type and severity of pain and evaluate response  - Implement non-pharmacological measures as appropriate and evaluate response  - Consider cultural and social influences on pain and pain management  - Notify physician/advanced practitioner if interventions unsuccessful or patient reports new pain  Outcome: Progressing     Problem: INFECTION - ADULT  Goal: Absence or prevention of progression during hospitalization  Description: INTERVENTIONS:  - Assess and monitor for signs and symptoms of infection  - Monitor lab/diagnostic results  - Monitor all insertion sites, i e  indwelling lines, tubes, and drains  - Monitor endotracheal if appropriate and nasal secretions for changes in amount and color  - [place] appropriate cooling/warming therapies per order  - Administer medications as ordered  - Instruct and encourage patient and family to use good hand hygiene technique  - Identify and instruct in appropriate isolation precautions for identified infection/condition  Outcome: Progressing  Goal: Absence of fever/infection during neutropenic period  Description: INTERVENTIONS:  - Monitor WBC    Outcome: Progressing     Problem: SAFETY ADULT  Goal: Patient will remain free of falls  Description: INTERVENTIONS:  - Educate patient/family on patient safety including physical limitations  - Instruct patient to call for assistance with activity   - Consult OT/PT to assist with strengthening/mobility   - Keep Call bell within reach  - Keep bed low and locked with side rails adjusted as appropriate  - Keep care items and personal belongings within reach  - Initiate and maintain comfort rounds  - Make Fall Risk Sign visible to staff  - Apply yellow socks and bracelet for high fall risk patients  - Consider moving patient to room near nurses station  Outcome: Progressing  Goal: Maintain or return to baseline ADL function  Description: INTERVENTIONS:  -  Assess patient's ability to carry out ADLs; assess patient's baseline for ADL function and identify physical deficits which impact ability to perform ADLs (bathing, care of mouth/teeth, toileting, grooming, dressing, etc )  - Assess/evaluate cause of self-care deficits   - Assess range of motion  - Assess patient's mobility; develop plan if impaired  - Assess patient's need for assistive devices and provide as appropriate  - Encourage maximum independence but intervene and supervise when necessary  - Involve family in performance of ADLs  - Assess for home care needs following discharge   - Consider OT consult to assist with ADL evaluation and planning for discharge  - Provide patient education as appropriate  Outcome: Progressing  Goal: Maintains/Returns to pre admission functional level  Description: INTERVENTIONS:  - Perform BMAT or MOVE assessment daily    - Set and communicate daily mobility goal to care team and patient/family/caregiver     - Collaborate with rehabilitation services on mobility goals if consulted  - Out of bed for toileting  - Record patient progress and toleration of activity level   Outcome: Progressing     Problem: DISCHARGE PLANNING  Goal: Discharge to home or other facility with appropriate resources  Description: INTERVENTIONS:  - Identify barriers to discharge w/patient and caregiver  - Arrange for needed discharge resources and transportation as appropriate  - Identify discharge learning needs (meds, wound care, etc )  - Arrange for interpretive services to assist at discharge as needed  - Refer to Case Management Department for coordinating discharge planning if the patient needs post-hospital services based on physician/advanced practitioner order or complex needs related to functional status, cognitive ability, or social support system  Outcome: Progressing     Problem: Nutrition/Hydration-ADULT  Goal: Nutrient/Hydration intake appropriate for improving, restoring or maintaining nutritional needs  Description: Monitor and assess patient's nutrition/hydration status for malnutrition  Collaborate with interdisciplinary team and initiate plan and interventions as ordered  Monitor patient's weight and dietary intake as ordered or per policy  Utilize nutrition screening tool and intervene as necessary  Determine patient's food preferences and provide high-protein, high-caloric foods as appropriate       INTERVENTIONS:  - Monitor oral intake, urinary output, labs, and treatment plans  - Assess nutrition and hydration status and recommend course of action  - Evaluate amount of meals eaten  - Assist patient with eating if necessary   - Allow adequate time for meals  - Recommend/ encourage appropriate diets, oral nutritional supplements, and vitamin/mineral supplements  - Order, calculate, and assess calorie counts as needed  - Recommend, monitor, and adjust tube feedings and TPN/PPN based on assessed needs  - Assess need for intravenous fluids  - Provide specific nutrition/hydration education as appropriate  - Include patient/family/caregiver in decisions related to nutrition  Outcome: Progressing     Problem: Prexisting or High Potential for Compromised Skin Integrity  Goal: Skin integrity is maintained or improved  Description: INTERVENTIONS:  - Identify patients at risk for skin breakdown  - Assess and monitor skin integrity  - Assess and monitor nutrition and hydration status  - Monitor labs   - Assess for incontinence   - Turn and reposition patient  - Assist with mobility/ambulation  - Relieve pressure over bony prominences  - Avoid friction and shearing  - Provide appropriate hygiene as needed including keeping skin clean and dry  - Evaluate need for skin moisturizer/barrier cream  - Collaborate with interdisciplinary team   - Patient/family teaching  - Consider wound care consult   Outcome: Progressing     Problem: SAFETY,RESTRAINT: NV/NON-SELF DESTRUCTIVE BEHAVIOR  Goal: Remains free of harm/injury (restraint for non violent/non self-detsructive behavior)  Description: INTERVENTIONS:  - Instruct patient/family regarding restraint use   - Assess and monitor physiologic and psychological status   - Provide interventions and comfort measures to meet assessed patient needs   - Identify and implement measures to help patient regain control  - Assess readiness for release of restraint   Outcome: Progressing  Goal: Returns to optimal restraint-free functioning  Description: INTERVENTIONS:  - Assess the patient's behavior and symptoms that indicate continued need for restraint  - Identify and implement measures to help patient regain control  - Assess readiness for release of restraint   Outcome: Progressing

## 2023-04-23 NOTE — PROGRESS NOTES
"   04/23/23 1510   Pain Assessment   Pain Assessment Tool 0-10   Pain Score No Pain   Restrictions/Precautions   Precautions Bed/chair alarms;Cognitive; Fall Risk;Hard of hearing;Supervision on toilet/commode;Visual deficit   Comprehension   Comprehension (FIM) 3 - Understands basic info/conversation 50-74% of time   Expression   Expression (FIM) 4 - Expresses basic info/needs 75-90% of time   Social Interaction   Social Interaction (FIM) 3 - Interacts 50-74% of time   Problem Solving   Problem solving (FIM) 3 - Solves basic problmes 50-74% of time   Memory   Memory (FIM) 3 - Recognizes, recalls/performs 50-74%   Speech/Language/Cognition Assessment   Treatment Assessment Pt seen for skilled ST session focusing on cognitive linguistic skills  When SLP entered pt's room, pt woke to voice  Pt then directly requesting that SLP locate his call bell, which was on the other side of him, as well as his clock  Pt without independent attempts or initiation at locating these items  SLP requested pt to mute the TV and he instead hit the call bell button, requiring verbal cues and physical assist  As SLP is new to this pt's care, engaged in brief rapport building  Pt provided single word answers with minimal elaboration of responses  Pt was then oriented to month, year, place and situation  Of note, pt initially stated November as the current month but promptly correctly his response  Attempted to engage pt in a problem solving task in which he was presented with common scenarios related to the home and community settings with pt to provide solutions, but with options of solutions provided if unable to spontaneous produce solutions  Pt initially engaging in task, demonstrating moderate deficits in problem solving, however, pt then began to state \"I don't know\" and \"there's nothing I can do,\" despite being provided with options of responses, noting decreased participation in task   Pt then asked for SLP to turn the volume back on " "the TV  When SLP asked what he was listening to, pt cont to state \"I don't know  \" After more direct instructions to listen then asked again, pt able to verbalize a baseball game  Given additional time, pt also identified the two teams playing  SLP then shifted session to discussing info about himself, which pt then became responsive again and interactive  In review of biographical info, pt was accurate in recall of his address, , age, names of children and names of his SO  Targeting STM recall, pt unable to recall any types of therapy tasks that he has completed with ST team, to which SLP then provide pt with a review of recent sessions  Pt also unable to recall having completed PT session earlier today, requiring max cuing, which he continued to state that he did not complete session  At end of session, pt appropriate in orientation to time of day as he asked when dinner would be arriving, which was not yet for another ~hour  SLP then repositioned pt upright and provided with a snack-RN aware  At this time, pt is recommended for further skilled SLP services targeting overall cognitive linguistic skills in order to maximize level of independence and to reduce level of assist needed on discharge  SLP Therapy Minutes   SLP Time In 5485   SLP Time Out 5406   SLP Total Time (minutes) 30   SLP Mode of treatment - Individual (minutes) 30   SLP Mode of treatment - Concurrent (minutes) 0   SLP Mode of treatment - Group (minutes) 0   SLP Mode of treatment - Co-treat (minutes) 0   SLP Mode of Treatment - Total time(minutes) 30 minutes   SLP Cumulative Minutes 275   Therapy Time missed   Time missed?  No       "

## 2023-04-23 NOTE — PLAN OF CARE
Problem: PAIN - ADULT  Goal: Verbalizes/displays adequate comfort level or baseline comfort level  Description: Interventions:  - Encourage patient to monitor pain and request assistance  - Assess pain using appropriate pain scale  - Administer analgesics based on type and severity of pain and evaluate response  - Implement non-pharmacological measures as appropriate and evaluate response  - Consider cultural and social influences on pain and pain management  - Notify physician/advanced practitioner if interventions unsuccessful or patient reports new pain  Outcome: Progressing     Problem: INFECTION - ADULT  Goal: Absence or prevention of progression during hospitalization  Description: INTERVENTIONS:  - Assess and monitor for signs and symptoms of infection  - Monitor lab/diagnostic results  - Monitor all insertion sites, i e  indwelling lines, tubes, and drains  - Monitor endotracheal if appropriate and nasal secretions for changes in amount and color  - [place] appropriate cooling/warming therapies per order  - Administer medications as ordered  - Instruct and encourage patient and family to use good hand hygiene technique  - Identify and instruct in appropriate isolation precautions for identified infection/condition  Outcome: Progressing  Goal: Absence of fever/infection during neutropenic period  Description: INTERVENTIONS:  - Monitor WBC    Outcome: Progressing     Problem: SAFETY ADULT  Goal: Patient will remain free of falls  Description: INTERVENTIONS:  - Educate patient/family on patient safety including physical limitations  - Instruct patient to call for assistance with activity   - Consult OT/PT to assist with strengthening/mobility   - Keep Call bell within reach  - Keep bed low and locked with side rails adjusted as appropriate  - Keep care items and personal belongings within reach  - Initiate and maintain comfort rounds  - Make Fall Risk Sign visible to staff  - Offer Toileting every Hours, in advance of need  - Initiate/Maintain alarm  - Obtain necessary fall risk management equipment:   - Apply yellow socks and bracelet for high fall risk patients  - Consider moving patient to room near nurses station  Outcome: Progressing  Goal: Maintain or return to baseline ADL function  Description: INTERVENTIONS:  -  Assess patient's ability to carry out ADLs; assess patient's baseline for ADL function and identify physical deficits which impact ability to perform ADLs (bathing, care of mouth/teeth, toileting, grooming, dressing, etc )  - Assess/evaluate cause of self-care deficits   - Assess range of motion  - Assess patient's mobility; develop plan if impaired  - Assess patient's need for assistive devices and provide as appropriate  - Encourage maximum independence but intervene and supervise when necessary  - Involve family in performance of ADLs  - Assess for home care needs following discharge   - Consider OT consult to assist with ADL evaluation and planning for discharge  - Provide patient education as appropriate  Outcome: Progressing  Goal: Maintains/Returns to pre admission functional level  Description: INTERVENTIONS:  - Perform BMAT or MOVE assessment daily    - Set and communicate daily mobility goal to care team and patient/family/caregiver  - Collaborate with rehabilitation services on mobility goals if consulted  - Perform Range of Motion times a day  - Reposition patient every  hours    - Dangle patient  times a day  - Stand patient times a day  - Ambulate patient  times a day  - Out of bed to chair times a day   - Out of bed for meals  times a day  - Out of bed for toileting  - Record patient progress and toleration of activity level   Outcome: Progressing     Problem: DISCHARGE PLANNING  Goal: Discharge to home or other facility with appropriate resources  Description: INTERVENTIONS:  - Identify barriers to discharge w/patient and caregiver  - Arrange for needed discharge resources and transportation as appropriate  - Identify discharge learning needs (meds, wound care, etc )  - Arrange for interpretive services to assist at discharge as needed  - Refer to Case Management Department for coordinating discharge planning if the patient needs post-hospital services based on physician/advanced practitioner order or complex needs related to functional status, cognitive ability, or social support system  Outcome: Progressing     Problem: Nutrition/Hydration-ADULT  Goal: Nutrient/Hydration intake appropriate for improving, restoring or maintaining nutritional needs  Description: Monitor and assess patient's nutrition/hydration status for malnutrition  Collaborate with interdisciplinary team and initiate plan and interventions as ordered  Monitor patient's weight and dietary intake as ordered or per policy  Utilize nutrition screening tool and intervene as necessary  Determine patient's food preferences and provide high-protein, high-caloric foods as appropriate       INTERVENTIONS:  - Monitor oral intake, urinary output, labs, and treatment plans  - Assess nutrition and hydration status and recommend course of action  - Evaluate amount of meals eaten  - Assist patient with eating if necessary   - Allow adequate time for meals  - Recommend/ encourage appropriate diets, oral nutritional supplements, and vitamin/mineral supplements  - Order, calculate, and assess calorie counts as needed  - Recommend, monitor, and adjust tube feedings and TPN/PPN based on assessed needs  - Assess need for intravenous fluids  - Provide specific nutrition/hydration education as appropriate  - Include patient/family/caregiver in decisions related to nutrition  Outcome: Progressing     Problem: Prexisting or High Potential for Compromised Skin Integrity  Goal: Skin integrity is maintained or improved  Description: INTERVENTIONS:  - Identify patients at risk for skin breakdown  - Assess and monitor skin integrity  - Assess and monitor nutrition and hydration status  - Monitor labs   - Assess for incontinence   - Turn and reposition patient  - Assist with mobility/ambulation  - Relieve pressure over bony prominences  - Avoid friction and shearing  - Provide appropriate hygiene as needed including keeping skin clean and dry  - Evaluate need for skin moisturizer/barrier cream  - Collaborate with interdisciplinary team   - Patient/family teaching  - Consider wound care consult   Outcome: Progressing     Problem: SAFETY,RESTRAINT: NV/NON-SELF DESTRUCTIVE BEHAVIOR  Goal: Remains free of harm/injury (restraint for non violent/non self-detsructive behavior)  Description: INTERVENTIONS:  - Instruct patient/family regarding restraint use   - Assess and monitor physiologic and psychological status   - Provide interventions and comfort measures to meet assessed patient needs   - Identify and implement measures to help patient regain control  - Assess readiness for release of restraint   Outcome: Progressing  Goal: Returns to optimal restraint-free functioning  Description: INTERVENTIONS:  - Assess the patient's behavior and symptoms that indicate continued need for restraint  - Identify and implement measures to help patient regain control  - Assess readiness for release of restraint   Outcome: Progressing

## 2023-04-24 LAB — SARS-COV-2 RNA RESP QL NAA+PROBE: NEGATIVE

## 2023-04-24 RX ADMIN — MEMANTINE 10 MG: 10 TABLET ORAL at 08:11

## 2023-04-24 RX ADMIN — DEXAMETHASONE 4 MG: 4 TABLET ORAL at 08:12

## 2023-04-24 RX ADMIN — ENOXAPARIN SODIUM 40 MG: 40 INJECTION SUBCUTANEOUS at 08:11

## 2023-04-24 RX ADMIN — ATORVASTATIN CALCIUM 40 MG: 40 TABLET, FILM COATED ORAL at 17:29

## 2023-04-24 RX ADMIN — LEVETIRACETAM 500 MG: 500 TABLET, FILM COATED ORAL at 08:11

## 2023-04-24 RX ADMIN — DEXAMETHASONE 4 MG: 4 TABLET ORAL at 20:13

## 2023-04-24 RX ADMIN — PANTOPRAZOLE SODIUM 40 MG: 40 TABLET, DELAYED RELEASE ORAL at 06:28

## 2023-04-24 RX ADMIN — LEVETIRACETAM 500 MG: 500 TABLET, FILM COATED ORAL at 20:13

## 2023-04-24 RX ADMIN — MEMANTINE 10 MG: 10 TABLET ORAL at 17:28

## 2023-04-24 NOTE — PROGRESS NOTES
"   04/24/23 1100   Pain Assessment   Pain Assessment Tool 0-10   Pain Score No Pain   Restrictions/Precautions   Precautions Bed/chair alarms;Cognitive; Fall Risk;Hard of hearing;Supervision on toilet/commode;Visual deficit   Subjective   Subjective pt agreeable to perform skilled PT   ' I need to go the bathroom \"   Roll Left and Right   Type of Assistance Needed Supervision   Roll Left and Right CARE Score 4   Sit to Lying   Type of Assistance Needed Supervision   Sit to Lying CARE Score 4   Lying to Sitting on Side of Bed   Type of Assistance Needed Supervision   Lying to Sitting on Side of Bed CARE Score 4   Sit to Stand   Type of Assistance Needed Physical assistance   Physical Assistance Level 51%-75%   Comment bedrail   Sit to Stand CARE Score 2   Bed-Chair Transfer   Type of Assistance Needed Physical assistance   Physical Assistance Level 51%-75%   Comment sit pivot for xfers   Chair/Bed-to-Chair Transfer CARE Score 2   Transfer Bed/Chair/Wheelchair   Adaptive Equipment None   Toilet Transfer   Type of Assistance Needed Physical assistance   Physical Assistance Level 51%-75%   Comment SPT xfers in bathroom for toiteling   Toilet Transfer CARE Score 2   Assessment   Treatment Assessment pt agreeable to perform skille PT , but need to use the bathroom , pt assist on Lower body pants TA in WC seated position   Pt also xfers to bathroom toiteling , also upperbody short ModA     in WC lvl   treatment 30 min d/t cognition and fatigue lvl   Pt back in bed lvl with alarm on and all needs in reach    Cont POC   Barriers to Discharge Inaccessible home environment;Decreased caregiver support   Plan   Progress Slow progress, cognitive deficits   Recommendation   PT Discharge Recommendation Post acute rehabilitation services   PT Therapy Minutes   PT Time In 1100   PT Time Out 1130   PT Total Time (minutes) 30   PT Mode of treatment - Individual (minutes) 30   PT Mode of treatment - Concurrent (minutes) 0   PT Mode of " treatment - Group (minutes) 0   PT Mode of treatment - Co-treat (minutes) 0   PT Mode of Treatment - Total time(minutes) 30 minutes   PT Cumulative Minutes 640   Therapy Time missed   Time missed?  No

## 2023-04-24 NOTE — DISCHARGE SUMMARY
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 who presented to [address] [place] [address] on 04/01/23 with a one day history of shortness of breath s/p lung mass biopsy and following this he had a small apical pneumothorax  This was believed to resolve on its own, patient was stable and discharged home  Patient presented with 1 day history of shortness of breath with oxygen saturation in the  70% on room air  He was placed on Bipap then transitioned to 2L nasal cannula  Chest x-ray showed a large right sided pneumothorax with complete collapse of the right lung  A right chest tube was placed on 4/1 and on 04/02 patient was found to have subcutaneous emphysema and chest tube was removed  Trauma was consulted and placed new chest tube  Chest tube was removed on 04/07  Patient also presented with leukocytosis, elevated procalcitonin and lactic acid  The acute respiratory failure was secondary to right-sided pneumothorax with possible LLL pneumonia  Patient completed Cefepime with prn tessalon pearls for cough  Patient was able to continue with whole brain radiation treatments, completed on 04/12  Chemotherapy treatments not started, per Dr Lisa Damon chemotherapy can be held for no more than 2 weeks so patient can have rehabilitation prior to starting the treatments  PT/OT were consulted and recommend acute inpatient rehabilitation   The patient was evaluated by the Rehabilitation team and deemed an appropriate candidate for comprehensive inpatient rehabilitation and admitted to the [facility] on 4/14/2023  1:30 PM    Procedures Performed During ARC Admission: None    Acute Rehabilitation Center Course: Patient participated in a comprehensive interdisciplinary inpatient rehabilitation program which included involvment of MD, therapies (PT, OT, and/or SLP), RN, CM, SW, dietary, and psychology services  He will continue to require further rehabilitation and considered safe to be discharged to SNF to continue his recovery process  Please see below for patient's day to day management of rehabilitation needs  Please refer to Internal Medicine notes during [facility] stay for day to day management of patient's medical co-morbidities  * Acute respiratory failure with hypoxia (HCC)  Assessment & Plan  - small pneumothorax s/p lung mass biopsy 03/17  - 04/01- shortness of breath  - 04/01 - imaging showed a complete right pneumothorax  - 04/01- right chest tube placed   - 04/02- chest tube dislodges; developed subcutaneous emphysema extending to neck,   - trauma consulted; removed initial chest tube, trauma placed new chest tube to suction  - monitor oxygenation  - 04/07 chest tube removed  - CXR- LLL PNA;  Cefepime completed, tessalon pearls for cough  - Stable  - Comprehensive therapies 3 hr a day, 5-6 days a week      Non-small cell carcinoma of right lung (HCC)  Assessment & Plan  - stage IV NSCLC w/ brain metastases  - confirmed with lung biopsy 03/17  - per Dr Jj Henry; okay to hold chemotherapy, no more than 2 weeks  - Follow-up with [facility] on 04/27 with plans to start chemotherapy on 5/2    Brain mass  Assessment & Plan  - Brain metastases  - received whole brain radiation 10 treatments, completed 04/12  - continue Keppra 500 mg q12h, decadron 4mg taper, memantine 10 mg q12h  - follows with Oncology, Dr Jania Ness  - Palliative following, will contact daughter Juan Hart for [address]  - Decadron q12h through 04/27, then "daily  - Follow up with HemOnc as out patient    Unspecified protein-calorie malnutrition ([address] )  Assessment & Plan  - BMI 20 99  - nutrition consult  - nutritional supplements  - monitor I&O        Discharge Physical Examination:  BP 96/67 (BP Location: Right arm)   Pulse 74   Temp 98 1 °F (36 7 °C) (Oral)   Resp 18   Ht 5' 6\" (1 676 m)   Wt 58 kg (127 lb 13 9 oz)   SpO2 95%   BMI 20 64 kg/m²     Gen: No acute distress, Well-nourished, well-appearing  HEENT: Severe [place]  Moist mucus membranes, Normocephalic/Atraumatic  Cardiovascular: Regular rate, rhythm, S1/S2  Distal pulses palpable  Heme/Extr: No edema/clubbing/cyanosis  Pulmonary: Non-labored breathing  Lungs CTAB  : No burnett, incontinent, timed void, brief  GI: Soft, non-tender, non-distended  BS+  MSK: ROM is WFL in all extremities  No effusions or deformities  Bulk is symmetric  Integumentary: Skin is warm, dry  No rashes or ulcers  Neuro: AAOx2, self, place; disoriented to time  Sensation intact to light touch throughout  Speech is intact  Appropriate to questioning  Psych: Easily frustrated, cognitive impairment  Significant Findings, Care, Treatment and Services Provided: Acute comprehensive interdisciplinary inpatient rehabilitation including PT, OT, SLP, RN, CM, SW, dietary, psychology, etc     Functional Status Upon Admission to ARC:  Physical Therapy: mobility- not assessed, transfers- supervision with verbal cues for hand placement, increased time; ambulation- mod A RW  Occupational Therapy: ADL: bathing- refused UB bathing, participated in LB upper leg only became frustrated and asked OT to complete, dressing: UB- refused , LB- refused  Speech Therapy: regular diet/thin liquids, cognition impaired    Functional Status Upon Discharge from ARC:   Physical therapy:  Mobility- supervision, transfers- min-mod A, sit pivot needs VC and hands on for safety if not fatigued then mod-max A, ambulation- mod-max A RW 50' WCF 2 person   Slow " progress, cognitive deficits  Occupational therapy: ADL bathing: mod-max A, seated at EOB, able to wash 7/10 parts, including UB, hadley, and b/; upper legs while seated w/ supervision and frequent BC for encouragement and task initiation  Mod A in stance for balance w/assist to wash rear  Pt cont to require A to wash lower legs/feet  dressing: UB min-mod A seated LB max A , footwear total A, toileting- total A  Speech therapy: cognitive deficits, comprehension and expression- min A, social interaction- needs redirection for appropriate language or to initiate interaction, problem solving- mod-max A, memory- mod-max A     Discharge Diagnosis: Impairment of mobility, safety and Activities of Daily Living (ADLs) due to Pulmonary Disorders:  10 9  Other Pulmonary    Discharge Medications:   See after visit summary for reconciled discharge medications provided to patient and family  Condition at Discharge: fair     Discharge instructions/Information to patient and family:   See after visit summary for information provided to patient and family  Provisions for Follow-Up Care:  See after visit summary for information related to follow-up care and any pertinent home health orders        Future Appointments   Date Time Provider Lisandra Springer   4/27/2023  9:00 AM 1000 Darnell Rod PA-C HEM ONC [facility] Practice-Onc   5/2/2023  7:30 AM AN INF CHEMO CHAIR AN Infusion AN HOSP CC   5/10/2023 11:20 AM Yanelis Patel MD [facility] Practice-Hos   5/22/2023  7:30 AM AN INF CHEMO CHAIR AN Infusion AN HOSP CC   5/24/2023  1:45 PM AN MRI 1 AN MRI AN HOSPITAL   6/1/2023  2:30 PM Nanette Scott MD AN Rad Onc AN HOSP CC   6/14/2023  7:30 AM AN INF CHEMO CHAIR AN Infusion AN HOSP CC   7/5/2023  7:30 AM AN INF CHEMO CHAIR AN Infusion AN HOSP CC   7/26/2023  7:30 AM AN INF CHEMO CHAIR AN Infusion AN HOSP CC       Disposition: Extended care facility [facility]    Planned Readmission: No    Discharge Medications:  See after visit summary for reconciled discharge medications provided to patient and family        Facility Administered Medications Prior to Discharge:    Current Facility-Administered Medications   Medication Dose Route Frequency Provider Last Rate   • acetaminophen  650 mg Oral Q6H PRN MARY BETH Hernandez     • atorvastatin  40 mg Oral QPM MARY BETH Momin     • dexamethasone  4 mg Oral Q12H [address] MARY BETH Momin      Followed by   • [START ON 4/28/2023] dexamethasone  4 mg Oral Daily MARY BETH Momin     • enoxaparin  40 mg Subcutaneous Daily MARY BETH Momin     • levETIRAcetam  500 mg Oral Q12H [address] MARY BETH Momin     • memantine  10 mg Oral BID MARY BETH Momin     • pantoprazole  40 mg Oral Early Morning MARY BETH Hernandez, [address]

## 2023-04-24 NOTE — CASE MANAGEMENT
Case Management Update:  [facility] able to accept pt tomorrow 4/25, cm spoke with pt's daughter Clayton García via telephone, family agreeable, pt aware of dc plan  Cm made team aware, Clayton García and family unable to to provide transportation and are aware of OOP cost of [facility] transport  CM scheduled [facility] transport via [facility] for 12pm, [facility] accepting       Ph# [phone], Fax# [phone]

## 2023-04-24 NOTE — PROGRESS NOTES
"PM&R PROGRESS NOTE:  Radha Ward 81 y o  male MRN: [id number]  Unit/Bed#: -01 Encounter: [id number]        Rehabilitation Diagnosis: Impairment of mobility, safety, Activities of Daily Living (ADLs), and cognitive/communication skills due to Pulmonary Disorders:  10 9  Other Pulmonary    HPI: Fuentes Bolton is a 81 y o  male with a medical history of hypertension, BPH, and a recent hospitalization on 3/14-3/19 where he was found to have 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 who presented to [address] [place] [address] on 04/01 with a 1 day history of shortness of breath s/p lung mass biopsy with a small pneumothorax  On presentation his  Oxygen saturation was 70% on room air, he received Bipap then transitioned to nasal cannula  Chest x-ray showed large right sided pneumothorax with complete lung collapse  A chest tube was place, it became dislodges and the patient developed subcutaneous emphysema  Trauma was consulted, reinserted tube without complications  Chest tube was removed on 04/07  Patient also presented with leukocytosis, tachycardia, elevated lactic acid and procalcitonin  Acute respiratory failure was likely secondary to right-sided pneumothorax with possible LLL pneumonia  Patient completed cefepime with prn tessalon pearls for cough  He was able to continue with whole brain radiation treatments, completed on 04/12  Chemotherapy treatments on hold, per Dr Enciso Gillian, can be held no longer than two weeks  The patient was evaluated by the Rehabilitation team and deemed an appropriate candidate for comprehensive inpatient rehabilitation and admitted to the [facility] on 04/14/23  SUBJECTIVE: Patient seen face to face  No acute issues overnight  Patient seen working with physical therapy in patient's room, transferring with verbal cues, incidental touching  Patient is in better spirits today than last week, stating \"I am trying, how am I doing\"   Reports good appetite, voiding, LBM " 04/24  Denies chest pain, shortness of breath, fever, chills, nausea, abdominal pain  ASSESSMENT: Stable, progressing    PLAN:  - continue timed void trials for bladder incontinence  -  continue current medical and rehabilitation plan of care with plans to discharge to [facility] on 04/25    Rehabilitation  • Functional deficits:  Mobility, self-care  • Continue current rehabilitation plan of care to maximize function  • Functional update:   o PT: mobility- supervision, transfers- mod A, ambulation- mod A RW- 50', wheelchair mobility- mod A ambulation: min-mod A 10', stairs- total A  o OT: ADL: bathing- total A, dressing UB max A, LB total A, footwear- mod A, toileting- total A  o SLP: comprehension mod A, problem solving and memory max A  • Estimated Discharge: 4/25 to [facility]    Pain  • Not an issue, prn tylenol    DVT prophylaxis  • Lovenox    Bladder plan  • Continent    Bowel plan  • Continent      * Acute respiratory failure with hypoxia (HCC)  Assessment & Plan  - small pneumothorax s/p lung mass biopsy 03/17  - 04/01- shortness of breath  - 04/01 - imaging showed a complete right pneumothorax  - 04/01- right chest tube placed   - 04/02- chest tube dislodges; developed subcutaneous emphysema extending to neck,   - trauma consulted; removed initial chest tube, trauma placed new chest tube to suction  - monitor oxygenation  - 04/07 chest tube removed  - CXR- LLL PNA;  Cefepime completed, tessalon pearls for cough    - Comprehensive therapies 3 hr a day, 5-6 days a week      Non-small cell carcinoma of right lung (HCC)  Assessment & Plan  - stage IV NSCLC w/ brain metastases  - confirmed with lung biopsy 03/17  - per Dr Sara Freeman; okay to hold chemotherapy, no more than 2 weeks    Brain mass  Assessment & Plan  - Brain metastases  - received whole brain radiation 10 treatments, completed 04/12  - continue Keppra 500 mg q12h, decadron 4mg q6h, memantine 10 mg q12h  - follows with Oncology, Dr Sara Freeman, "Radiation Oncology Dr Toshia Huff  - Palliative following, will contact daughter Sherry Fitting for [address]    Unspecified protein-calorie malnutrition ([address] )  Assessment & Plan  - BMI 20 99  - nutrition consult  - nutritional supplements  - monitor I&O      Appreciate IM consultants medical co-management  Personally reviewed labs, medications, and imaging  ROS:  Review of Systems   A 10 point review of systems was negative except for what is noted in the HPI  OBJECTIVE:   /58 (BP Location: Right arm)   Pulse 76   Temp (!) 97 1 °F (36 2 °C) (Axillary)   Resp 18   Ht 5' 6\" (1 676 m)   Wt 58 kg (127 lb 13 9 oz)   SpO2 95%   BMI 20 64 kg/m²     Physical Exam  Constitutional:       Appearance: Normal appearance  HENT:      Head: Normocephalic and atraumatic  Mouth/Throat:      Mouth: Mucous membranes are moist    Cardiovascular:      Rate and Rhythm: Normal rate and regular rhythm  Pulses: Normal pulses  Heart sounds: Normal heart sounds  Pulmonary:      Effort: Pulmonary effort is normal       Breath sounds: Normal breath sounds  Abdominal:      General: Bowel sounds are normal       Palpations: Abdomen is soft  Musculoskeletal:         General: Normal range of motion  Cervical back: Normal range of motion  Skin:     General: Skin is warm and dry  Capillary Refill: Capillary refill takes less than 2 seconds  Findings: Bruising present  Neurological:      Mental Status: He is alert and oriented to person, place, and time  Motor: Weakness present     Psychiatric:         Mood and Affect: Mood normal           Lab Results   Component Value Date    WBC 9 18 04/20/2023    HGB 12 1 04/20/2023    HCT 37 4 04/20/2023    MCV 88 04/20/2023     04/20/2023     Lab Results   Component Value Date    SODIUM 137 04/20/2023    K 3 8 04/20/2023     04/20/2023    CO2 29 04/20/2023    BUN 35 (H) 04/20/2023    CREATININE 0 48 (L) 04/20/2023    GLUC 78 04/20/2023    CALCIUM 8 5 " 2023     Lab Results   Component Value Date    INR 1 00 03/15/2023    PROTIME 13 4 03/15/2023       Current Facility-Administered Medications:   •  acetaminophen (TYLENOL) tablet 650 mg, 650 mg, Oral, Q6H PRN, MARY BETH Momin  •  atorvastatin (LIPITOR) tablet 40 mg, 40 mg, Oral, QPM, MARY BETH Momin, 40 mg at 23 1812  •  [] dexamethasone (DECADRON) tablet 4 mg, 4 mg, Oral, Q8H MOLLY, 4 mg at 23 0509 **FOLLOWED BY** dexamethasone (DECADRON) tablet 4 mg, 4 mg, Oral, Q12H MOLLY, 4 mg at 23 0812 **FOLLOWED BY** [START ON 2023] dexamethasone (DECADRON) tablet 4 mg, 4 mg, Oral, Daily, MARY BETH Momin  •  enoxaparin (LOVENOX) subcutaneous injection 40 mg, 40 mg, Subcutaneous, Daily, MARY BETH Momin, 40 mg at 23 3141  •  levETIRAcetam (KEPPRA) tablet 500 mg, 500 mg, Oral, Q12H MOLLY, MARY BETH Momin, 500 mg at 23 5748  •  memantine (NAMENDA) tablet 10 mg, 10 mg, Oral, BID, MARY BETH Momin, 10 mg at 23 3934  •  pantoprazole (PROTONIX) EC tablet 40 mg, 40 mg, Oral, Early Morning, MARY BETH Momin, 40 mg at 23 2522    Past Medical History:   Diagnosis Date   • Hypertension        Patient Active Problem List    Diagnosis Date Noted   • Acute respiratory failure with hypoxia ([facility] ) 2023   • Non-small cell carcinoma of right lung ([facility] ) 2023   • Brain mass 2023   • Ambulatory dysfunction 2023   • Encounter for antineoplastic immunotherapy 2023   • Chemotherapy induced neutropenia ([facility] ) 2023   • 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([facility] ) 2023   • Unspecified protein-calorie malnutrition ([facility] ) 2023   • Lung mass 2023   • Hypertension 2023      MARY BETH Leon  Physical Medicine and Alfredo

## 2023-04-24 NOTE — PROGRESS NOTES
"   04/24/23 1010   Pain Assessment   Pain Assessment Tool 0-10   Pain Score No Pain   Restrictions/Precautions   Precautions Bed/chair alarms;Cognitive; Fall Risk;Hard of hearing;Supervision on toilet/commode;Visual deficit   Comprehension   Comprehension (FIM) 4 - Understands basic info/conversation 75-90% of time   Expression   Expression (FIM) 4 - Expresses basic info/needs 75-90% of time   Social Interaction   Social Interaction (FIM) 4 - Needs redirecting for appropriate language or to initiate interaction   Problem Solving   Problem solving (FIM) 3 - Solves basic problmes 50-74% of time   Memory   Memory (FIM) 3 - Recognizes, recalls/performs 50-74%   Speech/Language/Cognition Assessment   Treatment Assessment Pt participated in skilled ST session focusing on cognitive linguistic skills  Pt more easily engaged this session as he woke to verbal stimuli and agreeable to reposition and to initiate tasks  Pt did recall having gone outside earlier this morning, but cont to require increased verbal cuing to make an attempt at recalling AM events  Targeting problem solving, pt was presented with written scenarios related to the home and community settings, along with Fo4 possible solutions  Pt chose the BEST solution (with some having more than one appropriate solution present) for 12/19 trials  Pt required moderate verbal and contextual cuing to improve problem solving for remaining trials, at times taking multiple times at attempting to determine correct responses  Of note, some initial responses were not appropriate, rather than an appropriate response but was not the best response  Lastly, when presented with a mock medication label which simulated a \"scheduled\" medication, pt accurately answered verbally presented questions by utilizing the presented info with 6/9 accuracy   Pt benefited from rewording of information, along with verbal cues and follow up questions to clarify responses or to provide complete " responses to questions  Overall, pt was much more engaged this session, but remains with overall cognitive linguistic deficits at this time and is recommended for further skilled SLP services to maximize skills  SLP Therapy Minutes   SLP Time In 1010   SLP Time Out 3993   SLP Total Time (minutes) 30   SLP Mode of treatment - Individual (minutes) 30   SLP Mode of treatment - Concurrent (minutes) 0   SLP Mode of treatment - Group (minutes) 0   SLP Mode of treatment - Co-treat (minutes) 0   SLP Mode of Treatment - Total time(minutes) 30 minutes   SLP Cumulative Minutes 305   Therapy Time missed   Time missed?  No

## 2023-04-24 NOTE — DISCHARGE INSTR - AVS FIRST PAGE
DISCHARGE INSTRUCTIONS: Catalina Rivas 67 22    Bring these instructions with you to your [facility] Provider so they can order and follow-up any additional lab work or imaging recommended at time of discharge  You remain a fall and injury risk which could be severe  - Your risk of fall has decreased however since admission to acute rehab  Caregiver training has been completed with our staff  - Appropriate supervision +/- assistance as instructed during your rehab course is recommended to decrease risk of fall and injury  - Continue skilled therapy as discussed after discharge to further decrease this risk    PHYSICIANS to see:  Please see your doctors listed in the follow up providers section of your discharge paperwork, and take the discharge paperwork with you to your appointments  LAB WORK recommended after discharge: Follow-up lab work at discretion of your outpatient physicians to be determined at time of your future appointments  Obtain lab orders and follow-up management through skilled nursing facility provider and later primary care physician and outpatient specialists (as indicated)  IMAGING to follow-up:  Follow-up imaging as discussed with your recent physicians or at discretion of your outpatient physicians to be determined at time of your appointments  SKIN CARE INSTRUCTIONS to follow:    Monitor skin for increased redness or breadown and promptly notify your physician should these develop    If instructed while in [facility] - be sure you stand +/- walk every 1-2 hours and if advised use appropriate supervision/assistance to optimally offload your buttock/sacral region  While seated or lying in bed shift positions and from side to side often  Can use barrier type cream such as Hydragaurd 2 times per day and as needed  Turn patient (yourself) fully every 2 hours while in bed        WOUND CARE INSTRUCTIONS to follow:    Due to the following you are at increased risk of skin breakdown/wounds/worsening wounds:  - Impaired mobility  - Impaired nutrition/intake/low albumin  - Medical co-morbidities  - Bowel/bladder dysfunction    Buttocks/Sacrum  Turn as full as possible off sacrum/buttocks every 2 hours  Use Cushion while in chair or wheelchair  Weight shift every 10-15 minutes while in chair  Keep skin clean and dry as possible  Remove wet or soiled clothing/linens promptly  Use barrier cream or similar 2 times per day   Monitor skin for increased breakdown which you are at risk of and notify nursing, PCP, or other physician providers should this occur right away    Heels/bony edges of feet  Float heels off edge of pillow for added pressure relief  Monitor heels and bony protuberances and notify physician or nursing for any increased redness, bogginess, or breakdown    ACTIVITY PRECAUTIONS to follow:    24 hours/7 days per week supervision by caregiver required    MEDICATIONS:  Please see a full list of your medications outlined in the After Visit Summary that is attached to these Discharge Instructions  Please note changes may have been made to your medications please refer to your discharge paperwork for your current medications and take this list with you to all your doctors appointments for your doctors to review  Please do not resume a home medication unless the medication reconciliation sheet indicates to do so, please do not assume that a medication that you were given a prescription for is the same as a medication you have at home based on both medications having the same name as dosages and frequency may have changed  Unless specifically noted in your medication list provided to you in your discharge paper work do not resume prior vitamins, minerals, or supplements you may have been taking prior to your hospitalization unless instructed by an outpatient physician in the future    Discuss with your primary care at next visit if applicable  NSAID Warning:  Please avoid NSAID (including but not limited to advil, aleve, motrin, naproxen, ibuprofen, mobic, meloxicam, diclofenac etc) medications as NSAID medications may increase your risk of bleeding (which can be life-threatening), stroke, worsening kidney disease, heart attack, developing/worsening GI ulcers, worsening heart failure, worsening liver (cirrhosis) disease, potentially delay bone healing  Blood thinners prescribed to optimize long and short term health and decrease risk of complications but with risks related to bleeding and other complications  Lovenox (Enoxaparin) Instructions: You have been prescribed Lovenox (Enoxaparin) for DVT prophylaxis, continuing this medication is at the discretion of the accepting physician at the subacute rehabilitation unit  This medication is used to prevent clots which can cause severe disability and even death    This medications was started prior to your acute rehabilitation course as recommended by your prior physicians  It was continued during your acute rehabilitation course  This is a strong anticoagulant (blood thinner)  It is being recommended for use by you (or your family) to decrease the risk of clotting which can be severely disabling and even life-threatening  Even when provided as recommended it can cause severe disabling and even life-threatening bleeding  Too much or too little of this blood thinner further increases your risk of this medication causing serious and even life-threatening complications such as severe bleeding, clots, strokes, and death  With that said, at this time based on best available evidence and consensus agreement, your physicians recommend you take Lovenox (Enoxaparin) medication based on your overall risks and benefits in your specific medical situation        If you (or your family/caregiver) notice black stools, bloody stools, vomit blood, develop new weakness, slurred speech, confusion or have any other concerning symptoms call 911 or obtain transportation to nearest emergency room immediately  MEDICAL MANAGEMENT during your acute rehabilitation admission:    * Acute respiratory failure with hypoxia (HCC)  Assessment & Plan  - small pneumothorax s/p lung mass biopsy 03/17  - 04/01- shortness of breath  - 04/01 - imaging showed a complete right pneumothorax  - 04/01- right chest tube placed   - 04/02- chest tube dislodges; developed subcutaneous emphysema extending to neck,   - trauma consulted; removed initial chest tube, trauma placed new chest tube to suction  - monitor oxygenation  - 04/07 chest tube removed  - CXR- LLL PNA; Cefepime completed, tessalon pearls for cough  - stable   - Comprehensive therapies 3 hr a day, 5-6 days a week      Non-small cell carcinoma of right lung (HCC)  Assessment & Plan  - stage IV NSCLC w/ brain metastases  - confirmed with lung biopsy 03/17  - per Dr Grecia Pugh; okay to hold chemotherapy, no more than 2 weeks  - Follow-up with [facility] outpt for next steps in care, Dr Grecia Pugh  - Rafia Colon follow-up 04/27 at [address] [place] [address] with plans to start chemotherapy 05/02     Brain mass  Assessment & Plan  - Brain metastases  - received whole brain radiation 10 treatments, completed 04/12  - continue Keppra 500 mg q12h, decadron 4mg q6h, memantine 10 mg q12h  - follows with Oncology, Dr Thompson Board Dr Laura Win  - Palliative following, will contact daughter Autumn Farias for [address]  - Follow-up with [facility] 04/27  - Decadron 4 mg q12h thru 04/27, then daily     Unspecified protein-calorie malnutrition ([address] )  Assessment & Plan  - BMI 20 99  - nutrition consult  - nutritional supplements  - monitor I&O    Please note a summary of your hospital stay with relevant information for your doctors will try to be sent to them    Please confirm with your doctors at your follow up visits that they have received this summary and have them contact [facility] if they have not received them along with any other medical records they may require       King Kruger Phone Number:  [phone]

## 2023-04-24 NOTE — PROGRESS NOTES
" Pastoral Care Progress Note    2023  Patient: Lawrence Tejada : 1950  Admission Date & Time: 2023 1330  MRN: [id number] [facility]: [id number]      Geovany Coronel did not seem to remember me or my most recent visit with him, yet was quite ready to talk about his spiritual thoughts as I reminded him that I was the   He continues to ponder if he's good enough to get into heaven, he suggested that he's not and that he's cursed  I reminded him of God's unending, unstoppable love, and reassured him that he's not condemned, that God holds nothing against him, that God is in the midst of the struggle with him  He seemed to accept this then said \"can I go to sleep now? !\"   glad to keep reminding Geovany Coronel of God's unconditional love as often as needed                 Chaplaincy Interventions Utilized:   Empowerment: Provided anxiety containment and Provided guilt counseling    Exploration: Explored emotional needs & resources and Explored spiritual needs & resources    Relationship Building: Listened empathically    Ritual: Provided Jew resources     23 1000   Clinical Encounter Type   Visited With Patient   Routine Visit Follow-up         "

## 2023-04-24 NOTE — PROGRESS NOTES
Internal Medicine Progress Note  Patient: Kimberly Love  Age/sex: 81 y o  male  Medical Record #: [id number]      ASSESSMENT/PLAN: (Interval History)  Kimberly Love is seen and examined and management for following issues:    Non-small cell lung cancer right lung with brain mets  • Chemotherapy on hold until pt discharged - no more than 2 weeks  • S/p whole brain radiation x 10 treatments  Completed 4/12/23  • Continue Decadron taper, Keppra and memantine  • Follow-up with Dr Sherry Boykin and radiation oncology  • Will need to update oncology with plan for dc since it has changed  • Palliative Care now following      Pneumothorax  • Occurred after lung biopsy from 3/17/23  • S/p chest tube  • Resolved      LLL PNA  • S/p tx with Cefepime  • stable     Behavioral issues  • Brain lesions are B/L frontal likely contributing  • Refuses therapy on and off and can at times yell at staff      HTN  • Stable on no medications  • Continue to monitor        Discharge date:  SNF    The above assessment and plan was reviewed and updated as determined by my evaluation of the patient on 4/24/2023      Labs:   Results from last 7 days   Lab Units 04/20/23  0537   WBC Thousand/uL 9 18   HEMOGLOBIN g/dL 12 1   HEMATOCRIT % 37 4   PLATELETS Thousands/uL 181     Results from last 7 days   Lab Units 04/20/23  0537   SODIUM mmol/L 137   POTASSIUM mmol/L 3 8   CHLORIDE mmol/L 106   CO2 mmol/L 29   BUN mg/dL 35*   CREATININE mg/dL 0 48*   CALCIUM mg/dL 8 5                   Review of Scheduled Meds:  Current Facility-Administered Medications   Medication Dose Route Frequency Provider Last Rate   • acetaminophen  650 mg Oral Q6H PRN MARY BETH Momin     • atorvastatin  40 mg Oral QPM MARY BETH Momin     • dexamethasone  4 mg Oral Q12H [facility] MARY BETH Momin      Followed by   • [START ON 4/28/2023] dexamethasone  4 mg Oral Daily MARY BETH Momin     • enoxaparin  40 mg Subcutaneous Daily MARY BETH Cheng     • levETIRAcetam  500 mg Oral Q12H [facility] MARY BETH Momin     • memantine  10 mg Oral BID MARY BETH Momin     • pantoprazole  40 mg Oral Early Morning MARY BETH Momin         Subjective/ HPI: Patient seen and examined  Patients overnight issues or events were reviewed with nursing or staff during rounds or morning huddle session  New or overnight issues include the following:     Pt seen in OT  He denies any current complaints  ROS:   A 10 point ROS was performed; negative except as noted above  Imaging:     No orders to display       *Labs /Radiology studies reviewed  *Medications reviewed and reconciled as needed  *Please refer to order section for additional ordered labs studies  *Case discussed with primary attending during morning huddle case rounds    Physical Examination:  Vitals:   Vitals:    04/23/23 1100 04/23/23 1445 04/23/23 2023 04/24/23 0630   BP: 100/68 (!) 91/45 90/56 91/55   BP Location: Right arm Right arm Right arm Right arm   Pulse:  80 76 76   Resp:  17 18 18   Temp:  98 3 °F (36 8 °C) (!) 97 3 °F (36 3 °C) (!) 97 1 °F (36 2 °C)   TempSrc:  Oral Oral Axillary   SpO2:  98% 97% 95%   Weight:       Height:           GEN: NAD, conversive  Cooperative today  NEURO: Alert and oriented x2  HEENT: Pupils are equal and reactive, EOMI, mucous membranes are moist, face symmetrical; [place]  CV: S1 S2 regular, no MRG, no peripheral edema noted  RESP: Lungs are decreased throughout, no wheezes, rales or rhonchi noted, on room air, respirations easy and non labored  GI: Flat, soft non tender, non distended; +BS x4  : Voiding without difficulty  MUSC: Moves all extremities, generalized deconditioning noted   SKIN: pink, warm and dry, normal turgor, no rashes, lesions          The above physical exam was reviewed and updated as determined by my evaluation of the patient on 4/24/2023      Invasive Devices     None                    VTE Pharmacologic Prophylaxis: Enoxaparin  Code Status: Level 2 - DNAR: but accepts endotracheal intubation  Current Length of Stay: 10 day(s)      Total time spent:  30 minutes with more than 50% spent counseling/coordinating care  Counseling includes discussion with patient re: progress  and discussion with patient of his/her current medical state/information  Coordination of patient's care was performed in conjunction with primary service  Time invested included review of patient's labs, vitals, and management of their comorbidities with continued monitoring  In addition, this patient was discussed with medical team including physician and advanced extenders  The care of the patient was extensively discussed and appropriate treatment plan was formulated unique for this patient  Medical decision making for the day was made by supervising physician unless otherwise noted in their attestation statement  ** Please Note:  voice to text software may have been used in the creation of this document   Although proof errors in transcription or interpretation are a potential of such software**

## 2023-04-24 NOTE — PROGRESS NOTES
04/24/23 1230   Pain Assessment   Pain Assessment Tool 0-10   Restrictions/Precautions   Precautions Bed/chair alarms;Cognitive; Fall Risk;Hard of hearing;Supervision on toilet/commode;Visual deficit   Subjective   Subjective pt agreeable to perform skilled PT and reportys he's getting better   Roll Left and Right   Type of Assistance Needed Supervision   Roll Left and Right CARE Score 4   Sit to Lying   Type of Assistance Needed Supervision   Sit to Lying CARE Score 4   Lying to Sitting on Side of Bed   Type of Assistance Needed Supervision   Lying to Sitting on Side of Bed CARE Score 4   Bed-Chair Transfer   Type of Assistance Needed Physical assistance   Physical Assistance Level 26%-50%   Comment better with sit pivot this PM session , but cont to need VC 's and hands on for safety and dec fall risk   Chair/Bed-to-Chair Transfer CARE Score 3   Transfer Bed/Chair/Wheelchair   Adaptive Equipment None   Car Transfer   Type of Assistance Needed Physical assistance   Physical Assistance Level 51%-75%   Comment SB xfers   Car Transfer CARE Score 2   Walk 10 Feet   Type of Assistance Needed Physical assistance   Physical Assistance Level Total assistance   Walk 10 Feet CARE Score 1   Walk 50 Feet with Two Turns   Type of Assistance Needed Physical assistance   Physical Assistance Level 51%-75%   Comment 50 x2 WCF 2 person   Walk 50 Feet with Two Turns CARE Score 2   Walk 150 Feet   Reason if not Attempted Safety concerns   Walk 150 Feet CARE Score 88   Walking 10 Feet on Uneven Surfaces   Reason if not Attempted Safety concerns   Walking 10 Feet on Uneven Surfaces CARE Score 88   Ambulation   Primary Mode of Locomotion Prior to Admission Walk   Distance Walked (feet) 50 ft   Assist Device Roller Walker   Does the patient walk? 2   Yes   Wheel 50 Feet with Two Turns   Type of Assistance Needed Supervision   Wheel 50 Feet with Two Turns CARE Score 4   Wheel 150 Feet   Type of Assistance Needed Supervision   Comment all extemities; requires VC for locking brakes   Wheel 150 Feet CARE Score 4   Wheelchair mobility   Does the patient use a wheelchair? 1  Yes   Type of Wheelchair Used 1  Manual   Method Right upper extremity; Left upper extremity;Right lower extremity; Left lower extremity   Curb or Single Stair   Reason if not Attempted Safety concerns   1 Step (Curb) CARE Score 88   4 Steps   Reason if not Attempted Safety concerns   4 Steps CARE Score 88   12 Steps   Reason if not Attempted Safety concerns   12 Steps CARE Score 88   Picking Up Object   Comment WC sitting only for cones   Therapeutic Interventions   Flexibility manual stretch LE   Equipment Use   NuStep lvl 2 for 13 min   Assessment   Treatment Assessment pt able to perform skilled PT , bed mobility , sit pivot and SPT improving a little ModA to Valentina if not fatigue   Pt propl  feet plus at S lvl , and pt possible going ot SNF tomorrow , Pt back in bed lvl with alarm on and all needs in reach   Problem List Decreased strength;Decreased endurance; Impaired balance;Decreased mobility; Impaired judgement;Decreased cognition;Decreased safety awareness; Impaired vision; Impaired hearing   Barriers to Discharge Inaccessible home environment;Decreased caregiver support   Plan   Progress Slow progress, cognitive deficits   Recommendation   PT Discharge Recommendation Post acute rehabilitation services   PT Therapy Minutes   PT Time In 1230   PT Time Out 1400   PT Total Time (minutes) 90   PT Mode of treatment - Individual (minutes) 90   PT Mode of treatment - Concurrent (minutes) 0   PT Mode of treatment - Group (minutes) 0   PT Mode of treatment - Co-treat (minutes) 0   PT Mode of Treatment - Total time(minutes) 90 minutes   PT Cumulative Minutes 730   Therapy Time missed   Time missed?  No

## 2023-04-24 NOTE — PROGRESS NOTES
"OT Treatment Note       04/24/23 2701   Pain Assessment   Pain Assessment Tool 0-10   Pain Score No Pain   Restrictions/Precautions   Precautions Bed/chair alarms;Cognitive; Fall Risk;Hard of hearing;Supervision on toilet/commode;Visual deficit   Weight Bearing Restrictions No   ROM Restrictions No   Lifestyle   Autonomy \"I don't think so, don't tell me that,\" when asked if he felt himself leaning to L side during WC mobility   Grooming   Able To Wash/Dry Hands   Findings SUP seated in WC at sink   Lower Body Dressing   Comment declined donning pants today, wanting to wear gown only   Sit to Lying   Type of Assistance Needed Supervision   Physical Assistance Level No physical assistance   Comment HOB flat   Sit to Lying CARE Score 4   Lying to Sitting on Side of Bed   Type of Assistance Needed Supervision   Physical Assistance Level No physical assistance   Comment HOB flat   Lying to Sitting on Side of Bed CARE Score 4   Sit to Stand   Type of Assistance Needed Physical assistance   Physical Assistance Level 26%-50%   Comment vc's for technique   Sit to Stand CARE Score 3   Bed-Chair Transfer   Type of Assistance Needed Physical assistance   Physical Assistance Level 26%-50%   Comment SPT without AD EOB<->WC, vc's for safe technique and hand placement   Chair/Bed-to-Chair Transfer CARE Score 3   Toileting Hygiene   Type of Assistance Needed Physical assistance   Physical Assistance Level Total assistance   Comment TA for BM hygiene in stance and CM to hike brief over hips   Toileting Hygiene CARE Score 1   Toilet Transfer   Type of Assistance Needed Physical assistance   Physical Assistance Level 26%-50%   Comment SPT using grab bar WC<->BSC over toilet, min vc's for technique   Toilet Transfer CARE Score 3   Functional Standing Tolerance   Comments for increased standing tolerance for ADL tasks, pt standing at table and completing functional reaching task using unilateral UE support on tabletop to reach for and " "match cards on board  Pt requiring CGA - modA to steady in stance based on level of fatigue  Pt unaware of R sided lean with prolonged stance, requiring vc's to correct  Able to tolerate max stand ~3 minutes before requiring seated rest    Cognition   Overall Cognitive Status Impaired   Arousal/Participation Alert; Cooperative   Attention Difficulty attending to directions   Orientation Level Oriented to person;Disoriented to time;Oriented to place   Memory Decreased recall of precautions;Decreased short term memory   Following Commands Follows one step commands with increased time or repetition   Additional Activities   Additional Activities Comments Pt completing WC mobility on unit and outside on sidewalk/ramp  pt requires mod/maxA up/down curb ramp 2* impaired strength and cognition  Pt requiring Regino at times propelling in hallways to avoid obstacles  Pt able to self-propell up to ~150' at a time before requiring rest  Pt requesting to go outside in the sun, RN made aware and pt taken outside for increased mood and QOL  Pt enjoyed sitting outside in sunshine stating, \"I love this  It's therapeutic  \"   Activity Tolerance   Activity Tolerance Patient tolerated treatment well   Assessment   Treatment Assessment Pt seen for 90 min OT session focusing on functional SPT, toilet transfers and toileting, standing balance/tolerance, and WC mobility  Pt tolerated session fairly 2* impaired activity tolerance  Increased frustration noted with questions; responded well to 2 choices  Pt declining need to use toilet, however upon 3rd STS OT noting sm BM incontinence; pt then able to have continent BM and urinate once seated on toilet  OT to continue POC to focus on ADL retraining, w/c mobility, SPTs w/c<>BSC, STM/recall, and fxl attention prior to anticipated d/c to SNF  Prognosis Fair   Problem List Decreased strength;Decreased endurance; Impaired balance;Decreased mobility; Impaired judgement;Decreased cognition;Decreased " safety awareness; Impaired vision; Impaired hearing   Barriers to Discharge Inaccessible home environment;Decreased caregiver support   Plan   Treatment/Interventions ADL retraining;Functional transfer training; Therapeutic exercise; Endurance training;Cognitive reorientation;Patient/family training;Equipment eval/education; Bed mobility;Gait training; Compensatory technique education;Spoke to nursing   Progress Slow progress, cognitive deficits   Recommendation   OT Discharge Recommendation   (pending SNF)   OT Therapy Minutes   OT Time In 0830   OT Time Out 1000   OT Total Time (minutes) 90   OT Mode of treatment - Individual (minutes) 90   OT Mode of treatment - Concurrent (minutes) 0   OT Mode of treatment - Group (minutes) 0   OT Mode of treatment - Co-treat (minutes) 0   OT Mode of Treatment - Total time(minutes) 90 minutes   OT Cumulative Minutes 745   Therapy Time missed   Time missed?  No

## 2023-04-24 NOTE — PLAN OF CARE
Problem: PAIN - ADULT  Goal: Verbalizes/displays adequate comfort level or baseline comfort level  Description: Interventions:  - Encourage patient to monitor pain and request assistance  - Assess pain using appropriate pain scale  - Administer analgesics based on type and severity of pain and evaluate response  - Implement non-pharmacological measures as appropriate and evaluate response  - Consider cultural and social influences on pain and pain management  - Notify physician/advanced practitioner if interventions unsuccessful or patient reports new pain  Outcome: Progressing     Problem: INFECTION - ADULT  Goal: Absence or prevention of progression during hospitalization  Description: INTERVENTIONS:  - Assess and monitor for signs and symptoms of infection  - Monitor lab/diagnostic results  - Monitor all insertion sites, i e  indwelling lines, tubes, and drains  - Monitor endotracheal if appropriate and nasal secretions for changes in amount and color  - [place] appropriate cooling/warming therapies per order  - Administer medications as ordered  - Instruct and encourage patient and family to use good hand hygiene technique  - Identify and instruct in appropriate isolation precautions for identified infection/condition  Outcome: Progressing  Goal: Absence of fever/infection during neutropenic period  Description: INTERVENTIONS:  - Monitor WBC    Outcome: Progressing     Problem: SAFETY ADULT  Goal: Patient will remain free of falls  Description: INTERVENTIONS:  - Educate patient/family on patient safety including physical limitations  - Instruct patient to call for assistance with activity   - Consult OT/PT to assist with strengthening/mobility   - Keep Call bell within reach  - Keep bed low and locked with side rails adjusted as appropriate  - Keep care items and personal belongings within reach  - Initiate and maintain comfort rounds  - Make Fall Risk Sign visible to staff  - Offer Toileting every 2-4 Hours, in advance of need  - Initiate/Maintain bed/chair alarm  - Obtain necessary fall risk management equipment: nonskid footwear  - Apply yellow socks and bracelet for high fall risk patients  - Consider moving patient to room near nurses station  Outcome: Progressing  Goal: Maintain or return to baseline ADL function  Description: INTERVENTIONS:  -  Assess patient's ability to carry out ADLs; assess patient's baseline for ADL function and identify physical deficits which impact ability to perform ADLs (bathing, care of mouth/teeth, toileting, grooming, dressing, etc )  - Assess/evaluate cause of self-care deficits   - Assess range of motion  - Assess patient's mobility; develop plan if impaired  - Assess patient's need for assistive devices and provide as appropriate  - Encourage maximum independence but intervene and supervise when necessary  - Involve family in performance of ADLs  - Assess for home care needs following discharge   - Consider OT consult to assist with ADL evaluation and planning for discharge  - Provide patient education as appropriate  Outcome: Progressing  Goal: Maintains/Returns to pre admission functional level  Description: INTERVENTIONS:  - Perform BMAT or MOVE assessment daily    - Set and communicate daily mobility goal to care team and patient/family/caregiver  - Collaborate with rehabilitation services on mobility goals if consulted  - Perform Range of Motion 3 times a day  - Reposition patient every 2 hours    - Dangle patient 3 times a day  - Stand patient 3 times a day  - Ambulate patient 3 times a day  - Out of bed to chair 3 times a day   - Out of bed for meals 3 times a day  - Out of bed for toileting  - Record patient progress and toleration of activity level   Outcome: Progressing     Problem: DISCHARGE PLANNING  Goal: Discharge to home or other facility with appropriate resources  Description: INTERVENTIONS:  - Identify barriers to discharge w/patient and caregiver  - Arrange for needed discharge resources and transportation as appropriate  - Identify discharge learning needs (meds, wound care, etc )  - Arrange for interpretive services to assist at discharge as needed  - Refer to Case Management Department for coordinating discharge planning if the patient needs post-hospital services based on physician/advanced practitioner order or complex needs related to functional status, cognitive ability, or social support system  Outcome: Progressing     Problem: Nutrition/Hydration-ADULT  Goal: Nutrient/Hydration intake appropriate for improving, restoring or maintaining nutritional needs  Description: Monitor and assess patient's nutrition/hydration status for malnutrition  Collaborate with interdisciplinary team and initiate plan and interventions as ordered  Monitor patient's weight and dietary intake as ordered or per policy  Utilize nutrition screening tool and intervene as necessary  Determine patient's food preferences and provide high-protein, high-caloric foods as appropriate       INTERVENTIONS:  - Monitor oral intake, urinary output, labs, and treatment plans  - Assess nutrition and hydration status and recommend course of action  - Evaluate amount of meals eaten  - Assist patient with eating if necessary   - Allow adequate time for meals  - Recommend/ encourage appropriate diets, oral nutritional supplements, and vitamin/mineral supplements  - Order, calculate, and assess calorie counts as needed  - Recommend, monitor, and adjust tube feedings and TPN/PPN based on assessed needs  - Assess need for intravenous fluids  - Provide specific nutrition/hydration education as appropriate  - Include patient/family/caregiver in decisions related to nutrition  Outcome: Progressing     Problem: Prexisting or High Potential for Compromised Skin Integrity  Goal: Skin integrity is maintained or improved  Description: INTERVENTIONS:  - Identify patients at risk for skin breakdown  - Assess and monitor skin integrity  - Assess and monitor nutrition and hydration status  - Monitor labs   - Assess for incontinence   - Turn and reposition patient  - Assist with mobility/ambulation  - Relieve pressure over bony prominences  - Avoid friction and shearing  - Provide appropriate hygiene as needed including keeping skin clean and dry  - Evaluate need for skin moisturizer/barrier cream  - Collaborate with interdisciplinary team   - Patient/family teaching  - Consider wound care consult   Outcome: Progressing     Problem: SAFETY,RESTRAINT: NV/NON-SELF DESTRUCTIVE BEHAVIOR  Goal: Remains free of harm/injury (restraint for non violent/non self-detsructive behavior)  Description: INTERVENTIONS:  - Instruct patient/family regarding restraint use   - Assess and monitor physiologic and psychological status   - Provide interventions and comfort measures to meet assessed patient needs   - Identify and implement measures to help patient regain control  - Assess readiness for release of restraint   Outcome: Progressing  Goal: Returns to optimal restraint-free functioning  Description: INTERVENTIONS:  - Assess the patient's behavior and symptoms that indicate continued need for restraint  - Identify and implement measures to help patient regain control  - Assess readiness for release of restraint   Outcome: Progressing

## 2023-04-25 ENCOUNTER — TELEPHONE (OUTPATIENT)
Age: 73
End: 2023-04-25

## 2023-04-25 VITALS
WEIGHT: 127.87 LBS | BODY MASS INDEX: 20.55 KG/M2 | OXYGEN SATURATION: 95 % | SYSTOLIC BLOOD PRESSURE: 96 MMHG | HEART RATE: 74 BPM | DIASTOLIC BLOOD PRESSURE: 67 MMHG | TEMPERATURE: 98.1 F | HEIGHT: 66 IN | RESPIRATION RATE: 18 BRPM

## 2023-04-25 RX ORDER — DEXAMETHASONE 4 MG/1
4 TABLET ORAL EVERY 12 HOURS SCHEDULED
Qty: 5 TABLET | Refills: 0 | Status: SHIPPED | OUTPATIENT
Start: 2023-04-25 | End: 2023-04-28

## 2023-04-25 RX ORDER — DEXAMETHASONE 4 MG/1
4 TABLET ORAL DAILY
Refills: 0
Start: 2023-04-28

## 2023-04-25 RX ORDER — ENOXAPARIN SODIUM 100 MG/ML
40 INJECTION SUBCUTANEOUS DAILY
Refills: 0
Start: 2023-04-26 | End: 2023-05-02

## 2023-04-25 RX ORDER — PANTOPRAZOLE SODIUM 40 MG/1
40 TABLET, DELAYED RELEASE ORAL
Refills: 0
Start: 2023-04-26

## 2023-04-25 RX ORDER — ACETAMINOPHEN 325 MG/1
650 TABLET ORAL EVERY 6 HOURS PRN
Refills: 0
Start: 2023-04-25

## 2023-04-25 RX ADMIN — MEMANTINE 10 MG: 10 TABLET ORAL at 08:43

## 2023-04-25 RX ADMIN — ENOXAPARIN SODIUM 40 MG: 40 INJECTION SUBCUTANEOUS at 08:41

## 2023-04-25 RX ADMIN — PANTOPRAZOLE SODIUM 40 MG: 40 TABLET, DELAYED RELEASE ORAL at 05:50

## 2023-04-25 RX ADMIN — LEVETIRACETAM 500 MG: 500 TABLET, FILM COATED ORAL at 08:42

## 2023-04-25 RX ADMIN — DEXAMETHASONE 4 MG: 4 TABLET ORAL at 08:43

## 2023-04-25 NOTE — PHYSICAL THERAPY NOTE
[facility] PT Discharge Summary  Pt demonstrated slow and inconsistent progress in PT presented to [facility] s/p acute respiratory failure with hypoxia and active non small cell lung CA  Noted impairments on eval include dec cognition and safety awareness, bilat UE/LE and core strength deficits, standing static & dynamic balance impairments with retropulsion tendencies, gait dysfunction, dec endurance resulting to significant functional declined  At d/c pt requires S for bed mobilities, mod A with transfers, S for w/c propulsion  Due to abovementioned impairments/deficits pt still requires 2 person assist for amb task with limited tolerance to 50' at best  Pt and daughter participated with family meeting and at this time family unable to provide recommended care pt will need if to go home  So Pt was d/c on 4/25/23 to SNF for further therapy address ongoing rehab needs micheal including improving dynamic balance, increase strength, improve activity tolerance and improve overall functional indep to reduce risk for falls and dec caregiver burden

## 2023-04-25 NOTE — NURSING NOTE
Discharge AVS reviewed with [facility] nurse Leslie Left  Patient was stable at time of discharge and voiced no further questions at this time

## 2023-04-25 NOTE — PROGRESS NOTES
04/23/23 1100   Pain Assessment   Pain Assessment Tool 0-10   Pain Score No Pain   Restrictions/Precautions   Precautions Bed/chair alarms;Cognitive; Fall Risk;Hard of hearing;Supervision on toilet/commode;Visual deficit  (4 bed rails per order)   Cognition   Overall Cognitive Status Impaired   Arousal/Participation Alert; Cooperative   Attention Difficulty attending to directions   Memory Decreased recall of precautions;Decreased recall of recent events;Decreased short term memory   Following Commands Follows one step commands with increased time or repetition   Subjective   Subjective Pt reports no new c/o  Pt agreeable to therapy     Roll Left and Right   Type of Assistance Needed Supervision   Comment HOB flat, no bed rail   Roll Left and Right CARE Score 4   Sit to Lying   Type of Assistance Needed Supervision   Comment HOB flat, no bed rail   Sit to Lying CARE Score 4   Lying to Sitting on Side of Bed   Type of Assistance Needed Supervision   Comment HOB flat, no bed rail   Lying to Sitting on Side of Bed CARE Score 4   Sit to Stand   Type of Assistance Needed Physical assistance   Physical Assistance Level 51%-75%   Comment Mod A required this session with RW practicing STS x5; pt with increase retropulsion requiring increased assistance   Sit to Stand CARE Score 2   Bed-Chair Transfer   Type of Assistance Needed Physical assistance   Physical Assistance Level 51%-75%   Comment Mod A required this session with SPT bed <> w/c x4; pt requires max VC for hand placement and step-by-step instructions for transfer demnstrating poor carry over   Chair/Bed-to-Chair Transfer CARE Score 2   Wheel 50 Feet with Two Turns   Type of Assistance Needed Supervision   Comment all extemities; requires VC for locking brakes   Wheel 50 Feet with Two Turns CARE Score 4   Wheel 150 Feet   Type of Assistance Needed Supervision   Comment all extemities; requires VC for locking brakes   Wheel 150 Feet CARE Score 4   Wheelchair mobility   Does the patient use a wheelchair? 1  Yes   Type of Wheelchair Used 1  Manual   Therapeutic Interventions   Other pt sitting balance observed while eating - pt able to sit EOB for 10 min with unilateral UE and bilateral LE support, pt demonstrated increased forward flexed posture with sitting with poor self-correction despite VC   Assessment   Treatment Assessment Pt seen for 30 min of skilled PT focusing on functional transfers, STS for functional activity and LE strengthening, and w/c mobility  Pt required increased assistance this session due to decreased safety awareness and increased retropulsion with standing despite therapist VC to shift weight forward  Pt continues to demonstrate poor carry over with transfers and decreased safety awareness indicating continued high fall risk  PT to continue working on LE strengthening, transfers, w/c mobility/management with brakes, and ambulation  Pt left sidelying in bed with HOB elevated 30 degrees, all bed rails up, bed alarm activated, and all needs within reach  Problem List Decreased strength;Decreased endurance; Impaired balance;Decreased mobility; Decreased cognition; Impaired judgement;Decreased safety awareness; Impaired vision; Impaired hearing   Barriers to Discharge Inaccessible home environment;Decreased caregiver support   PT Barriers   Physical Impairment Decreased strength;Decreased endurance; Impaired balance;Decreased mobility; Decreased cognition; Impaired judgement;Decreased safety awareness; Impaired vision; Impaired hearing   Plan   Treatment/Interventions Functional transfer training;LE strengthening/ROM; Therapeutic exercise;Cognitive reorientation; Endurance training;Patient/family training;Equipment eval/education; Bed mobility;Gait training; Compensatory technique education   Progress Slow progress, cognitive deficits   PT Therapy Minutes   PT Time In 1100   PT Time Out 1130   PT Total Time (minutes) 30   PT Mode of treatment - Individual (minutes) 20   PT Mode of treatment - Concurrent (minutes) 10   PT Mode of treatment - Group (minutes) 0   PT Mode of treatment - Co-treat (minutes) 0   PT Mode of Treatment - Total time(minutes) 30 minutes   PT Cumulative Minutes 610   Therapy Time missed   Time missed?  No

## 2023-04-25 NOTE — TELEPHONE ENCOUNTER
Left message for Miranda Deras (daughter) to return call to the office to schedule a hospital follow up appointment for [facility] with Palliative care

## 2023-04-25 NOTE — PROGRESS NOTES
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  • Chemotherapy on hold until pt discharged - no more than 2 weeks  • S/p whole brain radiation x 10 treatments  Completed 4/12/23  • Continue Decadron taper, Keppra and memantine  • Follow-up with Dr Glenda Fuller and radiation oncology  • Will need to update oncology with plan for dc since it has changed  • Palliative Care now following      Pneumothorax  • Occurred after lung biopsy from 3/17/23  • S/p chest tube  • Resolved      LLL PNA  • S/p tx with Cefepime  • stable     Behavioral issues  • Brain lesions are B/L frontal likely contributing  • Refuses therapy on and off and can at times yell at staff      HTN  • Stable on no medications  • Continue to monitor        Discharge date:  SNF today @ 1200 OK from medicine standpoint for dc    The above assessment and plan was reviewed and updated as determined by my evaluation of the patient on 4/25/2023      Labs:   Results from last 7 days   Lab Units 04/20/23  0537   WBC Thousand/uL 9 18   HEMOGLOBIN g/dL 12 1   HEMATOCRIT % 37 4   PLATELETS Thousands/uL 181     Results from last 7 days   Lab Units 04/20/23  0537   SODIUM mmol/L 137   POTASSIUM mmol/L 3 8   CHLORIDE mmol/L 106   CO2 mmol/L 29   BUN mg/dL 35*   CREATININE mg/dL 0 48*   CALCIUM mg/dL 8 5                   Review of Scheduled Meds:  Current Facility-Administered Medications   Medication Dose Route Frequency Provider Last Rate   • acetaminophen  650 mg Oral Q6H PRN MARY BETH Momin     • atorvastatin  40 mg Oral QPM MARY BETH Momin     • dexamethasone  4 mg Oral Q12H [address] MARY BETH Momin      Followed by   • [START ON 4/28/2023] dexamethasone  4 mg Oral Daily MARY BETH Momin     • enoxaparin  40 mg Subcutaneous Daily MARY BETH Momin     • levETIRAcetam  500 mg Oral Q12H [facility] & long-term MARY BETH Momin     • memantine  10 mg Oral BID MARY BETH Momin     • pantoprazole  40 mg Oral Early Morning MARY BETH Momin         Subjective/ HPI: Patient seen and examined  Patients overnight issues or events were reviewed with nursing or staff during rounds or morning huddle session  New or overnight issues include the following:     Pt seen in his room  S/O present as well  No complaints    ROS:   A 10 point ROS was performed; negative except as noted above  Imaging:     No orders to display       *Labs /Radiology studies reviewed  *Medications reviewed and reconciled as needed  *Please refer to order section for additional ordered labs studies  *Case discussed with primary attending during morning huddle case rounds    Physical Examination:  Vitals:   Vitals:    04/24/23 0839 04/24/23 1532 04/24/23 2016 04/25/23 0544   BP: 100/58 98/55 97/62 96/67   BP Location: Right arm Left arm Right arm Right arm   Pulse:  65 83 74   Resp:  17 20 18   Temp:  98 2 °F (36 8 °C) (!) 97 4 °F (36 3 °C) 98 1 °F (36 7 °C)   TempSrc:  Oral Oral Oral   SpO2:  95% 92% 95%   Weight:       Height:           GEN: No apparent distress, interactive  NEURO: Alert and oriented to self/place; remains impulsive   HEENT: Pupils are equal and reactive, EOMI, mucous membranes are moist, face symmetrical; [place]  CV: S1 S2 regular, no MRG, no peripheral edema noted  RESP: Lungs are decreased bilaterally, no wheezes, rales or rhonchi noted, on room air, respirations easy and non labored  GI: Flat, soft non tender, non distended; +BS x4  : Voiding without difficulty  MUSC: Moves all extremities  SKIN: pink, warm and dry, normal turgor, no rashes, lesions            The above physical exam was reviewed and updated as determined by my evaluation of the patient on 4/25/2023      Invasive Devices     None                    VTE Pharmacologic Prophylaxis: Enoxaparin  Code Status: Level 2 - DNAR: but accepts endotracheal intubation  Current Length of Stay: 11 day(s)      Total time spent:  30 minutes with more than 50% spent counseling/coordinating care  Counseling includes discussion with patient re: progress  and discussion with patient of his/her current medical state/information  Coordination of patient's care was performed in conjunction with primary service  Time invested included review of patient's labs, vitals, and management of their comorbidities with continued monitoring  In addition, this patient was discussed with medical team including physician and advanced extenders  The care of the patient was extensively discussed and appropriate treatment plan was formulated unique for this patient  Medical decision making for the day was made by supervising physician unless otherwise noted in their attestation statement  ** Please Note:  voice to text software may have been used in the creation of this document   Although proof errors in transcription or interpretation are a potential of such software**

## 2023-04-25 NOTE — PLAN OF CARE
Problem: PAIN - ADULT  Goal: Verbalizes/displays adequate comfort level or baseline comfort level  Description: Interventions:  - Encourage patient to monitor pain and request assistance  - Assess pain using appropriate pain scale  - Administer analgesics based on type and severity of pain and evaluate response  - Implement non-pharmacological measures as appropriate and evaluate response  - Consider cultural and social influences on pain and pain management  - Notify physician/advanced practitioner if interventions unsuccessful or patient reports new pain  Outcome: Progressing     Problem: INFECTION - ADULT  Goal: Absence or prevention of progression during hospitalization  Description: INTERVENTIONS:  - Assess and monitor for signs and symptoms of infection  - Monitor lab/diagnostic results  - Monitor all insertion sites, i e  indwelling lines, tubes, and drains  - Monitor endotracheal if appropriate and nasal secretions for changes in amount and color  - [place] appropriate cooling/warming therapies per order  - Administer medications as ordered  - Instruct and encourage patient and family to use good hand hygiene technique  - Identify and instruct in appropriate isolation precautions for identified infection/condition  Outcome: Progressing  Goal: Absence of fever/infection during neutropenic period  Description: INTERVENTIONS:  - Monitor WBC    Outcome: Progressing     Problem: SAFETY ADULT  Goal: Patient will remain free of falls  Description: INTERVENTIONS:  - Educate patient/family on patient safety including physical limitations  - Instruct patient to call for assistance with activity   - Consult OT/PT to assist with strengthening/mobility   - Keep Call bell within reach  - Keep bed low and locked with side rails adjusted as appropriate  - Keep care items and personal belongings within reach  - Initiate and maintain comfort rounds  - Make Fall Risk Sign visible to staff  - Offer Toileting every 2 Hours, in advance of need  - Initiate/Maintain BED  CHAIR alarm  - Obtain necessary fall risk management equipment: nonskid socks  - Apply yellow socks and bracelet for high fall risk patients  - Consider moving patient to room near nurses station  Outcome: Progressing  Goal: Maintain or return to baseline ADL function  Description: INTERVENTIONS:  -  Assess patient's ability to carry out ADLs; assess patient's baseline for ADL function and identify physical deficits which impact ability to perform ADLs (bathing, care of mouth/teeth, toileting, grooming, dressing, etc )  - Assess/evaluate cause of self-care deficits   - Assess range of motion  - Assess patient's mobility; develop plan if impaired  - Assess patient's need for assistive devices and provide as appropriate  - Encourage maximum independence but intervene and supervise when necessary  - Involve family in performance of ADLs  - Assess for home care needs following discharge   - Consider OT consult to assist with ADL evaluation and planning for discharge  - Provide patient education as appropriate  Outcome: Progressing  Goal: Maintains/Returns to pre admission functional level  Description: INTERVENTIONS:  - Perform BMAT or MOVE assessment daily    - Set and communicate daily mobility goal to care team and patient/family/caregiver  - Collaborate with rehabilitation services on mobility goals if consulted  - Perform Range of Motion 3 times a day  - Reposition patient every 2 hours    - Dangle patient 3 times a day  - Stand patient 3 times a day  - Ambulate patient 3 times a day  - Out of bed to chair 3 times a day   - Out of bed for meals 3 times a day  - Out of bed for toileting  - Record patient progress and toleration of activity level   Outcome: Progressing     Problem: DISCHARGE PLANNING  Goal: Discharge to home or other facility with appropriate resources  Description: INTERVENTIONS:  - Identify barriers to discharge w/patient and caregiver  - Arrange for needed discharge resources and transportation as appropriate  - Identify discharge learning needs (meds, wound care, etc )  - Arrange for interpretive services to assist at discharge as needed  - Refer to Case Management Department for coordinating discharge planning if the patient needs post-hospital services based on physician/advanced practitioner order or complex needs related to functional status, cognitive ability, or social support system  Outcome: Progressing     Problem: Nutrition/Hydration-ADULT  Goal: Nutrient/Hydration intake appropriate for improving, restoring or maintaining nutritional needs  Description: Monitor and assess patient's nutrition/hydration status for malnutrition  Collaborate with interdisciplinary team and initiate plan and interventions as ordered  Monitor patient's weight and dietary intake as ordered or per policy  Utilize nutrition screening tool and intervene as necessary  Determine patient's food preferences and provide high-protein, high-caloric foods as appropriate       INTERVENTIONS:  - Monitor oral intake, urinary output, labs, and treatment plans  - Assess nutrition and hydration status and recommend course of action  - Evaluate amount of meals eaten  - Assist patient with eating if necessary   - Allow adequate time for meals  - Recommend/ encourage appropriate diets, oral nutritional supplements, and vitamin/mineral supplements  - Order, calculate, and assess calorie counts as needed  - Recommend, monitor, and adjust tube feedings and TPN/PPN based on assessed needs  - Assess need for intravenous fluids  - Provide specific nutrition/hydration education as appropriate  - Include patient/family/caregiver in decisions related to nutrition  Outcome: Progressing     Problem: Prexisting or High Potential for Compromised Skin Integrity  Goal: Skin integrity is maintained or improved  Description: INTERVENTIONS:  - Identify patients at risk for skin breakdown  - Assess and monitor skin integrity  - Assess and monitor nutrition and hydration status  - Monitor labs   - Assess for incontinence   - Turn and reposition patient  - Assist with mobility/ambulation  - Relieve pressure over bony prominences  - Avoid friction and shearing  - Provide appropriate hygiene as needed including keeping skin clean and dry  - Evaluate need for skin moisturizer/barrier cream  - Collaborate with interdisciplinary team   - Patient/family teaching  - Consider wound care consult   Outcome: Progressing     Problem: SAFETY,RESTRAINT: NV/NON-SELF DESTRUCTIVE BEHAVIOR  Goal: Remains free of harm/injury (restraint for non violent/non self-detsructive behavior)  Description: INTERVENTIONS:  - Instruct patient/family regarding restraint use   - Assess and monitor physiologic and psychological status   - Provide interventions and comfort measures to meet assessed patient needs   - Identify and implement measures to help patient regain control  - Assess readiness for release of restraint   Outcome: Progressing  Goal: Returns to optimal restraint-free functioning  Description: INTERVENTIONS:  - Assess the patient's behavior and symptoms that indicate continued need for restraint  - Identify and implement measures to help patient regain control  - Assess readiness for release of restraint   Outcome: Progressing

## 2023-04-26 ENCOUNTER — NURSING HOME VISIT (OUTPATIENT)
Dept: GERIATRICS | Facility: OTHER | Age: 73
End: 2023-04-26

## 2023-04-26 DIAGNOSIS — I10 HYPERTENSION, UNSPECIFIED TYPE: ICD-10-CM

## 2023-04-26 DIAGNOSIS — G93.89 BRAIN MASS: ICD-10-CM

## 2023-04-26 DIAGNOSIS — C34.91 NON-SMALL CELL CARCINOMA OF RIGHT LUNG (HCC): Primary | ICD-10-CM

## 2023-04-26 DIAGNOSIS — E46 UNSPECIFIED PROTEIN-CALORIE MALNUTRITION (HCC): ICD-10-CM

## 2023-04-26 DIAGNOSIS — R26.2 AMBULATORY DYSFUNCTION: ICD-10-CM

## 2023-04-26 NOTE — ASSESSMENT & PLAN NOTE
Patient developed right pneumothorax after lung biopsy, followed by left lower lobe pneumonia  It was also received whole brain radiation    Patient was admitted to [address] [place] [address] on 4/14-4/25/23  Patient received inpatient rehabitation program  Now admitted to \[facility]\"" for further rehabilitation to recover

## 2023-04-26 NOTE — ASSESSMENT & PLAN NOTE
Patient was found to have brain metastasis  Temp reached whole brain radiation on 4/12/2023 during last hospitalization  Continue Keppra 500 mg p o  every 12 hours memantine 10 mg every 12 hours  Continue Decadron every 12 hours through 4/27, then daily  Outpatient follow-up with oncologist Dr Monae Fuentes and radiation oncology Dr Yrn Reddy

## 2023-04-26 NOTE — PROGRESS NOTES
[facility]  [address], [address] [place] [address]  IVI2    Nursing Home Admission    NAME: Xiomara Osceola  AGE: 81 y o  SEX: male [id number]      Patient Location     [address] rehab    Patient’s care was coordinated with nursing facility staff  Recent vitals, labs and updated medications were [facility]  Past Medical, surgical, social, medication and allergy history and patient’s previous records reviewed  Assessment/Plan:    Non-small cell carcinoma of right lung ([facility] [address] [facility] )  Patient had IR lung biopsy on 3/17/2023 and pathology revealed poorly differentiated non-small cell carcinoma  Patient later presented to the ED on 4/1/2023 with large right-sided pneumothorax and acute respiratory failure  Patient was treated with chest tube  Tube was removed on 4/7/2023 and was found to have left lower lobe pneumonia patient received 6 days of IV cefepime and vancomycin  Will follow up with heme-onc on 4/27 with plans to start chemotherapy on 5/2/2023    Brain mass  Patient was found to have brain metastasis  Temp reached whole brain radiation on 4/12/2023 during last hospitalization  Continue Keppra 500 mg p o  every 12 hours memantine 10 mg every 12 hours  Continue Decadron every 12 hours through 4/27, then daily  Outpatient follow-up with oncologist Dr Codi Pastor and radiation oncology Dr Mirtha Cardenas    Unspecified protein-calorie malnutrition ([facility] )  BMI 20 6  Patient lost about 50 pounds  Continue nutritional supplement      Ambulatory dysfunction  Patient developed right pneumothorax after lung biopsy, followed by left lower lobe pneumonia  It was also received whole brain radiation    Patient was admitted to [address] [place] [address] on 4/14-4/25/23  Patient received inpatient rehabitation program  Now admitted to [address] for further rehabilitation to recover    Hypertension  History of hypertension, currently patient is not on antihypertensive medications  Blood pressure is controlled  Continue monitor blood pressure          Chief Complaint     Follow-up with medical conditions    HPI       Patient is a 81 y o  male with a past medical history of hypertension who was admitted to [facility] rehab for rehabilitation  Patient underwent IR lung biopsy on 8/32/7588 and complicated by right-sided pneumothorax and was found to have acute respiratory failure on 4/1/2023 patient was treated with right-sided chest tube  Chest tube was removed on 4/7/2023  Patient was later found to have left lower lobe pneumonia and the patient completed 6 days of IV cefepime and vancomycin  Patient was also received whole brain radiation on 4/12/2023 for brain metastases from non-small cell lung cancer  Patient was discharged to [address] [place] [address] on 4/14/2023 to receive inpatient rehabilitation  Patient was discharged to SNF on 4/25/2023  On my evaluation, patient is awake and oriented  Patient feels okay  Patient denies chest pain, fever, chills, shortness of breath, or fever  No abdominal pain  No dysuria or bowel movement changes  Past Medical History:   Diagnosis Date   • Hypertension        Past Surgical History:   Procedure Laterality Date   • HERNIA REPAIR     • IR BIOPSY LUNG  3/17/2023       Social History     Tobacco Use   Smoking Status Former   Smokeless Tobacco Not on file   Tobacco Comments    quit Wednesday        History reviewed  No pertinent family history       No Known Allergies       Current Outpatient Medications:   •  acetaminophen (TYLENOL) 325 mg tablet, Take 2 tablets (650 mg total) by mouth every 6 (six) hours as needed for mild pain, headaches or fever, Disp: , Rfl: 0  •  atorvastatin (LIPITOR) 40 mg tablet, Take 1 tablet (40 mg total) by mouth every evening, Disp: 30 tablet, Rfl: 0  •  dexamethasone (DECADRON) 4 mg tablet, Take 1 tablet (4 mg total) by mouth every 12 (twelve) hours for 5 doses, Disp: 5 tablet, Rfl: 0  •  [START ON 4/28/2023] dexamethasone (DECADRON) 4 mg tablet, Take 1 tablet (4 mg total) by mouth daily Do not start before April 28, 2023 , Disp: , Rfl: 0  •  enoxaparin (LOVENOX) 40 mg/0 4 mL, Inject 0 4 mL (40 mg total) under the skin daily At the discretion of the accepting physician Do not start before April 26, 2023 , Disp: , Rfl: 0  •  levETIRAcetam (KEPPRA) 500 mg tablet, Take 1 tablet (500 mg total) by mouth every 12 (twelve) hours, Disp: 60 tablet, Rfl: 0  •  memantine (NAMENDA) 5 mg tablet, Take 2 tablets (10 mg total) by mouth 2 (two) times a day, Disp: 120 tablet, Rfl: 5  •  ondansetron (ZOFRAN-ODT) 4 mg disintegrating tablet, Take 1 tablet (4 mg total) by mouth every 6 (six) hours as needed for nausea or vomiting, Disp: 20 tablet, Rfl: 1  •  pantoprazole (PROTONIX) 40 mg tablet, Take 1 tablet (40 mg total) by mouth daily in the early morning Do not start before April 26, 2023 , Disp: , Rfl: 0  No current facility-administered medications for this visit  Updated list was reviewed in [address] [place] [address] system of facility  Vital signs were reviewed in point click care  /59, temperature 97 8 °F, pulse 75, respirations 16, SPO2 96% weight 128 8 pounds, height 69 inch      Review of Systems   Constitutional: Negative for chills and fever  HENT: Negative for congestion, rhinorrhea, sneezing and sore throat  Eyes: Negative for pain and discharge  Respiratory: Negative for cough, chest tightness, shortness of breath and wheezing  Cardiovascular: Negative for chest pain and leg swelling  Gastrointestinal: Negative for abdominal pain, nausea and vomiting  Endocrine: Negative for polydipsia, polyphagia and polyuria  Genitourinary: Negative for flank pain, frequency and urgency  Musculoskeletal: Negative for arthralgias, back pain and joint swelling  Skin: Negative for color change and pallor  Neurological: Negative for dizziness, weakness, light-headedness and headaches  Psychiatric/Behavioral: Negative for agitation and confusion  Physical Exam  Constitutional:       Appearance: He is ill-appearing  Comments: Muscle wasting in lower extremities   HENT:      Head: Normocephalic  Eyes:      Pupils: Pupils are equal, round, and reactive to light  Cardiovascular:      Rate and Rhythm: Normal rate and regular rhythm  Heart sounds: No murmur heard  Pulmonary:      Effort: Pulmonary effort is normal  No respiratory distress  Breath sounds: No wheezing or rales  Abdominal:      Palpations: Abdomen is soft  Tenderness: There is no abdominal tenderness  Musculoskeletal:         General: No swelling  Normal range of motion  Cervical back: Normal range of motion  Right lower leg: No edema  Left lower leg: No edema  Skin:     General: Skin is warm  Neurological:      Mental Status: He is alert and oriented to person, place, and time  Psychiatric:         Mood and Affect: Mood normal            Diagnostic Data       Recent labs and imaging studies were reviewed    CBC:  Component Ref Range & Units 4/20/23 0537 4/13/23 0528 4/11/23 0549 4/8/23 0454 4/7/23 0510 4/6/23 0455 4/5/23 0524 4/5/23 0524   WBC 4 31 - 10 16 Thousand/uL 9 18  10 92 High   10 88 High   11 26 High   11 53 High   12 87 High    13 24 High     RBC 3 88 - 5 62 Million/uL 4 26  4 03  4 10  4 32  4 06  4 17   4 48    Hemoglobin 12 0 - 17 0 g/dL 12 1  11 5 Low   11 5 Low   12 1  11 1 Low   11 6 Low    12 6    Hematocrit 36 5 - 49 3 % 37 4  37 0  36 3 Low   38 0  35 7 Low   37 0   40 1    MCV 82 - 98 fL 88  92  89  88  88  89   90    MCH 26 8 - 34 3 pg 28 4  28 5  28 0  28 0  27 3  27 8   28 1    MCHC 31 4 - 37 4 g/dL 32 4  31 1 Low   31 7  31 8  31 1 Low   31 4   31 4    RDW 11 6 - 15 1 % 17 6 High   17 7 High   17 1 High   17 1 High   16 9 High   16 7 High    16 7 High     MPV 8 9 - 12 7 fL 10 3  11 3  11 0  11 5  11 7  11 8   11 6    Platelets 192 - 479 Thousands/uL 181  153 " 167  165  153  164        BMP:       Component Ref Range & Units 4/20/23 0537 4/13/23 0528 4/7/23 0510 4/6/23 0455 4/5/23 0524 4/4/23 0519 4/3/23 0430   Sodium 135 - 147 mmol/L 137  135  136  139  135  134 Low   135    Potassium 3 5 - 5 3 mmol/L 3 8  4 5  4 8  4 7  4 8  4 4  4 1    Chloride 96 - 108 mmol/L 106  103  102  95 Low   101  102  106    CO2 21 - 32 mmol/L 29  28  30  29  28  25  25    ANION GAP 4 - 13 mmol/L 2 Low   4  4  15 High   6  7  4    BUN 5 - 25 mg/dL 35 High   30 High   37 High   30 High   33 High   28 High   30 High     Creatinine 0 60 - 1 30 mg/dL 0 48 Low   0 49 Low  CM  0 54 Low  CM  0 53 Low  CM  0 74 CM  0 66 CM  0 57 Low  CM    Comment: Standardized to IDMS reference method   Glucose 65 - 140 mg/dL 78  135 CM  123 CM  103 CM  110 CM  96 CM  101 CM    Comment: Specimen collection should occur prior to Sulfasalazine administration due to the potential for falsely depressed results  Specimen collection should occur prior to Sulfapyridine administration due to the potential for falsely elevated results  If the patient is fasting, the ADA then defines impaired fasting glucose as > 100 mg/dL and diabetes as > or equal to 123 mg/dL  Calcium 8 3 - 10 1 mg/dL 8 5  8 0 Low  R  8 8 R  8 9 R  9 2 R  9 1 R  8 5 R    eGFR ml/min/1 73sq m 111  110  106  107  93  97  104        Code Status:      DNR    Additional notes:        Portions of the record may have been created with voice recognition software  Occasional wrong word or \"sound a like\" substitutions may have occurred due to the inherent limitations of voice recognition software  Read the chart carefully and recognize, using context, where substitutions have occurred      This note was electronically signed by Dr Hemant Buck   "

## 2023-04-26 NOTE — ASSESSMENT & PLAN NOTE
History of hypertension, currently patient is not on antihypertensive medications  Blood pressure is controlled  Continue monitor blood pressure

## 2023-04-26 NOTE — ASSESSMENT & PLAN NOTE
Patient had IR lung biopsy on 3/17/2023 and pathology revealed poorly differentiated non-small cell carcinoma  Patient later presented to the ED on 4/1/2023 with large right-sided pneumothorax and acute respiratory failure  Patient was treated with chest tube  Tube was removed on 4/7/2023 and was found to have left lower lobe pneumonia patient received 6 days of IV cefepime and vancomycin    Will follow up with heme-onc on 4/27 with plans to start chemotherapy on 5/2/2023 09-Feb-2022 20:00

## 2023-04-27 ENCOUNTER — OFFICE VISIT (OUTPATIENT)
Dept: HEMATOLOGY ONCOLOGY | Facility: CLINIC | Age: 73
End: 2023-04-27

## 2023-04-27 ENCOUNTER — TELEPHONE (OUTPATIENT)
Dept: HEMATOLOGY ONCOLOGY | Facility: CLINIC | Age: 73
End: 2023-04-27

## 2023-04-27 VITALS
TEMPERATURE: 97.3 F | WEIGHT: 118 LBS | HEIGHT: 66 IN | BODY MASS INDEX: 18.96 KG/M2 | OXYGEN SATURATION: 94 % | SYSTOLIC BLOOD PRESSURE: 106 MMHG | DIASTOLIC BLOOD PRESSURE: 68 MMHG | RESPIRATION RATE: 18 BRPM | HEART RATE: 95 BPM

## 2023-04-27 DIAGNOSIS — C34.91 NON-SMALL CELL CARCINOMA OF RIGHT LUNG (HCC): Primary | ICD-10-CM

## 2023-04-27 DIAGNOSIS — E46 PROTEIN-CALORIE MALNUTRITION, UNSPECIFIED SEVERITY (HCC): ICD-10-CM

## 2023-04-27 DIAGNOSIS — C79.31 METASTASIS TO BRAIN (HCC): ICD-10-CM

## 2023-04-27 NOTE — TELEPHONE ENCOUNTER
Called [facility] regarding patients decision of hospice  LVM with [facility]  Will await callback prior to placing orders  JEANA Holder, MJ; Joyce Scott, MJ  Patient currently at [facility]  [phone]  Director of nursing  - Iam Breen       We agreed today to no treatment  [facility]   Per patient and his wife, Lincoln Munoz, his PS has decreased and he is not anticipated to return home        I did not place hospice order as I am not sure what Sb Patrciiawood's situation is

## 2023-04-27 NOTE — CASE MANAGEMENT
Team dc summary: Team recommending SNF transition due to inability for 24/7 supervision at home and family not feeling equipped for pt to dc home  Pt transferred to [facility] for further rehab   Transport scheduled via roundtrip for 12pm

## 2023-04-27 NOTE — PROGRESS NOTES
Hematology/Oncology Outpatient Follow- up Note  Rubi Samia Perez 81 y o  male MRN: @ Encounter: [id number]        Date:  4/27/2023      Assessment / Plan:    Stage IV non-small cell lung cancer, poorly differentiated diagnosed March 2023 when he presented with failure to thrive, weight loss, dizziness  He is found to have a 12 cm right-sided lung mass with brain metastases  He has completed whole brain RT  Possibility of palliative chemotherapy had been discussed  He was hospitalized April 1 through April 14, 2023 with pneumonia, pneumothorax requiring chest tube  He currently is at [facility], [place], operated by [facility]  Per his family, his performance status has continued to decline  He is not engaged in his physical therapy  When his current and declined performance status, he is not a candidate for chemotherapy  Discussed that this would likely not benefit and likely would exacerbate his declining performance status  Ultimately, patient agreed  We did discuss palliative care/hospice  Patient and family are agreeable  We will reach out to [facility] to see what steps we need to take to arrange this  Point of contact is his wife, Lincoln Munoz or daughter, Idalia Dias  Patient was seen and treatment plan made with Dr Nita Tellez  HPI:  Clair Walker is a 81 y o  male seen in inpatient consultation 3/16/23 regarding Rt lung mass with brain metastasis and lymphadenopathy  Past medical history of hypertension, BPH    Presented to [address] [place] [address] with ambulatory dysfunction for 5 days prior to presentation with reported fall  He was noted to have severe protein calorie malnutrition with cachexia  He reported poor appetite, unintentional weight loss, fatigue  Evaluated by Palliative care for goals of care discussion    3/14/23 CTA head - Enhancing lesions in the cerebral hemispheres and posterior fossa was able surrounding vasogenic edema  Recommend MRI of the brain with gadolinium    2  Right level 2 and 3 lymph nodes measuring up to 1 9 cm, likely metastatic  3/14/23 CT C/A/P -   Right lower lobe mass extending along the fissure to the right middle and upper lobes and to the right hilum, measuring 10 x 12 12 cm, consistent with lung malignancy  2  Left lower lobe subpleural 9 mm pulmonary nodule, indeterminate though concerning for malignancy  3  No evidence of malignancy in the abdomen or pelvis  3/14/23 MRI brain - Multiple intracranial enhancing lesions are identified, total of 5, consistent with intracranial metastasis  Several of these demonstrate focal surrounding vasogenic with localized mass effect, most prominently the right cerebellar lesion  Neurosurgery recommended radiation consult  Heme-onc services were consulted for further evaluation regarding lung mass  3/17/23 - Lung, right, biopsy: Poorly differentiated non-small cell carcinoma  Interval History: At first outpatient appointment 3/30/23, his poor performance status of 2-3 was discussed  Patient declined palliative care/hospice  Taxol 175 mg per metered square, carboplatin AUC 5, pembrolizumab 200 mg flat dose every 3 weeks followed by pegfilgrastim to prevent chemotherapy-induced neutropenia planned  This was planned to start 4/11/23 4/1/23  Patient presented via EMS due to SOB, 02 70% range on RA  He was found to have a large right-sided pneumothorax and chest tube placed in ED  He was found to have left lower lobe pneumonia she was treated with antibiotics  Continued on whole brain RT while inpatient, completing this April 12, 2023  Acute rehab was recommended  He was transferred to [address] [place] [address] April 14, 2023  Was not making progress, was not engaged in his care  Transferred to [address] [place] [address] earlier this week  Per patient's wife, Bebe Vazquez, and daughter, Gopal Goyal, his PS has gotten worse  He is always been a pessimistic person but he is no longer engaged in his care    He will participate physical therapy and will eat but makes no initiative to move independently or eat if food is not directly placed in front of him and he is encouraged to eat  Review of Systems   Constitutional: Positive for fatigue and unexpected weight change  Negative for appetite change, chills, diaphoresis and fever  HENT:   Negative for mouth sores, nosebleeds, sore throat, tinnitus and voice change  Eyes: Negative for eye problems  Respiratory: Negative for chest tightness, cough, shortness of breath and wheezing  Cardiovascular: Negative for chest pain, leg swelling and palpitations  Gastrointestinal: Negative for abdominal distention, abdominal pain, blood in stool, constipation, diarrhea, nausea, rectal pain and vomiting  Endocrine: Negative for hot flashes  Genitourinary: Negative  Musculoskeletal: Positive for gait problem  Negative for myalgias  Skin: Negative for itching and rash  Neurological: Positive for gait problem  Negative for dizziness, headaches, light-headedness and numbness  Hematological: Negative for adenopathy  Psychiatric/Behavioral: Negative for confusion and sleep disturbance  The patient is not nervous/anxious  Test Results:        Labs:   Lab Results   Component Value Date    HGB 12 1 04/20/2023    HCT 37 4 04/20/2023    MCV 88 04/20/2023     04/20/2023    WBC 9 18 04/20/2023    NRBC 0 04/20/2023    BANDSPCT 3 04/05/2023     Lab Results   Component Value Date    K 3 8 04/20/2023     04/20/2023    CO2 29 04/20/2023    BUN 35 (H) 04/20/2023    CREATININE 0 48 (L) 04/20/2023    CALCIUM 8 5 04/20/2023    CORRECTEDCA 9 8 04/03/2023    AST 9 (L) 04/02/2023    ALT 12 04/02/2023    ALKPHOS 46 04/02/2023    EGFR 111 04/20/2023           Imaging: XR chest portable    Result Date: 4/7/2023  Narrative: CHEST RADIOGRAPH INDICATION:   post chest tube clamp > evaluate for PNX  COMPARISON:  Chest radiograph 4/6/2023   EXAM PERFORMED/VIEWS:  XR CHEST PORTABLE  Technique: AP portable semierect  Images:  1  Patient is rotated to the left  FINDINGS:  Lines/Tubes/Devices: Stable positioning of right-sided apically directed chest tube  Mediastinum: Cardiomediastinal silhouette appears within normal limits  Lungs: Unchanged mass in the lower right hemithorax  Stable patchy airspace opacity at the left lung base  Pleura: Small bilateral pleural effusions, left greater than right, unchanged  No visualized right pneumothorax, noting numerous linear markings overlying the apex related to subcutaneous air  Bones: Osseous structures appear within normal limits for patient age  Extensive right chest wall and bilateral neck subcutaneous emphysema again seen  Impression: 1  Right apical chest tube in stable position without visualized pneumothorax  2   Prominent right chest wall and bilateral neck subcutaneous emphysema  3   Stable bibasilar effusions  Workstation performed: [id number]     XR chest portable    Result Date: 4/6/2023  Narrative: CHEST INDICATION:   Respiratory failure  COMPARISON:  4/6/2023 EXAM PERFORMED/VIEWS:  XR CHEST PORTABLE FINDINGS: Stable cardiomegaly  Stable right chest tube without residual pneumothorax identified  Right chest wall emphysema as before  Right pulmonary mass redemonstrated  Small left greater than right pleural effusions  Osseous structures appear within normal limits for patient age  Impression: 1  No significant pneumothorax with right chest tube in place  Prominent right chest wall emphysema  2   Stable right pulmonary mass  3   Basilar effusions  Workstation performed: [id number]     XR chest portable    Result Date: 4/6/2023  Narrative: CHEST INDICATION:   follow CXR on water seal  COMPARISON:  4/5/2023 EXAM PERFORMED/VIEWS:  XR CHEST PORTABLE FINDINGS: Cardiomediastinal silhouette appears unremarkable  No residual pneumothorax identified with a right chest tube in place  Small basilar pleural effusions    Stable right pulmonary mass  Right-sided soft tissue emphysema as before  Osseous structures appear within normal limits for patient age  Impression: No pneumothorax identified with a right chest tube in place  Stable pulmonary mass  Workstation performed: [id number]     XR chest portable    Result Date: 4/6/2023  Narrative: CHEST INDICATION:   pneumothorax follow up  COMPARISON:  4/4/2023 EXAM PERFORMED/VIEWS:  XR CHEST PORTABLE FINDINGS: Cardiomediastinal silhouette appears unremarkable  Small right apical pneumothorax with stable position of a right chest tube  Prominent right chest wall emphysema as before  Large right lower lobe pulmonary mass  Small left basilar pleural effusion noted  Osseous structures appear within normal limits for patient age  Impression: 1  Small stable right apex pneumothorax with a chest tube in place  2   Stable right pulmonary mass  3   Small left basilar effusion  Workstation performed: [id number]     XR chest portable    Result Date: 4/6/2023  Narrative: CHEST INDICATION:   pntx  COMPARISON:  4/5/2023 EXAM PERFORMED/VIEWS:  XR CHEST PORTABLE FINDINGS: Cardiomediastinal silhouette appears unremarkable  Right chest tube in place without residual pneumothorax  Right chest wall emphysema demonstrated  Right basilar pulmonary mass as previously characterized  Small left basilar effusion noted  Osseous structures appear within normal limits for patient age  Impression: 1  Right apex chest tube without residual pneumothorax  Prominent chest wall subcutaneous emphysema as before  2   Stable right pulmonary mass  3   Small left basilar effusion  Workstation performed: [id number]     XR chest portable    Result Date: 4/4/2023  Narrative: CHEST INDICATION:   pntx now bubbling  COMPARISON:  CXR 04/03/2023 and chest CT 3/14/2023  EXAM PERFORMED/VIEWS:  XR CHEST PORTABLE  FINDINGS: Cardiomediastinal silhouette normal  Chest tube over right apex    Extensive subcutaneous emphysema with probable at least small right basilar pneumothorax  Huge tumor in the right lower lobe  Increased opacity in the left base with trace left effusion  Upper abdomen normal  Bones normal for age  Impression: Right chest tube with at least a probable small right basilar pneumothorax  A larger pneumothorax can be obscured by the extensive subcutaneous emphysema  Large right lung mass  Mild opacity in the left base, atelectasis and/or pneumonia, with trace left effusion  Workstation performed: [id number]     XR chest portable    Result Date: 4/3/2023  Narrative: CHEST INDICATION:   follow up PNX  COMPARISON:  Portable chest from April 3, 2023 at 0516 hours EXAM PERFORMED/VIEWS:  XR CHEST PORTABLE FINDINGS:  Right-sided chest tube unchanged in position with tip in the right apical pleural space  Cardiomediastinal silhouette appears unremarkable  Large right lower lobe mass unchanged  Persistent mild left basilar subsegmental atelectasis  Mild blunting of the right lateral costophrenic sulcus  No evidence of residual or recurrent right pneumothorax  Osseous structures appear within normal limits for patient age  Soft tissue emphysema in the neck and right side of the chest wall  Impression: No evidence of residual right pneumothorax  Workstation performed: [id number]     XR chest portable    Result Date: 4/3/2023  Narrative: CHEST INDICATION:   re eval ptx  COMPARISON:  Portable chest from April 2, 2023 at 0551 hours  EXAM PERFORMED/VIEWS:  XR CHEST PORTABLE  1751 hours  FINDINGS:  A right-sided chest tube is been removed since the last examination  Cardiomediastinal silhouette appears unremarkable  There is a new moderate-sized right pneumothorax  Extensive consolidation is present in the right lower lobe  Opacification is also present in the base of the left lower lobe  Imaged bones unremarkable  Soft tissue emphysema in the right side of the chest and neck  Impression: 1    Moderate-sized right pneumothorax following removal of right chest tube  2   Consolidation in both lower lobes, more extensive on the right, consistent with bilateral pneumonia or, possibly, pulmonary edema  3   Additional atelectasis in the base of the left lower lobe  The above findings were described and documented by the referring provider in [address] [place] [address]  Workstation performed: [id number]     XR chest portable    Result Date: 4/2/2023  Narrative: CHEST INDICATION:   pneumothorax, right sided chest tube  COMPARISON:  4/1/2023 EXAM PERFORMED/VIEWS:  XR CHEST PORTABLE Images: 2 FINDINGS: Cardiomediastinal silhouette appears unremarkable  Stable lobulated consolidation/mass within the right mid to lower lung field  No pneumothorax identified  Hyperinflated lung fields suggestive of underlying COPD  Focal opacity within the left lung base more linear than the prior examination suggestive of atelectasis  Please note this examination is limited due to patient rotation  Stable bony structures  Impression: Stable right mid to lower lung field consolidation/mass  Focal opacity in the left lung base suggestive of atelectasis  No pneumothorax identified  Workstation performed: [id number]     XR chest 1 view portable    Result Date: 4/2/2023  Narrative: CHEST INDICATION:   pneumo  COMPARISON:  4/1/2023  EXAM PERFORMED/VIEWS:  XR CHEST PORTABLE Images: 3 FINDINGS: Stable cardiomediastinal structures  Once again identified is a large lobulated consolidation within the right hilar region extending into the lower lobe  Lung fields appear hyperinflated suggesting underlying COPD  Focal opacity is now identified within the left lung base which may represent atelectasis or infiltrate  No pneumothorax identified within the right chest  Stable bony structures  Impression: No residual pneumothorax identified within the right chest  Stable large lobulated consolidation/mass within the right mid to lower lung field   There is a new, small focal opacity in the left lung base  Workstation performed: [id number]     XR chest 1 view portable    Result Date: 4/2/2023  Narrative: CHEST INDICATION:   post chest tube placement  COMPARISON:  Prior chest x-ray performed earlier the same day  EXAM PERFORMED/VIEWS:  XR CHEST PORTABLE  AP semierect Images: 2 FINDINGS:  A chest tube is now noted in the lateral right hemithorax  Tip and side port project within the rib margin  Cardiomediastinal silhouette appears unremarkable  Leftward shift has resolved  Right lung now appears to be adequately expanded  There may be trace right basilar pneumothorax  Large right lower lobe mass again noted, unchanged  Minimal opacity at the left base, slightly improved, possibly due to pneumonia/atelectasis  Osseous structures appear within normal limits for patient age  Impression: Nearly completely reexpanded right lung following right chest tube placement  Possible trace right basilar pneumothorax  Minimal though improving left base opacity likely due to atelectasis or pneumonia  Workstation performed: [id number]     XR chest 1 view portable    Result Date: 4/2/2023  Narrative: CHEST INDICATION:   sob  Large right lower lobe mass, recently biopsied  COMPARISON:  3/18/2023 EXAM PERFORMED/VIEWS:  XR CHEST PORTABLE  AP semierect Images: 2 FINDINGS: Slight shift of the cardiomediastinal silhouette to the left  Heart does not appear to be enlarged  Large right pneumothorax with the right lung collapsed around the known right lower lobe mass  This represents significant adverse change  Left base haziness consistent with developing minimal atelectasis and/or effusion  Left lung otherwise clear  Osseous structures appear within normal limits for patient age  Impression: Marked right pneumothorax, significant adverse change, possibly under tension  Developing opacity at the left base consistent with small effusion and/or atelectasis   The study was marked in [facility] for immediate notification  Workstation performed: [id number]     CT chest wo contrast    Result Date: 4/4/2023  Narrative: CT CHEST WITHOUT IV CONTRAST INDICATION:   Pneumothorax  Per my review of the medical record, the patient has stage IV non-small cell lung cancer with brain metastases  Admitted for pneumothorax resulting from IR guided lung biopsy  COMPARISON:  CXR from today and chest CT 3/14/2023  TECHNIQUE: Chest CT without intravenous contrast   Axial, sagittal, coronal 2D reformats and coronal MIPS from source data  Radiation dose length product (DLP):  225 mGy-cm   Radiation dose exposure minimized using iterative reconstruction and automated exposure control  FINDINGS: LUNGS:  Redemonstration of 13 cm right lower lobe mass extending across the fissure into the right upper lobe  Moderate consolidation and groundglass opacity in the left lower lobe  Mild emphysema  PLEURA:  Chest tube in the right apical pleural space with trace right pneumothorax  New small left pleural effusion  HEART/GREAT VESSELS:  Normal heart size  Moderate coronary artery calcification indicating atherosclerotic heart disease  MEDIASTINUM AND UDAY:  Moderate pneumomediastinum  CHEST WALL AND LOWER NECK: Moderate subcutaneous emphysema  UPPER ABDOMEN:  Bilateral renal cysts  OSSEOUS STRUCTURES: Mild degenerative disease in the spine  Impression: Chest tube in the right apical pleural space with trace right pneumothorax  Pneumomediastinum and subcutaneous emphysema  Redemonstration of acute right lower lobe tumor extending across the fissure into the right upper lobe  Left lower lobe pneumonia and small left pleural effusion  Workstation performed: [id number]     XR chest portable ICU    Result Date: 4/3/2023  Narrative: CHEST INDICATION:   ctx placement  Right pneumothorax   COMPARISON:  Portable chest from April 3, 2023 at 0357 hours EXAM PERFORMED/VIEWS:  XR CHEST PORTABLE ICU  0516 hours FINDINGS:  In the interim, a right-sided chest tube has been placed with its tip in the right apical pleural space  Cardiomediastinal silhouette appears unremarkable  Right lower lobe mass and left subsegmental atelectasis unchanged  Slight blunting of both lateral costophrenic sulci  Right lung reexpanded with no evidence of residual pneumothorax  Osseous structures appear within normal limits for patient age  Soft tissue emphysema again noted in the neck and right side of the chest wall  Impression: Resolution of right pneumothorax following chest tube placement  Workstation performed: [id number]     XR chest portable ICU    Result Date: 4/3/2023  Narrative: CHEST INDICATION:   f/u pnuemo  COMPARISON:  Portable chest from April 2, 2023 at 2036 hours EXAM PERFORMED/VIEWS:  XR CHEST PORTABLE ICU  0357 hours FINDINGS: Cardiomediastinal silhouette appears unremarkable  Right lower lobe mass and left basilar subsegmental atelectasis unchanged  Small right pneumothorax, mildly increased in size since yesterday  Bones unremarkable  Progression of extensive soft tissue emphysema in the right chest wall and neck  Impression: Increase in size of small right pneumothorax since 4/2/2023  Progression of right-sided soft tissue emphysema  Workstation performed: [id number]     XR chest portable ICU    Result Date: 4/3/2023  Narrative: CHEST INDICATION:   f/u removal of CTx  COMPARISON:  Portable chest from April 2, 2023 at 1751 hours  EXAM PERFORMED/VIEWS:  XR CHEST PORTABLE ICU  2036 hours FINDINGS:  Patient rotated to the left, limiting evaluation of the chest  Heart top normal in size  Pulmonary vessels unremarkable  Large consolidation and/or mass in the right lower lobe, better demonstrated on a CT of the chest from 3/14/2023  Persistent opacification in the base of the left lower lobe  Small right pneumothorax, which appears to have decreased in size since the last examination  Osseous structures appear within normal limits for patient age    Soft "tissue emphysema in the right side of the chest wall and neck  Impression: 1  Decrease in size of small right pneumothorax since a portable chest from earlier the same evening  2   Left lower lobe consolidation and/or atelectasis in right lower lobe mass unchanged  Workstation performed: [id number]             Allergies: No Known Allergies  Current Medications: Reviewed  PMH/FH/SH:  Reviewed      Physical Exam:    There is no height or weight on file to calculate BSA  Ht Readings from Last 3 Encounters:   04/14/23 5' 6\" (1 676 m)   04/01/23 5' 6\" (1 676 m)   03/30/23 5' 6\" (1 676 m)        Wt Readings from Last 3 Encounters:   04/19/23 58 kg (127 lb 13 9 oz)   04/01/23 59 4 kg (130 lb 15 3 oz)   03/30/23 62 1 kg (137 lb)        Temp Readings from Last 3 Encounters:   04/25/23 98 1 °F (36 7 °C) (Oral)   04/14/23 97 6 °F (36 4 °C)   03/30/23 99 5 °F (37 5 °C) (Temporal)        BP Readings from Last 3 Encounters:   04/25/23 96/67   04/14/23 96/58   03/30/23 98/76             Physical Exam  Constitutional:       General: He is not in acute distress  Appearance: He is well-developed  He is ill-appearing  He is not diaphoretic  Comments: underweight   HENT:      Head: Normocephalic and atraumatic  Eyes:      Conjunctiva/sclera: Conjunctivae normal    Neck:      Trachea: No tracheal deviation  Cardiovascular:      Rate and Rhythm: Normal rate and regular rhythm  Heart sounds: Normal heart sounds  No murmur heard  No friction rub  No gallop  Pulmonary:      Effort: Pulmonary effort is normal  No respiratory distress  Breath sounds: Normal breath sounds  No wheezing or rales  Chest:      Chest wall: No tenderness  Abdominal:      General: There is no distension  Palpations: Abdomen is soft  Tenderness: There is no abdominal tenderness  Musculoskeletal:      Cervical back: Normal range of motion and neck supple  Lymphadenopathy:      Cervical: No cervical adenopathy   " Skin:     General: Skin is warm and dry  Coloration: Skin is not pale  Findings: No erythema  Neurological:      General: No focal deficit present  Mental Status: He is alert  Psychiatric:         Behavior: Behavior normal          Thought Content: Thought content normal          Judgment: Judgment normal          ECOG: 3    Emergency Contacts:    Extended Emergency Contact Information  Primary Emergency Contact: Alice Perez  Address: [facility] [address] [facility] [facility] [employer] Phone: [phone]  Relation: Daughter  Preferred language: English   needed? No  Secondary Emergency Contact: [address] [facility]  Mobile Phone: [phone]  Relation: Spouse  Preferred language: English   needed?  No

## 2023-04-28 ENCOUNTER — TELEPHONE (OUTPATIENT)
Dept: HEMATOLOGY ONCOLOGY | Facility: CLINIC | Age: 73
End: 2023-04-28

## 2023-04-28 NOTE — PROGRESS NOTES
04/28/23 1505   Hello, [Guardian’s Name / Patient’s Deon Whyte, this is [Caller Deon Whyte from [facility], and our clinical care team wanted to check on you / your child after your recent visit to the hospital  It will only take 3-5 minutes  Is this a good time? Discharge Call Type/ Specific Diagnosis: General Call   Call Complete   Attempted Number of Calls 1   Discharge phone call complete?  Does not meet criteria  (D/C to acute hospital)

## 2023-04-28 NOTE — TELEPHONE ENCOUNTER
Daughter Gopal Goyal did return call 4/25/23  Return call today by this nurse  LMOM of dgt to please call office back to schedule new consult appointment

## 2023-05-01 ENCOUNTER — NURSING HOME VISIT (OUTPATIENT)
Dept: GERIATRICS | Facility: OTHER | Age: 73
End: 2023-05-01

## 2023-05-01 DIAGNOSIS — C34.91 NON-SMALL CELL CARCINOMA OF RIGHT LUNG (HCC): Primary | ICD-10-CM

## 2023-05-02 ENCOUNTER — TELEPHONE (OUTPATIENT)
Dept: HEMATOLOGY ONCOLOGY | Facility: CLINIC | Age: 73
End: 2023-05-02

## 2023-05-02 ENCOUNTER — HOSPITAL ENCOUNTER (OUTPATIENT)
Dept: INFUSION CENTER | Facility: CLINIC | Age: 73
End: 2023-05-02

## 2023-05-02 VITALS
DIASTOLIC BLOOD PRESSURE: 56 MMHG | HEART RATE: 80 BPM | SYSTOLIC BLOOD PRESSURE: 96 MMHG | OXYGEN SATURATION: 97 % | TEMPERATURE: 97.9 F | RESPIRATION RATE: 18 BRPM | BODY MASS INDEX: 19.89 KG/M2 | WEIGHT: 123.2 LBS

## 2023-05-02 NOTE — PROGRESS NOTES
[address]  [address], Roberto Spears [address]  51     Progress Note  Code SNF 31    Patient Location     [address] [place] [address] rehab    Reason for visit     Follow-up metastatic lung CA, recent pneumonia, pleural effusion, protein calorie malnutrition, hypertension, ambulatory dysfunction    Patients care was coordinated with nursing facility staff  Recent vitals, labs and [facility]  Problem List Items Addressed This Visit        Respiratory    Non-small cell carcinoma of right lung ([facility] [address] ) - Primary     Pt was diagnosed with stage IV non-small lung CA poorly differentiated in March/23  He was subsequently found to have brain mets and underwent brain radiation treatment recently at the hospital    Patient was recently hospitalized with pneumonia and pneumothorax needing chest tube placement  Patient's overall clinical status continues to decline  He was evaluated by heme-onc service on 4/27/2023  Follow-up notes were reviewed  He was noted to have poor performance status deemed not a candidate for any chemotherapy  Patient and  family has decided to pursue comfort care  Consider hospice upon discharge from the facility            Brain mets:  Status post recent radiation therapy  No focal changes  Patient remains on levetiracetam and dexamethasone for now  Severe protein calorie malnutrition:  Pt is noted to be weak and frail with significant weight loss over the last few months  Continue supportive care  Patient is for comfort care      Pneumonia:  Patient recently completed antibiotic treatment, remains afebrile    Pneumothorax:  History of recent hospitalization with pneumothorax and pneumonia  Patient underwent chest tube placement with subsequent removal   Currently denies any dyspnea  SaO2 97% on room air    HPI       Pt is being seen for a follow-up visit    In general  appears to be declining  Patient remains hard of hearing  He is noted to be weak and frail  Patient remains tired and fatigued  He is needing significant assistance with ADLs  Patient was recently evaluated by heme-onc service for a follow-up visit  He was deemed not a candidate for any chemotherapy considering poor performance status  Review of Systems   Constitutional: Positive for activity change (Tired and fatigued) and fatigue  Negative for chills and fever  Respiratory: Negative for chest tightness, shortness of breath and stridor  Gastrointestinal: Negative for abdominal distention, abdominal pain and vomiting  Genitourinary: Negative for flank pain and hematuria  Musculoskeletal: Positive for gait problem  Neurological: Positive for weakness  Negative for seizures and syncope  Psychiatric/Behavioral: Negative for agitation and behavioral problems  Past Medical History:   Diagnosis Date    Hypertension        Past Surgical History:   Procedure Laterality Date    HERNIA REPAIR      IR BIOPSY LUNG  3/17/2023       Social History     Tobacco Use   Smoking Status Former   Smokeless Tobacco Not on file   Tobacco Comments    quit Wednesday       No family history on file       No Known Allergies      Current Outpatient Medications:     acetaminophen (TYLENOL) 325 mg tablet, Take 2 tablets (650 mg total) by mouth every 6 (six) hours as needed for mild pain, headaches or fever, Disp: , Rfl: 0    atorvastatin (LIPITOR) 40 mg tablet, Take 1 tablet (40 mg total) by mouth every evening, Disp: 30 tablet, Rfl: 0    dexamethasone (DECADRON) 4 mg tablet, Take 1 tablet (4 mg total) by mouth daily Do not start before April 28, 2023 , Disp: , Rfl: 0    enoxaparin (LOVENOX) 40 mg/0 4 mL, Inject 0 4 mL (40 mg total) under the skin daily At the discretion of the accepting physician Do not start before April 26, 2023 , Disp: , Rfl: 0    levETIRAcetam (KEPPRA) 500 mg tablet, Take 1 tablet (500 mg total) by "mouth every 12 (twelve) hours, Disp: 60 tablet, Rfl: 0    memantine (NAMENDA) 5 mg tablet, Take 2 tablets (10 mg total) by mouth 2 (two) times a day, Disp: 120 tablet, Rfl: 5    ondansetron (ZOFRAN-ODT) 4 mg disintegrating tablet, Take 1 tablet (4 mg total) by mouth every 6 (six) hours as needed for nausea or vomiting, Disp: 20 tablet, Rfl: 1    pantoprazole (PROTONIX) 40 mg tablet, Take 1 tablet (40 mg total) by mouth daily in the early morning Do not start before April 26, 2023 , Disp: , Rfl: 0    Updated list was reviewed in [facility] of facility  Vitals:    05/01/23 0749   BP: 96/56   Pulse: 80   Resp: 18   Temp: 97 9 °F (36 6 °C)   SpO2: 97%       Physical Exam  Constitutional:       Comments: Thin appearing   HENT:      Head: Normocephalic and atraumatic  Cardiovascular:      Rate and Rhythm: Regular rhythm  Pulmonary:      Breath sounds: No wheezing, rhonchi or rales  Abdominal:      General: There is no distension  Tenderness: There is no abdominal tenderness  There is no guarding  Musculoskeletal:      Cervical back: Neck supple  Right lower leg: No edema  Left lower leg: No edema  Skin:     Coloration: Skin is not jaundiced  Neurological:      General: No focal deficit present  Motor: Weakness present  Psychiatric:         Mood and Affect: Mood normal        Diagnostic Data:    Recent labs were reviewed  Labs done on 4/26/2023 revealed WBC count of 10 2, hemoglobin 12 3, platelet count 156  Creatinine 0 5, BUN 25, potassium 5 1    Additional Notes:  Discussed with palliative care team who reached out to patient's daughter  Patient is for comfort care only  No further lab draws  Discontinue enoxaparin  Will position to hospice care upon discharge  Patient is DNR/DNI and DNH    Portions of the record may have been created with voice recognition software    Occasional wrong word or \"sound a like\" substitutions may have occurred due to the inherent " limitations of voice recognition software  Read the chart carefully and recognize, using context, where substitutions have occurred      This note was electronically signed by Dr Sherry Baltazar

## 2023-05-02 NOTE — TELEPHONE ENCOUNTER
Spoke to daughter Eugenio Ramirez and she stated Vero Turner is currently at [facility] and getting Palliative Care there

## 2023-05-02 NOTE — ASSESSMENT & PLAN NOTE
Pt was diagnosed with stage IV non-small lung CA poorly differentiated in March/23  He was subsequently found to have brain mets and underwent brain radiation treatment recently at the hospital    Patient was recently hospitalized with pneumonia and pneumothorax needing chest tube placement  Patient's overall clinical status continues to decline  He was evaluated by heme-onc service on 4/27/2023  Follow-up notes were reviewed  He was noted to have poor performance status deemed not a candidate for any chemotherapy  Patient and  family has decided to pursue comfort care    Consider hospice upon discharge from the facility

## 2023-05-02 NOTE — TELEPHONE ENCOUNTER
Third attempt to reach [facility]  LVM with director of nursing with name office callback number and that this was our third attempt

## 2023-05-04 ENCOUNTER — DOCUMENTATION (OUTPATIENT)
Dept: HEMATOLOGY ONCOLOGY | Facility: CLINIC | Age: 73
End: 2023-05-04

## 2023-05-04 NOTE — SPEECH THERAPY NOTE
SLP Discharge Summary      Pt was discharged to subacute level of care for continued skilled therapies on 4/25/2023  Pt was unable to achieve goals which were established on initial assessment, but was establishing slower progress given stay on the acute rehab center  Primary focus for ST services while on the unit was towards targeting pt's overall cognitive linguistic skills  Pt completed the [facility] cognitive assessment  Pt total score was 17/30 which as compared to those with high school education correlates with moderate neurocognitive disorder and is indicative of dementia  Barriers which presented included decreased processing, orientation, decreased ST/working memory, decreased executive function skills (problem solving, reasoning, organization, judgement) and overall insight to deficits which currently impact safety and functional mobility  Pt was able to recall orientation information with correct place, situation and date (month/year only)  Pt recalled his biographical information as well with no errors  Pt limited historian but reported living with significant other, Sierar Mercedes and has a supportive daughter, Pallavi Vences  Despite the observed cognitive deficits which present, pt's overall participation had fluctuated given sessions, but was noted to engage in tasks when the plan of session was presented accordingly at beginning of sessions  Visual aids given larger digital clock as well as written calendar have been used for improvement in orientation given time/date  There had been reports of pt completing ADL and I ADL tasks independently prior to admission, however, due to observed current cognitive deficits it would be recommended for supervision/assistance for these tasks moving forward   Family training and education was provided to both pt's SOSierra and dtr, Pallavi Ru who report that unfortunately the cannot provide the assistance/supervision required for pt at this time to where they wished to pursue subacute rehab facility for continued therapy  Pt would benefit from continued skilled SLP services at subacute level of care in order to further maximize overall functional cognitive linguistic skills

## 2023-05-04 NOTE — PROGRESS NOTES
Pt recently established care with Dr Devyn Andres  Due to patients current and declined performance status, he is not a candidate for chemotherapy  It was discussed that this would likely not benefit and likely would exacerbate his declining performance status   No further action needed by the Oncology Care Coordination team

## 2023-05-06 NOTE — PROGRESS NOTES
"[address], [address], [facility], [address]  [phone]    NAME: Shaq Perez  AGE: 81 y o  SEX: male    Progress Note    [facility]   POS: 31 (SNF)     Patient's care was coordinated with nursing facility staff  Recent vitals, labs, and updated medications were review on [facility] system in facility  Assessment/Plan:    Non-small cell carcinoma of right lung ([facility] [address] )  On 3/17/23- diagnosed with stage IV non small lung CA poorly differentiated  Subsequently found to have brain mets and underwent brain radiation treatment recently at the hospital  Also pt with pneumonia and pneumothorax  S/p chest tube  Overall pt with rapid decline   Heme onc notes reviewed  Pt deemed not a candidate for any chemotherapy due to poor performance status   Remains on supplemental oxygen at 2L SaO2 96%  +SOB at rest  No cough noted in exam  Sleeps most of the day with decrease strength and generalized muscle wasting  Remains on comfort care family considering hospice upon discharge from the faciltiy  continue supportive care    Brain mass  With brain mets  Deemed not a candidate for any chemotherapy per heme onc notes  C/w keppra 500 mg every 12 hrs, memantine 10 mg every 12 hrs, decadron 4 mg daily   Alert to self   On comfort measures with goal to be discharged home with hospice services  continue supportive care     Unspecified [facility]calorie [facility]  Malnutrition Findings:    with poor intake  Noted with generalized subcutaneous muscle wasting and decrease strength  Pt on comfort care  Encourage hydration/nutrition as tolerated by pt  Continue supportive care    Estimated body mass index is 19 89 kg/m² as calculated from the following:    Height as of 4/27/23: 5' 6\" (1 676 m)  Weight as of this encounter: 55 9 kg (123 lb 3 2 oz)      Ambulatory dysfunction  Multifactorial with progression of disease   Continue PT OT   Fall precautions  mostly bed bound " Assist pt with ADLs/IADLs  supportive care     Physical deconditioning  Noted with progression of disease  PT OT  Fall precautions  Encourage nutrition/hydration  Continue supportive care with ADLs/IADLs     Chief complaint / Reason for visit: STR F/U    History of Present Illness:  Patient is a 78-year old male seen and examined for STR F/U  Received pt lying in bed  Appears chronically ill, weak, deconditioned with generalized muscle/subcutaneous wasting  With decreased strength  Per staff with poor intake  Denies back or joint pain  Dependent for ADLs/IADLs  Denies /GI discomfort  Free of chest pain or palpitations  No other concerns or issues at this time  Review of Systems:  Per history of present illness, all other systems reviewed and negative  Other than those noted in HPI    HISTORY:  Medical Hx: Reviewed, unchanged  Family Hx: Reviewed, unchanged  Soc Hx: Reviewed,  unchanged    ALLERGY: Reviewed, unchanged  No Known Allergies     PHYSICAL EXAM:  Vital Signs: /68, P 71, R 18, Temp 98 0, O2 96% on 2L NC  Weight: 123 2 lb    General: chronically ill, deconditioned, weak and frail   Head: Atraumatic  Normocephalic  Eye Exam: anicteric sclera, no discharge, PERRLA, No injection  Oral Exam: dry mucous membranes, no buccaloropharyngeal erythema, palatine tonsils WNL  Neck Exam: no anterior cervical lymphadenopathy noted, neck supple  Cardiovascular: regular rate, regular rhythm, no murmurs, rubs, or gallops  Pulmonary: breath sounds diminished, no wheeze, no rhonchi, no rales  No chest tenderness  Abdominal: soft, non-tender, nondistended, bowel sounds audible x 4 quadrants  : Non distended bladder  B/B incontinent   Extremities and skin: no edema noted, no rashes  Neurological: alert, cooperative and responsive, Oriented to self  BLE +weakness    Laboratory results / Imaging reviewed: Hard copy/ies in medical chart: Reviewed from [facility]    Current Medications:   All medications reviewed and "updated in Nursing Home Chart    Please note:  Voice-recognition software may have been used in the preparation of this document  Occasional wrong word or \"sound-alike\" substitutions may have occurred due to the inherent limitations of voice recognition software  Interpretation should be guided by context      MARY BETH Navarro  5/5/2023    "

## 2023-05-09 PROBLEM — R53.81 PHYSICAL DECONDITIONING: Status: ACTIVE | Noted: 2023-01-01

## 2023-05-10 PROBLEM — T14.8XXA ABRASION: Status: ACTIVE | Noted: 2023-01-01

## 2023-05-10 NOTE — PROGRESS NOTES
[address] MANAGEMENT   AND HYPERBARIC MEDICINE CENTER       Patient ID: Darby Lamas is a 81 y o  male Date of Birth 1950     Location of Service: [facility]    Performed wound round with: Wound team     Chief Complaint : Left hip    Wound Instructions:  Wound: Left hip  Cleanse the wound bed with normal saline solution or wound cleanser  Apply Skin-Prep to periwound area  Apply hydraguard to wound bed  Twice a day and as needed for soiling  Continue to offload  Continue to monitor for worsening or any obvious sign of infection, informed primary care physician and your facility    Allergies  Patient has no known allergies  Assessment & Plan:  1  Abrasion  Assessment & Plan:  Left hip  Unknown etiology  Partial-thickness, with no obvious sign of infection  Local wound care with hydraguard  Continue to offload  Follow-up next week      2  Ambulatory dysfunction  Assessment & Plan:  On 24/7 restorative care             Subjective: May 10, 2023  New consult for wound on the left hip  Patient was referred by Senior care team   As per report, the wound was discovered today, May 10, 2023  Possible etiology unknown  Patient consumes 0 to 100% of his meals  Patient needs assistance with turning in bed  Patient is incontinent of both bowel and bladder  Review of Systems   Reason unable to perform ROS: Confused  Objective:    Physical Exam  Constitutional:       Comments: Seems frail   HENT:      Head: Normocephalic  Cardiovascular:      Rate and Rhythm: Normal rate  Pulmonary:      Effort: Pulmonary effort is normal    Genitourinary:     Comments: Incontinent  Musculoskeletal:      Comments: Limited range of motion   Skin:     Findings: Lesion present  Comments: Left hip: Wound size is 4 x 0 5 x 0 1 cm ,  100% epithelial, no drainage, normal periwound area, wound edge is irregular, with no obvious sign of infection   Neurological:      Mental Status: He is alert  Procedures           Patient's care was coordinated with nursing facility staff  Recent vitals, labs and updated medications were reviewed on EMR or chart system of facility  Past Medical, surgical, social, medication and allergy history and patient's previous records were reviewed and updated as appropriate: Most up-to date information is available in the facility EMR where the patient is currently admitted  Patient Active Problem List   Diagnosis   • Brain mass   • Lung mass   • Hypertension   • Unspecified protein-calorie malnutrition (HCC)   • Non-small cell carcinoma of right lung ([facility] [address] )   • Chemotherapy induced neutropenia (HCC)   • 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[facility])   • Encounter for antineoplastic immunotherapy   • Ambulatory dysfunction   • Acute respiratory failure with hypoxia (HCC)   • Physical deconditioning   • Abrasion     Past Medical History:   Diagnosis Date   • Hypertension      Past Surgical History:   Procedure Laterality Date   • HERNIA REPAIR     • IR BIOPSY LUNG  3/17/2023     Social History     Socioeconomic History   • Marital status: Single     Spouse name: None   • Number of children: None   • Years of education: None   • Highest education level: None   Occupational History   • None   Tobacco Use   • Smoking status: Former   • Smokeless tobacco: None   • Tobacco comments:     quit Wednesday   Substance and Sexual Activity   • Alcohol use: Never   • Drug use: Never   • Sexual activity: None   Other Topics Concern   • None   Social History Narrative   • None     Social Determinants of Health     Financial Resource Strain: Not on file   Food Insecurity: No Food Insecurity   • Worried About Running Out of Food in the Last Year: Never true   • Ran Out of Food in the Last Year: Never true   Transportation Needs: No Transportation Needs   • Lack of Transportation (Medical): No   • Lack of Transportation (Non-Medical):  No   Physical Activity: Not on file   Stress: Not on file   Social "Connections: Not on file   Intimate Partner Violence: Not on file   Housing Stability: Low Risk    • Unable to Pay for Housing in the Last Year: No   • Number of Places Lived in the Last Year: 1   • Unstable Housing in the Last Year: No        Current Outpatient Medications:   •  acetaminophen (TYLENOL) 650 mg suppository, Insert 650 mg into the rectum every 4 (four) hours as needed Temp above 100 4, Disp: , Rfl:   •  dexamethasone (DECADRON) 4 mg tablet, Take 1 tablet (4 mg total) by mouth daily Do not start before April 28, 2023 , Disp: , Rfl: 0  •  hyoscyamine (LEVSIN/SL) 0 125 mg SL tablet, Take 0 125 mg by mouth every 4 (four) hours as needed, Disp: , Rfl:   •  levETIRAcetam (KEPPRA) 500 mg tablet, Take 1 tablet (500 mg total) by mouth every 12 (twelve) hours, Disp: 60 tablet, Rfl: 0  •  LORazepam (ATIVAN) 2 mg/mL concentrated solution, Take 0 25 mL by mouth every 2 (two) hours as needed, Disp: , Rfl:   •  morphine 20 MG/5ML solution, Take 0 25 mL by mouth every 2 (two) hours as needed, Disp: , Rfl:   •  ondansetron (ZOFRAN-ODT) 4 mg disintegrating tablet, Take 1 tablet (4 mg total) by mouth every 6 (six) hours as needed for nausea or vomiting, Disp: 20 tablet, Rfl: 1  History reviewed  No pertinent family history  Coordination of Care: Wound team aware of the treatment plan  Facility nurse will provide wound treatment and monitor the wound for any changes  Patient / Staff education : Patient / Staff was given education on sign of infection and pressure ulcer prevention  Patient/ Staff verbalized understanding     Follow up :  Next week    Voice-recognition software may have been used in the preparation of this document  Occasional wrong word or \"sound-alike\" substitutions may have occurred due to the inherent limitations of voice recognition software  Interpretation should be guided by MARY BETH Zamudio  "

## 2023-05-10 NOTE — ASSESSMENT & PLAN NOTE
Noted with progression of disease  PT OT  Fall precautions  Encourage nutrition/hydration  Continue supportive care with ADLs/IADLs

## 2023-05-10 NOTE — ASSESSMENT & PLAN NOTE
"Malnutrition Findings:    with poor intake  Noted with generalized subcutaneous muscle wasting and decrease strength  Pt on comfort care  Encourage hydration/nutrition as tolerated by pt  Continue supportive care    Estimated body mass index is 19 89 kg/m² as calculated from the following:    Height as of 4/27/23: 5' 6\" (1 676 m)  Weight as of this encounter: 55 9 kg (123 lb 3 2 oz)    "

## 2023-05-10 NOTE — PROGRESS NOTES
"[address], [address], [facility] SPECIALTY [facility], [facility]  [phone]    NAME: Leonila Perez  AGE: 81 y o  SEX: male    Progress Note    Location: [facility]   POS: 31 (SNF)    Assessment/Plan:    Physical deconditioning  Noted with progression of disease  PT OT  Fall precautions  Encourage nutrition/hydration  Continue supportive care with ADLs/IADLs    Ambulatory dysfunction  Multifactorial with progression of disease   Continue PT OT   Fall precautions  mostly bed bound   Assist pt with ADLs/IADLs  supportive care     Hypertension  With know history however is not on antihypertensive  blood pressure log reviewed and within acceptable range  BP this /60  Monitor     Non-small cell carcinoma of right lung ([facility] [address] )  On 3/17/23- diagnosed with stage IV non small lung CA poorly differentiated  Subsequently found to have brain mets and underwent brain radiation treatment recently at the hospital  Also pt with pneumonia and pneumothorax  S/p chest tube  Overall pt with rapid decline   Heme onc notes reviewed  Pt deemed not a candidate for any chemotherapy due to poor performance status   Remains on supplemental oxygen at 2L SaO2 96%  +SOB at rest  No cough noted in exam  Sleeps most of the day with decrease strength and generalized muscle wasting  Remains on comfort care family considering hospice upon discharge from the faciltiy  continue supportive care    Unspecified protein-calorie malnutrition ([address] )  Malnutrition Findings:    with poor intake  Noted with generalized subcutaneous muscle wasting and decrease strength  Pt on comfort care  Encourage hydration/nutrition as tolerated by pt  Continue supportive care    Estimated body mass index is 19 89 kg/m² as calculated from the following:    Height as of 4/27/23: 5' 6\" (1 676 m)  Weight as of this encounter: 55 9 kg (123 lb 3 2 oz)      Chief complaint / Reason for visit: STR F/U    History of Present Illness:  Patient is a 78-year old " "male seen and examined for STR F/U  Received pt lying in bed, resting  At the time of my evaluation pt appears comfortable  In no acute distress  Noted with generalized muscle/subcutaneous wasting  Denies back or joint pain  Remains dependent for ADLs/IADLs  B/B incontinent  Denies /GI discomfort  Free of cp or palpitations  No other concerns or issues at this time  Review of Systems:  Per history of present illness, all other systems reviewed and negative  Other than those noted in HPI    HISTORY:  Medical Hx: Reviewed, unchanged  Family Hx: Reviewed, unchanged  Soc Hx: Reviewed,  unchanged    ALLERGY: Reviewed, unchanged  No Known Allergies     PHYSICAL EXAM:  Vital Signs: /60, P 78, R 16, T 98, O2 95% on 2L NC  Weight: 123 2 lb    General: chronically ill appearing   Head: Atraumatic  Normocephalic  Eye Exam: anicteric sclera, no discharge, PERRLA, No injection  Oral Exam: dry mucous membranes, no buccaloropharyngeal erythema, palatine tonsils WNL  Neck Exam: no anterior cervical lymphadenopathy noted, neck supple  Cardiovascular: regular rate, regular rhythm, no murmurs, rubs, or gallops  Pulmonary: decreased breath sounds, no wheeze, no rhonchi, no rales  No chest tenderness  Abdominal: soft, non-tender, nondistended, bowel sounds audible x 4 quadrants  : Non distended bladder  B/B incontinent   Extremities and skin: no edema noted, no rashes  Neurological: alert, cooperative and responsive, Oriented to self, BLE +weakness     Laboratory results / Imaging reviewed: Hard copy/ies in medical chart: Reviewed from [facility]    Current Medications: All medications reviewed and updated in Nursing Home Chart    Please note:  Voice-recognition software may have been used in the preparation of this document  Occasional wrong word or \"sound-alike\" substitutions may have occurred due to the inherent limitations of voice recognition software  Interpretation should be guided by context      Abhay San, " MARY BETH  5/9/2023

## 2023-05-10 NOTE — ASSESSMENT & PLAN NOTE
Multifactorial with progression of disease   Continue PT OT   Fall precautions  mostly bed bound   Assist pt with ADLs/IADLs  supportive care

## 2023-05-10 NOTE — ASSESSMENT & PLAN NOTE
Left hip  Unknown etiology  Partial-thickness, with no obvious sign of infection  Local wound care with hydraguard  Continue to offload  Follow-up next week

## 2023-05-10 NOTE — ASSESSMENT & PLAN NOTE
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

## 2023-05-10 NOTE — ASSESSMENT & PLAN NOTE
With brain mets  Deemed not a candidate for any chemotherapy per heme onc notes  C/w keppra 500 mg every 12 hrs, memantine 10 mg every 12 hrs, decadron 4 mg daily   Alert to self   On comfort measures with goal to be discharged home with hospice services  continue supportive care

## 2023-05-11 NOTE — OCCUPATIONAL THERAPY NOTE
Occupational Therapy Discharge Summary:     Pt made limited progress throughout rehab stay, from OT standpoint  Pt was D/C to subacute rehab 2* cognitive and physical deficits that prevented pt from d/c home   At time of D/C pt was completing ADLS with mod/max assist and functional transfers with mod assist  Family meeting was held with pt and pt's dtr during admission

## 2023-05-11 NOTE — ASSESSMENT & PLAN NOTE
· Patient with known small cell lung cancer with brain mets  · Maintain fall/safety precautions  · Assist patient with ADLs/IADLs as needed  · Encourage adequate p o  nutrition/hydration  · Continue 24/7 supportive per facility

## 2023-05-11 NOTE — ASSESSMENT & PLAN NOTE
· Known non-small cell lung cancer  · Patient underwent IR lung biopsy 5/24/3606 that was complicated by right pneumothorax requiring right chest tube  · Chest tube was removed for 4/7/23  · Following removal of chest tube patient was noted to have left lower lobe pneumonia  · Patient completed 6-day course of IV cefepime and vancomycin patient had episode of choking during breakfast this morning Requiring suctioning  · O2 was increased to 4 L patient's O2 sat 92%  · We will continue to monitor

## 2023-05-11 NOTE — PROGRESS NOTES
[facility]  [address]  [phone]  [place]  Code 31 (STR)        NAME: Jhonny Perez  AGE: 81 y o  SEX: male CODE STATUS: No CPR    DATE OF ENCOUNTER: 5/11/2023    Assessment and Plan     1  Non-small cell carcinoma of right lung [facility])  Assessment & Plan:  · Recent diagnosis of stage IV non-small cell lung cancer with brain mets  · Patient received whole brain radiation 4/12/23  · Patient deemed not a candidate for chemotherapy   · Patient had episode of choking this morning during breakfast  · O2 at 3 L  90%  · Increased oxygen to 4 L O2 sat 92%  · On exam patient does not appear to be in any respiratory distress  · We will continue to monitor      2  Acute respiratory failure with hypoxia (HCC)  Assessment & Plan:  · Known non-small cell lung cancer  · Patient underwent IR lung biopsy 1/16/9083 that was complicated by right pneumothorax requiring right chest tube  · Chest tube was removed for 4/7/23  · Following removal of chest tube patient was noted to have left lower lobe pneumonia  · Patient completed 6-day course of IV cefepime and vancomycin patient had episode of choking during breakfast this morning Requiring suctioning  · O2 was increased to 4 L patient's O2 sat 92%  · We will continue to monitor      3  Hypertension, unspecified type  Assessment & Plan:  · BP stable 100/60  · Continue monitor BP  · Not currently on antihypertensive medications      4  Physical deconditioning  Assessment & Plan:  · Patient with known small cell lung cancer with brain mets  · Maintain fall/safety precautions  · Assist patient with ADLs/IADLs as needed  · Encourage adequate p o  nutrition/hydration  · Continue 24/7 supportive per facility         All medications and routine orders were reviewed and updated as needed  Chief Complaint     STR follow up visit  Patient's care was coordinated with nursing facility staff   Recent vitals, labs, and updated medications were review on [facility] system in facility  Past Medical and Surgical History      Past Medical History:   Diagnosis Date   • Hypertension      Past Surgical History:   Procedure Laterality Date   • HERNIA REPAIR     • IR BIOPSY LUNG  3/17/2023     No Known Allergies       History of Present Illness     HPI  Eliza Stanley is a 81year old male, he is a STR patient of [facility] since 4/25/23  Past Medical Hx including but not limited to non SCC of right lung, HTN, brain mets  He was  seen in collaboration with nursing for medical mgmt and STR follow up  Hospital course  Patient underwent IR lung biopsy 3/76/1090 that was complicated by right pneumothorax  Requiring right chest tube  Chest tube was removed 4/7/2023, patient then was noted to have left lower lobe pneumonia  Patient completed 6-day course of IV cefepime and vancomycin  Patient received whole brain radiation 4/12/2023 for brain metastasis secondary to non-small cell lung cancer  He was discharged to [address] [place] [address] on 4/25/2023  Patient was then discharged to [facility]  Rehab course  Priscila Singer was seen and examined at bedside today  On exam patient is resting in bed  He is alert to self but disoriented to time and place  Per SNF staff patient had choked on eggs during his breakfast, requiring suctioning  Lung sounds are decreased, O2 90% on 3 L  Oxygen was increased to 4 L, O2 sat 92%  Patient remains on comfort care, will continue to monitor  Diet changed to puréed  No other complaints at this time  He denies CP/SOB/N/V/D  Denies lightheadedness, dizziness, headaches, vision changes  Patient states they are eating well and staying hydrated  Denies any bowel or bladder issues  Per review of SNF records, Patient is eating 3 meals per day, consuming  %  Last documented BM 5/10/23  No concerns from nursing at this time      The patient's allergies, past medical, surgical, social and family history were reviewed and unchanged  Review of Systems     Review of Systems   Constitutional: Positive for activity change and appetite change  Negative for fever  HENT: Negative  Eyes: Negative  Respiratory: Positive for cough, choking and shortness of breath  Negative for wheezing  Cardiovascular: Negative for chest pain and palpitations  Gastrointestinal: Negative  Negative for abdominal distention, abdominal pain, constipation, diarrhea, nausea and vomiting  Endocrine: Negative  Genitourinary: Negative for difficulty urinating  Musculoskeletal: Positive for gait problem  Skin: Positive for wound  Left hip followed by wound care   Neurological: Positive for weakness  Hematological: Negative  Psychiatric/Behavioral: Negative for sleep disturbance  Objective     Vitals:   Vitals:    05/11/23 1821   BP: 100/60   Pulse: 73   Resp: 18   Temp: 97 9 °F (36 6 °C)   SpO2: 96%         Physical Exam  Constitutional:       General: He is not in acute distress  Appearance: He is ill-appearing  HENT:      Head: Normocephalic and atraumatic  Nose: No congestion  Mouth/Throat:      Mouth: Mucous membranes are moist    Eyes:      Conjunctiva/sclera: Conjunctivae normal    Cardiovascular:      Rate and Rhythm: Normal rate and regular rhythm  Heart sounds: Normal heart sounds  Pulmonary:      Effort: Pulmonary effort is normal  No respiratory distress  Breath sounds: No wheezing  Comments: Decreased LS  Abdominal:      General: Bowel sounds are normal  There is no distension  Palpations: Abdomen is soft  Tenderness: There is no abdominal tenderness  Musculoskeletal:      Right lower leg: No edema  Left lower leg: No edema  Skin:     General: Skin is warm  Neurological:      Mental Status: He is alert  Mental status is at baseline     Psychiatric:         Mood and Affect: Mood normal          Behavior: Behavior normal          Pertinent Laboratory/Diagnostic "Studies:   Reviewed in facility chart-stable      Current Medications   Medications reviewed and updated see facility [facility] for details  Current Outpatient Medications:   •  acetaminophen (TYLENOL) 650 mg suppository, Insert 650 mg into the rectum every 4 (four) hours as needed Temp above 100 4, Disp: , Rfl:   •  dexamethasone (DECADRON) 4 mg tablet, Take 1 tablet (4 mg total) by mouth daily Do not start before April 28, 2023 , Disp: , Rfl: 0  •  hyoscyamine (LEVSIN/SL) 0 125 mg SL tablet, Take 0 125 mg by mouth every 4 (four) hours as needed, Disp: , Rfl:   •  levETIRAcetam (KEPPRA) 500 mg tablet, Take 1 tablet (500 mg total) by mouth every 12 (twelve) hours, Disp: 60 tablet, Rfl: 0  •  LORazepam (ATIVAN) 2 mg/mL concentrated solution, Take 0 25 mL by mouth every 2 (two) hours as needed, Disp: , Rfl:   •  morphine 20 MG/5ML solution, Take 0 25 mL by mouth every 2 (two) hours as needed, Disp: , Rfl:   •  ondansetron (ZOFRAN-ODT) 4 mg disintegrating tablet, Take 1 tablet (4 mg total) by mouth every 6 (six) hours as needed for nausea or vomiting, Disp: 20 tablet, Rfl: 1       Please note:  Voice-recognition software may have been used in the preparation of this document  Occasional wrong word or \"sound-alike\" substitutions may have occurred due to the inherent limitations of voice recognition software  Interpretation should be guided by context           [facility] MARY BETH Jalloh  5/11/2023  6:34 PM    "

## 2023-05-11 NOTE — ASSESSMENT & PLAN NOTE
· Recent diagnosis of stage IV non-small cell lung cancer with brain mets  · Patient received whole brain radiation 4/12/23  · Patient deemed not a candidate for chemotherapy   · Patient had episode of choking this morning during breakfast  · O2 at 3 L  90%  · Increased oxygen to 4 L O2 sat 92%  · On exam patient does not appear to be in any respiratory distress  · We will continue to monitor

## 2023-05-16 NOTE — TELEPHONE ENCOUNTER
Viviane Angelucci called, requesting a call back from provider, regarding pt passing away   Provider paged via [facility]

## 2023-05-22 ENCOUNTER — HOSPITAL ENCOUNTER (OUTPATIENT)
Dept: INFUSION CENTER | Facility: CLINIC | Age: 73
End: 2023-05-22
